# Patient Record
Sex: MALE | Race: WHITE | NOT HISPANIC OR LATINO | Employment: OTHER | URBAN - NONMETROPOLITAN AREA
[De-identification: names, ages, dates, MRNs, and addresses within clinical notes are randomized per-mention and may not be internally consistent; named-entity substitution may affect disease eponyms.]

---

## 2017-03-24 RX ORDER — ESOMEPRAZOLE MAGNESIUM 40 MG/1
CAPSULE, DELAYED RELEASE ORAL
Qty: 90 CAPSULE | Refills: 1 | Status: SHIPPED | OUTPATIENT
Start: 2017-03-24 | End: 2023-03-02 | Stop reason: SDUPTHER

## 2017-03-24 RX ORDER — FLUTICASONE PROPIONATE 50 MCG
SPRAY, SUSPENSION (ML) NASAL
Qty: 48 G | Refills: 2 | Status: SHIPPED | OUTPATIENT
Start: 2017-03-24 | End: 2021-06-21

## 2017-03-24 RX ORDER — BUPROPION HYDROCHLORIDE 300 MG/1
300 TABLET ORAL DAILY
Qty: 30 TABLET | Refills: 2 | Status: SHIPPED | OUTPATIENT
Start: 2017-03-24

## 2017-03-24 RX ORDER — BUPROPION HYDROCHLORIDE 300 MG/1
TABLET ORAL
Qty: 90 TABLET | Refills: 2 | OUTPATIENT
Start: 2017-03-24

## 2017-03-24 RX ORDER — PROPRANOLOL HCL 60 MG
CAPSULE, EXTENDED RELEASE 24HR ORAL
Qty: 90 CAPSULE | Refills: 1 | Status: SHIPPED | OUTPATIENT
Start: 2017-03-24 | End: 2017-04-20 | Stop reason: SDUPTHER

## 2017-04-20 RX ORDER — PROPRANOLOL HCL 60 MG
60 CAPSULE, EXTENDED RELEASE 24HR ORAL DAILY
Qty: 90 CAPSULE | Refills: 1 | Status: SHIPPED | OUTPATIENT
Start: 2017-04-20 | End: 2021-03-03 | Stop reason: SDUPTHER

## 2017-06-13 ENCOUNTER — HOSPITAL ENCOUNTER (OUTPATIENT)
Dept: PHYSICAL THERAPY | Facility: HOSPITAL | Age: 62
Setting detail: THERAPIES SERIES
Discharge: HOME | End: 2017-06-13

## 2017-06-13 ENCOUNTER — TRANSCRIBE ORDERS (OUTPATIENT)
Dept: PHYSICAL THERAPY | Facility: HOSPITAL | Age: 62
End: 2017-06-13

## 2017-06-13 DIAGNOSIS — M17.10 PRIMARY OSTEOARTHRITIS OF ONE KNEE, UNSPECIFIED LATERALITY: Primary | ICD-10-CM

## 2017-06-13 DIAGNOSIS — G89.29 CHRONIC PAIN OF RIGHT KNEE: ICD-10-CM

## 2017-06-13 DIAGNOSIS — Z96.651 STATUS POST TOTAL RIGHT KNEE REPLACEMENT: Primary | ICD-10-CM

## 2017-06-13 DIAGNOSIS — R79.1 ABNORMAL COAGULATION PROFILE: ICD-10-CM

## 2017-06-13 DIAGNOSIS — M25.561 CHRONIC PAIN OF RIGHT KNEE: ICD-10-CM

## 2017-06-13 PROCEDURE — 97162 PT EVAL MOD COMPLEX 30 MIN: CPT | Performed by: PHYSICAL THERAPIST

## 2017-06-13 PROCEDURE — 97110 THERAPEUTIC EXERCISES: CPT | Performed by: PHYSICAL THERAPIST

## 2017-06-13 NOTE — THERAPY EVALUATION
Outpatient Physical Therapy Ortho Initial Evaluation  [facility]     Patient Name: Otf Murray  : 1955  MRN: [id number]  Today's Date: 2017      Visit Date: 2017    There is no problem list on file for this patient.  Visit   Recert date 17  MD visit 17      S/P R TKR 17     Past Medical History:   Diagnosis Date   • Acute gastritis without bleeding    • Alcohol abuse counseling and surveillance of alcoholic    • Allergic rhinitis    • Anxiety state    • Attention deficit disorder of childhood with hyperactivity    • Attention deficit hyperactivity disorder    • Attention deficit hyperactivity disorder, predominantly hyperactive impulsive type    • Attention deficit hyperactivity disorder, predominantly inattentive type    • Backache    • Body mass index (bmi) 31.0-31.9, adult     Body mass index (BMI) 31.0-31.9, adult - BMI 33.5      • Burn of lower leg    • Candidiasis of skin    • Cardiac murmur, unspecified    • Decreased testosterone level    • Depressive disorder     Depressive disorder - acute on chronic      • Dysgraphia    • Edema    • Elbow joint pain    • Encounter for general adult medical examination with abnormal findings    • Encounter for screening for malignant neoplasm of colon    • Essential hypertension    • Essential tremor    • Ex-smoker    • Fatigue    • Gastro-esophageal reflux disease with esophagitis    • Gastroesophageal reflux disease    • Hyperlipidemia    • Hyperlipoproteinemia    • Impotence of organic origin    • Insomnia    • Intermittent palpitations    • Knee pain     Knee pain - patellofemoral      • Lateral epicondylitis    • Low back pain    • Male erectile disorder    • Male erectile dysfunction, unspecified    • Male hypogonadism    • Neck pain     Neck pain aggravated by recumbency      • Obese     Obese - BMI 33.0      • On long term drug therapy    • Osteoarthrosis     Osteoarthrosis, unspecified whether generalized or  localized, involving lower leg      • Other obesity    • Other specified diseases of anus and rectum     Other specified diseases of anus and rectum - rectal pain after c-scope prep      • Overweight    • Pain in left knee    • Pain in right knee    • Shoulder pain, right    • Spasm of back muscles    • Tachycardia, unspecified    • Tremor, unspecified    • Tubular adenoma of colon    • Unspecified essential hypertension         Past Surgical History:   Procedure Laterality Date   • COLONOSCOPY  03/16/2016    One polyp in the sigmoid colon.Resected and retrieved.        • CYST REMOVAL  10/27/2009     Excision of sebaceous cyst of the forehead, glabellar region.   • ENDOSCOPY  03/16/2016    Mildly severe esophagitis.Gastritis.Normal examined duodenum.Several biopsies obtained in the lower third of the esophagus.    • ENDOSCOPY AND COLONOSCOPY  04/12/2006     Normal colonoscopic examination    • INCISION AND DRAINAGE ABSCESS  10/24/2012   • INJECTION OF MEDICATION  04/21/2011    Depo Medrol (Methylprednisone) 80mg (1)        • INJECTION OF MEDICATION  02/01/2016    Kenalog (3)        • INJECTION OF MEDICATION  02/19/2016    Toradol (3)    • KNEE ARTHROSCOPY  04/02/2009     Medial meniscus tear of left knee   • SCROTUM EXPLORATION  07/02/2002     Excision of epidermal inclusion cyst. base of left scrotum   • SKIN BIOPSY  04/23/2012   • SKIN TAG REMOVAL  04/25/2002    Skin tag, left scrotum        Visit Dx:     ICD-10-CM ICD-9-CM   1. Status post total right knee replacement Z96.651 V43.65   2. Chronic pain of right knee M25.561 719.46    G89.29 338.29             Patient History       06/13/17 1300          History    Chief Complaint Pain  -KW      Type of Pain Knee pain  -KW      Date Current Problem(s) Began --   3 years ago  -KW      Brief Description of Current Complaint Difficulty walking, stairs, discomfort  -KW      Previous treatment for THIS PROBLEM Injections;Rehabilitation;Medication;Surgery  -KW       Surgery Date: 06/08/17  -KW      Patient/Caregiver Goals Relieve pain;Improve mobility;Improve strength  -KW      Current Tobacco Use none  -KW      Smoking Status none  -KW      Patient's Rating of General Health Good  -KW      Hand Dominance right-handed  -KW      Occupation/sports/leisure activities [place]  -KW      What clinical tests have you had for this problem? MRI  -KW      Results of Clinical Tests torn ACL,  -KW      History of Previous Related Injuries L knee scope 6 years ago  -KW      Pain     Pain Location Knee  -KW      Pain at Present 3  -KW      Pain at Best 2  -KW      Pain at Worst 10  -KW      Pain Description Burning;Discomfort;Dull  -KW      What Performance Factors Make the Current Problem(s) WORSE? moving, walking  -KW      What Performance Factors Make the Current Problem(s) BETTER? rest, meds  -KW      Is your sleep disturbed? No  -KW      What position do you sleep in? Right sidelying;Left sidelying;Supine  -KW      Fall Risk Assessment    Any falls in the past year: No  -KW      Services    Prior Rehab/Home Health Experiences No  -KW      Daily Activities    Primary Language English  -KW      Safety    Are you being hurt, hit, or frightened by anyone at home or in your life? No  -KW        User Key  (r) = Recorded By, (t) = Taken By, (c) = Cosigned By    Initials Name Provider Type    KW Pearl Mckeon, PT Physical Therapist                PT Ortho       06/13/17 1300    Posture/Observations    Observations Edema;Ecchymosis/bruising;Incision healing;Muscle atrophy  -KW    Posture/Observations Comments --   non antalgic gait with RW and compression sock on  -KW    Sensation    Sensation WNL? WNL  -KW    Knee Palpation    Patella Tendon Right:;Tender  -KW    Quads Right:;Elicits spasm;Guarded/taut;Trigger point  -KW    Knee Palpation? Yes  -KW    ROM (Range of Motion)    General ROM Detail R AROM knee 5-110, PROM 4-120  -KW    MMT (Manual Muscle Testing)    General MMT  Assessment Detail Held sec to recent sx  -KW    Flexibility    Flexibility Tested? Lower Extremity  -KW    Lower Extremity Flexibility    Hamstrings Right:;Moderately limited  -KW    Girth    Girth Measured? Right Lower Extremity  -KW    RLE Quick Girth (cm)    Mid patella 49 cm  -KW    Distal thigh 51 cm  -KW    Gait Assessment/Treatment    Gait, Caswell Level independent  -KW    Gait, Assistive Device rolling walker  -KW    Gait, Distance (Feet) 50  -KW    Gait, Gait Deviations decreased heel strike;step length decreased;stride length decreased;saman decreased  -KW    Gait, Impairments ROM decreased;strength decreased;pain  -KW      User Key  (r) = Recorded By, (t) = Taken By, (c) = Cosigned By    Initials Name Provider Type    CIPRIANO Mckeon, PT Physical Therapist                            Therapy Education       06/13/17 1348          Therapy Education    Given HEP  -KW      Program New  -KW      How Provided Verbal;Demonstration  -KW      Provided to Patient  -KW      Level of Understanding Verbalized;Demonstrated  -KW        User Key  (r) = Recorded By, (t) = Taken By, (c) = Cosigned By    Initials Name Provider Type    CIPRIANO Mckeon, PT Physical Therapist                PT OP Goals       06/13/17 1400       PT Short Term Goals    STG Date to Achieve 06/27/17  -KW     STG 1 Decrease R knee pain to 2/10  -KW     STG 2 Inc R knee AROM to 0-120 degrees  -KW     STG 3 Decrease edema R knee at jointline to 48 cm  -KW     STG 4 Improve Hamstring flexibility from mod to min  -KW     Long Term Goals    LTG Date to Achieve 07/04/17  -KW     LTG 1 Ind w/ final HEP  -KW     LTG 2 Inc R knee AROM to 0-125 degrees  -KW     LTG 3 Decrease R knee edema at joint line to 47cm or less  -KW     LTG 4 Increase LEFS score to 40/80 or better  -KW     LTG 5 Improve Hamsting flexibility from min to WNL  -KW     Time Calculation    PT Goal Re-Cert Due Date 07/04/17  -KW       User Key  (r) = Recorded By, (t) = Taken  By, (c) = Cosigned By    Initials Name Provider Type    CIPRIANO Mckeon, PT Physical Therapist                PT Assessment/Plan       06/13/17 1411       PT Assessment    Functional Limitations Impaired gait;Impaired locomotion;Limitation in home management;Limitations in community activities;Performance in leisure activities;Performance in self-care ADL  -KW     Impairments Joint integrity;Joint mobility;Range of motion;Impaired flexibility;Edema;Endurance;Locomotion;Muscle strength;Pain;Gait  -KW     Assessment Comments moderate edema R knee, decreased quad strength on R  -KW     Please refer to paper survey for additional self-reported information No  -KW     Rehab Potential Good  -KW     Patient/caregiver participated in establishment of treatment plan and goals Yes  -KW     Patient would benefit from skilled therapy intervention Yes  -KW     PT Plan    PT Frequency 4x/week  -KW     Predicted Duration of Therapy Intervention (days/wks) 4 weeks  -KW     Planned CPT's? PT EVAL MOD COMPLELITY: 32593;PT RE-EVAL: 86678;PT THER ACT EA 15 MIN: 75608;PT AQUATIC THERAPY EA 15 MIN: 45144;PT THER SUPP EA 15 MIN;PT MANUAL THERAPY EA 15 MIN: 31329;PT ELECTRICAL STIM UNATTEND: ;PT GAIT TRAINING EA 15 MIN: 53766;PT THER PROC EA 15 MIN: 19816  -KW     Physical Therapy Interventions (Optional Details) aquatics exercise;ROM (Range of Motion);stair training;strengthening;stretching;modalities;manual therapy techniques;balance training;swiss ball techniques;gait training;home exercise program;joint mobilization;patient/family education;gross motor skills  -KW     PT Plan Comments S/p R TKR on 6/8/17. 3-4x per week for the first 2 weeks Inc ROm and strength R knee  -KW       User Key  (r) = Recorded By, (t) = Taken By, (c) = Cosigned By    Initials Name Provider Type    CIPRIANO Mckeon, PT Physical Therapist                Modalities       06/13/17 1300          Ice    Ice Applied Yes  -KW      Location R knee  -KW       Rx Minutes 12 mins  -KW        User Key  (r) = Recorded By, (t) = Taken By, (c) = Cosigned By    Initials Name Provider Type    CIPRIANO Mckeon PT Physical Therapist              Exercises       06/13/17 1400          Subjective Comments    Subjective Comments Reports s/p R TKR on 6/8/17. states he had a no problems to get up and walking after sx. Has minimal pain today in R knee. States prior to sx he had years of R knee pain due to OA  -KW      Subjective Pain    Able to rate subjective pain? yes  -KW      Pre-Treatment Pain Level 3  -KW      Post-Treatment Pain Level 1  -KW      Exercise 1    Exercise Name 1 heel slides  -KW      Sets 1 2  -KW      Reps 1 10  -KW      Exercise 2    Exercise Name 2 PROM R knee  -KW      Sets 2 1  -KW      Reps 2 10  -KW      Exercise 3    Exercise Name 3 quad sets  -KW      Sets 3 3  -KW      Reps 3 10  -KW      Exercise 4    Exercise Name 4 stertch Hamstrings  -KW      Sets 4 3  -KW      Time (Seconds) 4 30  -KW        User Key  (r) = Recorded By, (t) = Taken By, (c) = Cosigned By    Initials Name Provider Type    CIPRIANO Mckeon, ODETTE Physical Therapist                              Outcome Measures       06/13/17 1400          Lower Extremity Functional Index    Any of your usual work, housework or school activities 0  -KW      Your usual hobbies, recreational or sporting activities 0  -KW      Getting into or out of the bath 2  -KW      Walking between rooms 2  -KW      Putting on your shoes or socks 0  -KW      Squatting 0  -KW      Lifting an object, like a bag of groceries from the floor 3  -KW      Performing light activities around your home 0  -KW      Performing heavy activities around your home 0  -KW      Getting into or out of a car 2  -KW      Walking 2 blocks 0  -KW      Walking a mile 0  -KW      Going up or down 10 stairs (about 1 flight of stairs) 0  -KW      Standing for 1 hour 0  -KW      Sitting for 1 hour 4  -KW      Running on even ground 0  -KW       Running on uneven ground 0  -KW      Making sharp turns while running fast 0  -KW      Hopping 0  -KW      Rolling over in bed 2  -KW      Total 15  -KW      Functional Assessment    Outcome Measure Options Lower Extremity Functional Scale (LEFS)  -KW        User Key  (r) = Recorded By, (t) = Taken By, (c) = Cosigned By    Initials Name Provider Type    KW Pearl Mckeon, PT Physical Therapist            Time Calculation:   Start Time: 1300  Stop Time: 1358  Time Calculation (min): 58 min  Total Timed Code Minutes- PT: 15 minute(s)     Therapy Charges for Today     Code Description Service Date Service Provider Modifiers Qty    32927818442 HC PT EVAL MOD COMPLEXITY 3 6/13/2017 Pearl Mckeon, PT GP 1    30576643043 HC PT THER PROC EA 15 MIN 6/13/2017 Pearl Mckeon, PT GP 1          PT G-Codes  Outcome Measure Options: Lower Extremity Functional Scale (LEFS)         Pearl Mckeon, PT  6/13/2017

## 2017-06-14 ENCOUNTER — HOSPITAL ENCOUNTER (OUTPATIENT)
Dept: PHYSICAL THERAPY | Facility: HOSPITAL | Age: 62
Setting detail: THERAPIES SERIES
Discharge: HOME | End: 2017-06-14

## 2017-06-14 DIAGNOSIS — Z96.651 STATUS POST TOTAL RIGHT KNEE REPLACEMENT: Primary | ICD-10-CM

## 2017-06-14 DIAGNOSIS — G89.29 CHRONIC PAIN OF RIGHT KNEE: ICD-10-CM

## 2017-06-14 DIAGNOSIS — M25.561 CHRONIC PAIN OF RIGHT KNEE: ICD-10-CM

## 2017-06-14 PROCEDURE — G0283 ELEC STIM OTHER THAN WOUND: HCPCS

## 2017-06-14 PROCEDURE — 97110 THERAPEUTIC EXERCISES: CPT

## 2017-06-14 NOTE — THERAPY TREATMENT NOTE
Outpatient Physical Therapy Ortho Treatment Note  [facility]     Patient Name: Otf Murray  : 1955  MRN: [id number]  Today's Date: 2017      Visit Date: 2017     sUBJECTIVE iMPROVEMENT 0  vISITS 2/2  VISITS APPROVED 30 per year  RTMD 2017  Recert Prfp2-    S/P right TKA on 2017      Visit Dx:    ICD-10-CM ICD-9-CM   1. Status post total right knee replacement Z96.651 V43.65   2. Chronic pain of right knee M25.561 719.46    G89.29 338.29       There is no problem list on file for this patient.       Past Medical History:   Diagnosis Date   • Acute gastritis without bleeding    • Alcohol abuse counseling and surveillance of alcoholic    • Allergic rhinitis    • Anxiety state    • Attention deficit disorder of childhood with hyperactivity    • Attention deficit hyperactivity disorder    • Attention deficit hyperactivity disorder, predominantly hyperactive impulsive type    • Attention deficit hyperactivity disorder, predominantly inattentive type    • Backache    • Body mass index (bmi) 31.0-31.9, adult     Body mass index (BMI) 31.0-31.9, adult - BMI 33.5      • Burn of lower leg    • Candidiasis of skin    • Cardiac murmur, unspecified    • Decreased testosterone level    • Depressive disorder     Depressive disorder - acute on chronic      • Dysgraphia    • Edema    • Elbow joint pain    • Encounter for general adult medical examination with abnormal findings    • Encounter for screening for malignant neoplasm of colon    • Essential hypertension    • Essential tremor    • Ex-smoker    • Fatigue    • Gastro-esophageal reflux disease with esophagitis    • Gastroesophageal reflux disease    • Hyperlipidemia    • Hyperlipoproteinemia    • Impotence of organic origin    • Insomnia    • Intermittent palpitations    • Knee pain     Knee pain - patellofemoral      • Lateral epicondylitis    • Low back pain    • Male erectile disorder    • Male erectile dysfunction,  unspecified    • Male hypogonadism    • Neck pain     Neck pain aggravated by recumbency      • Obese     Obese - BMI 33.0      • On long term drug therapy    • Osteoarthrosis     Osteoarthrosis, unspecified whether generalized or localized, involving lower leg      • Other obesity    • Other specified diseases of anus and rectum     Other specified diseases of anus and rectum - rectal pain after c-scope prep      • Overweight    • Pain in left knee    • Pain in right knee    • Shoulder pain, right    • Spasm of back muscles    • Tachycardia, unspecified    • Tremor, unspecified    • Tubular adenoma of colon    • Unspecified essential hypertension         Past Surgical History:   Procedure Laterality Date   • COLONOSCOPY  03/16/2016    One polyp in the sigmoid colon.Resected and retrieved.        • CYST REMOVAL  10/27/2009     Excision of sebaceous cyst of the forehead, glabellar region.   • ENDOSCOPY  03/16/2016    Mildly severe esophagitis.Gastritis.Normal examined duodenum.Several biopsies obtained in the lower third of the esophagus.    • ENDOSCOPY AND COLONOSCOPY  04/12/2006     Normal colonoscopic examination    • INCISION AND DRAINAGE ABSCESS  10/24/2012   • INJECTION OF MEDICATION  04/21/2011    Depo Medrol (Methylprednisone) 80mg (1)        • INJECTION OF MEDICATION  02/01/2016    Kenalog (3)        • INJECTION OF MEDICATION  02/19/2016    Toradol (3)    • KNEE ARTHROSCOPY  04/02/2009     Medial meniscus tear of left knee   • SCROTUM EXPLORATION  07/02/2002     Excision of epidermal inclusion cyst. base of left scrotum   • SKIN BIOPSY  04/23/2012   • SKIN TAG REMOVAL  04/25/2002    Skin tag, left scrotum              PT Ortho       06/14/17 1500    ROM (Range of Motion)    General ROM Detail Right AROM fl 115  -CP    MMT (Manual Muscle Testing)    General MMT Assessment Detail No Independent SLR  -CP      06/14/17 1400    Subjective Comments    Subjective Comments Patient report doing well so far.  thinks  he may have walked a little too much the other day  -CP    Subjective Pain    Able to rate subjective pain? yes  -CP    Pre-Treatment Pain Level 4  -CP    Post-Treatment Pain Level 3  -CP    Posture/Observations    Posture/Observations Comments osiel hose bilateral LE  -CP    Gait Assessment/Treatment    Gait, [place] Level conditional independence  -CP    Gait, Assistive Device rolling walker  -CP    Gait, Gait Deviations antalgic  -CP      06/13/17 1300    Posture/Observations    Observations Edema;Ecchymosis/bruising;Incision healing;Muscle atrophy  -KW    Posture/Observations Comments --   non antalgic gait with RW and compression sock on  -KW    Sensation    Sensation WNL? WNL  -KW    Knee Palpation    Patella Tendon Right:;Tender  -KW    Quads Right:;Elicits spasm;Guarded/taut;Trigger point  -KW    Knee Palpation? Yes  -KW    ROM (Range of Motion)    General ROM Detail R AROM knee 5-110, PROM 4-120  -KW    MMT (Manual Muscle Testing)    General MMT Assessment Detail Held sec to recent sx  -KW    Flexibility    Flexibility Tested? Lower Extremity  -KW    Lower Extremity Flexibility    Hamstrings Right:;Moderately limited  -KW    Girth    Girth Measured? Right Lower Extremity  -KW    RLE Quick Girth (cm)    Mid patella 49 cm  -KW    Distal thigh 51 cm  -KW    Gait Assessment/Treatment    Gait, [place] Level independent  -KW    Gait, Assistive Device rolling walker  -KW    Gait, Distance (Feet) 50  -KW    Gait, Gait Deviations decreased heel strike;step length decreased;stride length decreased;saman decreased  -KW    Gait, Impairments ROM decreased;strength decreased;pain  -KW      User Key  (r) = Recorded By, (t) = Taken By, (c) = Cosigned By    Initials Name Provider Type    CP Amy Santoyo, PTA Physical Therapy Assistant    KW Pearl Mckeon, PT Physical Therapist                            PT Assessment/Plan       06/14/17 6755       PT Assessment    Assessment Comments Patient has progressed  nicely so far.    -CP     PT Plan    PT Frequency 4x/week  -CP     Predicted Duration of Therapy Intervention (days/wks) 2 weeks  -CP     PT Plan Comments cont with POC. Standing SLR and hip AB  -CP       User Key  (r) = Recorded By, (t) = Taken By, (c) = Cosigned By    Initials Name Provider Type    CP Amy Santoyo PTA Physical Therapy Assistant                Modalities       06/14/17 1400          Ice    Ice Applied Yes  -CP      Location Right Knee  -CP      Rx Minutes --   20 minutes  -CP      Ice S/P Rx Yes  -CP      ELECTRICAL STIMULATION    Attended/Unattended Unattended  -CP      Stimulation Type IFC  -CP      Location/Electrode Placement/Other right knee  -CP      Rx Minutes 20 mins  -CP        User Key  (r) = Recorded By, (t) = Taken By, (c) = Cosigned By    Initials Name Provider Type    CP Amy Santoyo PTA Physical Therapy Assistant                Exercises       06/14/17 1400          Subjective Comments    Subjective Comments Patient report doing well so far.  thinks he may have walked a little too much the other day  -CP      Subjective Pain    Able to rate subjective pain? yes  -CP      Pre-Treatment Pain Level 4  -CP      Post-Treatment Pain Level 3  -CP      Exercise 1    Exercise Name 1 Pro II level 1 for ROM  -CP      Time (Minutes) 1 10  -CP      Exercise 2    Exercise Name 2 Incline Stretch  -CP      Sets 2 3  -CP      Time (Seconds) 2 30  -CP      Exercise 3    Exercise Name 3 HS stretch  -CP      Sets 3 3  -CP      Time (Seconds) 3 30  -CP      Exercise 4    Exercise Name 4 Lunge Stretch  -CP      Sets 4 3  -CP      Time (Seconds) 4 30  -CP      Exercise 5    Exercise Name 5 Heel raises  -CP      Reps 5 20  -CP      Exercise 6    Exercise Name 6 seated heelslides  -CP      Sets 6 2  -CP      Reps 6 10  -CP      Exercise 7    Exercise Name 7 seated leg swings/kicks on high table  -CP      Reps 7 30  -CP      Exercise 8    Exercise Name 8 LAQ  -CP      Sets 8 2  -CP      Reps 8 10   -CP      Exercise 9    Exercise Name 9 Heelslides with strap  -CP      Reps 9 30  -CP      Exercise 10    Exercise Name 10 QS  -CP      Sets 10 3  -CP      Reps 10 10  -CP      Time (Seconds) 10 5  -CP        User Key  (r) = Recorded By, (t) = Taken By, (c) = Cosigned By    Initials Name Provider Type    CP Amy Santoyo PTA Physical Therapy Assistant                               PT OP Goals       06/14/17 1500       PT Short Term Goals    STG Date to Achieve 06/27/17  -CP     STG 1 Decrease R knee pain to 2/10  -CP     STG 1 Progress Not Met  -CP     STG 2 Inc R knee AROM to 0-120 degrees  -CP     STG 2 Progress Progressing  -CP     STG 3 Decrease edema R knee at jointline to 48 cm  -CP     STG 3 Progress Not Met  -CP     STG 4 Improve Hamstring flexibility from mod to min  -CP     STG 4 Progress Progressing  -CP     Long Term Goals    LTG Date to Achieve 07/04/17  -CP     LTG 1 Ind w/ final HEP  -CP     LTG 1 Progress Progressing;Ongoing  -CP     LTG 2 Inc R knee AROM to 0-125 degrees  -CP     LTG 2 Progress Not Met  -CP     LTG 3 Decrease R knee edema at joint line to 47cm or less  -CP     LTG 3 Progress Not Met  -CP     LTG 4 Increase LEFS score to 40/80 or better  -CP     LTG 4 Progress Not Met  -CP     LTG 5 Improve Hamsting flexibility from min to WNL  -CP     LTG 5 Progress Not Met  -CP     Time Calculation    PT Goal Re-Cert Due Date 07/04/17  -CP       User Key  (r) = Recorded By, (t) = Taken By, (c) = Cosigned By    Initials Name Provider Type    CP Amy Santoyo PTA Physical Therapy Assistant                Therapy Education       06/14/17 1537          Therapy Education    Given HEP   seated heelslides  -CP      Program New  -CP      How Provided Verbal;Demonstration  -CP      Provided to Patient  -CP      Level of Understanding Verbalized;Demonstrated  -CP        User Key  (r) = Recorded By, (t) = Taken By, (c) = Cosigned By    Initials Name Provider Type    CP Amy Santoyo PTA Physical  Therapy Assistant                Outcome Measures       06/13/17 1400          Lower Extremity Functional Index    Any of your usual work, housework or school activities 0  -KW      Your usual hobbies, recreational or sporting activities 0  -KW      Getting into or out of the bath 2  -KW      Walking between rooms 2  -KW      Putting on your shoes or socks 0  -KW      Squatting 0  -KW      Lifting an object, like a bag of groceries from the floor 3  -KW      Performing light activities around your home 0  -KW      Performing heavy activities around your home 0  -KW      Getting into or out of a car 2  -KW      Walking 2 blocks 0  -KW      Walking a mile 0  -KW      Going up or down 10 stairs (about 1 flight of stairs) 0  -KW      Standing for 1 hour 0  -KW      Sitting for 1 hour 4  -KW      Running on even ground 0  -KW      Running on uneven ground 0  -KW      Making sharp turns while running fast 0  -KW      Hopping 0  -KW      Rolling over in bed 2  -KW      Total 15  -KW      Functional Assessment    Outcome Measure Options Lower Extremity Functional Scale (LEFS)  -KW        User Key  (r) = Recorded By, (t) = Taken By, (c) = Cosigned By    Initials Name Provider Type    CIPRIANO Mckeon, PT Physical Therapist            Time Calculation:   Start Time: 1433  Stop Time: 1542  Time Calculation (min): 69 min  Total Timed Code Minutes- PT: 65 minute(s)    Therapy Charges for Today     Code Description Service Date Service Provider Modifiers Qty    86134427695 HC PT THER PROC EA 15 MIN 6/14/2017 Amy Santoyo PTA GP 3    93543884113 HC PT ELECTRICAL STIM UNATTENDED 6/14/2017 Amy Santoyo PTA  1    48685496863 HC PT THER SUPP EA 15 MIN 6/14/2017 Amy Santoyo PTA GP 1     [facility] TENS SUPPL 2 LEAD PER MONTH 6/14/2017 Amy Santoyo PTA  1                    Amy Santoyo PTA  6/14/2017

## 2017-06-15 ENCOUNTER — HOSPITAL ENCOUNTER (OUTPATIENT)
Dept: PHYSICAL THERAPY | Facility: HOSPITAL | Age: 62
Setting detail: THERAPIES SERIES
Discharge: HOME | End: 2017-06-15

## 2017-06-15 DIAGNOSIS — Z96.651 STATUS POST TOTAL RIGHT KNEE REPLACEMENT: Primary | ICD-10-CM

## 2017-06-15 PROCEDURE — G0283 ELEC STIM OTHER THAN WOUND: HCPCS

## 2017-06-15 PROCEDURE — 97110 THERAPEUTIC EXERCISES: CPT

## 2017-06-15 NOTE — THERAPY TREATMENT NOTE
Outpatient Physical Therapy Ortho Treatment Note  [facility]     Patient Name: Otf Murray  : 1955  MRN: [id number]  Today's Date: 6/15/2017      Visit Date: 06/15/2017    Subjective Improvement: 0%   Visits Attended: 3/3   Visits Approved    MD visit: 17   Recert Date: 17       Visit Dx:    ICD-10-CM ICD-9-CM   1. Status post total right knee replacement Z96.651 V43.65       There is no problem list on file for this patient.       Past Medical History:   Diagnosis Date   • Acute gastritis without bleeding    • Alcohol abuse counseling and surveillance of alcoholic    • Allergic rhinitis    • Anxiety state    • Attention deficit disorder of childhood with hyperactivity    • Attention deficit hyperactivity disorder    • Attention deficit hyperactivity disorder, predominantly hyperactive impulsive type    • Attention deficit hyperactivity disorder, predominantly inattentive type    • Backache    • Body mass index (bmi) 31.0-31.9, adult     Body mass index (BMI) 31.0-31.9, adult - BMI 33.5      • Burn of lower leg    • Candidiasis of skin    • Cardiac murmur, unspecified    • Decreased testosterone level    • Depressive disorder     Depressive disorder - acute on chronic      • Dysgraphia    • Edema    • Elbow joint pain    • Encounter for general adult medical examination with abnormal findings    • Encounter for screening for malignant neoplasm of colon    • Essential hypertension    • Essential tremor    • Ex-smoker    • Fatigue    • Gastro-esophageal reflux disease with esophagitis    • Gastroesophageal reflux disease    • Hyperlipidemia    • Hyperlipoproteinemia    • Impotence of organic origin    • Insomnia    • Intermittent palpitations    • Knee pain     Knee pain - patellofemoral      • Lateral epicondylitis    • Low back pain    • Male erectile disorder    • Male erectile dysfunction, unspecified    • Male hypogonadism    • Neck pain     Neck pain aggravated by recumbency       • Obese     Obese - BMI 33.0      • On long term drug therapy    • Osteoarthrosis     Osteoarthrosis, unspecified whether generalized or localized, involving lower leg      • Other obesity    • Other specified diseases of anus and rectum     Other specified diseases of anus and rectum - rectal pain after c-scope prep      • Overweight    • Pain in left knee    • Pain in right knee    • Shoulder pain, right    • Spasm of back muscles    • Tachycardia, unspecified    • Tremor, unspecified    • Tubular adenoma of colon    • Unspecified essential hypertension         Past Surgical History:   Procedure Laterality Date   • COLONOSCOPY  03/16/2016    One polyp in the sigmoid colon.Resected and retrieved.        • CYST REMOVAL  10/27/2009     Excision of sebaceous cyst of the forehead, glabellar region.   • ENDOSCOPY  03/16/2016    Mildly severe esophagitis.Gastritis.Normal examined duodenum.Several biopsies obtained in the lower third of the esophagus.    • ENDOSCOPY AND COLONOSCOPY  04/12/2006     Normal colonoscopic examination    • INCISION AND DRAINAGE ABSCESS  10/24/2012   • INJECTION OF MEDICATION  04/21/2011    Depo Medrol (Methylprednisone) 80mg (1)        • INJECTION OF MEDICATION  02/01/2016    Kenalog (3)        • INJECTION OF MEDICATION  02/19/2016    Toradol (3)    • KNEE ARTHROSCOPY  04/02/2009     Medial meniscus tear of left knee   • SCROTUM EXPLORATION  07/02/2002     Excision of epidermal inclusion cyst. base of left scrotum   • SKIN BIOPSY  04/23/2012   • SKIN TAG REMOVAL  04/25/2002    Skin tag, left scrotum              PT Ortho       06/15/17 1500    ROM (Range of Motion)    General ROM Detail AROM of R knee = 118*  -TW      06/14/17 1500    ROM (Range of Motion)    General ROM Detail Right AROM fl 115  -CP    MMT (Manual Muscle Testing)    General MMT Assessment Detail No Independent SLR  -CP      06/14/17 1400    Subjective Comments    Subjective Comments Patient report doing well so far.   thinks he may have walked a little too much the other day  -CP    Subjective Pain    Able to rate subjective pain? yes  -CP    Pre-Treatment Pain Level 4  -CP    Post-Treatment Pain Level 3  -CP    Posture/Observations    Posture/Observations Comments osiel hose bilateral LE  -CP    Gait Assessment/Treatment    Gait, [place] Level conditional independence  -CP    Gait, Assistive Device rolling walker  -CP    Gait, Gait Deviations antalgic  -CP      06/13/17 1300    Posture/Observations    Observations Edema;Ecchymosis/bruising;Incision healing;Muscle atrophy  -KW    Posture/Observations Comments --   non antalgic gait with RW and compression sock on  -KW    Sensation    Sensation WNL? WNL  -KW    Knee Palpation    Patella Tendon Right:;Tender  -KW    Quads Right:;Elicits spasm;Guarded/taut;Trigger point  -KW    Knee Palpation? Yes  -KW    ROM (Range of Motion)    General ROM Detail R AROM knee 5-110, PROM 4-120  -KW    MMT (Manual Muscle Testing)    General MMT Assessment Detail Held sec to recent sx  -KW    Flexibility    Flexibility Tested? Lower Extremity  -KW    Lower Extremity Flexibility    Hamstrings Right:;Moderately limited  -KW    Girth    Girth Measured? Right Lower Extremity  -KW    RLE Quick Girth (cm)    Mid patella 49 cm  -KW    Distal thigh 51 cm  -KW    Gait Assessment/Treatment    Gait, [place] Level independent  -KW    Gait, Assistive Device rolling walker  -KW    Gait, Distance (Feet) 50  -KW    Gait, Gait Deviations decreased heel strike;step length decreased;stride length decreased;saman decreased  -KW    Gait, Impairments ROM decreased;strength decreased;pain  -KW      User Key  (r) = Recorded By, (t) = Taken By, (c) = Cosigned By    Initials Name Provider Type     Dennis Hare PTA Physical Therapy Assistant    MACIE Santoyo PTA Physical Therapy Assistant    KW Pearl Mckeon, PT Physical Therapist                            PT Assessment/Plan       06/15/17 1600     "   PT Assessment    Assessment Comments Pt cont to improve with ROM and transfer ability.  -TW     PT Plan    PT Frequency 4x/week  -TW     Predicted Duration of Therapy Intervention (days/wks) 2 weeks  -TW     PT Plan Comments Cont with POC add standing SLR and Hip abd.  -TW       User Key  (r) = Recorded By, (t) = Taken By, (c) = Cosigned By    Initials Name Provider Type    TW Dennis Hare PTA Physical Therapy Assistant                Modalities       06/15/17 1500          Ice    Ice Applied Yes  -TW      Location Right Knee  -TW      Rx Minutes --   20 min with IFC  -TW      Ice S/P Rx Yes  -TW      ELECTRICAL STIMULATION    Attended/Unattended Attended  -TW      Stimulation Type IFC  -TW      Location/Electrode Placement/Other right knee  -TW      Rx Minutes 20 mins  -TW        User Key  (r) = Recorded By, (t) = Taken By, (c) = Cosigned By    Initials Name Provider Type     Dennis Hare PTA Physical Therapy Assistant                Exercises       06/15/17 1500          Subjective Comments    Subjective Comments Pt reports that his knee feels \"Stiff\" and pain goes up and down.  -TW      Subjective Pain    Able to rate subjective pain? yes  -TW      Pre-Treatment Pain Level 4  -TW      Exercise 1    Exercise Name 1 Pro II, level 2, seat at 14  -TW      Time (Minutes) 1 10  -TW      Exercise 2    Exercise Name 2 Incline Stretch  -TW      Sets 2 3  -TW      Time (Seconds) 2 30  -TW      Exercise 3    Exercise Name 3 HS stretch  -TW      Sets 3 3  -TW      Time (Seconds) 3 30  -TW      Exercise 4    Exercise Name 4 Lunge stretch  -TW      Sets 4 3  -TW      Time (Seconds) 4 30  -TW      Exercise 5    Exercise Name 5 Heelraises  -TW      Reps 5 30  -TW      Exercise 6    Exercise Name 6 seated heelslides  -TW      Sets 6 2  -TW      Reps 6 15  -TW      Exercise 7    Exercise Name 7 seated leg swings/kicks on high table  -TW      Reps 7 30  -TW      Exercise 8    Exercise Name 8 LAQ  -TW      Sets 8 " 2  -TW      Reps 8 15  -TW      Exercise 9    Exercise Name 9 Heelslides with strap  -TW      Sets 9 2  -TW      Reps 9 30  -TW      Exercise 10    Exercise Name 10 QS  -TW      Sets 10 3  -TW      Reps 10 15  -TW      Time (Seconds) 10 5  -TW        User Key  (r) = Recorded By, (t) = Taken By, (c) = Cosigned By    Initials Name Provider Type    TW Dennis Hare PTA Physical Therapy Assistant                               PT OP Goals       06/15/17 1614 06/15/17 1500    PT Short Term Goals    STG Date to Achieve  06/27/17  -TW    STG 1  Decrease R knee pain to 2/10  -TW    STG 1 Progress  Not Met  -TW    STG 2  Inc R knee AROM to 0-120 degrees  -TW    STG 2 Progress  Progressing  -TW    STG 3  Decrease edema R knee at jointline to 48 cm  -TW    STG 3 Progress  Not Met  -TW    STG 4  Improve Hamstring flexibility from mod to min  -TW    STG 4 Progress  Progressing  -TW    Long Term Goals    LTG Date to Achieve  07/04/17  -TW    LTG 1  Ind w/ final HEP  -TW    LTG 1 Progress  Progressing;Ongoing  -TW    LTG 2  Inc R knee AROM to 0-125 degrees  -TW    LTG 2 Progress  Not Met  -TW    LTG 3  Decrease R knee edema at joint line to 47cm or less  -TW    LTG 3 Progress  Not Met  -TW    LTG 4  Increase LEFS score to 40/80 or better  -TW    LTG 4 Progress  Not Met  -TW    LTG 5  Improve Hamsting flexibility from min to WNL  -TW    LTG 5 Progress  Not Met  -TW    Time Calculation    PT Goal Re-Cert Due Date 07/04/17  -TW       User Key  (r) = Recorded By, (t) = Taken By, (c) = Cosigned By    Initials Name Provider Type    TW Dennis Hare PTA Physical Therapy Assistant                Therapy Education       06/15/17 1500          Therapy Education    Given HEP;Pain management  -TW      Program Reinforced  -TW      How Provided Verbal  -TW      Provided to Patient  -TW      Level of Understanding Verbalized;Demonstrated  -TW        User Key  (r) = Recorded By, (t) = Taken By, (c) = Cosigned By    Initials Name  Provider Type    TW Dennis Hare PTA Physical Therapy Assistant                Outcome Measures       06/13/17 1400          Lower Extremity Functional Index    Any of your usual work, housework or school activities 0  -KW      Your usual hobbies, recreational or sporting activities 0  -KW      Getting into or out of the bath 2  -KW      Walking between rooms 2  -KW      Putting on your shoes or socks 0  -KW      Squatting 0  -KW      Lifting an object, like a bag of groceries from the floor 3  -KW      Performing light activities around your home 0  -KW      Performing heavy activities around your home 0  -KW      Getting into or out of a car 2  -KW      Walking 2 blocks 0  -KW      Walking a mile 0  -KW      Going up or down 10 stairs (about 1 flight of stairs) 0  -KW      Standing for 1 hour 0  -KW      Sitting for 1 hour 4  -KW      Running on even ground 0  -KW      Running on uneven ground 0  -KW      Making sharp turns while running fast 0  -KW      Hopping 0  -KW      Rolling over in bed 2  -KW      Total 15  -KW      Functional Assessment    Outcome Measure Options Lower Extremity Functional Scale (LEFS)  -KW        User Key  (r) = Recorded By, (t) = Taken By, (c) = Cosigned By    Initials Name Provider Type    KW Pearl Mckeon, PT Physical Therapist            Time Calculation:   Start Time: 1507  Stop Time: 1625  Time Calculation (min): 78 min  PT Non-Billable Time (min): 20 min  Total Timed Code Minutes- PT: 58 minute(s)    Therapy Charges for Today     Code Description Service Date Service Provider Modifiers Qty    18367970444 HC PT THER SUPP EA 15 MIN 6/15/2017 Dennis Hare PTA GP 1    15974917729 HC PT ELECTRICAL STIM UNATTENDED 6/15/2017 Dennis Hare PTA  1    14838902563 HC PT THER PROC EA 15 MIN 6/15/2017 Dennis Hare PTA GP 4                    Dennis Hare PTA  6/15/2017

## 2017-06-16 ENCOUNTER — HOSPITAL ENCOUNTER (OUTPATIENT)
Dept: PHYSICAL THERAPY | Facility: HOSPITAL | Age: 62
Setting detail: THERAPIES SERIES
Discharge: HOME | End: 2017-06-16

## 2017-06-16 DIAGNOSIS — G89.29 CHRONIC PAIN OF RIGHT KNEE: ICD-10-CM

## 2017-06-16 DIAGNOSIS — M25.561 CHRONIC PAIN OF RIGHT KNEE: ICD-10-CM

## 2017-06-16 DIAGNOSIS — Z96.651 STATUS POST TOTAL RIGHT KNEE REPLACEMENT: Primary | ICD-10-CM

## 2017-06-16 PROCEDURE — G0283 ELEC STIM OTHER THAN WOUND: HCPCS

## 2017-06-16 PROCEDURE — 97110 THERAPEUTIC EXERCISES: CPT

## 2017-06-16 NOTE — THERAPY TREATMENT NOTE
Outpatient Physical Therapy Ortho Treatment Note  [facility]     Patient Name: Otf Murray  : 1955  MRN: [id number]  Today's Date: 2017      Visit Date: 2017     Subjective Improvement: 30%  Attendance:    Next MD Visit : 17  Recert Date:  17      Therapy Diagnosis:  S/P R TKR 17        Visit Dx:    ICD-10-CM ICD-9-CM   1. Status post total right knee replacement Z96.651 V43.65   2. Chronic pain of right knee M25.561 719.46    G89.29 338.29       There is no problem list on file for this patient.       Past Medical History:   Diagnosis Date   • Acute gastritis without bleeding    • Alcohol abuse counseling and surveillance of alcoholic    • Allergic rhinitis    • Anxiety state    • Attention deficit disorder of childhood with hyperactivity    • Attention deficit hyperactivity disorder    • Attention deficit hyperactivity disorder, predominantly hyperactive impulsive type    • Attention deficit hyperactivity disorder, predominantly inattentive type    • Backache    • Body mass index (bmi) 31.0-31.9, adult     Body mass index (BMI) 31.0-31.9, adult - BMI 33.5      • Burn of lower leg    • Candidiasis of skin    • Cardiac murmur, unspecified    • Decreased testosterone level    • Depressive disorder     Depressive disorder - acute on chronic      • Dysgraphia    • Edema    • Elbow joint pain    • Encounter for general adult medical examination with abnormal findings    • Encounter for screening for malignant neoplasm of colon    • Essential hypertension    • Essential tremor    • Ex-smoker    • Fatigue    • Gastro-esophageal reflux disease with esophagitis    • Gastroesophageal reflux disease    • Hyperlipidemia    • Hyperlipoproteinemia    • Impotence of organic origin    • Insomnia    • Intermittent palpitations    • Knee pain     Knee pain - patellofemoral      • Lateral epicondylitis    • Low back pain    • Male erectile disorder    • Male erectile dysfunction,  unspecified    • Male hypogonadism    • Neck pain     Neck pain aggravated by recumbency      • Obese     Obese - BMI 33.0      • On long term drug therapy    • Osteoarthrosis     Osteoarthrosis, unspecified whether generalized or localized, involving lower leg      • Other obesity    • Other specified diseases of anus and rectum     Other specified diseases of anus and rectum - rectal pain after c-scope prep      • Overweight    • Pain in left knee    • Pain in right knee    • Shoulder pain, right    • Spasm of back muscles    • Tachycardia, unspecified    • Tremor, unspecified    • Tubular adenoma of colon    • Unspecified essential hypertension         Past Surgical History:   Procedure Laterality Date   • COLONOSCOPY  03/16/2016    One polyp in the sigmoid colon.Resected and retrieved.        • CYST REMOVAL  10/27/2009     Excision of sebaceous cyst of the forehead, glabellar region.   • ENDOSCOPY  03/16/2016    Mildly severe esophagitis.Gastritis.Normal examined duodenum.Several biopsies obtained in the lower third of the esophagus.    • ENDOSCOPY AND COLONOSCOPY  04/12/2006     Normal colonoscopic examination    • INCISION AND DRAINAGE ABSCESS  10/24/2012   • INJECTION OF MEDICATION  04/21/2011    Depo Medrol (Methylprednisone) 80mg (1)        • INJECTION OF MEDICATION  02/01/2016    Kenalog (3)        • INJECTION OF MEDICATION  02/19/2016    Toradol (3)    • KNEE ARTHROSCOPY  04/02/2009     Medial meniscus tear of left knee   • SCROTUM EXPLORATION  07/02/2002     Excision of epidermal inclusion cyst. base of left scrotum   • SKIN BIOPSY  04/23/2012   • SKIN TAG REMOVAL  04/25/2002    Skin tag, left scrotum              PT Ortho       06/16/17 0936    Subjective Pain    Post-Treatment Pain Level 3  -KH    Posture/Observations    Posture/Observations Comments gait with rolling walker; no bandage no drainage; Incsion healing. Minimal swelling  -KH    ROM (Range of Motion)    General ROM Detail AROM 0-120; AAROM  0-122  -KH      06/15/17 1500    ROM (Range of Motion)    General ROM Detail AROM of R knee = 118*  -TW      06/14/17 1500    ROM (Range of Motion)    General ROM Detail Right AROM fl 115  -CP    MMT (Manual Muscle Testing)    General MMT Assessment Detail No Independent SLR  -CP      06/14/17 1400    Subjective Comments    Subjective Comments Patient report doing well so far.  thinks he may have walked a little too much the other day  -CP    Subjective Pain    Able to rate subjective pain? yes  -CP    Pre-Treatment Pain Level 4  -CP    Post-Treatment Pain Level 3  -CP    Posture/Observations    Posture/Observations Comments osiel hose bilateral LE  -CP    Gait Assessment/Treatment    Gait, Calloway Level conditional independence  -CP    Gait, Assistive Device rolling walker  -CP    Gait, Gait Deviations antalgic  -CP      06/13/17 1300    Posture/Observations    Observations Edema;Ecchymosis/bruising;Incision healing;Muscle atrophy  -KW    Posture/Observations Comments --   non antalgic gait with RW and compression sock on  -KW    Sensation    Sensation WNL? WNL  -KW    Knee Palpation    Patella Tendon Right:;Tender  -KW    Quads Right:;Elicits spasm;Guarded/taut;Trigger point  -KW    Knee Palpation? Yes  -KW    ROM (Range of Motion)    General ROM Detail R AROM knee 5-110, PROM 4-120  -KW    MMT (Manual Muscle Testing)    General MMT Assessment Detail Held sec to recent sx  -KW    Flexibility    Flexibility Tested? Lower Extremity  -KW    Lower Extremity Flexibility    Hamstrings Right:;Moderately limited  -KW    Girth    Girth Measured? Right Lower Extremity  -KW    RLE Quick Girth (cm)    Mid patella 49 cm  -KW    Distal thigh 51 cm  -KW    Gait Assessment/Treatment    Gait, Calloway Level independent  -KW    Gait, Assistive Device rolling walker  -KW    Gait, Distance (Feet) 50  -KW    Gait, Gait Deviations decreased heel strike;step length decreased;stride length decreased;saman decreased  -KW    Gait,  Impairments ROM decreased;strength decreased;pain  -KW      User Key  (r) = Recorded By, (t) = Taken By, (c) = Cosigned By    Initials Name Provider Type    TW Dennis Hare PTA Physical Therapy Assistant    CHELSEA Lamas, [facility] Physical Therapy Assistant    MACIE Santoyo, PTA Physical Therapy Assistant    CIPRIANO Mckeon, PT Physical Therapist                            PT Assessment/Plan       06/16/17 0936 06/15/17 1600    PT Assessment    Assessment Comments continues to lack with SLR and quad lag secondary to quad weakness. Encouraged patient to increase quad sets and SLR at home along with LAQ to focus on quad strength as able.   - Pt cont to improve with ROM and transfer ability.  -TW    PT Plan    PT Frequency 4x/week  -KH 4x/week  -TW    Predicted Duration of Therapy Intervention (days/wks) 2 weeks  - 2 weeks  -TW    PT Plan Comments Continue to increase as tolerates. Pt to bring cane at next visit. Progress pt off walker.   - Cont with POC add standing SLR and Hip abd.  -TW      User Key  (r) = Recorded By, (t) = Taken By, (c) = Cosigned By    Initials Name Provider Type     Dennis Hare PTA Physical Therapy Assistant    CHELSEA Lamas PTA Physical Therapy Assistant                Modalities       06/16/17 0936 06/15/17 1500       Ice    Ice Applied Yes  -KH Yes  -TW     Location R knee 20 min w/ IFC  -KH Right Knee  -TW     Rx Minutes Other:  -KH --   20 min with IFC  -TW     Ice S/P Rx Yes  - Yes  -TW     ELECTRICAL STIMULATION    Attended/Unattended Unattended  - Attended  -TW     Stimulation Type IFC  -KH IFC  -TW     Location/Electrode Placement/Other Right knee w/ ICE  -KH right knee  -TW     Rx Minutes 20 mins  -KH 20 mins  -TW       User Key  (r) = Recorded By, (t) = Taken By, (c) = Cosigned By    Initials Name Provider Type    ELIZABETH Hare PTA Physical Therapy Assistant    CHELSEA Lamas PTA Physical Therapy Assistant                Exercises        "06/16/17 0936 06/15/17 1500       Subjective Comments    Subjective Comments Reports that knee is doing well.  Moving and bending more than it was.   -KH Pt reports that his knee feels \"Stiff\" and pain goes up and down.  -TW     Subjective Pain    Able to rate subjective pain? yes  -KH yes  -TW     Pre-Treatment Pain Level 4  -KH 4  -TW     Post-Treatment Pain Level 3  -KH      Exercise 1    Exercise Name 1 pro ll level 3  -KH Pro II, level 2, seat at 14  -TW     Time (Minutes) 1 10'  -KH 10  -TW     Exercise 2    Exercise Name 2 incline stretch  -KH Incline Stretch  -TW     Sets 2 3  -KH 3  -TW     Time (Seconds) 2 30\"  -KH 30  -TW     Exercise 3    Exercise Name 3 standing hamstring stretch  -KH HS stretch  -TW     Sets 3 3  -KH 3  -TW     Time (Seconds) 3 30\"  -KH 30  -TW     Exercise 4    Exercise Name 4 lunge flexion stretch  -KH Lunge stretch  -TW     Sets 4 1  -KH 3  -TW     Reps 4 10  -KH      Time (Seconds) 4 10\"  -KH 30  -TW     Exercise 5    Exercise Name 5 CR/TR  -KH Heelraises  -TW     Sets 5 2  -KH      Reps 5 10  -KH 30  -TW     Exercise 6    Exercise Name 6 Mini squats  -KH seated heelslides  -TW     Sets 6 2  -KH 2  -TW     Reps 6 10  -KH 15  -TW     Exercise 7    Exercise Name 7 step ups fwd  -KH seated leg swings/kicks on high table  -TW     Sets 7 2  -KH      Reps 7 10  -KH 30  -TW     Time (Minutes) 7 4 inch step  -KH      Exercise 8    Exercise Name 8 Heel slides  -KH LAQ  -TW     Sets 8 2  -KH 2  -TW     Reps 8 10  -KH 15  -TW     Exercise 9    Exercise Name 9 QS  -KH Heelslides with strap  -TW     Sets 9 2  -KH 2  -TW     Reps 9 10  -KH 30  -TW     Exercise 10    Exercise Name 10 SLR  -KH QS  -TW     Sets 10 2  -KH 3  -TW     Reps 10 10  -KH 15  -TW     Time (Seconds) 10  5  -TW     Exercise 11    Exercise Name 11 SAQ lg bolster  -KH      Sets 11 2  -KH      Reps 11 10  -KH      Exercise 12    Exercise Name 12 LAQ  -KH      Sets 12 2  -KH      Reps 12 10  -KH        User Key  (r) = Recorded " By, (t) = Taken By, (c) = Cosigned By    Initials Name Provider Type    TW Dennis Hare, PTA Physical Therapy Assistant     Kristin Lamas, PTA Physical Therapy Assistant                               PT OP Goals       06/16/17 0936 06/15/17 1614    PT Short Term Goals    STG Date to Achieve 06/27/17  -KH     STG 1 Decrease R knee pain to 2/10  -KH     STG 1 Progress Not Met  -KH     STG 2 Inc R knee AROM to 0-120 degrees  -KH     STG 2 Progress Met  -KH     STG 3 Decrease edema R knee at jointline to 48 cm  -KH     STG 3 Progress Not Met  -KH     STG 4 Improve Hamstring flexibility from mod to min  -KH     STG 4 Progress Progressing  -KH     Long Term Goals    LTG Date to Achieve 07/04/17  -KH     LTG 1 Ind w/ final HEP  -KH     LTG 1 Progress Progressing;Ongoing  -KH     LTG 2 Inc R knee AROM to 0-125 degrees  -KH     LTG 2 Progress Not Met  -KH     LTG 3 Decrease R knee edema at joint line to 47cm or less  -KH     LTG 3 Progress Not Met  -KH     LTG 4 Increase LEFS score to 40/80 or better  -KH     LTG 4 Progress Not Met  -KH     LTG 5 Improve Hamsting flexibility from min to WNL  -KH     LTG 5 Progress Not Met  -KH     Time Calculation    PT Goal Re-Cert Due Date 07/04/17  -KH 07/04/17  -TW      06/15/17 1500       PT Short Term Goals    STG Date to Achieve 06/27/17  -TW     STG 1 Decrease R knee pain to 2/10  -TW     STG 1 Progress Not Met  -TW     STG 2 Inc R knee AROM to 0-120 degrees  -TW     STG 2 Progress Progressing  -TW     STG 3 Decrease edema R knee at jointline to 48 cm  -TW     STG 3 Progress Not Met  -TW     STG 4 Improve Hamstring flexibility from mod to min  -TW     STG 4 Progress Progressing  -TW     Long Term Goals    LTG Date to Achieve 07/04/17  -TW     LTG 1 Ind w/ final HEP  -TW     LTG 1 Progress Progressing;Ongoing  -TW     LTG 2 Inc R knee AROM to 0-125 degrees  -TW     LTG 2 Progress Not Met  -TW     LTG 3 Decrease R knee edema at joint line to 47cm or less  -TW     LTG 3 Progress  Not Met  -TW     LTG 4 Increase LEFS score to 40/80 or better  -TW     LTG 4 Progress Not Met  -TW     LTG 5 Improve Hamsting flexibility from min to WNL  -TW     LTG 5 Progress Not Met  -TW       User Key  (r) = Recorded By, (t) = Taken By, (c) = Cosigned By    Initials Name Provider Type    ELIZABETH Hare PTA Physical Therapy Assistant    CHELSEA Lamas PTA Physical Therapy Assistant                Therapy Education       06/15/17 1500          Therapy Education    Given HEP;Pain management  -TW      Program Reinforced  -TW      How Provided Verbal  -TW      Provided to Patient  -TW      Level of Understanding Verbalized;Demonstrated  -TW        User Key  (r) = Recorded By, (t) = Taken By, (c) = Cosigned By    Initials Name Provider Type    ELIZABETH Hare PTA Physical Therapy Assistant                Outcome Measures       06/13/17 1400          Lower Extremity Functional Index    Any of your usual work, housework or school activities 0  -KW      Your usual hobbies, recreational or sporting activities 0  -KW      Getting into or out of the bath 2  -KW      Walking between rooms 2  -KW      Putting on your shoes or socks 0  -KW      Squatting 0  -KW      Lifting an object, like a bag of groceries from the floor 3  -KW      Performing light activities around your home 0  -KW      Performing heavy activities around your home 0  -KW      Getting into or out of a car 2  -KW      Walking 2 blocks 0  -KW      Walking a mile 0  -KW      Going up or down 10 stairs (about 1 flight of stairs) 0  -KW      Standing for 1 hour 0  -KW      Sitting for 1 hour 4  -KW      Running on even ground 0  -KW      Running on uneven ground 0  -KW      Making sharp turns while running fast 0  -KW      Hopping 0  -KW      Rolling over in bed 2  -KW      Total 15  -KW      Functional Assessment    Outcome Measure Options Lower Extremity Functional Scale (LEFS)  -KW        User Key  (r) = Recorded By, (t) = Taken By, (c) =  Cosigned By    Initials Name Provider Type    KW Pearl Mckeon, PT Physical Therapist            Time Calculation:   Start Time: 0936  Stop Time: 1036  Time Calculation (min): 60 min  Total Timed Code Minutes- PT: 40 minute(s)    Therapy Charges for Today     Code Description Service Date Service Provider Modifiers Qty    16024508868 HC PT THER SUPP EA 15 MIN 6/16/2017 Kristin Lamas, PTA GP 1    11752645609 HC PT ELECTRICAL STIM UNATTENDED 6/16/2017 Kristin Lamas PTA  1    07378010556 HC PT THER PROC EA 15 MIN 6/16/2017 Kristin Lamas PTA GP 3                    Kristin Lamas PTA  6/16/2017

## 2017-06-19 ENCOUNTER — HOSPITAL ENCOUNTER (OUTPATIENT)
Dept: PHYSICAL THERAPY | Facility: HOSPITAL | Age: 62
Setting detail: THERAPIES SERIES
Discharge: HOME | End: 2017-06-19

## 2017-06-19 DIAGNOSIS — Z96.651 STATUS POST TOTAL RIGHT KNEE REPLACEMENT: Primary | ICD-10-CM

## 2017-06-19 PROCEDURE — G0283 ELEC STIM OTHER THAN WOUND: HCPCS

## 2017-06-19 PROCEDURE — 97110 THERAPEUTIC EXERCISES: CPT

## 2017-06-19 NOTE — THERAPY TREATMENT NOTE
Outpatient Physical Therapy Ortho Treatment Note  [facility]   Rayna Link ATC       Patient Name: Otf Murray  : 1955  MRN: [id number]  Today's Date: 2017      Visit Date: 2017   Pt reports 2/10 pain.  Reports 40% of improvement.  Attended 5/5 visits.  Insurance available:30 per year   Next MD appt: 2017.  Recertification: 2017.    Visit Dx:    ICD-10-CM ICD-9-CM   1. Status post total right knee replacement Z96.651 V43.65       There is no problem list on file for this patient.       Past Medical History:   Diagnosis Date   • Acute gastritis without bleeding    • Alcohol abuse counseling and surveillance of alcoholic    • Allergic rhinitis    • Anxiety state    • Attention deficit disorder of childhood with hyperactivity    • Attention deficit hyperactivity disorder    • Attention deficit hyperactivity disorder, predominantly hyperactive impulsive type    • Attention deficit hyperactivity disorder, predominantly inattentive type    • Backache    • Body mass index (bmi) 31.0-31.9, adult     Body mass index (BMI) 31.0-31.9, adult - BMI 33.5      • Burn of lower leg    • Candidiasis of skin    • Cardiac murmur, unspecified    • Decreased testosterone level    • Depressive disorder     Depressive disorder - acute on chronic      • Dysgraphia    • Edema    • Elbow joint pain    • Encounter for general adult medical examination with abnormal findings    • Encounter for screening for malignant neoplasm of colon    • Essential hypertension    • Essential tremor    • Ex-smoker    • Fatigue    • Gastro-esophageal reflux disease with esophagitis    • Gastroesophageal reflux disease    • Hyperlipidemia    • Hyperlipoproteinemia    • Impotence of organic origin    • Insomnia    • Intermittent palpitations    • Knee pain     Knee pain - patellofemoral      • Lateral epicondylitis    • Low back pain    • Male erectile disorder    • Male erectile dysfunction, unspecified    • Male  hypogonadism    • Neck pain     Neck pain aggravated by recumbency      • Obese     Obese - BMI 33.0      • On long term drug therapy    • Osteoarthrosis     Osteoarthrosis, unspecified whether generalized or localized, involving lower leg      • Other obesity    • Other specified diseases of anus and rectum     Other specified diseases of anus and rectum - rectal pain after c-scope prep      • Overweight    • Pain in left knee    • Pain in right knee    • Shoulder pain, right    • Spasm of back muscles    • Tachycardia, unspecified    • Tremor, unspecified    • Tubular adenoma of colon    • Unspecified essential hypertension         Past Surgical History:   Procedure Laterality Date   • COLONOSCOPY  03/16/2016    One polyp in the sigmoid colon.Resected and retrieved.        • CYST REMOVAL  10/27/2009     Excision of sebaceous cyst of the forehead, glabellar region.   • ENDOSCOPY  03/16/2016    Mildly severe esophagitis.Gastritis.Normal examined duodenum.Several biopsies obtained in the lower third of the esophagus.    • ENDOSCOPY AND COLONOSCOPY  04/12/2006     Normal colonoscopic examination    • INCISION AND DRAINAGE ABSCESS  10/24/2012   • INJECTION OF MEDICATION  04/21/2011    Depo Medrol (Methylprednisone) 80mg (1)        • INJECTION OF MEDICATION  02/01/2016    Kenalog (3)        • INJECTION OF MEDICATION  02/19/2016    Toradol (3)    • KNEE ARTHROSCOPY  04/02/2009     Medial meniscus tear of left knee   • SCROTUM EXPLORATION  07/02/2002     Excision of epidermal inclusion cyst. base of left scrotum   • SKIN BIOPSY  04/23/2012   • SKIN TAG REMOVAL  04/25/2002    Skin tag, left scrotum              PT Ortho       06/19/17 1300    Subjective Pain    Post-Treatment Pain Level (P)  0  -HB    Posture/Observations    Posture/Observations Comments (P)  amb with single point cane but rarely uses throughout therex  -HB      User Key  (r) = Recorded By, (t) = Taken By, (c) = Cosigned By    Initials Name Provider Type      Rayna Link, [facility]                             PT Assessment/Plan       06/19/17 1300       PT Assessment    Assessment Comments (P)  performed SLR great effort and little to no lag. able to perform all exercises well and even begged for hard activities to help stregthen  -HB     PT Plan    PT Frequency (P)  4x/week  -HB     PT Plan Comments (P)  cont to progress strength and ROM. may need to add heel prop for residual ext lag.   -HB       User Key  (r) = Recorded By, (t) = Taken By, (c) = Cosigned By    Initials Name Provider Type     Rayna Link [facility]                 Modalities       06/19/17 1300          Ice    Ice Applied (P)  Yes  -HB      Location (P)  r knee  -HB      Ice S/P Rx (P)  Yes  -HB      ELECTRICAL STIMULATION    Attended/Unattended (P)  Unattended  -HB      Stimulation Type (P)  IFC  -HB      Location/Electrode Placement/Other (P)  Right knee w/ ice  -HB      Rx Minutes (P)  20 mins  -HB        User Key  (r) = Recorded By, (t) = Taken By, (c) = Cosigned By    Initials Name Provider Type     Rayna Link, [facility]                 Exercises       06/19/17 1300          Subjective Comments    Subjective Comments (P)  reports walking aorund the house with just his cane or no AD and reporting being able to wlk fine without his cane but keeps it with him in case. states his pain is reduced and is able to doing HEP without issues.   -HB      Subjective Pain    Able to rate subjective pain? (P)  yes  -HB      Pre-Treatment Pain Level (P)  2  -HB      Post-Treatment Pain Level (P)  0  -HB      Exercise 1    Exercise Name 1 (P)  pro 2 L3.5  -HB      Time (Minutes) 1 (P)  10 min  -HB      Exercise 2    Exercise Name 2 (P)  HS stetch   -HB      Sets 2 (P)  3  -HB      Time (Seconds) 2 (P)  30 sec  -HB      Exercise 3    Exercise Name 3 (P)  incline S (mama)  -HB      Sets 3 (P)  3  -HB      Time (Seconds) 3 (P)  30 sec  -HB      Exercise 4    Exercise Name 4  (P)  lunge stretch  -HB      Reps 4 (P)  10  -HB      Time (Seconds) 4 (P)  10 sec  -HB      Exercise 5    Exercise Name 5 (P)  FWD step ups   -HB      Sets 5 (P)  2  -HB      Reps 5 (P)  10  -HB      Exercise 6    Exercise Name 6 (P)  Lat step ups B  -HB      Reps 6 (P)  15  -HB      Exercise 7    Exercise Name 7 (P)  fwd step downs  -HB      Reps 7 (P)  10  -HB      Exercise 8    Exercise Name 8 (P)  Heel slides   -HB      Sets 8 (P)  2  -HB      Reps 8 (P)  10  -HB      Exercise 9    Exercise Name 9 (P)  SLR  -HB      Reps 9 (P)  20  -HB      Exercise 10    Exercise Name 10 (P)  SAQ   -HB      Sets 10 (P)  2  -HB      Reps 10 (P)  15  -HB      Exercise 11    Exercise Name 11 (P)  Add squeezes   -HB      Reps 11 (P)  20  -HB      Exercise 12    Exercise Name 12 (P)  LAQ  -HB      Reps 12 (P)  20  -HB      Exercise 13    Exercise Name 13 (P)  seated Marches  -HB      Reps 13 (P)  20  -HB        User Key  (r) = Recorded By, (t) = Taken By, (c) = Cosigned By    Initials Name Provider Type    JUNE Link, ATC                                PT OP Goals       06/19/17 1300       PT Short Term Goals    STG Date to Achieve (P)  06/27/17  -HB     STG 1 (P)  Decrease R knee pain to 2/10  -HB     STG 1 Progress (P)  Partially Met  -HB     STG 2 (P)  Inc R knee AROM to 0-120 degrees  -HB     STG 2 Progress (P)  Met  -HB     STG 3 (P)  Decrease edema R knee at jointline to 48 cm  -HB     STG 3 Progress (P)  Ongoing  -HB     STG 4 (P)  Improve Hamstring flexibility from mod to min  -HB     STG 4 Progress (P)  Progressing  -HB     Long Term Goals    LTG Date to Achieve (P)  07/04/17  -HB     LTG 1 (P)  Ind w/ final HEP  -HB     LTG 1 Progress (P)  Progressing;Ongoing  -HB     LTG 2 (P)  Inc R knee AROM to 0-125 degrees  -HB     LTG 2 Progress (P)  Progressing  -HB     LTG 3 (P)  Decrease R knee edema at joint line to 47cm or less  -HB     LTG 3 Progress (P)  Progressing  -HB     LTG 4 (P)  Increase LEFS score  to 40/80 or better  -HB     LTG 4 Progress (P)  Progressing  -HB     LTG 5 (P)  Improve Hamsting flexibility from min to WNL  -HB     LTG 5 Progress (P)  Progressing  -HB     Time Calculation    PT Goal Re-Cert Due Date (P)  07/04/17  -HB       User Key  (r) = Recorded By, (t) = Taken By, (c) = Cosigned By    Initials Name Provider Type    HB Rayna Link, ATC                     Time Calculation:   Start Time: (P) 1301  Stop Time: (P) 1415  Time Calculation (min): (P) 74 min  PT Non-Billable Time (min): (P) 20 min  Total Timed Code Minutes- PT: (P) 54 minute(s)    Therapy Charges for Today     Code Description Service Date Service Provider Modifiers Qty    82787303190 HC PT THER PROC EA 15 MIN 6/19/2017 Rayna Link, ATC  3    30156608425 HC PT ELECTRICAL STIM UNATTENDED 6/19/2017 Rayna Link, ATC  1    62232390299 HC PT THER SUPP EA 15 MIN 6/19/2017 Rayna Link, ATC  1                    Rayna Link, ATC  6/19/2017

## 2017-06-20 ENCOUNTER — APPOINTMENT (OUTPATIENT)
Dept: PHYSICAL THERAPY | Facility: HOSPITAL | Age: 62
End: 2017-06-20

## 2017-06-21 ENCOUNTER — HOSPITAL ENCOUNTER (OUTPATIENT)
Dept: PHYSICAL THERAPY | Facility: HOSPITAL | Age: 62
Setting detail: THERAPIES SERIES
Discharge: HOME | End: 2017-06-21

## 2017-06-21 DIAGNOSIS — Z96.651 STATUS POST TOTAL RIGHT KNEE REPLACEMENT: Primary | ICD-10-CM

## 2017-06-21 PROCEDURE — G0283 ELEC STIM OTHER THAN WOUND: HCPCS

## 2017-06-21 PROCEDURE — 97110 THERAPEUTIC EXERCISES: CPT

## 2017-06-21 NOTE — THERAPY TREATMENT NOTE
Outpatient Physical Therapy Ortho Treatment Note  [facility]   Rayna Link ATC       Patient Name: Otf Murray  : 1955  MRN: [id number]  Today's Date: 2017      Visit Date: 2017   Pt reports 3/10 pain.  Reports 45% of improvement.  Attended 6/6 visits.  Insurance available: 30 per year  Next MD appt: 2017.  Recertification: 2017.    Visit Dx:    ICD-10-CM ICD-9-CM   1. Status post total right knee replacement Z96.651 V43.65       There is no problem list on file for this patient.       Past Medical History:   Diagnosis Date   • Acute gastritis without bleeding    • Alcohol abuse counseling and surveillance of alcoholic    • Allergic rhinitis    • Anxiety state    • Attention deficit disorder of childhood with hyperactivity    • Attention deficit hyperactivity disorder    • Attention deficit hyperactivity disorder, predominantly hyperactive impulsive type    • Attention deficit hyperactivity disorder, predominantly inattentive type    • Backache    • Body mass index (bmi) 31.0-31.9, adult     Body mass index (BMI) 31.0-31.9, adult - BMI 33.5      • Burn of lower leg    • Candidiasis of skin    • Cardiac murmur, unspecified    • Decreased testosterone level    • Depressive disorder     Depressive disorder - acute on chronic      • Dysgraphia    • Edema    • Elbow joint pain    • Encounter for general adult medical examination with abnormal findings    • Encounter for screening for malignant neoplasm of colon    • Essential hypertension    • Essential tremor    • Ex-smoker    • Fatigue    • Gastro-esophageal reflux disease with esophagitis    • Gastroesophageal reflux disease    • Hyperlipidemia    • Hyperlipoproteinemia    • Impotence of organic origin    • Insomnia    • Intermittent palpitations    • Knee pain     Knee pain - patellofemoral      • Lateral epicondylitis    • Low back pain    • Male erectile disorder    • Male erectile dysfunction, unspecified    • Male  hypogonadism    • Neck pain     Neck pain aggravated by recumbency      • Obese     Obese - BMI 33.0      • On long term drug therapy    • Osteoarthrosis     Osteoarthrosis, unspecified whether generalized or localized, involving lower leg      • Other obesity    • Other specified diseases of anus and rectum     Other specified diseases of anus and rectum - rectal pain after c-scope prep      • Overweight    • Pain in left knee    • Pain in right knee    • Shoulder pain, right    • Spasm of back muscles    • Tachycardia, unspecified    • Tremor, unspecified    • Tubular adenoma of colon    • Unspecified essential hypertension         Past Surgical History:   Procedure Laterality Date   • COLONOSCOPY  03/16/2016    One polyp in the sigmoid colon.Resected and retrieved.        • CYST REMOVAL  10/27/2009     Excision of sebaceous cyst of the forehead, glabellar region.   • ENDOSCOPY  03/16/2016    Mildly severe esophagitis.Gastritis.Normal examined duodenum.Several biopsies obtained in the lower third of the esophagus.    • ENDOSCOPY AND COLONOSCOPY  04/12/2006     Normal colonoscopic examination    • INCISION AND DRAINAGE ABSCESS  10/24/2012   • INJECTION OF MEDICATION  04/21/2011    Depo Medrol (Methylprednisone) 80mg (1)        • INJECTION OF MEDICATION  02/01/2016    Kenalog (3)        • INJECTION OF MEDICATION  02/19/2016    Toradol (3)    • KNEE ARTHROSCOPY  04/02/2009     Medial meniscus tear of left knee   • SCROTUM EXPLORATION  07/02/2002     Excision of epidermal inclusion cyst. base of left scrotum   • SKIN BIOPSY  04/23/2012   • SKIN TAG REMOVAL  04/25/2002    Skin tag, left scrotum              PT Ortho       06/21/17 1300    Subjective Comments    Subjective Comments (P)  reports no having a ride to yesterdays appointment so he did his HEP throughout the day yesterday. He believes he overdid it and that caused some swelling in his knee last night/this morning. reports icing  last night and this morning to  help reduce the swelling.  -HB    Subjective Pain    Able to rate subjective pain? (P)  yes  -HB    Pre-Treatment Pain Level (P)  3  -HB    Posture/Observations    Posture/Observations Comments (P)  amb w/ SC but doesnt us throughout therex  -HB      06/19/17 1300    Subjective Pain    Post-Treatment Pain Level (P)  0  -HB    Posture/Observations    Posture/Observations Comments (P)  amb with single point cane but rarely uses throughout therex  -HB      User Key  (r) = Recorded By, (t) = Taken By, (c) = Cosigned By    Initials Name Provider Type     Rayna Link [email or web address]                             PT Assessment/Plan       06/21/17 1300       PT Assessment    Assessment Comments (P)  added heel prop for ext lag. tolerated therex well.   -HB     PT Plan    PT Frequency (P)  4x/week  -HB     PT Plan Comments (P)  MD note next visit. progress ROM and strength as needed  -HB       User Key  (r) = Recorded By, (t) = Taken By, (c) = Cosigned By    Initials Name Provider Type     Rayna Link [facility]                 Modalities       06/21/17 1300          Subjective Pain    Post-Treatment Pain Level (P)  0  -HB      Ice    Ice Applied (P)  Yes  -HB      Location (P)  R knee  -HB      Ice S/P Rx (P)  Yes  -HB      ELECTRICAL STIMULATION    Attended/Unattended (P)  Unattended  -HB      Stimulation Type (P)  IFC  -HB      Location/Electrode Placement/Other (P)  Right knee w/ ice  -HB      Rx Minutes (P)  20 mins  -HB        User Key  (r) = Recorded By, (t) = Taken By, (c) = Cosigned By    Initials Name Provider Type     Rayna Link [facility]                 Exercises       06/21/17 1300          Subjective Comments    Subjective Comments (P)  reports no having a ride to yesterdays appointment so he did his HEP throughout the day yesterday. He believes he overdid it and that caused some swelling in his knee last night/this morning. reports icing  last night and this morning to help  reduce the swelling.  -HB      Subjective Pain    Able to rate subjective pain? (P)  yes  -HB      Pre-Treatment Pain Level (P)  3  -HB      Post-Treatment Pain Level (P)  0  -HB      Exercise 1    Exercise Name 1 (P)  Pro 2 L 5.0  -HB      Time (Minutes) 1 (P)  10 min  -HB      Exercise 2    Exercise Name 2 (P)  Hs Stretch   -HB      Sets 2 (P)  3  -HB      Time (Seconds) 2 (P)  30 sec  -HB      Exercise 3    Exercise Name 3 (P)  incline S  -HB      Sets 3 (P)  3  -HB      Time (Seconds) 3 (P)  30 sec  -HB      Exercise 4    Exercise Name 4 (P)  Lunge S  -HB      Reps 4 (P)  10  -HB      Time (Seconds) 4 (P)  10 sec  -HB      Exercise 5    Exercise Name 5 (P)  LAQ  -HB      Sets 5 (P)  3  -HB      Reps 5 (P)  20  -HB      Exercise 6    Exercise Name 6 (P)  SLR flex/ABD  -HB      Sets 6 (P)  2  -HB      Reps 6 (P)  20  -HB      Exercise 7    Exercise Name 7 (P)  heel prop  -HB      Time (Minutes) 7 (P)  3 min  -HB      Exercise 8    Exercise Name 8 (P)  Add squeezes  -HB      Sets 8 (P)  2  -HB      Reps 8 (P)  20  -HB      Exercise 9    Exercise Name 9 (P)  FWD Step ups  -HB      Reps 9 (P)  20  -HB      Exercise 10    Exercise Name 10 (P)  LAT step ups  -HB      Reps 10 (P)  20  -HB      Exercise 11    Exercise Name 11 (P)  FWD step down  -HB      Reps 11 (P)  10  -HB      Exercise 12    Exercise Name 12 (P)  airex CR/TR  -HB      Sets 12 (P)  2  -HB      Reps 12 (P)  20  -HB      Exercise 13    Exercise Name 13 (P)  Airex marches  -HB      Sets 13 (P)  2  -HB      Reps 13 (P)  20  -HB        User Key  (r) = Recorded By, (t) = Taken By, (c) = Cosigned By    Initials Name Provider Type    HB Rayna Link, ATC                                PT OP Goals       06/21/17 1300       PT Short Term Goals    STG Date to Achieve (P)  06/27/17  -HB     STG 1 (P)  Decrease R knee pain to 2/10  -HB     STG 1 Progress (P)  Partially Met  -HB     STG 2 (P)  Inc R knee AROM to 0-120 degrees  -HB     STG 2 Progress  (P)  Met  -HB     STG 3 (P)  Decrease edema R knee at jointline to 48 cm  -HB     STG 3 Progress (P)  Progressing  -HB     STG 4 (P)  Improve Hamstring flexibility from mod to min  -HB     STG 4 Progress (P)  Ongoing  -HB     Long Term Goals    LTG Date to Achieve (P)  07/04/17  -HB     LTG 1 (P)  Ind w/ final HEP  -HB     LTG 1 Progress (P)  Progressing;Ongoing  -HB     LTG 2 (P)  Inc R knee AROM to 0-125 degrees  -HB     LTG 2 Progress (P)  Progressing  -HB     LTG 3 (P)  Decrease R knee edema at joint line to 47cm or less  -HB     LTG 3 Progress (P)  Progressing  -HB     LTG 4 (P)  Increase LEFS score to 40/80 or better  -HB     LTG 4 Progress (P)  Progressing  -HB     LTG 5 (P)  Improve Hamsting flexibility from min to WNL  -HB     LTG 5 Progress (P)  Progressing  -HB     Time Calculation    PT Goal Re-Cert Due Date (P)  07/04/17  -HB       User Key  (r) = Recorded By, (t) = Taken By, (c) = Cosigned By    Initials Name Provider Type    HB Rayna Link, ATC                 Therapy Education       06/21/17 1400          Therapy Education    Given (P)  HEP  -HB      Program (P)  Reinforced  -HB      How Provided (P)  Demonstration;Verbal  -HB      Provided to (P)  Patient  -HB      Level of Understanding (P)  Verbalized;Demonstrated  -HB        User Key  (r) = Recorded By, (t) = Taken By, (c) = Cosigned By    Initials Name Provider Type    HB Rayna Link, ATC                 Time Calculation:   Start Time: (P) 1258  Stop Time: (P) 1412  Time Calculation (min): (P) 74 min  PT Non-Billable Time (min): (P) 20 min  Total Timed Code Minutes- PT: (P) 54 minute(s)    Therapy Charges for Today     Code Description Service Date Service Provider Modifiers Qty    63599814223 HC PT THER PROC EA 15 MIN 6/21/2017 Rayna Link, ATC  4    14495034801 HC PT ELECTRICAL STIM UNATTENDED 6/21/2017 Rayna Link, ATC  1    54837632025 HC PT THER SUPP EA 15 MIN 6/21/2017 Rayna Link, ATC  1                     Rayna Link, ATC  6/21/2017

## 2017-06-22 ENCOUNTER — HOSPITAL ENCOUNTER (OUTPATIENT)
Dept: PHYSICAL THERAPY | Facility: HOSPITAL | Age: 62
Setting detail: THERAPIES SERIES
Discharge: HOME | End: 2017-06-22

## 2017-06-22 DIAGNOSIS — M25.561 CHRONIC PAIN OF RIGHT KNEE: ICD-10-CM

## 2017-06-22 DIAGNOSIS — G89.29 CHRONIC PAIN OF RIGHT KNEE: ICD-10-CM

## 2017-06-22 DIAGNOSIS — Z96.651 STATUS POST TOTAL RIGHT KNEE REPLACEMENT: Primary | ICD-10-CM

## 2017-06-22 PROCEDURE — 97110 THERAPEUTIC EXERCISES: CPT | Performed by: PHYSICAL THERAPIST

## 2017-06-22 NOTE — THERAPY TREATMENT NOTE
Outpatient Physical Therapy Ortho Treatment Note  [facility]     Patient Name: Otf Murray  : 1955  MRN: [id number]  Today's Date: 2017      Visit Date: 2017    Visit  (30)  MD visit 17  Recert date 17  45% improvement          Visit Dx:    ICD-10-CM ICD-9-CM   1. Status post total right knee replacement Z96.651 V43.65   2. Chronic pain of right knee M25.561 719.46    G89.29 338.29       There is no problem list on file for this patient.       Past Medical History:   Diagnosis Date   • Acute gastritis without bleeding    • Alcohol abuse counseling and surveillance of alcoholic    • Allergic rhinitis    • Anxiety state    • Attention deficit disorder of childhood with hyperactivity    • Attention deficit hyperactivity disorder    • Attention deficit hyperactivity disorder, predominantly hyperactive impulsive type    • Attention deficit hyperactivity disorder, predominantly inattentive type    • Backache    • Body mass index (bmi) 31.0-31.9, adult     Body mass index (BMI) 31.0-31.9, adult - BMI 33.5      • Burn of lower leg    • Candidiasis of skin    • Cardiac murmur, unspecified    • Decreased testosterone level    • Depressive disorder     Depressive disorder - acute on chronic      • Dysgraphia    • Edema    • Elbow joint pain    • Encounter for general adult medical examination with abnormal findings    • Encounter for screening for malignant neoplasm of colon    • Essential hypertension    • Essential tremor    • Ex-smoker    • Fatigue    • Gastro-esophageal reflux disease with esophagitis    • Gastroesophageal reflux disease    • Hyperlipidemia    • Hyperlipoproteinemia    • Impotence of organic origin    • Insomnia    • Intermittent palpitations    • Knee pain     Knee pain - patellofemoral      • Lateral epicondylitis    • Low back pain    • Male erectile disorder    • Male erectile dysfunction, unspecified    • Male hypogonadism    • Neck pain     Neck pain  aggravated by recumbency      • Obese     Obese - BMI 33.0      • On long term drug therapy    • Osteoarthrosis     Osteoarthrosis, unspecified whether generalized or localized, involving lower leg      • Other obesity    • Other specified diseases of anus and rectum     Other specified diseases of anus and rectum - rectal pain after c-scope prep      • Overweight    • Pain in left knee    • Pain in right knee    • Shoulder pain, right    • Spasm of back muscles    • Tachycardia, unspecified    • Tremor, unspecified    • Tubular adenoma of colon    • Unspecified essential hypertension         Past Surgical History:   Procedure Laterality Date   • COLONOSCOPY  03/16/2016    One polyp in the sigmoid colon.Resected and retrieved.        • CYST REMOVAL  10/27/2009     Excision of sebaceous cyst of the forehead, glabellar region.   • ENDOSCOPY  03/16/2016    Mildly severe esophagitis.Gastritis.Normal examined duodenum.Several biopsies obtained in the lower third of the esophagus.    • ENDOSCOPY AND COLONOSCOPY  04/12/2006     Normal colonoscopic examination    • INCISION AND DRAINAGE ABSCESS  10/24/2012   • INJECTION OF MEDICATION  04/21/2011    Depo Medrol (Methylprednisone) 80mg (1)        • INJECTION OF MEDICATION  02/01/2016    Kenalog (3)        • INJECTION OF MEDICATION  02/19/2016    Toradol (3)    • KNEE ARTHROSCOPY  04/02/2009     Medial meniscus tear of left knee   • SCROTUM EXPLORATION  07/02/2002     Excision of epidermal inclusion cyst. base of left scrotum   • SKIN BIOPSY  04/23/2012   • SKIN TAG REMOVAL  04/25/2002    Skin tag, left scrotum              PT Ortho       06/22/17 0800    Posture/Observations    Posture/Observations Comments amb w/ SC but doesnt us throughout therex  -KW    ROM (Range of Motion)    General ROM Detail AROM 0-117  -KW      06/21/17 1300    Subjective Comments    Subjective Comments (P)  reports no having a ride to yesterdays appointment so he did his HEP throughout the day  yesterday. He believes he overdid it and that caused some swelling in his knee last night/this morning. reports icing  last night and this morning to help reduce the swelling.  -HB    Subjective Pain    Able to rate subjective pain? (P)  yes  -HB    Pre-Treatment Pain Level (P)  3  -HB    Posture/Observations    Posture/Observations Comments (P)  amb w/ SC but doesnt us throughout therex  -HB      06/19/17 1300    Subjective Pain    Post-Treatment Pain Level (P)  0  -HB    Posture/Observations    Posture/Observations Comments (P)  amb with single point cane but rarely uses throughout therex  -HB      User Key  (r) = Recorded By, (t) = Taken By, (c) = Cosigned By    Initials Name Provider Type    JUNE Link ATC     CIPRIANO Mckeon, PT Physical Therapist                            PT Assessment/Plan       06/22/17 0842 06/21/17 1300    PT Assessment    Assessment Comments mild heat and edema medial R knee  -KW (P)  added heel prop for ext lag. tolerated therex well.   -HB    PT Plan    PT Frequency  (P)  4x/week  -HB    PT Plan Comments Cont POC. MD visit today  -KW (P)  MD note next visit. progress ROM and strength as needed  -HB      User Key  (r) = Recorded By, (t) = Taken By, (c) = Cosigned By    Initials Name Provider Type    JUNE Link ATC     CIPRIANO Mckeon, PT Physical Therapist                Modalities       06/21/17 1300          Subjective Pain    Post-Treatment Pain Level (P)  0  -HB      Ice    Ice Applied (P)  Yes  -HB      Location (P)  R knee  -HB      Ice S/P Rx (P)  Yes  -HB      ELECTRICAL STIMULATION    Attended/Unattended (P)  Unattended  -HB      Stimulation Type (P)  IFC  -HB      Location/Electrode Placement/Other (P)  Right knee w/ ice  -HB      Rx Minutes (P)  20 mins  -HB        User Key  (r) = Recorded By, (t) = Taken By, (c) = Cosigned By    Initials Name Provider Type    JUNE Link ATC                 Elyse   "     06/22/17 0800 06/21/17 1300       Subjective Comments    Subjective Comments Reports having to ice alot yesterday. Has MD appt today after treatment  -KW (P)  reports no having a ride to yesterdays appointment so he did his HEP throughout the day yesterday. He believes he overdid it and that caused some swelling in his knee last night/this morning. reports icing  last night and this morning to help reduce the swelling.  -HB     Subjective Pain    Able to rate subjective pain? yes  -KW (P)  yes  -HB     Pre-Treatment Pain Level 1  -KW (P)  3  -HB     Post-Treatment Pain Level 0  -KW (P)  0  -HB     Exercise 1    Exercise Name 1 pro ll level 3  -KW (P)  Pro 2 L 5.0  -HB     Sets 1 2  -KW      Reps 1 10  -KW      Time (Minutes) 1 10'  -KW (P)  10 min  -HB     Exercise 2    Exercise Name 2 incline stretch  -KW (P)  Hs Stretch   -HB     Sets 2 3  -KW (P)  3  -HB     Reps 2 10  -KW      Time (Seconds) 2 30\"  -KW (P)  30 sec  -HB     Exercise 3    Exercise Name 3 standing hamstring stretch  -KW (P)  incline S  -HB     Sets 3 3  -KW (P)  3  -HB     Reps 3 10  -KW      Time (Seconds) 3 30\"  -KW (P)  30 sec  -HB     Exercise 4    Exercise Name 4 lunge flexion stretch  -KW (P)  Lunge S  -HB     Sets 4 1  -KW      Reps 4 10  -KW (P)  10  -HB     Time (Seconds) 4 10\"  -KW (P)  10 sec  -HB     Exercise 5    Exercise Name 5 gait training  -KW (P)  LAQ  -HB     Sets 5 --  -KW (P)  3  -HB     Reps 5 --  -KW (P)  20  -HB     Time (Minutes) 5 5  -KW      Exercise 6    Exercise Name 6 --  -KW (P)  SLR flex/ABD  -HB     Sets 6 --  -KW (P)  2  -HB     Reps 6 --  -KW (P)  20  -HB     Exercise 7    Exercise Name 7 step ups fwd  -KW (P)  heel prop  -HB     Sets 7 2  -KW      Reps 7 10  -KW      Time (Minutes) 7 4 inch step  -KW (P)  3 min  -HB     Exercise 8    Exercise Name 8 Heel slides  -KW (P)  Add squeezes  -HB     Sets 8 2  -KW (P)  2  -HB     Reps 8 10  -KW (P)  20  -HB     Exercise 9    Exercise Name 9 QS  -KW (P)  FWD Step ups "  -HB     Sets 9 2  -KW      Reps 9 10  -KW (P)  20  -HB     Exercise 10    Exercise Name 10 SLR  -KW (P)  LAT step ups  -HB     Sets 10 2  -KW      Reps 10 10  -KW (P)  20  -HB     Time (Seconds) 10 5  -KW      Exercise 11    Exercise Name 11 --  -KW (P)  FWD step down  -HB     Sets 11 --  -KW      Reps 11 --  -KW (P)  10  -HB     Exercise 12    Exercise Name 12 --  -KW (P)  airex CR/TR  -HB     Sets 12 --  -KW (P)  2  -HB     Reps 12 --  -KW (P)  20  -HB     Exercise 13    Exercise Name 13  (P)  Airex marches  -HB     Sets 13  (P)  2  -HB     Reps 13  (P)  20  -HB       User Key  (r) = Recorded By, (t) = Taken By, (c) = Cosigned By    Initials Name Provider Type    HB Rayna Link, ATC     KW Pearl Mckeon, PT Physical Therapist                               PT OP Goals       06/22/17 0800 06/21/17 1300    PT Short Term Goals    STG Date to Achieve 06/27/17  -KW (P)  06/27/17  -HB    STG 1 Decrease R knee pain to 2/10  -KW (P)  Decrease R knee pain to 2/10  -HB    STG 1 Progress Met  -KW (P)  Partially Met  -HB    STG 2 Inc R knee AROM to 0-120 degrees  -KW (P)  Inc R knee AROM to 0-120 degrees  -HB    STG 2 Progress Met  -KW (P)  Met  -HB    STG 3 Decrease edema R knee at jointline to 48 cm  -KW (P)  Decrease edema R knee at jointline to 48 cm  -HB    STG 3 Progress Not Met  -KW (P)  Progressing  -HB    STG 4 Improve Hamstring flexibility from mod to min  -KW (P)  Improve Hamstring flexibility from mod to min  -HB    STG 4 Progress Progressing  -KW (P)  Ongoing  -HB    Long Term Goals    LTG Date to Achieve 07/04/17  -KW (P)  07/04/17  -HB    LTG 1 Ind w/ final HEP  -KW (P)  Ind w/ final HEP  -HB    LTG 1 Progress Progressing;Ongoing  -KW (P)  Progressing;Ongoing  -HB    LTG 2 Inc R knee AROM to 0-125 degrees  -KW (P)  Inc R knee AROM to 0-125 degrees  -HB    LTG 2 Progress Not Met  -KW (P)  Progressing  -HB    LTG 3 Decrease R knee edema at joint line to 47cm or less  -KW (P)  Decrease R knee  edema at joint line to 47cm or less  -HB    LTG 3 Progress Not Met  -KW (P)  Progressing  -HB    LTG 4 Increase LEFS score to 40/80 or better  -KW (P)  Increase LEFS score to 40/80 or better  -HB    LTG 4 Progress Not Met  -KW (P)  Progressing  -HB    LTG 5 Improve Hamsting flexibility from min to WNL  -KW (P)  Improve Hamsting flexibility from min to WNL  -HB    LTG 5 Progress Not Met  -KW (P)  Progressing  -HB    Time Calculation    PT Goal Re-Cert Due Date 07/04/17  -KW (P)  07/04/17  -HB      User Key  (r) = Recorded By, (t) = Taken By, (c) = Cosigned By    Initials Name Provider Type    JUNE Link ATC     KW Pearl Mckeon, PT Physical Therapist                Therapy Education       06/22/17 0842 06/21/17 1400       Therapy Education    Given HEP  -KW (P)  HEP  -HB     Program Reinforced  -KW (P)  Reinforced  -HB     How Provided Verbal;Demonstration  -KW (P)  Demonstration;Verbal  -HB     Provided to Patient  -KW (P)  Patient  -HB     Level of Understanding Verbalized;Demonstrated  -KW (P)  Verbalized;Demonstrated  -HB       User Key  (r) = Recorded By, (t) = Taken By, (c) = Cosigned By    Initials Name Provider Type    JUNE Link ATC     KW Pearl Mckeon, PT Physical Therapist                Time Calculation:   Start Time: 0805  Stop Time: 0844  Time Calculation (min): 39 min  Total Timed Code Minutes- PT: 39 minute(s)    Therapy Charges for Today     Code Description Service Date Service Provider Modifiers Qty    06123936173 HC PT THER PROC EA 15 MIN 6/22/2017 Pearl Mckeon, PT GP 3                    Pearl Mckeon, PT  6/22/2017

## 2017-06-23 ENCOUNTER — HOSPITAL ENCOUNTER (OUTPATIENT)
Dept: PHYSICAL THERAPY | Facility: HOSPITAL | Age: 62
Setting detail: THERAPIES SERIES
Discharge: HOME | End: 2017-06-23

## 2017-06-23 DIAGNOSIS — Z96.651 STATUS POST TOTAL RIGHT KNEE REPLACEMENT: Primary | ICD-10-CM

## 2017-06-23 PROCEDURE — 97110 THERAPEUTIC EXERCISES: CPT

## 2017-06-23 PROCEDURE — G0283 ELEC STIM OTHER THAN WOUND: HCPCS

## 2017-06-23 NOTE — THERAPY TREATMENT NOTE
Outpatient Physical Therapy Ortho Treatment Note  [facility]     Patient Name: Otf Murray  : 1955  MRN: [id number]  Today's Date: 2017      Visit Date: 2017     Sub imp 50%  Visit  (30)  Recert 17  MD 17    Visit Dx:    ICD-10-CM ICD-9-CM   1. Status post total right knee replacement Z96.651 V43.65       There is no problem list on file for this patient.       Past Medical History:   Diagnosis Date   • Acute gastritis without bleeding    • Alcohol abuse counseling and surveillance of alcoholic    • Allergic rhinitis    • Anxiety state    • Attention deficit disorder of childhood with hyperactivity    • Attention deficit hyperactivity disorder    • Attention deficit hyperactivity disorder, predominantly hyperactive impulsive type    • Attention deficit hyperactivity disorder, predominantly inattentive type    • Backache    • Body mass index (bmi) 31.0-31.9, adult     Body mass index (BMI) 31.0-31.9, adult - BMI 33.5      • Burn of lower leg    • Candidiasis of skin    • Cardiac murmur, unspecified    • Decreased testosterone level    • Depressive disorder     Depressive disorder - acute on chronic      • Dysgraphia    • Edema    • Elbow joint pain    • Encounter for general adult medical examination with abnormal findings    • Encounter for screening for malignant neoplasm of colon    • Essential hypertension    • Essential tremor    • Ex-smoker    • Fatigue    • Gastro-esophageal reflux disease with esophagitis    • Gastroesophageal reflux disease    • Hyperlipidemia    • Hyperlipoproteinemia    • Impotence of organic origin    • Insomnia    • Intermittent palpitations    • Knee pain     Knee pain - patellofemoral      • Lateral epicondylitis    • Low back pain    • Male erectile disorder    • Male erectile dysfunction, unspecified    • Male hypogonadism    • Neck pain     Neck pain aggravated by recumbency      • Obese     Obese - BMI 33.0      • On long term drug  therapy    • Osteoarthrosis     Osteoarthrosis, unspecified whether generalized or localized, involving lower leg      • Other obesity    • Other specified diseases of anus and rectum     Other specified diseases of anus and rectum - rectal pain after c-scope prep      • Overweight    • Pain in left knee    • Pain in right knee    • Shoulder pain, right    • Spasm of back muscles    • Tachycardia, unspecified    • Tremor, unspecified    • Tubular adenoma of colon    • Unspecified essential hypertension         Past Surgical History:   Procedure Laterality Date   • COLONOSCOPY  03/16/2016    One polyp in the sigmoid colon.Resected and retrieved.        • CYST REMOVAL  10/27/2009     Excision of sebaceous cyst of the forehead, glabellar region.   • ENDOSCOPY  03/16/2016    Mildly severe esophagitis.Gastritis.Normal examined duodenum.Several biopsies obtained in the lower third of the esophagus.    • ENDOSCOPY AND COLONOSCOPY  04/12/2006     Normal colonoscopic examination    • INCISION AND DRAINAGE ABSCESS  10/24/2012   • INJECTION OF MEDICATION  04/21/2011    Depo Medrol (Methylprednisone) 80mg (1)        • INJECTION OF MEDICATION  02/01/2016    Kenalog (3)        • INJECTION OF MEDICATION  02/19/2016    Toradol (3)    • KNEE ARTHROSCOPY  04/02/2009     Medial meniscus tear of left knee   • SCROTUM EXPLORATION  07/02/2002     Excision of epidermal inclusion cyst. base of left scrotum   • SKIN BIOPSY  04/23/2012   • SKIN TAG REMOVAL  04/25/2002    Skin tag, left scrotum              PT Ortho       06/23/17 0900    ROM (Range of Motion)    General ROM Detail (P)  AROM 0-120  -NEVIN    MMT (Manual Muscle Testing)    General MMT Assessment Detail (P)  Independent slr with 10+degree lag  -NEVIN    Gait Assessment/Treatment    Gait, Comment (P)  FWB, antalgic  -NEVIN      06/22/17 0800    Posture/Observations    Posture/Observations Comments amb w/ SC but doesnt us throughout therex  -KW    ROM (Range of Motion)    General ROM  Detail AROM 0-117  -KW      06/21/17 1300    Subjective Comments    Subjective Comments (P)  reports no having a ride to yesterdays appointment so he did his HEP throughout the day yesterday. He believes he overdid it and that caused some swelling in his knee last night/this morning. reports icing  last night and this morning to help reduce the swelling.  -HB    Subjective Pain    Able to rate subjective pain? (P)  yes  -HB    Pre-Treatment Pain Level (P)  3  -HB    Posture/Observations    Posture/Observations Comments (P)  amb w/ SC but doesnt us throughout therex  -HB      User Key  (r) = Recorded By, (t) = Taken By, (c) = Cosigned By    Initials Name Provider Type    HB Rayna Link, ATC     NEVIN Morse, ATC     KW Pearl Mckeon, PT Physical Therapist                            PT Assessment/Plan       06/23/17 1023       PT Assessment    Assessment Comments (P)  completes rx. slight decreased secondary to increased pain a fatigue from long day yesterday and 4th PT visit this week  -NEVIN     PT Plan    PT Plan Comments (P)  Cont to progress per POC as yudelka  -NEVIN       User Key  (r) = Recorded By, (t) = Taken By, (c) = Cosigned By    Initials Name Provider Type    NEVIN Morse, ATC                 Modalities       06/23/17 1000 06/23/17 0900       Subjective Comments    Subjective Comments  (P)  Saw MD yesterday. Doing extremely well. Reports he is pretty sore above  knee on muscle where therapist rubbed last rx. Getting L knee done on July 28th. Also worn out from 4 days of PT this week and long day in [place] yesterday   -NEVIN     Subjective Pain    Post-Treatment Pain Level  (P)  1  -NEVIN     Ice    Location (P)  R knee  -NEVIN      Ice S/P Rx (P)  Yes  -NEVIN      ELECTRICAL STIMULATION    Attended/Unattended (P)  Unattended  -NEVIN      Stimulation Type (P)  IFC  -NEVIN      Location/Electrode Placement/Other (P)  Right knee w/ ice  -NEVIN      Rx Minutes (P)  20 mins  -NEVIN  "       User Key  (r) = Recorded By, (t) = Taken By, (c) = Cosigned By    Initials Name Provider Type    NEVIN Morse ATC                 Exercises       06/23/17 0900          Subjective Comments    Subjective Comments (P)  Saw MD yesterday. Doing extremely well. Reports he is pretty sore above  knee on muscle where therapist rubbed last rx. Getting L knee done on July 28th. Also worn out from 4 days of PT this week and long day in [place] yesterday   -NEVIN      Subjective Pain    Able to rate subjective pain? (P)  yes  -NEVIN      Pre-Treatment Pain Level (P)  3  -NEVIN      Post-Treatment Pain Level (P)  1  -NEVIN      Exercise 1    Exercise Name 1 (P)  Pro II seat 15 L3  -NEVIN      Time (Minutes) 1 (P)  12  -NEVIN      Exercise 2    Exercise Name 2 (P)  standing ham stretch  -NEVIN      Sets 2 (P)  3  -NEVIN      Time (Seconds) 2 (P)  30  -NEVIN      Exercise 3    Exercise Name 3 (P)  standing lunge stretch  -NEVIN      Sets 3 (P)  3  -NEVIN      Time (Seconds) 3 (P)  30  -NEVIN      Exercise 4    Exercise Name 4 (P)  incline stretch  -NEVIN      Sets 4 (P)  3  -NEVIN      Time (Seconds) 4 (P)  30  -NEVIN      Exercise 5    Exercise Name 5 (P)  Fwd step up 4\"  -NEVIN      Sets 5 (P)  2  -NEVIN      Reps 5 (P)  10  -NEVIN      Exercise 6    Exercise Name 6 (P)  QS  -NEVIN      Time (Minutes) 6 (P)  20  -NEVIN      Exercise 7    Exercise Name 7 (P)  SLR  -NEVIN      Sets 7 (P)  2  -NEVIN      Reps 7 (P)  10  -NEVIN      Exercise 8    Exercise Name 8 (P)  SAQ  -NEVIN      Sets 8 (P)  2  -NEVIN      Reps 8 (P)  10  -NEVIN        User Key  (r) = Recorded By, (t) = Taken By, (c) = Cosigned By    Initials Name Provider Type    NEVIN Morse ATC                                PT OP Goals       06/23/17 0900       PT Short Term Goals    STG Date to Achieve (P)  06/27/17  -NEVIN     STG 1 (P)  Decrease R knee pain to 2/10  -NEVIN     STG 1 Progress (P)  Met  -NEVIN     STG 2 (P)  Inc R knee AROM to 0-120 degrees  -NEVIN     STG 2 Progress (P)  Met  -NEVIN     STG 3 (P)  " Decrease edema R knee at jointline to 48 cm  -NEVIN     STG 3 Progress (P)  Not Met  -NEVIN     STG 4 (P)  Improve Hamstring flexibility from mod to min  -NEVIN     STG 4 Progress (P)  Progressing  -NEVIN     Long Term Goals    LTG Date to Achieve (P)  07/04/17  -     LTG 1 (P)  Ind w/ final HEP  -NEVIN     LTG 1 Progress (P)  Progressing;Ongoing  -NEVIN     LTG 2 (P)  Inc R knee AROM to 0-125 degrees  -     LTG 2 Progress (P)  Not Met  -NEVIN     LTG 3 (P)  Decrease R knee edema at joint line to 47cm or less  -     LTG 3 Progress (P)  Not Met  -NEVIN     LTG 4 (P)  Increase LEFS score to 40/80 or better  -     LTG 4 Progress (P)  Not Met  -NEVIN     LTG 5 (P)  Improve Hamsting flexibility from min to WNL  -     LTG 5 Progress (P)  Not Met  -NEVIN       User Key  (r) = Recorded By, (t) = Taken By, (c) = Cosigned By    Initials Name Provider Type    NEVIN Morse ATC                 Therapy Education       06/23/17 0926          Therapy Education    Given (P)  HEP  -NEVIN      Program (P)  Reinforced  -NEVIN      How Provided (P)  Verbal  -NEVIN      Provided to (P)  Patient  -NEVIN      Level of Understanding (P)  Verbalized  -NEVIN        User Key  (r) = Recorded By, (t) = Taken By, (c) = Cosigned By    Initials Name Provider Type    NEVIN Morse ATC                 Time Calculation:   Start Time: (P) 0926  Stop Time: (P) 1035  Time Calculation (min): (P) 69 min  Total Timed Code Minutes- PT: (P) 49 minute(s)    Therapy Charges for Today     Code Description Service Date Service Provider Modifiers Qty    43837880426 HC PT THER SUPP EA 15 MIN 6/23/2017 Tony Morse ATC  1    83743636025 HC PT ELECTRICAL STIM UNATTENDED 6/23/2017 Tony Morse ATC  1    93469446548 HC PT THER PROC EA 15 MIN 6/23/2017 Tony Morse ATC  3                    Tony Morse ATC  6/23/2017

## 2017-06-26 ENCOUNTER — HOSPITAL ENCOUNTER (OUTPATIENT)
Dept: PHYSICAL THERAPY | Facility: HOSPITAL | Age: 62
Setting detail: THERAPIES SERIES
Discharge: HOME | End: 2017-06-26

## 2017-06-26 DIAGNOSIS — Z96.651 STATUS POST TOTAL RIGHT KNEE REPLACEMENT: Primary | ICD-10-CM

## 2017-06-26 PROCEDURE — 97110 THERAPEUTIC EXERCISES: CPT

## 2017-06-26 PROCEDURE — G0283 ELEC STIM OTHER THAN WOUND: HCPCS

## 2017-06-26 NOTE — THERAPY TREATMENT NOTE
Outpatient Physical Therapy Ortho Treatment Note  [facility]   Rayna Link ATC       Patient Name: Otf uMrray  : 1955  MRN: [id number]  Today's Date: 2017      Visit Date: 2017   Pt reports 3/10 pain.  Reports 50% of improvement.  Attended / visits.  Insurance available: 30  Next MD appt: 2017.  Recertification: 2017.    Visit Dx:    ICD-10-CM ICD-9-CM   1. Status post total right knee replacement Z96.651 V43.65       There is no problem list on file for this patient.       Past Medical History:   Diagnosis Date   • Acute gastritis without bleeding    • Alcohol abuse counseling and surveillance of alcoholic    • Allergic rhinitis    • Anxiety state    • Attention deficit disorder of childhood with hyperactivity    • Attention deficit hyperactivity disorder    • Attention deficit hyperactivity disorder, predominantly hyperactive impulsive type    • Attention deficit hyperactivity disorder, predominantly inattentive type    • Backache    • Body mass index (bmi) 31.0-31.9, adult     Body mass index (BMI) 31.0-31.9, adult - BMI 33.5      • Burn of lower leg    • Candidiasis of skin    • Cardiac murmur, unspecified    • Decreased testosterone level    • Depressive disorder     Depressive disorder - acute on chronic      • Dysgraphia    • Edema    • Elbow joint pain    • Encounter for general adult medical examination with abnormal findings    • Encounter for screening for malignant neoplasm of colon    • Essential hypertension    • Essential tremor    • Ex-smoker    • Fatigue    • Gastro-esophageal reflux disease with esophagitis    • Gastroesophageal reflux disease    • Hyperlipidemia    • Hyperlipoproteinemia    • Impotence of organic origin    • Insomnia    • Intermittent palpitations    • Knee pain     Knee pain - patellofemoral      • Lateral epicondylitis    • Low back pain    • Male erectile disorder    • Male erectile dysfunction, unspecified    • Male hypogonadism     • Neck pain     Neck pain aggravated by recumbency      • Obese     Obese - BMI 33.0      • On long term drug therapy    • Osteoarthrosis     Osteoarthrosis, unspecified whether generalized or localized, involving lower leg      • Other obesity    • Other specified diseases of anus and rectum     Other specified diseases of anus and rectum - rectal pain after c-scope prep      • Overweight    • Pain in left knee    • Pain in right knee    • Shoulder pain, right    • Spasm of back muscles    • Tachycardia, unspecified    • Tremor, unspecified    • Tubular adenoma of colon    • Unspecified essential hypertension         Past Surgical History:   Procedure Laterality Date   • COLONOSCOPY  03/16/2016    One polyp in the sigmoid colon.Resected and retrieved.        • CYST REMOVAL  10/27/2009     Excision of sebaceous cyst of the forehead, glabellar region.   • ENDOSCOPY  03/16/2016    Mildly severe esophagitis.Gastritis.Normal examined duodenum.Several biopsies obtained in the lower third of the esophagus.    • ENDOSCOPY AND COLONOSCOPY  04/12/2006     Normal colonoscopic examination    • INCISION AND DRAINAGE ABSCESS  10/24/2012   • INJECTION OF MEDICATION  04/21/2011    Depo Medrol (Methylprednisone) 80mg (1)        • INJECTION OF MEDICATION  02/01/2016    Kenalog (3)        • INJECTION OF MEDICATION  02/19/2016    Toradol (3)    • KNEE ARTHROSCOPY  04/02/2009     Medial meniscus tear of left knee   • SCROTUM EXPLORATION  07/02/2002     Excision of epidermal inclusion cyst. base of left scrotum   • SKIN BIOPSY  04/23/2012   • SKIN TAG REMOVAL  04/25/2002    Skin tag, left scrotum              PT Ortho       06/26/17 1300    Subjective Comments    Subjective Comments (P)  reports having tightness/ soreness. he states its just feels like its enough to kow its there. states he will going back 7/26/2017 to get the left knee replaced  -HB    Subjective Pain    Able to rate subjective pain? (P)  yes  -HB    Pre-Treatment  Pain Level (P)  3  -HB    Posture/Observations    Posture/Observations Comments (P)  amb w/ no assisted device  -HB    ROM (Range of Motion)    General ROM Detail (P)  AROM 0-123  -HB    Girth    Girth Measured? (P)  --   jt line 45cm, 2 inches above 47cm, tib tub 44cm  -HB    RLE Quick Girth (cm)    Tib tuberosity (P)  42 cm   L 41  -HB    Mid patella (P)  44 cm   L 43  -HB    Distal thigh (P)  47 cm   L 45  -HB      User Key  (r) = Recorded By, (t) = Taken By, (c) = Cosigned By    Initials Name Provider Type     Rayna Link, [place]                             PT Assessment/Plan       06/26/17 1300       PT Assessment    Assessment Comments (P)  toelrated therex well. completed exercises well. accomplished girth measurments goal today.   -HB     PT Plan    PT Frequency (P)  4x/week  -HB     PT Plan Comments (P)  cont as tolerated  -HB       User Key  (r) = Recorded By, (t) = Taken By, (c) = Cosigned By    Initials Name Provider Type     Rayna Link, [place]                 Modalities       06/26/17 1300          Ice    Ice Applied (P)  Yes  -HB      Location (P)  R knee  -HB      Ice S/P Rx (P)  Yes  -HB      ELECTRICAL STIMULATION    Attended/Unattended (P)  Unattended  -HB      Stimulation Type (P)  IFC  -HB      Location/Electrode Placement/Other (P)  Right knee w/ ice  -HB      Rx Minutes (P)  20 mins  -HB        User Key  (r) = Recorded By, (t) = Taken By, (c) = Cosigned By    Initials Name Provider Type     Rayna Link, [place]                 Exercises       06/26/17 1300          Subjective Comments    Subjective Comments (P)  reports having tightness/ soreness. he states its just feels like its enough to kow its there. states he will going back 7/26/2017 to get the left knee replaced  -HB      Subjective Pain    Able to rate subjective pain? (P)  yes  -HB      Pre-Treatment Pain Level (P)  3  -HB      Exercise 1    Exercise Name 1 (P)  Pro 2 seat 15 L 5.0  -HB       Time (Minutes) 1 (P)  10 min  -HB      Exercise 2    Exercise Name 2 (P)  St ham S  -HB      Sets 2 (P)  3  -HB      Time (Seconds) 2 (P)  30 sec  -HB      Exercise 3    Exercise Name 3 (P)  St Lunge S  -HB      Sets 3 (P)  3  -HB      Time (Seconds) 3 (P)  30 sec  -HB      Exercise 4    Exercise Name 4 (P)  Incline S  -HB      Sets 4 (P)  3  -HB      Time (Seconds) 4 (P)  30 sec  -HB      Exercise 5    Exercise Name 5 (P)  Airex CR/TR  -HB      Reps 5 (P)  30  -HB      Exercise 6    Exercise Name 6 (P)  Airex marches   -HB      Time (Minutes) 6 (P)  30  -HB      Exercise 7    Exercise Name 7 (P)  Airex MS  -HB      Reps 7 (P)  20  -HB      Exercise 8    Exercise Name 8 (P)  FWD step ups   -HB      Sets 8 (P)  2  -HB      Reps 8 (P)  10  -HB      Exercise 9    Exercise Name 9 (P)  LAT Step ups  -HB      Sets 9 (P)  2  -HB      Reps 9 (P)  10  -HB      Exercise 10    Exercise Name 10 (P)  SLR w/ ER  -HB      Sets 10 (P)  2  -HB      Reps 10 (P)  10  -HB      Exercise 11    Exercise Name 11 (P)  heel slides  -HB      Time (Minutes) 11 (P)  3 min  -HB      Exercise 12    Exercise Name 12 (P)  LAQ  -HB      Reps 12 (P)  20  -HB        User Key  (r) = Recorded By, (t) = Taken By, (c) = Cosigned By    Initials Name Provider Type    JUNE Link, ATC                                PT OP Goals       06/26/17 1400 06/26/17 1300    PT Short Term Goals    STG Date to Achieve  (P)  06/27/17  -HB    STG 1  (P)  Decrease R knee pain to 2/10  -HB    STG 1 Progress  (P)  Met  -HB    STG 2  (P)  Inc R knee AROM to 0-120 degrees  -HB    STG 2 Progress  (P)  Met  -HB    STG 3  (P)  Decrease edema R knee at jointline to 48 cm  -HB    STG 3 Progress  (P)  Met  -HB    STG 4  (P)  Improve Hamstring flexibility from mod to min  -HB    STG 4 Progress  (P)  Progressing  -HB    Long Term Goals    LTG Date to Achieve  (P)  07/04/17  -HB    LTG 1  (P)  Ind w/ final HEP  -HB    LTG 1 Progress  (P)  Progressing;Ongoing  -HB     LTG 2  (P)  Inc R knee AROM to 0-125 degrees  -HB    LTG 2 Progress  (P)  Not Met  -HB    LTG 3  (P)  Decrease R knee edema at joint line to 47cm or less  -HB    LTG 3 Progress  (P)  Met  -HB    LTG 4  (P)  Increase LEFS score to 40/80 or better  -HB    LTG 4 Progress  (P)  Not Met  -HB    LTG 5  (P)  Improve Hamsting flexibility from min to WNL  -HB    LTG 5 Progress  (P)  Not Met  -HB    Time Calculation    PT Goal Re-Cert Due Date (P)  07/04/17  -HB       User Key  (r) = Recorded By, (t) = Taken By, (c) = Cosigned By    Initials Name Provider Type    HB Rayna Link, ATC                     Time Calculation:   Start Time: (P) 1300  Stop Time: (P) 1415  Time Calculation (min): (P) 75 min  PT Non-Billable Time (min): (P) 20 min  Total Timed Code Minutes- PT: (P) 55 minute(s)    Therapy Charges for Today     Code Description Service Date Service Provider Modifiers Qty    80350130699  PT THER PROC EA 15 MIN 6/26/2017 Rayna Link, ATC  4    00000949853 HC PT THER SUPP EA 15 MIN 6/26/2017 Rayna Link, ATC  1    [id number]  PT ELECTRICAL STIM UNATTENDED 6/26/2017 Rayna Link, ATC  1                    Rayna Link, ATC  6/26/2017

## 2017-06-28 ENCOUNTER — HOSPITAL ENCOUNTER (OUTPATIENT)
Dept: PHYSICAL THERAPY | Facility: HOSPITAL | Age: 62
Setting detail: THERAPIES SERIES
Discharge: HOME | End: 2017-06-28

## 2017-06-28 DIAGNOSIS — Z96.651 STATUS POST TOTAL RIGHT KNEE REPLACEMENT: Primary | ICD-10-CM

## 2017-06-28 PROCEDURE — G0283 ELEC STIM OTHER THAN WOUND: HCPCS

## 2017-06-28 PROCEDURE — 97110 THERAPEUTIC EXERCISES: CPT

## 2017-06-28 NOTE — THERAPY TREATMENT NOTE
Outpatient Physical Therapy Ortho Treatment Note  [facility]   Rayna Link ATC       Patient Name: Otf Murray  : 1955  MRN: [id number]  Today's Date: 2017      Visit Date: 2017   Pt reports /10 pain.  Reports 60% of improvement.  Attended 10/10 visits.  Insurance available: 30 per year  Next MD appt: 2017.  Recertification: 2017.    Visit Dx:    ICD-10-CM ICD-9-CM   1. Status post total right knee replacement Z96.651 V43.65       There is no problem list on file for this patient.       Past Medical History:   Diagnosis Date   • Acute gastritis without bleeding    • Alcohol abuse counseling and surveillance of alcoholic    • Allergic rhinitis    • Anxiety state    • Attention deficit disorder of childhood with hyperactivity    • Attention deficit hyperactivity disorder    • Attention deficit hyperactivity disorder, predominantly hyperactive impulsive type    • Attention deficit hyperactivity disorder, predominantly inattentive type    • Backache    • Body mass index (bmi) 31.0-31.9, adult     Body mass index (BMI) 31.0-31.9, adult - BMI 33.5      • Burn of lower leg    • Candidiasis of skin    • Cardiac murmur, unspecified    • Decreased testosterone level    • Depressive disorder     Depressive disorder - acute on chronic      • Dysgraphia    • Edema    • Elbow joint pain    • Encounter for general adult medical examination with abnormal findings    • Encounter for screening for malignant neoplasm of colon    • Essential hypertension    • Essential tremor    • Ex-smoker    • Fatigue    • Gastro-esophageal reflux disease with esophagitis    • Gastroesophageal reflux disease    • Hyperlipidemia    • Hyperlipoproteinemia    • Impotence of organic origin    • Insomnia    • Intermittent palpitations    • Knee pain     Knee pain - patellofemoral      • Lateral epicondylitis    • Low back pain    • Male erectile disorder    • Male erectile dysfunction, unspecified    • Male  hypogonadism    • Neck pain     Neck pain aggravated by recumbency      • Obese     Obese - BMI 33.0      • On long term drug therapy    • Osteoarthrosis     Osteoarthrosis, unspecified whether generalized or localized, involving lower leg      • Other obesity    • Other specified diseases of anus and rectum     Other specified diseases of anus and rectum - rectal pain after c-scope prep      • Overweight    • Pain in left knee    • Pain in right knee    • Shoulder pain, right    • Spasm of back muscles    • Tachycardia, unspecified    • Tremor, unspecified    • Tubular adenoma of colon    • Unspecified essential hypertension         Past Surgical History:   Procedure Laterality Date   • COLONOSCOPY  03/16/2016    One polyp in the sigmoid colon.Resected and retrieved.        • CYST REMOVAL  10/27/2009     Excision of sebaceous cyst of the forehead, glabellar region.   • ENDOSCOPY  03/16/2016    Mildly severe esophagitis.Gastritis.Normal examined duodenum.Several biopsies obtained in the lower third of the esophagus.    • ENDOSCOPY AND COLONOSCOPY  04/12/2006     Normal colonoscopic examination    • INCISION AND DRAINAGE ABSCESS  10/24/2012   • INJECTION OF MEDICATION  04/21/2011    Depo Medrol (Methylprednisone) 80mg (1)        • INJECTION OF MEDICATION  02/01/2016    Kenalog (3)        • INJECTION OF MEDICATION  02/19/2016    Toradol (3)    • KNEE ARTHROSCOPY  04/02/2009     Medial meniscus tear of left knee   • SCROTUM EXPLORATION  07/02/2002     Excision of epidermal inclusion cyst. base of left scrotum   • SKIN BIOPSY  04/23/2012   • SKIN TAG REMOVAL  04/25/2002    Skin tag, left scrotum              PT Ortho       06/28/17 1300    Subjective Comments    Subjective Comments (P)  reports he had a bad day yesterday. states his knee began to flare with with some swelling and pain afer he took some trash out yesterday. he was able to get some rest after taking a pain pill last night. today he is feeling better but  still has some sweling ans tightness. patient cont to report doing HEP at home  -HB    Subjective Pain    Able to rate subjective pain? (P)  yes  -HB    Pre-Treatment Pain Level (P)  4  -HB    Post-Treatment Pain Level (P)  1  -HB      06/26/17 1300    Subjective Comments    Subjective Comments (P)  reports having tightness/ soreness. he states its just feels like its enough to kow its there. states he will going back 7/26/2017 to get the left knee replaced  -HB    Subjective Pain    Able to rate subjective pain? (P)  yes  -HB    Pre-Treatment Pain Level (P)  3  -HB    Posture/Observations    Posture/Observations Comments (P)  amb w/ no assisted device  -HB    ROM (Range of Motion)    General ROM Detail (P)  AROM 0-123  -HB    Girth    Girth Measured? (P)  --   jt line 45cm, 2 inches above 47cm, tib tub 44cm  -HB    RLE Quick Girth (cm)    Tib tuberosity (P)  42 cm   L 41  -HB    Mid patella (P)  44 cm   L 43  -HB    Distal thigh (P)  47 cm   L 45  -HB      User Key  (r) = Recorded By, (t) = Taken By, (c) = Cosigned By    Initials Name Provider Type     Raynakaren Link, [employer]                             PT Assessment/Plan       06/28/17 1300       PT Assessment    Assessment Comments (P)  patient began to have a medical knee pain while walking. but does not occur when not ambulating   -HB     PT Plan    PT Frequency (P)  4x/week  -HB     PT Plan Comments (P)  resume step ups if able. progress ROM and strength  -       User Key  (r) = Recorded By, (t) = Taken By, (c) = Cosigned By    Initials Name Provider Type     Rayna Link [employer]                 Modalities       06/28/17 1300          Ice    Ice Applied (P)  Yes  -HB      Location (P)  R knee  -HB      Ice S/P Rx (P)  Yes  -HB      ELECTRICAL STIMULATION    Attended/Unattended (P)  Unattended  -HB      Stimulation Type (P)  IFC  -HB      Location/Electrode Placement/Other (P)  Right knee w/ ice  -HB      Rx Minutes (P)  20 mins   -HB        User Key  (r) = Recorded By, (t) = Taken By, (c) = Cosigned By    Initials Name Provider Type    HB Rayna Link, ATC                 Exercises       06/28/17 1300          Subjective Comments    Subjective Comments (P)  reports he had a bad day yesterday. states his knee began to flare with with some swelling and pain afer he took some trash out yesterday. he was able to get some rest after taking a pain pill last night. today he is feeling better but still has some sweling ans tightness. patient cont to report doing HEP at home  -HB      Subjective Pain    Able to rate subjective pain? (P)  yes  -HB      Pre-Treatment Pain Level (P)  4  -HB      Post-Treatment Pain Level (P)  1  -HB      Exercise 1    Exercise Name 1 (P)  Pro 2 s=15 L3.0  -HB      Time (Minutes) 1 (P)  10 min  -HB      Exercise 2    Exercise Name 2 (P)  St Ham S  -HB      Sets 2 (P)  3  -HB      Time (Seconds) 2 (P)  30 sec  -HB      Exercise 3    Exercise Name 3 (P)  St lunge  -HB      Sets 3 (P)  3  -HB      Time (Seconds) 3 (P)  30 sec  -HB      Exercise 4    Exercise Name 4 (P)  Incline S  -HB      Sets 4 (P)  3  -HB      Time (Seconds) 4 (P)  30 sec  -HB      Exercise 5    Exercise Name 5 (P)  A[employer] CR/TR  -HB      Sets 5 (P)  2  -HB      Reps 5 (P)  20  -HB      Exercise 6    Exercise Name 6 (P)  A[employer]x MS  -HB      Time (Minutes) 6 (P)  20  -HB      Exercise 7    Exercise Name 7 (P)  Airex Marches  -HB      Reps 7 (P)  30  -HB      Exercise 8    Exercise Name 8 (P)  SLR w/ ER  -HB      Sets 8 (P)  2  -HB      Reps 8 (P)  10  -HB      Exercise 9    Exercise Name 9 (P)  SLR 4 way   -HB      Reps 9 (P)  10  -HB      Exercise 10    Exercise Name 10 (P)  heelslides   -HB      Time (Minutes) 10 (P)  3 min  -HB      Exercise 11    Exercise Name 11 (P)  CC TKE   -HB      Weights/Plates 11 (P)  3 Plates  -HB      Reps 11 (P)  20  -HB        User Key  (r) = Recorded By, (t) = Taken By, (c) = Cosigned By    Initials Name  Provider Type    JUNE Link ATC                                PT OP Goals       06/28/17 1300       PT Short Term Goals    STG Date to Achieve (P)  06/27/17  -HB     STG 1 (P)  Decrease R knee pain to 2/10  -HB     STG 1 Progress (P)  Met  -HB     STG 2 (P)  Inc R knee AROM to 0-120 degrees  -HB     STG 2 Progress (P)  Met  -HB     STG 3 (P)  Decrease edema R knee at jointline to 48 cm  -HB     STG 3 Progress (P)  Met  -HB     STG 4 (P)  Improve Hamstring flexibility from mod to min  -HB     STG 4 Progress (P)  Progressing  -HB     Long Term Goals    LTG Date to Achieve (P)  07/04/17  -HB     LTG 1 (P)  Ind w/ final HEP  -HB     LTG 1 Progress (P)  Progressing;Ongoing  -HB     LTG 2 (P)  Inc R knee AROM to 0-125 degrees  -HB     LTG 2 Progress (P)  Progressing  -HB     LTG 3 (P)  Decrease R knee edema at joint line to 47cm or less  -HB     LTG 3 Progress (P)  Met  -HB     LTG 4 (P)  Increase LEFS score to 40/80 or better  -HB     LTG 4 Progress (P)  Not Met  -HB     LTG 5 (P)  Improve Hamsting flexibility from min to WNL  -HB     LTG 5 Progress (P)  Progressing  -HB     Time Calculation    PT Goal Re-Cert Due Date (P)  07/04/17  -HB       User Key  (r) = Recorded By, (t) = Taken By, (c) = Cosigned By    Initials Name Provider Type    JUNE Way RADHA Nikolay ATC                 Therapy Education       06/28/17 1300          Therapy Education    Given (P)  HEP;Pain management  -HB      Program (P)  Reinforced  -HB      How Provided (P)  Verbal  -HB      Provided to (P)  Patient  -HB      Level of Understanding (P)  Verbalized  -HB        User Key  (r) = Recorded By, (t) = Taken By, (c) = Cosigned By    Initials Name Provider Type    JUNE Way RADHA Nikolay ATC                 Time Calculation:   Start Time: (P) 1300  Stop Time: (P) 1410  Time Calculation (min): (P) 70 min  PT Non-Billable Time (min): (P) 20 min  Total Timed Code Minutes- PT: (P) 50 minute(s)    Therapy Charges  for Today     Code Description Service Date Service Provider Modifiers Qty    16326511654 HC PT THER PROC EA 15 MIN 6/28/2017 Rayna Link, ATC  3    36321844885 HC PT THER SUPP EA 15 MIN 6/28/2017 Rayna Link, ATC  1    14543791775 HC PT ELECTRICAL STIM UNATTENDED 6/28/2017 Rayna Link, ATC  1                    Rayna Link, ATC  6/28/2017

## 2017-06-29 ENCOUNTER — APPOINTMENT (OUTPATIENT)
Dept: PHYSICAL THERAPY | Facility: HOSPITAL | Age: 62
End: 2017-06-29

## 2017-06-30 ENCOUNTER — HOSPITAL ENCOUNTER (OUTPATIENT)
Dept: PHYSICAL THERAPY | Facility: HOSPITAL | Age: 62
Setting detail: THERAPIES SERIES
Discharge: HOME | End: 2017-06-30

## 2017-06-30 DIAGNOSIS — Z96.651 STATUS POST TOTAL RIGHT KNEE REPLACEMENT: Primary | ICD-10-CM

## 2017-06-30 PROCEDURE — G0283 ELEC STIM OTHER THAN WOUND: HCPCS

## 2017-06-30 PROCEDURE — 97110 THERAPEUTIC EXERCISES: CPT

## 2017-07-05 ENCOUNTER — HOSPITAL ENCOUNTER (OUTPATIENT)
Dept: PHYSICAL THERAPY | Facility: HOSPITAL | Age: 62
Setting detail: THERAPIES SERIES
Discharge: HOME | End: 2017-07-05

## 2017-07-05 DIAGNOSIS — M25.561 CHRONIC PAIN OF RIGHT KNEE: ICD-10-CM

## 2017-07-05 DIAGNOSIS — G89.29 CHRONIC PAIN OF RIGHT KNEE: ICD-10-CM

## 2017-07-05 DIAGNOSIS — Z96.651 STATUS POST TOTAL RIGHT KNEE REPLACEMENT: Primary | ICD-10-CM

## 2017-07-05 PROCEDURE — G0283 ELEC STIM OTHER THAN WOUND: HCPCS

## 2017-07-05 PROCEDURE — 97140 MANUAL THERAPY 1/> REGIONS: CPT

## 2017-07-05 PROCEDURE — 97110 THERAPEUTIC EXERCISES: CPT

## 2017-07-05 NOTE — THERAPY TREATMENT NOTE
Outpatient Physical Therapy Ortho Treatment Note  [facility]     Patient Name: Otf Murray  : 1955  MRN: [id number]  Today's Date: 2017      Visit Date: 2017     Subjective Improvement: 60%  Attendance:    Next MD Visit : 2017  Recert Date:  2017      Therapy Diagnosis:  Status post total R knee replacement         Visit Dx:    ICD-10-CM ICD-9-CM   1. Status post total right knee replacement Z96.651 V43.65   2. Chronic pain of right knee M25.561 719.46    G89.29 338.29       There is no problem list on file for this patient.       Past Medical History:   Diagnosis Date   • Acute gastritis without bleeding    • Alcohol abuse counseling and surveillance of alcoholic    • Allergic rhinitis    • Anxiety state    • Attention deficit disorder of childhood with hyperactivity    • Attention deficit hyperactivity disorder    • Attention deficit hyperactivity disorder, predominantly hyperactive impulsive type    • Attention deficit hyperactivity disorder, predominantly inattentive type    • Backache    • Body mass index (bmi) 31.0-31.9, adult     Body mass index (BMI) 31.0-31.9, adult - BMI 33.5      • Burn of lower leg    • Candidiasis of skin    • Cardiac murmur, unspecified    • Decreased testosterone level    • Depressive disorder     Depressive disorder - acute on chronic      • Dysgraphia    • Edema    • Elbow joint pain    • Encounter for general adult medical examination with abnormal findings    • Encounter for screening for malignant neoplasm of colon    • Essential hypertension    • Essential tremor    • Ex-smoker    • Fatigue    • Gastro-esophageal reflux disease with esophagitis    • Gastroesophageal reflux disease    • Hyperlipidemia    • Hyperlipoproteinemia    • Impotence of organic origin    • Insomnia    • Intermittent palpitations    • Knee pain     Knee pain - patellofemoral      • Lateral epicondylitis    • Low back pain    • Male erectile disorder    • Male  erectile dysfunction, unspecified    • Male hypogonadism    • Neck pain     Neck pain aggravated by recumbency      • Obese     Obese - BMI 33.0      • On long term drug therapy    • Osteoarthrosis     Osteoarthrosis, unspecified whether generalized or localized, involving lower leg      • Other obesity    • Other specified diseases of anus and rectum     Other specified diseases of anus and rectum - rectal pain after c-scope prep      • Overweight    • Pain in left knee    • Pain in right knee    • Shoulder pain, right    • Spasm of back muscles    • Tachycardia, unspecified    • Tremor, unspecified    • Tubular adenoma of colon    • Unspecified essential hypertension         Past Surgical History:   Procedure Laterality Date   • COLONOSCOPY  03/16/2016    One polyp in the sigmoid colon.Resected and retrieved.        • CYST REMOVAL  10/27/2009     Excision of sebaceous cyst of the forehead, glabellar region.   • ENDOSCOPY  03/16/2016    Mildly severe esophagitis.Gastritis.Normal examined duodenum.Several biopsies obtained in the lower third of the esophagus.    • ENDOSCOPY AND COLONOSCOPY  04/12/2006     Normal colonoscopic examination    • INCISION AND DRAINAGE ABSCESS  10/24/2012   • INJECTION OF MEDICATION  04/21/2011    Depo Medrol (Methylprednisone) 80mg (1)        • INJECTION OF MEDICATION  02/01/2016    Kenalog (3)        • INJECTION OF MEDICATION  02/19/2016    Toradol (3)    • KNEE ARTHROSCOPY  04/02/2009     Medial meniscus tear of left knee   • SCROTUM EXPLORATION  07/02/2002     Excision of epidermal inclusion cyst. base of left scrotum   • SKIN BIOPSY  04/23/2012   • SKIN TAG REMOVAL  04/25/2002    Skin tag, left scrotum              PT Ortho       07/05/17 1100    Subjective Pain    Post-Treatment Pain Level (P)  1  -MO      User Key  (r) = Recorded By, (t) = Taken By, (c) = Cosigned By    Initials Name Provider Type    YOVANY Toussaint, PTA Student PTA Student                            PT  Assessment/Plan       07/05/17 1100       PT Assessment    Assessment Comments (P)  Pt tolerated treatment well.  Pt stated his knee is almost back to normal and he broke a sweat today.  -MO     PT Plan    PT Frequency (P)  4x/week  -MO     Predicted Duration of Therapy Intervention (days/wks) (P)  2 weeks  -MO     PT Plan Comments (P)  Continue POC per protocol.  Next visit work w/ CC to switch things up.  -MO       User Key  (r) = Recorded By, (t) = Taken By, (c) = Cosigned By    Initials Name Provider Type    YOVANY Toussaint, PTA Student PTA Student                Modalities       07/05/17 1100          Ice    Ice Applied (P)  Yes  -MO      Location (P)  R knee  -MO      Rx Minutes (P)  --   10 min w/ IFC  -MO      Ice S/P Rx (P)  Yes  -MO      ELECTRICAL STIMULATION    Attended/Unattended (P)  Unattended  -MO      Stimulation Type (P)  IFC  -MO      Location/Electrode Placement/Other (P)  R knee w/ ice  -MO      Rx Minutes (P)  10 mins  -MO        User Key  (r) = Recorded By, (t) = Taken By, (c) = Cosigned By    Initials Name Provider Type    YOVANY Toussaint, PTA Student PTA Student                Exercises       07/05/17 1100          Subjective Comments    Subjective Comments (P)  Pt states he is feeling pretty good today.  He is not having any pain today and is following his HEP.  -MO      Subjective Pain    Able to rate subjective pain? (P)  yes  -MO      Pre-Treatment Pain Level (P)  1  -MO      Post-Treatment Pain Level (P)  1  -MO      Exercise 1    Exercise Name 1 (P)  Pro ll level 4.5  -MO      Time (Minutes) 1 (P)  10  -MO      Exercise 2    Exercise Name 2 (P)  incline stretch  -MO      Sets 2 (P)  3  -MO      Time (Seconds) 2 (P)  30  -MO      Exercise 3    Exercise Name 3 (P)  Hamstring stretch  -MO      Sets 3 (P)  3  -MO      Time (Seconds) 3 (P)  30  -MO      Exercise 4    Exercise Name 4 (P)  lunge stretch  -MO      Sets 4 (P)  3  -MO      Time (Seconds) 4 (P)  30  -MO      Exercise 5     Exercise Name 5 (P)  airex heel/toe lifts  -MO      Sets 5 (P)  3  -MO      Reps 5 (P)  10  -MO      Exercise 6    Exercise Name 6 (P)  airex mini squats  -MO      Sets 6 (P)  3  -MO      Reps 6 (P)  10  -MO      Exercise 7    Exercise Name 7 (P)  airex marches  -MO      Sets 7 (P)  3  -MO      Reps 7 (P)  10  -MO      Exercise 8    Exercise Name 8 (P)  airex hamstring curls  -MO      Equipment 8 (P)  Cuff Weight   3#  -MO      Sets 8 (P)  3  -MO      Reps 8 (P)  10  -MO      Exercise 9    Exercise Name 9 (P)  Bosu fwd/lat steps ups  -MO      Sets 9 (P)  3  -MO      Reps 9 (P)  10  -MO      Exercise 10    Exercise Name 10 (P)  SLR 4-way  -MO      Equipment 10 (P)  Theraband  -MO      Resistance 10 (P)  Red  -MO      Sets 10 (P)  2  -MO      Reps 10 (P)  10  -MO      Exercise 11    Exercise Name 11 (P)  --  -MO      Sets 11 (P)  --  -MO      Reps 11 (P)  --  -MO      Time (Seconds) 11 (P)  --  -MO      Exercise 12    Exercise Name 12 (P)  --  -MO      Sets 12 (P)  --  -MO      Reps 12 (P)  --  -MO      Time (Seconds) 12 (P)  --  -MO        User Key  (r) = Recorded By, (t) = Taken By, (c) = Cosigned By    Initials Name Provider Type    YOVANY Toussaint PTA Student PTA Student                        Manual Rx (last 36 hours)      Manual Treatments       07/05/17 1100          Manual Rx 1    Manual Rx 1 Location (P)  R knee  -MO      Manual Rx 1 Type (P)  scar massage  -MO      Manual Rx 1 Duration (P)  10'  -MO      Manual Rx 2    Manual Rx 2 Location (P)  R knee  -MO      Manual Rx 2 Type (P)  Patellar mobs  -MO      Manual Rx 2 Duration (P)  5'  -MO        User Key  (r) = Recorded By, (t) = Taken By, (c) = Cosigned By    Initials Name Provider Type    YOVANY Toussaint PTA Student PTA Student                PT OP Goals       07/05/17 1112       PT Short Term Goals    STG Date to Achieve (P)  06/27/17  -MO     STG 1 (P)  Decrease R knee pain to 2/10  -MO     STG 1 Progress (P)  Met  -MO     STG 2 (P)  Inc DIANA  knee AROM to 0-120 degrees  -MO     STG 2 Progress (P)  Met  -MO     STG 3 (P)  Decrease edema R knee at jointline to 48 cm  -MO     STG 3 Progress (P)  Not Met  -MO     STG 4 (P)  Improve Hamstring flexibility from mod to min  -MO     STG 4 Progress (P)  Progressing  -MO     Long Term Goals    LTG Date to Achieve (P)  07/04/17  -MO     LTG 1 (P)  Ind w/ final HEP  -MO     LTG 1 Progress (P)  Progressing;Ongoing  -MO     LTG 2 (P)  Inc R knee AROM to 0-125 degrees  -MO     LTG 2 Progress (P)  Not Met  -MO     LTG 3 (P)  Decrease R knee edema at joint line to 47cm or less  -MO     LTG 3 Progress (P)  Not Met  -MO     LTG 4 (P)  Increase LEFS score to 40/80 or better  -MO     LTG 4 Progress (P)  Not Met  -MO     LTG 5 (P)  Improve Hamsting flexibility from min to WNL  -MO     LTG 5 Progress (P)  Not Met  -MO     Time Calculation    PT Goal Re-Cert Due Date (P)  07/04/17  -MO       User Key  (r) = Recorded By, (t) = Taken By, (c) = Cosigned By    Initials Name Provider Type    YOVANY Toussaint PTA Student PTA Student                    Time Calculation:   Start Time: (P) 1100  Stop Time: (P) 1202  Time Calculation (min): (P) 62 min  Total Timed Code Minutes- PT: (P) 62 minute(s)    Therapy Charges for Today     Code Description Service Date Service Provider Modifiers Qty    67836068808 HC PT THER SUPP EA 15 MIN 7/5/2017 Carol Toussaint PTA Student GP 1    42846478309 HC PT THER PROC EA 15 MIN 7/5/2017 Carol Toussaint PTA Student GP 2    76971239784 HC PT MANUAL THERAPY EA 15 MIN 7/5/2017 Carol Toussaint PTA Student GP 1    77722922442 HC PT ELECTRICAL STIM UNATTENDED 7/5/2017 Carol Toussaint PTA Student  1                    Carol Toussaint PTA Student  7/5/2017

## 2017-07-06 ENCOUNTER — HOSPITAL ENCOUNTER (OUTPATIENT)
Dept: PHYSICAL THERAPY | Facility: HOSPITAL | Age: 62
Setting detail: THERAPIES SERIES
Discharge: HOME | End: 2017-07-06

## 2017-07-06 ENCOUNTER — OFFICE VISIT (OUTPATIENT)
Dept: SLEEP MEDICINE | Facility: HOSPITAL | Age: 62
End: 2017-07-06

## 2017-07-06 VITALS
DIASTOLIC BLOOD PRESSURE: 72 MMHG | HEART RATE: 67 BPM | OXYGEN SATURATION: 95 % | BODY MASS INDEX: 35.89 KG/M2 | SYSTOLIC BLOOD PRESSURE: 130 MMHG | HEIGHT: 77 IN | WEIGHT: 304 LBS

## 2017-07-06 DIAGNOSIS — F51.04 PSYCHOPHYSIOLOGICAL INSOMNIA: ICD-10-CM

## 2017-07-06 DIAGNOSIS — Z96.651 STATUS POST TOTAL RIGHT KNEE REPLACEMENT: Primary | ICD-10-CM

## 2017-07-06 DIAGNOSIS — G89.29 CHRONIC PAIN OF RIGHT KNEE: ICD-10-CM

## 2017-07-06 DIAGNOSIS — M25.561 CHRONIC PAIN OF RIGHT KNEE: ICD-10-CM

## 2017-07-06 DIAGNOSIS — G47.30 HYPERSOMNIA WITH SLEEP APNEA: Primary | ICD-10-CM

## 2017-07-06 DIAGNOSIS — G47.10 HYPERSOMNIA WITH SLEEP APNEA: Primary | ICD-10-CM

## 2017-07-06 PROCEDURE — 99243 OFF/OP CNSLTJ NEW/EST LOW 30: CPT | Performed by: INTERNAL MEDICINE

## 2017-07-06 PROCEDURE — G0283 ELEC STIM OTHER THAN WOUND: HCPCS

## 2017-07-06 PROCEDURE — 97110 THERAPEUTIC EXERCISES: CPT

## 2017-07-06 NOTE — THERAPY TREATMENT NOTE
Outpatient Physical Therapy Ortho Treatment Note  [facility]     Patient Name: Otf Murray  : 1955  MRN: [id number]  Today's Date: 2017      Visit Date: 2017     Subjective Improvement: 60%  Attendance:    Next MD Visit : 2017  Recert Date:  2017      Therapy Diagnosis:  S/P R TKA replacement         Visit Dx:    ICD-10-CM ICD-9-CM   1. Status post total right knee replacement Z96.651 V43.65   2. Chronic pain of right knee M25.561 719.46    G89.29 338.29       There is no problem list on file for this patient.       Past Medical History:   Diagnosis Date   • Acute gastritis without bleeding    • Alcohol abuse counseling and surveillance of alcoholic    • Allergic rhinitis    • Anxiety state    • Attention deficit disorder of childhood with hyperactivity    • Attention deficit hyperactivity disorder    • Attention deficit hyperactivity disorder, predominantly hyperactive impulsive type    • Attention deficit hyperactivity disorder, predominantly inattentive type    • Backache    • Body mass index (bmi) 31.0-31.9, adult     Body mass index (BMI) 31.0-31.9, adult - BMI 33.5      • Burn of lower leg    • Candidiasis of skin    • Cardiac murmur, unspecified    • Decreased testosterone level    • Depressive disorder     Depressive disorder - acute on chronic      • Dysgraphia    • Edema    • Elbow joint pain    • Encounter for general adult medical examination with abnormal findings    • Encounter for screening for malignant neoplasm of colon    • Essential hypertension    • Essential tremor    • Ex-smoker    • Fatigue    • Gastro-esophageal reflux disease with esophagitis    • Gastroesophageal reflux disease    • Hyperlipidemia    • Hyperlipoproteinemia    • Impotence of organic origin    • Insomnia    • Intermittent palpitations    • Knee pain     Knee pain - patellofemoral      • Lateral epicondylitis    • Low back pain    • Male erectile disorder    • Male erectile  dysfunction, unspecified    • Male hypogonadism    • Neck pain     Neck pain aggravated by recumbency      • Obese     Obese - BMI 33.0      • On long term drug therapy    • Osteoarthrosis     Osteoarthrosis, unspecified whether generalized or localized, involving lower leg      • Other obesity    • Other specified diseases of anus and rectum     Other specified diseases of anus and rectum - rectal pain after c-scope prep      • Overweight    • Pain in left knee    • Pain in right knee    • Shoulder pain, right    • Spasm of back muscles    • Tachycardia, unspecified    • Tremor, unspecified    • Tubular adenoma of colon    • Unspecified essential hypertension         Past Surgical History:   Procedure Laterality Date   • COLONOSCOPY  03/16/2016    One polyp in the sigmoid colon.Resected and retrieved.        • CYST REMOVAL  10/27/2009     Excision of sebaceous cyst of the forehead, glabellar region.   • ENDOSCOPY  03/16/2016    Mildly severe esophagitis.Gastritis.Normal examined duodenum.Several biopsies obtained in the lower third of the esophagus.    • ENDOSCOPY AND COLONOSCOPY  04/12/2006     Normal colonoscopic examination    • INCISION AND DRAINAGE ABSCESS  10/24/2012   • INJECTION OF MEDICATION  04/21/2011    Depo Medrol (Methylprednisone) 80mg (1)        • INJECTION OF MEDICATION  02/01/2016    Kenalog (3)        • INJECTION OF MEDICATION  02/19/2016    Toradol (3)    • KNEE ARTHROSCOPY  04/02/2009     Medial meniscus tear of left knee   • SCROTUM EXPLORATION  07/02/2002     Excision of epidermal inclusion cyst. base of left scrotum   • SKIN BIOPSY  04/23/2012   • SKIN TAG REMOVAL  04/25/2002    Skin tag, left scrotum              PT Ortho       07/06/17 1101    Posture/Observations    Posture- WNL (P)  Posture is WNL  -MO      07/05/17 1100    Subjective Pain    Post-Treatment Pain Level (P)  1  -MO      User Key  (r) = Recorded By, (t) = Taken By, (c) = Cosigned By    Initials Name Provider Type    MO  Carol Toussaint, PTA Student PTA Student                            PT Assessment/Plan       07/06/17 1101       PT Assessment    Assessment Comments (P)  Pt tolerated treatment well.  Pt could only tolerate 20 mini squats on the Bosu before L and R medial knees started aching.    -MO     PT Plan    PT Frequency (P)  4x/week  -MO     Predicted Duration of Therapy Intervention (days/wks) (P)  2 weeks  -MO     PT Plan Comments (P)  Continue POC per protocol.  Work on strength and extension of R knee.  -MO       User Key  (r) = Recorded By, (t) = Taken By, (c) = Cosigned By    Initials Name Provider Type    YOVANY Toussaint, PTA Student PTA Student                Modalities       07/06/17 1101          Ice    Ice Applied (P)  Yes  -MO      Location (P)  R knee w/ IFC  -MO      Rx Minutes (P)  --   15 mins  -MO      Ice S/P Rx (P)  Yes  -MO      ELECTRICAL STIMULATION    Attended/Unattended (P)  Unattended  -MO      Stimulation Type (P)  IFC  -MO      Location/Electrode Placement/Other (P)  R knee w/ ice  -MO      Rx Minutes (P)  15 mins  -MO        User Key  (r) = Recorded By, (t) = Taken By, (c) = Cosigned By    Initials Name Provider Type    MO Carol Toussaint, PTA Student PTA Student                Exercises       07/06/17 1101          Subjective Comments    Subjective Comments (P)  Pt states he is feeling very good today and not in any pain.  Pt states he took it easy yesterday after therapy so he would ot be sore today.  -MO      Subjective Pain    Able to rate subjective pain? (P)  yes  -MO      Pre-Treatment Pain Level (P)  1  -MO      Post-Treatment Pain Level (P)  1  -MO      Exercise 1    Exercise Name 1 (P)  Pro ll level 4.5  -MO      Time (Minutes) 1 (P)  10  -MO      Exercise 2    Exercise Name 2 (P)  incline stretch  -MO      Sets 2 (P)  3  -MO      Time (Seconds) 2 (P)  30  -MO      Exercise 3    Exercise Name 3 (P)  hamstring stretch  -MO      Sets 3 (P)  3  -MO      Time (Seconds) 3 (P)  30  -MO       Exercise 4    Exercise Name 4 (P)  lunge stretch  -MO      Sets 4 (P)  3  -MO      Time (Seconds) 4 (P)  30  -MO      Exercise 5    Exercise Name 5 (P)  Bosu mini squats  -MO      Sets 5 (P)  2  -MO      Reps 5 (P)  10  -MO      Exercise 6    Exercise Name 6 (P)  airex L hamstring curls  -MO      Sets 6 (P)  3  -MO      Reps 6 (P)  10  -MO      Exercise 7    Exercise Name 7 (P)  fwd/lat bosu step ups  -MO      Sets 7 (P)  2  -MO      Reps 7 (P)  10  -MO      Exercise 8    Exercise Name 8 (P)  Cybex CC 4-way  -MO      Weights/Plates 8 (P)  4 Plates  -MO      Sets 8 (P)  1  -MO      Reps 8 (P)  10  -MO      Exercise 9    Exercise Name 9 (P)  Standing TKE  -MO      Equipment 9 (P)  Theraband  -MO      Resistance 9 (P)  Red  -MO      Sets 9 (P)  3  -MO      Reps 9 (P)  10  -MO        User Key  (r) = Recorded By, (t) = Taken By, (c) = Cosigned By    Initials Name Provider Type    YOVANY Toussaint, PTA Student PTA Student                        Manual Rx (last 36 hours)      Manual Treatments       07/05/17 1100          Manual Rx 1    Manual Rx 1 Location (P)  R knee  -MO      Manual Rx 1 Type (P)  scar massage  -MO      Manual Rx 1 Duration (P)  10'  -MO      Manual Rx 2    Manual Rx 2 Location (P)  R knee  -MO      Manual Rx 2 Type (P)  Patellar mobs  -MO      Manual Rx 2 Duration (P)  5'  -MO        User Key  (r) = Recorded By, (t) = Taken By, (c) = Cosigned By    Initials Name Provider Type    YOVANY Toussaint PTA Student PTA Student                PT OP Goals       07/06/17 1101       PT Short Term Goals    STG Date to Achieve (P)  06/27/17  -MO     STG 1 (P)  Decrease R knee pain to 2/10  -MO     STG 1 Progress (P)  Met  -MO     STG 2 (P)  Inc R knee AROM to 0-120 degrees  -MO     STG 2 Progress (P)  Met  -MO     STG 3 (P)  Decrease edema R knee at jointline to 48 cm  -MO     STG 3 Progress (P)  Not Met  -MO     STG 4 (P)  Improve Hamstring flexibility from mod to min  -MO     STG 4 Progress (P)   Progressing  -MO     Long Term Goals    LTG Date to Achieve (P)  07/04/17  -MO     LTG 1 (P)  Ind w/ final HEP  -MO     LTG 1 Progress (P)  Progressing;Ongoing  -MO     LTG 2 (P)  Inc R knee AROM to 0-125 degrees  -MO     LTG 2 Progress (P)  Not Met  -MO     LTG 3 (P)  Decrease R knee edema at joint line to 47cm or less  -MO     LTG 3 Progress (P)  Not Met  -MO     LTG 4 (P)  Increase LEFS score to 40/80 or better  -MO     LTG 4 Progress (P)  Not Met  -MO     LTG 5 (P)  Improve Hamsting flexibility from min to WNL  -MO     LTG 5 Progress (P)  Not Met  -MO     Time Calculation    PT Goal Re-Cert Due Date (P)  07/04/17  -MO       User Key  (r) = Recorded By, (t) = Taken By, (c) = Cosigned By    Initials Name Provider Type    YOVANY Toussaint PTA Student PTA Student                    Time Calculation:   Start Time: (P) 1101  Stop Time: (P) 1158  Time Calculation (min): (P) 57 min  Total Timed Code Minutes- PT: (P) 57 minute(s)    Therapy Charges for Today     Code Description Service Date Service Provider Modifiers Qty    04998650207 HC PT THER SUPP EA 15 MIN 7/6/2017 Carol Toussaint PTA Student GP 1    1511955 HC PT ELECTRICAL STIM UNATTENDED 7/6/2017 Carol Toussaint PTA Student  1    86648262191 HC PT THER PROC EA 15 MIN 7/6/2017 Carol Toussaint PTA Student GP 3                    Carol Toussaint PTA Student  7/6/2017

## 2017-07-06 NOTE — PROGRESS NOTES
New patient Sleep Consultation    Encounter Date: 7/6/2017         Patient's PCP: Juan Augustine MD  Referring provider: Ray Ureña MD  Reason for consultation: excessive daytime sleepiness, unrefreshing sleep and insomnia    Otf Murray is a 62 y.o. male who complains of worsening sleep. He has very strong family history of JEAN PIERRE on CPAP and has noticed more snoring and excessive daytime sleepiness. He had right knee surgery ~ 4 wks ago and his sleep is worse. He had insomnia (sleep onset and maintenance) prior, but now it is worse. His wife suffers from JEAN PIERRE (is on CPAP) and he convinced her to get tested because he could not sleep prior because of her snoring and apnea. He has depression and ADD and is on Wellbutrin 300mg right now. He was on 450 mg of wellbutrin and Vyvanse a year and a half ago.    Bed time: 2145  Number of times awakens during the night: rare  Wake time: 0530  Estimated total sleep time at night: 8 hours  Caffeine intake: 2 cups in the am  Nap time: rare except since surgery - more now  Alcohol intake: 1 daily  Driving:no issues with drowsiness now, was getting sleepy in the afternoon, but better since started on vit B  He denies abnormal dreams, but has negative dreams, no sleep paralysis, hypnagogic hallucinations, or cataplexy symptoms   Tonsillectomy: No    Weight Issues:  Weight recorded over past 2 years:   weight gain of 40 lbs      Past Medical History:   Diagnosis Date   • Acute gastritis without bleeding    • Alcohol abuse counseling and surveillance of alcoholic    • Allergic rhinitis    • Anxiety state    • Attention deficit disorder of childhood with hyperactivity    • Attention deficit hyperactivity disorder    • Attention deficit hyperactivity disorder, predominantly hyperactive impulsive type    • Attention deficit hyperactivity disorder, predominantly inattentive type    • Backache    • Body mass index (bmi) 31.0-31.9, adult     Body mass index (BMI) 31.0-31.9,  adult - BMI 33.5      • Burn of lower leg    • Candidiasis of skin    • Cardiac murmur, unspecified    • Decreased testosterone level    • Depressive disorder     Depressive disorder - acute on chronic      • Dysgraphia    • Edema    • Elbow joint pain    • Encounter for general adult medical examination with abnormal findings    • Encounter for screening for malignant neoplasm of colon    • Essential hypertension    • Essential tremor    • Ex-smoker    • Fatigue    • Gastro-esophageal reflux disease with esophagitis    • Gastroesophageal reflux disease    • Hyperlipidemia    • Hyperlipoproteinemia    • Impotence of organic origin    • Insomnia    • Intermittent palpitations    • Knee pain     Knee pain - patellofemoral      • Lateral epicondylitis    • Low back pain    • Male erectile disorder    • Male erectile dysfunction, unspecified    • Male hypogonadism    • Neck pain     Neck pain aggravated by recumbency      • Obese     Obese - BMI 33.0      • On long term drug therapy    • Osteoarthrosis     Osteoarthrosis, unspecified whether generalized or localized, involving lower leg      • Other obesity    • Other specified diseases of anus and rectum     Other specified diseases of anus and rectum - rectal pain after c-scope prep      • Overweight    • Pain in left knee    • Pain in right knee    • Shoulder pain, right    • Spasm of back muscles    • Tachycardia, unspecified    • Tremor, unspecified    • Tubular adenoma of colon    • Unspecified essential hypertension      Social History     Social History   • Marital status:      Spouse name: N/A   • Number of children: N/A   • Years of education: N/A     Occupational History   • Not on file.     Social History Main Topics   • Smoking status: Former Smoker   • Smokeless tobacco: Not on file   • Alcohol use Not on file   • Drug use: Not on file   • Sexual activity: Not on file     Other Topics Concern   • Not on file     Social History Narrative     Family  History   Problem Relation Age of Onset   • Diabetes Mother    • Kidney disease Mother    • Thyroid disease Mother    • Atrial fibrillation Mother    • Coronary artery disease Father    • Hypertension Father    • Hearing loss Father    • Cancer Other    • Coronary artery disease Other    • Diabetes Other    • Hearing loss Other    • Heart disease Other    • Hypertension Other    • Osteoarthritis Other    • Stroke Other    • Thyroid disease Other    • Pancreatitis Other        Smoking history: smoked < 1 ppd ppds from age 16 until 59    Review of Systems:  Pertinent items are noted in HPI. Significant for depression    Patient advised to discuss any positive ROS with PCP.      Vitals:    07/06/17 1602   BP: 130/72   Pulse: 67   SpO2: 95%     Gen:  No distress, appears stated age, alert, oriented to person, place, and time  Heent:   NC/AT, PERRLA, EOMI, anicteric sclera    External ears normal, OP clear, Mallamati 4 with small torus    Nose: nares patent  NECK:  Supple, midline trachea, no palpable goiter  LUNGS: Clear breath sounds bilaterally, nonlabored breathing  CV:  Normal S1/S2, without murmur, no peripheral edema  ABD:  Non tender, no enlarged liver or masses, Bowel sounds not appreciated  EXT:  No cyanosis or clubbing, right knee healing well      ASSESSMENT:  1. Possible Obstructive sleep apnea - tosses and turns, excessive daytime sleepiness with  Snoring.  2. Insomnia - sleep maintenance       PLAN:  1. Reduce narcotics as possible except when at physical therapy  2.  In laboratory polysomnography testing    3. RTC in 1-2 weeks after psg      Matthew Ramirez MD        CC: MD Niranjan Edwards Farhan, MD

## 2017-07-07 ENCOUNTER — HOSPITAL ENCOUNTER (OUTPATIENT)
Dept: PHYSICAL THERAPY | Facility: HOSPITAL | Age: 62
Setting detail: THERAPIES SERIES
Discharge: HOME | End: 2017-07-07

## 2017-07-07 DIAGNOSIS — Z96.651 STATUS POST TOTAL RIGHT KNEE REPLACEMENT: Primary | ICD-10-CM

## 2017-07-07 DIAGNOSIS — G89.29 CHRONIC PAIN OF RIGHT KNEE: ICD-10-CM

## 2017-07-07 DIAGNOSIS — M25.561 CHRONIC PAIN OF RIGHT KNEE: ICD-10-CM

## 2017-07-07 PROCEDURE — 97110 THERAPEUTIC EXERCISES: CPT

## 2017-07-07 PROCEDURE — G0283 ELEC STIM OTHER THAN WOUND: HCPCS

## 2017-07-07 NOTE — THERAPY TREATMENT NOTE
Outpatient Physical Therapy Ortho Treatment Note  [facility]     Patient Name: Otf Murray  : 1955  MRN: [id number]  Today's Date: 2017      Visit Date: 2017     Subjective Improvement: 60%  Attendance:    Next MD Visit : 2017  Recert Date:  2017      Therapy Diagnosis:  1. S/p R TKA        Visit Dx:    ICD-10-CM ICD-9-CM   1. Status post total right knee replacement Z96.651 V43.65   2. Chronic pain of right knee M25.561 719.46    G89.29 338.29       There is no problem list on file for this patient.       Past Medical History:   Diagnosis Date   • Acute gastritis without bleeding    • Alcohol abuse counseling and surveillance of alcoholic    • Allergic rhinitis    • Anxiety state    • Attention deficit disorder of childhood with hyperactivity    • Attention deficit hyperactivity disorder    • Attention deficit hyperactivity disorder, predominantly hyperactive impulsive type    • Attention deficit hyperactivity disorder, predominantly inattentive type    • Backache    • Body mass index (bmi) 31.0-31.9, adult     Body mass index (BMI) 31.0-31.9, adult - BMI 33.5      • Burn of lower leg    • Candidiasis of skin    • Cardiac murmur, unspecified    • Decreased testosterone level    • Depressive disorder     Depressive disorder - acute on chronic      • Dysgraphia    • Edema    • Elbow joint pain    • Encounter for general adult medical examination with abnormal findings    • Encounter for screening for malignant neoplasm of colon    • Essential hypertension    • Essential tremor    • Ex-smoker    • Fatigue    • Gastro-esophageal reflux disease with esophagitis    • Gastroesophageal reflux disease    • Hyperlipidemia    • Hyperlipoproteinemia    • Impotence of organic origin    • Insomnia    • Intermittent palpitations    • Knee pain     Knee pain - patellofemoral      • Lateral epicondylitis    • Low back pain    • Male erectile disorder    • Male erectile dysfunction,  unspecified    • Male hypogonadism    • Neck pain     Neck pain aggravated by recumbency      • Obese     Obese - BMI 33.0      • On long term drug therapy    • Osteoarthrosis     Osteoarthrosis, unspecified whether generalized or localized, involving lower leg      • Other obesity    • Other specified diseases of anus and rectum     Other specified diseases of anus and rectum - rectal pain after c-scope prep      • Overweight    • Pain in left knee    • Pain in right knee    • Shoulder pain, right    • Spasm of back muscles    • Tachycardia, unspecified    • Tremor, unspecified    • Tubular adenoma of colon    • Unspecified essential hypertension         Past Surgical History:   Procedure Laterality Date   • COLONOSCOPY  03/16/2016    One polyp in the sigmoid colon.Resected and retrieved.        • CYST REMOVAL  10/27/2009     Excision of sebaceous cyst of the forehead, glabellar region.   • ENDOSCOPY  03/16/2016    Mildly severe esophagitis.Gastritis.Normal examined duodenum.Several biopsies obtained in the lower third of the esophagus.    • ENDOSCOPY AND COLONOSCOPY  04/12/2006     Normal colonoscopic examination    • INCISION AND DRAINAGE ABSCESS  10/24/2012   • INJECTION OF MEDICATION  04/21/2011    Depo Medrol (Methylprednisone) 80mg (1)        • INJECTION OF MEDICATION  02/01/2016    Kenalog (3)        • INJECTION OF MEDICATION  02/19/2016    Toradol (3)    • KNEE ARTHROSCOPY  04/02/2009     Medial meniscus tear of left knee   • SCROTUM EXPLORATION  07/02/2002     Excision of epidermal inclusion cyst. base of left scrotum   • SKIN BIOPSY  04/23/2012   • SKIN TAG REMOVAL  04/25/2002    Skin tag, left scrotum              PT Ortho       07/07/17 1055    Posture/Observations    Posture- WNL (P)  Posture is WNL  -MO    ROM (Range of Motion)    General ROM Detail (P)  AROM 0-122  -MO    Girth    Girth Measured? (P)  Right Lower Extremity  -MO    RLE Quick Girth (cm)    Mid patella (P)  --   44.5 cm.  -MO       07/06/17 1101    Posture/Observations    Posture- WNL (P)  Posture is WNL  -MO      07/05/17 1100    Subjective Pain    Post-Treatment Pain Level (P)  1  -MO      User Key  (r) = Recorded By, (t) = Taken By, (c) = Cosigned By    Initials Name Provider Type    MO Carol Toussaint PTA Student PTA Student                            PT Assessment/Plan       07/07/17 1055       PT Assessment    Assessment Comments (P)  Pt increased his LEFS score and his flexion ROM.  Pt tolerated treatment w/ minimal pain.  There was slight discomfort when riding the bike and using the leg press in the medial R knee. Educated pt on the exercises and machines to use once discharged.  All pt goals were met.  -MO     PT Plan    PT Frequency (P)  4x/week  -MO     Predicted Duration of Therapy Intervention (days/wks) (P)  2 weeks  -MO     PT Plan Comments (P)  Pt discharged this date.  -MO       User Key  (r) = Recorded By, (t) = Taken By, (c) = Cosigned By    Initials Name Provider Type    YOVANY Toussaint PTA Student PTA Student                Modalities       07/07/17 1055          Ice    Ice Applied (P)  Yes  -MO      Location (P)  R knee 20 min w/IFC  -MO      Rx Minutes (P)  15 mins  -MO      Ice S/P Rx (P)  Yes  -MO      ELECTRICAL STIMULATION    Attended/Unattended (P)  Unattended  -MO      Stimulation Type (P)  IFC  -MO      Location/Electrode Placement/Other (P)  R knee w/ ice  -MO      Rx Minutes (P)  15 mins  -MO        User Key  (r) = Recorded By, (t) = Taken By, (c) = Cosigned By    Initials Name Provider Type    YOVANY Carbonewero Toussaint PTA Student PTA Student                Exercises       07/07/17 1055          Subjective Comments    Subjective Comments (P)  Pt states there is a slight pain on the medial portion of his R knee.  It does not hurt bad just lets him know it is there.  -MO      Subjective Pain    Able to rate subjective pain? (P)  yes  -MO      Pre-Treatment Pain Level (P)  2  -MO      Post-Treatment Pain Level  (P)  1  -MO      Exercise 1    Exercise Name 1 (P)  Pro ll level 4.5  -MO      Time (Minutes) 1 (P)  10  -MO      Exercise 2    Exercise Name 2 (P)  incline stretch  -MO      Sets 2 (P)  3  -MO      Time (Seconds) 2 (P)  30  -MO      Exercise 3    Exercise Name 3 (P)  standing hamstring stretch  -MO      Sets 3 (P)  3  -MO      Time (Seconds) 3 (P)  30  -MO      Exercise 4    Exercise Name 4 (P)  lunge stretch  -MO      Sets 4 (P)  3  -MO      Time (Seconds) 4 (P)  30  -MO      Exercise 5    Exercise Name 5 (P)  cybex hip abd  -MO      Weights/Plates 5 (P)  --   50#  -MO      Sets 5 (P)  2  -MO      Reps 5 (P)  10  -MO      Exercise 6    Exercise Name 6 (P)  cybex hip abb  -MO      Weights/Plates 6 (P)  --   50#  -MO      Sets 6 (P)  2  -MO      Reps 6 (P)  10  -MO      Exercise 7    Exercise Name 7 (P)  cybex leg press  -MO      Weights/Plates 7 (P)  --   90#  -MO      Sets 7 (P)  3  -MO      Reps 7 (P)  10  -MO      Exercise 8    Exercise Name 8 (P)  cybex leg curl/extension  -MO      Weights/Plates 8 (P)  --   30#  -MO      Sets 8 (P)  3  -MO      Reps 8 (P)  10  -MO        User Key  (r) = Recorded By, (t) = Taken By, (c) = Cosigned By    Initials Name Provider Type    YOVANY Toussaint, PTA Student PTA Student                               PT OP Goals       07/07/17 1055       PT Short Term Goals    STG Date to Achieve (P)  06/27/17  -MO     STG 1 (P)  Decrease R knee pain to 2/10  -MO     STG 1 Progress (P)  Met  -MO     STG 2 (P)  Inc R knee AROM to 0-120 degrees  -MO     STG 2 Progress (P)  Met  -MO     STG 3 (P)  Decrease edema R knee at jointline to 48 cm  -MO     STG 3 Progress (P)  Met  -MO     STG 4 (P)  Improve Hamstring flexibility from mod to min  -MO     STG 4 Progress (P)  Progressing  -MO     Long Term Goals    LTG Date to Achieve (P)  07/04/17  -MO     LTG 1 (P)  Ind w/ final HEP  -MO     LTG 1 Progress (P)  Met  -MO     LTG 2 (P)  Inc R knee AROM to 0-125 degrees  -MO     LTG 2 Progress (P)   Met  -MO     LTG 3 (P)  Decrease R knee edema at joint line to 47cm or less  -MO     LTG 3 Progress (P)  Met  -MO     LTG 4 (P)  Increase LEFS score to 40/80 or better  -MO     LTG 4 Progress (P)  Met  -MO     LTG 5 (P)  Improve Hamsting flexibility from min to WNL  -MO     LTG 5 Progress (P)  Progressing  -MO     Time Calculation    PT Goal Re-Cert Due Date (P)  07/04/17  -MO       User Key  (r) = Recorded By, (t) = Taken By, (c) = Cosigned By    Initials Name Provider Type    YOVANY Toussaint, PTA Student PTA Student                    Outcome Measures       07/07/17 1055          Lower Extremity Functional Index    Any of your usual work, housework or school activities (P)  3  -MO      Your usual hobbies, recreational or sporting activities (P)  3  -MO      Getting into or out of the bath (P)  3  -MO      Walking between rooms (P)  4  -MO      Putting on your shoes or socks (P)  3  -MO      Squatting (P)  3  -MO      Lifting an object, like a bag of groceries from the floor (P)  4  -MO      Performing light activities around your home (P)  3  -MO      Performing heavy activities around your home (P)  2  -MO      Getting into or out of a car (P)  3  -MO      Walking 2 blocks (P)  4  -MO      Walking a mile (P)  3  -MO      Going up or down 10 stairs (about 1 flight of stairs) (P)  3  -MO      Standing for 1 hour (P)  3  -MO      Sitting for 1 hour (P)  3  -MO      Running on even ground (P)  2  -MO      Running on uneven ground (P)  2  -MO      Making sharp turns while running fast (P)  2  -MO      Hopping (P)  2  -MO      Rolling over in bed (P)  3  -MO      Total (P)  58  -MO      Functional Assessment    Outcome Measure Options (P)  Lower Extremity Functional Scale (LEFS)  -MO        User Key  (r) = Recorded By, (t) = Taken By, (c) = Cosigned By    Initials Name Provider Type    YOVANY Toussaint, PTA Student PTA Student            Time Calculation:   Start Time: (P) 1055  Stop Time: (P) 1201  Time  Calculation (min): (P) 66 min  Total Timed Code Minutes- PT: (P) 66 minute(s)        PT G-Codes  Outcome Measure Options: (P) Lower Extremity Functional Scale (LEFS)         Kristin Lamas, NANCY  7/7/2017

## 2017-07-13 ENCOUNTER — HOSPITAL ENCOUNTER (OUTPATIENT)
Dept: SLEEP MEDICINE | Facility: HOSPITAL | Age: 62
Discharge: HOME | End: 2017-07-13
Admitting: INTERNAL MEDICINE

## 2017-07-13 VITALS — HEIGHT: 77 IN | WEIGHT: 304 LBS | BODY MASS INDEX: 35.89 KG/M2

## 2017-07-13 DIAGNOSIS — G47.10 HYPERSOMNIA WITH SLEEP APNEA: ICD-10-CM

## 2017-07-13 DIAGNOSIS — G47.30 HYPERSOMNIA WITH SLEEP APNEA: ICD-10-CM

## 2017-07-13 PROCEDURE — 95810 POLYSOM 6/> YRS 4/> PARAM: CPT

## 2017-07-13 PROCEDURE — 95810 POLYSOM 6/> YRS 4/> PARAM: CPT | Performed by: INTERNAL MEDICINE

## 2017-07-19 ENCOUNTER — OFFICE VISIT (OUTPATIENT)
Dept: SLEEP MEDICINE | Facility: HOSPITAL | Age: 62
End: 2017-07-19

## 2017-07-19 VITALS
DIASTOLIC BLOOD PRESSURE: 64 MMHG | SYSTOLIC BLOOD PRESSURE: 124 MMHG | BODY MASS INDEX: 35.89 KG/M2 | HEIGHT: 77 IN | WEIGHT: 304 LBS

## 2017-07-19 DIAGNOSIS — G47.33 OSA (OBSTRUCTIVE SLEEP APNEA): Primary | ICD-10-CM

## 2017-07-19 PROCEDURE — 99212 OFFICE O/P EST SF 10 MIN: CPT | Performed by: INTERNAL MEDICINE

## 2017-07-19 NOTE — PROGRESS NOTES
CHIEF COMPLAINT: follow up sleep study results    HPI:The patient is a 62 y.o. male.  He returns to sleep clinic to discuss the results of the recent sleep study.  He had a diagnostic polysomnogram/ split night sleep study/PAP titration study on the night of 07/14/2017, AHi of 12, REM AHi of 38.    INTERVAL MEDICAL HISTORY: plans to have knee surgery next week    MEDICATIONS:   Current Outpatient Prescriptions:   •  amLODIPine (NORVASC) 5 MG tablet, Take 5 mg by mouth Daily., Disp: , Rfl:   •  buPROPion XL (WELLBUTRIN XL) 300 MG 24 hr tablet, Take 1 tablet by mouth Daily., Disp: 30 tablet, Rfl: 2  •  esomeprazole (nexIUM) 40 MG capsule, TAKE 1 CAPSULE DAILY, Disp: 90 capsule, Rfl: 1  •  fluticasone (FLONASE) 50 MCG/ACT nasal spray, USE 2 SPRAYS IN EACH NOSTRIL DAILY, Disp: 48 g, Rfl: 2  •  losartan-hydrochlorothiazide (HYZAAR) 100-12.5 MG per tablet, Take 1 tablet by mouth Daily., Disp: , Rfl:   •  propranolol LA (INDERAL LA) 60 MG 24 hr capsule, Take 1 capsule by mouth Daily., Disp: 90 capsule, Rfl: 1    PHYSICAL EXAM  Vital Signs (last 24 hours)       07/18 0700  -  07/19 0659 07/19 0700  -  07/19 1504   Most Recent    BP     124/64     124/64          Gen:  No distress, appears stated age, alert, oriented to person, place, and time  Heent:   NC/AT, PERRLA, EOMI, anicteric sclera    NECK:  Supple, midline trachea, LUNGS: Clear breath sounds bilaterally, nonlabored breathing  CV:  Normal S1/S2, without murmur, no peripheral edema  ABD:  Non tender, no enlarged liver or masses, Bowel sounds not appreciated  EXT:  No cyanosis or clubbing      IMPRESSION AND PLAN:  1.mild Obstructive Sleep Apnea Hypopnea Syndrome.  The sleep results were discussed in detail with the patient.  The risks of untreated sleep apnea were reviewed.  Treatment options for sleep apnea were discussed.  The patient decided to pursue CPAP therapy.  He will follow-up in 2-3 months with a download.  He agrees to return sooner on a prn basis if  needed      Matthew Ramirez MD

## 2017-07-26 ENCOUNTER — TRANSCRIBE ORDERS (OUTPATIENT)
Dept: PHYSICAL THERAPY | Facility: HOSPITAL | Age: 62
End: 2017-07-26

## 2017-07-26 ENCOUNTER — HOSPITAL ENCOUNTER (OUTPATIENT)
Dept: PHYSICAL THERAPY | Facility: HOSPITAL | Age: 62
Setting detail: THERAPIES SERIES
Discharge: HOME | End: 2017-07-26

## 2017-07-26 DIAGNOSIS — Z96.652 STATUS POST LEFT KNEE REPLACEMENT: Primary | ICD-10-CM

## 2017-07-26 DIAGNOSIS — M25.562 ACUTE PAIN OF LEFT KNEE: Primary | ICD-10-CM

## 2017-07-26 DIAGNOSIS — M17.12 PRIMARY LOCALIZED OSTEOARTHROSIS, LOWER LEG, LEFT: ICD-10-CM

## 2017-07-26 DIAGNOSIS — M25.562 CHRONIC PAIN OF LEFT KNEE: ICD-10-CM

## 2017-07-26 DIAGNOSIS — G89.29 CHRONIC PAIN OF LEFT KNEE: ICD-10-CM

## 2017-07-26 PROCEDURE — 97162 PT EVAL MOD COMPLEX 30 MIN: CPT | Performed by: PHYSICAL THERAPIST

## 2017-07-26 PROCEDURE — 97110 THERAPEUTIC EXERCISES: CPT | Performed by: PHYSICAL THERAPIST

## 2017-07-26 PROCEDURE — G0283 ELEC STIM OTHER THAN WOUND: HCPCS | Performed by: PHYSICAL THERAPIST

## 2017-07-27 ENCOUNTER — HOSPITAL ENCOUNTER (OUTPATIENT)
Dept: PHYSICAL THERAPY | Facility: HOSPITAL | Age: 62
Setting detail: THERAPIES SERIES
Discharge: HOME | End: 2017-07-27

## 2017-07-27 DIAGNOSIS — G89.29 CHRONIC PAIN OF LEFT KNEE: ICD-10-CM

## 2017-07-27 DIAGNOSIS — Z96.652 STATUS POST LEFT KNEE REPLACEMENT: Primary | ICD-10-CM

## 2017-07-27 DIAGNOSIS — M25.562 CHRONIC PAIN OF LEFT KNEE: ICD-10-CM

## 2017-07-27 DIAGNOSIS — M17.12 PRIMARY LOCALIZED OSTEOARTHROSIS, LOWER LEG, LEFT: ICD-10-CM

## 2017-07-27 PROCEDURE — G0283 ELEC STIM OTHER THAN WOUND: HCPCS

## 2017-07-27 PROCEDURE — 97110 THERAPEUTIC EXERCISES: CPT

## 2017-07-27 NOTE — THERAPY TREATMENT NOTE
Outpatient Physical Therapy Ortho Treatment Note  [facility]     Patient Name: Otf Murray  : 1955  MRN: [id number]  Today's Date: 2017      Visit Date: 2017     Subjective Improvement: 0%  Attendance:  2/2 (16 visits for the rest of )  Next MD Visit : 2017  Recert Date:  2017      Therapy Diagnosis:  L TKA 2017        Visit Dx:    ICD-10-CM ICD-9-CM   1. Status post left knee replacement Z96.652 V43.65   2. Chronic pain of left knee M25.562 719.46    G89.29 338.29   3. Primary localized osteoarthrosis, lower leg, left M17.12 715.16       There is no problem list on file for this patient.       Past Medical History:   Diagnosis Date   • Acute gastritis without bleeding    • Alcohol abuse counseling and surveillance of alcoholic    • Allergic rhinitis    • Anxiety state    • Attention deficit disorder of childhood with hyperactivity    • Attention deficit hyperactivity disorder    • Attention deficit hyperactivity disorder, predominantly hyperactive impulsive type    • Attention deficit hyperactivity disorder, predominantly inattentive type    • Backache    • Body mass index (bmi) 31.0-31.9, adult     Body mass index (BMI) 31.0-31.9, adult - BMI 33.5      • Burn of lower leg    • Candidiasis of skin    • Cardiac murmur, unspecified    • Decreased testosterone level    • Depressive disorder     Depressive disorder - acute on chronic      • Dysgraphia    • Edema    • Elbow joint pain    • Encounter for general adult medical examination with abnormal findings    • Encounter for screening for malignant neoplasm of colon    • Essential hypertension    • Essential tremor    • Ex-smoker    • Fatigue    • Gastro-esophageal reflux disease with esophagitis    • Gastroesophageal reflux disease    • Hyperlipidemia    • Hyperlipoproteinemia    • Impotence of organic origin    • Insomnia    • Intermittent palpitations    • Knee pain     Knee pain - patellofemoral      • Lateral  epicondylitis    • Low back pain    • Male erectile disorder    • Male erectile dysfunction, unspecified    • Male hypogonadism    • Neck pain     Neck pain aggravated by recumbency      • Obese     Obese - BMI 33.0      • On long term drug therapy    • Osteoarthrosis     Osteoarthrosis, unspecified whether generalized or localized, involving lower leg      • Other obesity    • Other specified diseases of anus and rectum     Other specified diseases of anus and rectum - rectal pain after c-scope prep      • Overweight    • Pain in left knee    • Pain in right knee    • Shoulder pain, right    • Spasm of back muscles    • Tachycardia, unspecified    • Tremor, unspecified    • Tubular adenoma of colon    • Unspecified essential hypertension         Past Surgical History:   Procedure Laterality Date   • COLONOSCOPY  03/16/2016    One polyp in the sigmoid colon.Resected and retrieved.        • CYST REMOVAL  10/27/2009     Excision of sebaceous cyst of the forehead, glabellar region.   • ENDOSCOPY  03/16/2016    Mildly severe esophagitis.Gastritis.Normal examined duodenum.Several biopsies obtained in the lower third of the esophagus.    • ENDOSCOPY AND COLONOSCOPY  04/12/2006     Normal colonoscopic examination    • INCISION AND DRAINAGE ABSCESS  10/24/2012   • INJECTION OF MEDICATION  04/21/2011    Depo Medrol (Methylprednisone) 80mg (1)        • INJECTION OF MEDICATION  02/01/2016    Kenalog (3)        • INJECTION OF MEDICATION  02/19/2016    Toradol (3)    • KNEE ARTHROSCOPY  04/02/2009     Medial meniscus tear of left knee   • SCROTUM EXPLORATION  07/02/2002     Excision of epidermal inclusion cyst. base of left scrotum   • SKIN BIOPSY  04/23/2012   • SKIN TAG REMOVAL  04/25/2002    Skin tag, left scrotum              PT Ortho       07/27/17 1600    Posture/Observations    Posture/Observations Comments (P)  bandage anterior L knee, pt presents w/ 2 wheeled walker  -MO    ROM (Range of Motion)    General ROM Detail  (P)  ROM ext: 5; AAROM flex: 95; seated SLR 11 deg lag  -MO      07/26/17 1600    Subjective Comments    Subjective Comments see Therapy Patient History  -SS    Precautions and Contraindications    Precautions/Limitations no known precautions/limitations  -SS    Subjective Pain    Able to rate subjective pain? yes  -SS    Pre-Treatment Pain Level 4  -SS    Post-Treatment Pain Level 6  -SS    Subjective Pain Comment increased pain from ice pressing knee into extension  -SS    Posture/Observations    Posture/Observations Comments bandage anterior left knee  -SS    ROM (Range of Motion)    General ROM Detail 0-  -SS    MMT (Manual Muscle Testing)    General MMT Assessment Detail MMT deferred. Independent SLR with 7 deg ext lag  -SS    Sensation    Sensation WNL? --   Sensation intact to light touch  -SS      User Key  (r) = Recorded By, (t) = Taken By, (c) = Cosigned By    Initials Name Provider Type     Horacio Davis, PT Physical Therapist    YOVANY Toussaint PTA Student PTA Student                            PT Assessment/Plan       07/27/17 1600       PT Assessment    Assessment Comments (P)  Pt 3 days post-op.  Pt in pain this date during exercises  -MO     PT Plan    PT Frequency (P)  4x/week;2x/week  -MO     Predicted Duration of Therapy Intervention (days/wks) (P)  4x/week for 2 weeks; 2x/week for 2 weeks  -MO     PT Plan Comments (P)  Possibly add SAQ next session along w/ gait training.  -MO       User Key  (r) = Recorded By, (t) = Taken By, (c) = Cosigned By    Initials Name Provider Type    YOVANY Toussaint PTA Student PTA Student                Modalities       07/27/17 1600          Ice    Ice Applied (P)  Yes  -MO      Location (P)  L knee w/ IFC supine and wedge for 20 min  -MO      Ice S/P Rx (P)  Yes  -MO      ELECTRICAL STIMULATION    Attended/Unattended (P)  Unattended  -MO      Stimulation Type (P)  IFC  -MO      Location/Electrode Placement/Other (P)  L knee w/ ice  -MO       Rx Minutes (P)  20 mins  -MO        User Key  (r) = Recorded By, (t) = Taken By, (c) = Cosigned By    Initials Name Provider Type    YOVANY Toussaint PTA Student PTA Student                Exercises       07/27/17 1600          Subjective Comments    Subjective Comments (P)  Pt states he is in pain today.  Pt cold water pack broke last night and in pain all night.   -MO      Subjective Pain    Able to rate subjective pain? (P)  yes  -MO      Pre-Treatment Pain Level (P)  7  -MO      Post-Treatment Pain Level (P)  3  -MO      Exercise 1    Exercise Name 1 (P)  Pro ll level 1 seat 19  -MO      Time (Minutes) 1 (P)  10  -MO      Exercise 2    Exercise Name 2 (P)  incline stretch  -MO      Sets 2 (P)  3  -MO      Time (Seconds) 2 (P)  30  -MO      Exercise 3    Exercise Name 3 (P)  standing hamstring stretch  -MO      Sets 3 (P)  3  -MO      Time (Seconds) 3 (P)  30  -MO      Exercise 4    Exercise Name 4 (P)  seated SLR  -MO      Sets 4 (P)  1  -MO      Reps 4 (P)  5  -MO      Time (Seconds) 4 (P)  --  -MO      Exercise 5    Exercise Name 5 (P)  ankle 4-way yellow tband  -MO      Sets 5 (P)  2  -MO      Reps 5 (P)  10  -MO      Exercise 6    Exercise Name 6 (P)  QS  -MO      Sets 6 (P)  2  -MO      Reps 6 (P)  10  -MO      Exercise 7    Exercise Name 7 (P)  heel slides w/ green strap  -MO      Sets 7 (P)  2  -MO      Reps 7 (P)  10  -MO      Time (Seconds) 7 (P)  5  -MO        User Key  (r) = Recorded By, (t) = Taken By, (c) = Cosigned By    Initials Name Provider Type    YOVANY Toussaint PTA Student PTA Student                   Balance Exercises (last 24 hours)      Balance Exercises       07/27/17 1600          Subjective Pain    Post-Treatment Pain Level (P)  3  -MO        User Key  (r) = Recorded By, (t) = Taken By, (c) = Cosigned By    Initials Name Provider Type    YOVANY Toussaint PTA Student PTA Student                     PT OP Goals       07/27/17 1600       PT Short Term Goals    STG Date to  Achieve (P)  08/16/17  -MO     STG 1 (P)  LEFS score to be >= 40/80  -MO     STG 1 Progress (P)  Met  -MO     STG 2 (P)  Note a >= 50% subjective improvement.  -MO     STG 2 Progress (P)  Met  -MO     STG 3 (P)  Ambulate without assistive device.  -MO     STG 3 Progress (P)  Not Met  -MO     STG 4 (P)  Knee extension to be 0 deg actively  -MO     STG 4 Progress (P)  Progressing  -MO     STG 5 (P)  Knee flexion AROM  to be >= 125 deg  -MO     Long Term Goals    LTG Date to Achieve (P)  --   TBA  -MO     LTG 1 (P)  Ind w/ final HEP  -MO     LTG 1 Progress (P)  Progressing;Ongoing  -MO     LTG 2 (P)  Inc R knee AROM to 0-125 degrees  -MO     LTG 2 Progress (P)  Not Met  -MO     LTG 3 (P)  Decrease R knee edema at joint line to 47cm or less  -MO     LTG 3 Progress (P)  Not Met  -MO     LTG 4 (P)  Increase LEFS score to 40/80 or better  -MO     LTG 4 Progress (P)  Not Met  -MO     LTG 5 (P)  Improve Hamsting flexibility from min to WNL  -MO     LTG 5 Progress (P)  Not Met  -MO     Time Calculation    PT Goal Re-Cert Due Date (P)  08/16/17  -MO       User Key  (r) = Recorded By, (t) = Taken By, (c) = Cosigned By    Initials Name Provider Type    YOVANY Toussaint, PTA Student PTA Student                    Outcome Measures       07/26/17 1700          Lower Extremity Functional Index    Any of your usual work, housework or school activities 0  -SS      Your usual hobbies, recreational or sporting activities 0  -SS      Getting into or out of the bath 0  -SS      Walking between rooms 0  -SS      Putting on your shoes or socks 0  -SS      Squatting 0  -SS      Lifting an object, like a bag of groceries from the floor 0  -SS      Performing light activities around your home 0  -SS      Performing heavy activities around your home 0  -SS      Getting into or out of a car 0  -SS      Walking 2 blocks 0  -SS      Walking a mile 0  -SS      Going up or down 10 stairs (about 1 flight of stairs) 0  -SS      Standing for 1 hour  0  -SS      Sitting for 1 hour 0  -SS      Running on even ground 0  -SS      Running on uneven ground 0  -SS      Making sharp turns while running fast 0  -SS      Hopping 0  -SS      Rolling over in bed 0  -SS      Total 0  -SS      Functional Assessment    Outcome Measure Options Lower Extremity Functional Scale (LEFS)  -SS        User Key  (r) = Recorded By, (t) = Taken By, (c) = Cosigned By    Initials Name Provider Type     Horacio Davis, PT Physical Therapist            Time Calculation:   Start Time: (P) 1600  Stop Time: (P) 1700  Time Calculation (min): (P) 60 min  Total Timed Code Minutes- PT: (P) 40 minute(s)    Therapy Charges for Today     Code Description Service Date Service Provider Modifiers Qty    87310723328 HC PT THER SUPP EA 15 MIN 7/27/2017 Carol Toussaint, PTA Student GP 1    48564610307 HC PT ELECTRICAL STIM UNATTENDED 7/27/2017 Caorl Toussaint PTA Student  1    63315201045 HC PT THER PROC EA 15 MIN 7/27/2017 Carol Toussaint, PTA Student GP 3        Addended:  Incorrect goal assessment of STG's that were carried over from previous TKR.  Pt has not met any current STG's at this time and LTG's are TBA.  Shawnee Duron PTA 7/27/17 at 17:30.             Carol Toussaint PTA Student  7/27/2017

## 2017-07-27 NOTE — THERAPY EVALUATION
Outpatient Physical Therapy Ortho Initial Evaluation  [facility]     Patient Name: Otf Murray  : 1955  MRN: [id number]  Today's Date: 2017      Visit Date: 2017  Attendance:  (16 visits remain for )  Subjective Improvement: n/a  Next MD Appt: 17  Recert Date: 17    Therapy Diagnosis: L TKA 17    There is no problem list on file for this patient.       Past Medical History:   Diagnosis Date   • Acute gastritis without bleeding    • Alcohol abuse counseling and surveillance of alcoholic    • Allergic rhinitis    • Anxiety state    • Attention deficit disorder of childhood with hyperactivity    • Attention deficit hyperactivity disorder    • Attention deficit hyperactivity disorder, predominantly hyperactive impulsive type    • Attention deficit hyperactivity disorder, predominantly inattentive type    • Backache    • Body mass index (bmi) 31.0-31.9, adult     Body mass index (BMI) 31.0-31.9, adult - BMI 33.5      • Burn of lower leg    • Candidiasis of skin    • Cardiac murmur, unspecified    • Decreased testosterone level    • Depressive disorder     Depressive disorder - acute on chronic      • Dysgraphia    • Edema    • Elbow joint pain    • Encounter for general adult medical examination with abnormal findings    • Encounter for screening for malignant neoplasm of colon    • Essential hypertension    • Essential tremor    • Ex-smoker    • Fatigue    • Gastro-esophageal reflux disease with esophagitis    • Gastroesophageal reflux disease    • Hyperlipidemia    • Hyperlipoproteinemia    • Impotence of organic origin    • Insomnia    • Intermittent palpitations    • Knee pain     Knee pain - patellofemoral      • Lateral epicondylitis    • Low back pain    • Male erectile disorder    • Male erectile dysfunction, unspecified    • Male hypogonadism    • Neck pain     Neck pain aggravated by recumbency      • Obese     Obese - BMI 33.0      • On long term drug  therapy    • Osteoarthrosis     Osteoarthrosis, unspecified whether generalized or localized, involving lower leg      • Other obesity    • Other specified diseases of anus and rectum     Other specified diseases of anus and rectum - rectal pain after c-scope prep      • Overweight    • Pain in left knee    • Pain in right knee    • Shoulder pain, right    • Spasm of back muscles    • Tachycardia, unspecified    • Tremor, unspecified    • Tubular adenoma of colon    • Unspecified essential hypertension         Past Surgical History:   Procedure Laterality Date   • COLONOSCOPY  03/16/2016    One polyp in the sigmoid colon.Resected and retrieved.        • CYST REMOVAL  10/27/2009     Excision of sebaceous cyst of the forehead, glabellar region.   • ENDOSCOPY  03/16/2016    Mildly severe esophagitis.Gastritis.Normal examined duodenum.Several biopsies obtained in the lower third of the esophagus.    • ENDOSCOPY AND COLONOSCOPY  04/12/2006     Normal colonoscopic examination    • INCISION AND DRAINAGE ABSCESS  10/24/2012   • INJECTION OF MEDICATION  04/21/2011    Depo Medrol (Methylprednisone) 80mg (1)        • INJECTION OF MEDICATION  02/01/2016    Kenalog (3)        • INJECTION OF MEDICATION  02/19/2016    Toradol (3)    • KNEE ARTHROSCOPY  04/02/2009     Medial meniscus tear of left knee   • SCROTUM EXPLORATION  07/02/2002     Excision of epidermal inclusion cyst. base of left scrotum   • SKIN BIOPSY  04/23/2012   • SKIN TAG REMOVAL  04/25/2002    Skin tag, left scrotum        Visit Dx:     ICD-10-CM ICD-9-CM   1. Status post left knee replacement Z96.652 V43.65   2. Chronic pain of left knee M25.562 719.46    G89.29 338.29   3. Primary localized osteoarthrosis, lower leg, left M17.12 715.16             Patient History       07/26/17 1600          History    Chief Complaint Pain  -SS      Type of Pain Knee pain  -SS      Date Current Problem(s) Began 07/24/17  -SS      Brief Description of Current Complaint Patient  underwent L total knee arthroplasty on 7/24/17 due to arthritis. Discharged from [employer] on 7/25/17. More painful today than the past 2 days. Has not worked on his HEP today. R TKA 6/8/17. Thinks that this knee replacement feels better than the right did.  male with children. Single story house with a basement. There is 1 step to enter. He is unsure of the number of steps down to his basement. Has a house at [facility] with 4-5 steps to enter.   -SS      Surgery Date: 07/24/17  -SS      Patient/Caregiver Goals Relieve pain;Return to prior level of function  -SS      Current Tobacco Use none  -SS      Smoking Status none  -SS      Patient's Rating of General Health Good  -SS      Hand Dominance right-handed  -SS      Occupation/sports/leisure activities [employer] - ; Hobbies - antique tractors and trucks, boat, jet ski  -SS      Pain     Pain Location Knee   left  -SS      Pain at Present 4  -SS      Pain at Best 1   since surgery  -SS      Pain at Worst 8   since surgery  -SS      Pain Frequency Constant/continuous  -SS      Pain Description Sharp;Aching  -SS      What Performance Factors Make the Current Problem(s) WORSE? moving, walking, putting weight through the knee  -SS      What Performance Factors Make the Current Problem(s) BETTER? rest  -SS      Is your sleep disturbed? No  -SS      Fall Risk Assessment    Any falls in the past year: No  -SS      Does patient have a fear of falling No  -SS      Daily Activities    Primary Language English  -      Safety    Are you being hurt, hit, or frightened by anyone at home or in your life? No  -SS      Are you being neglected by a caregiver No  -SS        User Key  (r) = Recorded By, (t) = Taken By, (c) = Cosigned By    Initials Name Provider Type    SS Horacio Davis, PT Physical Therapist                PT Ortho       07/26/17 1600    Subjective Comments    Subjective Comments see Therapy Patient History  -SS    Precautions and  Contraindications    Precautions/Limitations no known precautions/limitations  -SS    Subjective Pain    Able to rate subjective pain? yes  -SS    Pre-Treatment Pain Level 4  -SS    Post-Treatment Pain Level 6  -SS    Subjective Pain Comment increased pain from ice pressing knee into extension  -SS    Posture/Observations    Posture/Observations Comments bandage anterior left knee  -SS    ROM (Range of Motion)    General ROM Detail 0-  -SS    MMT (Manual Muscle Testing)    General MMT Assessment Detail MMT deferred. Independent SLR with 7 deg ext lag  -SS    Sensation    Sensation WNL? --   Sensation intact to light touch  -SS      User Key  (r) = Recorded By, (t) = Taken By, (c) = Cosigned By    Initials Name Provider Type    RADHA Davis, PT Physical Therapist                            Therapy Education       07/26/17 1600          Therapy Education    Education Details review HEP and encouraged compliance  -SS      Given HEP  -SS      Program New   HS stretch plus HEP from hospital  -SS      How Provided Verbal  -SS      Provided to Patient  -SS      Level of Understanding Verbalized  -SS        User Key  (r) = Recorded By, (t) = Taken By, (c) = Cosigned By    Initials Name Provider Type    RADHA Davis, PT Physical Therapist                PT OP Goals       07/26/17 1600       PT Short Term Goals    STG Date to Achieve 08/16/17  -     STG 1 LEFS score to be >= 40/80  -     STG 2 Note a >= 50% subjective improvement.  -     STG 3 Ambulate without assistive device.  -SS     STG 4 Knee extension to be 0 deg actively  -     STG 5 Knee flexion AROM  to be >= 125 deg  -     Long Term Goals    LTG Date to Achieve --   TBA  -     Time Calculation    PT Goal Re-Cert Due Date 08/16/17  -       User Key  (r) = Recorded By, (t) = Taken By, (c) = Cosigned By    Initials Name Provider Type    RADHA Davis, PT Physical Therapist                PT Assessment/Plan        07/26/17 1600       PT Assessment    Functional Limitations Impaired gait;Limitation in home management;Limitations in community activities;Performance in work activities  -     Impairments Endurance;Gait;Muscle strength;Pain;Range of motion  -     Assessment Comments Patient is 2 days s/p L TKA. Pain and limited ROM post-operatively. Feels better after Pro2. Pain in knee at completion of treatment from ice pushing knee into extension. Good effort  -     Rehab Potential Excellent  -SS     Patient/caregiver participated in establishment of treatment plan and goals Yes  -SS     Patient would benefit from skilled therapy intervention Yes  -SS     PT Plan    PT Frequency 4x/week;2x/week  -     Predicted Duration of Therapy Intervention (days/wks) 4x/wk for 2 weeks then 2x/wk for 2 weeks  -     Planned CPT's? PT EVAL MOD COMPLEXITY: 03422;PT THER PROC EA 15 MIN: 57615;PT HOT OR COLD PACK TREAT MCARE;PT ELECTRICAL STIM UNATTEND: ;PT THER SUPP EA 15 MIN  -     Physical Therapy Interventions (Optional Details) gait training;home exercise program;manual therapy techniques;modalities;patient/family education;ROM (Range of Motion);stair training;strengthening;stretching  -     PT Plan Comments ROM, stretching, strengthening, IFC estim with ice for pain, gait training,  -       User Key  (r) = Recorded By, (t) = Taken By, (c) = Cosigned By    Initials Name Provider Type     Horacio Davis, PT Physical Therapist                Modalities       07/26/17 1600          Ice    Ice Applied Yes  -SS      Location R knee concurrent with IFC estim   supine with LE elevated  -SS      Rx Minutes Other:   20 mins  -SS      Ice S/P Rx Yes  -SS      ELECTRICAL STIMULATION    Attended/Unattended Unattended  -      Stimulation Type IFC  -SS      Location/Electrode Placement/Other R knee concurrent with ice  -SS      Rx Minutes 20 mins  -SS        User Key  (r) = Recorded By, (t) = Taken By, (c) = Cosigned By     Initials Name Provider Type    SS Horacio Davis, PT Physical Therapist              Exercises       07/26/17 1600          Subjective Comments    Subjective Comments see Therapy Patient History  -SS      Subjective Pain    Able to rate subjective pain? yes  -SS      Pre-Treatment Pain Level 4  -SS      Post-Treatment Pain Level 6  -SS      Subjective Pain Comment increased pain from ice pressing knee into extension  -SS      Exercise 1    Exercise Name 1 Pro2, Seat 19, LE ROM  -SS      Cueing 1 Verbal  -SS      Time (Minutes) 1 10 min  -SS      Additional Comments Level 1  -SS      Exercise 2    Exercise Name 2 Incline calf stretch  -SS      Cueing 2 Verbal  -SS      Sets 2 3  -SS      Time (Seconds) 2 30 sec  -SS      Exercise 3    Exercise Name 3 Standing L HS stretch  -SS      Cueing 3 Verbal  -SS      Sets 3 3  -SS      Time (Seconds) 3 30 sec  -SS      Exercise 4    Exercise Name 4 Lunge stretch for flexion - L  -SS      Cueing 4 Verbal  -SS      Sets 4 3  -SS      Time (Seconds) 4 30 sec  -SS        User Key  (r) = Recorded By, (t) = Taken By, (c) = Cosigned By    Initials Name Provider Type    SS Horacio Davis, PT Physical Therapist                              Outcome Measures       07/26/17 1700          Lower Extremity Functional Index    Any of your usual work, housework or school activities 0  -SS      Your usual hobbies, recreational or sporting activities 0  -SS      Getting into or out of the bath 0  -SS      Walking between rooms 0  -SS      Putting on your shoes or socks 0  -SS      Squatting 0  -SS      Lifting an object, like a bag of groceries from the floor 0  -SS      Performing light activities around your home 0  -SS      Performing heavy activities around your home 0  -SS      Getting into or out of a car 0  -SS      Walking 2 blocks 0  -SS      Walking a mile 0  -SS      Going up or down 10 stairs (about 1 flight of stairs) 0  -SS      Standing for 1 hour 0  -SS      Sitting  for 1 hour 0  -SS      Running on even ground 0  -SS      Running on uneven ground 0  -SS      Making sharp turns while running fast 0  -SS      Hopping 0  -SS      Rolling over in bed 0  -SS      Total 0  -SS      Functional Assessment    Outcome Measure Options Lower Extremity Functional Scale (LEFS)  -SS        User Key  (r) = Recorded By, (t) = Taken By, (c) = Cosigned By    Initials Name Provider Type    SS Horacio Davis, PT Physical Therapist            Time Calculation:   Start Time: 1620  Stop Time: 1736  Time Calculation (min): 76 min     Therapy Charges for Today     Code Description Service Date Service Provider Modifiers Qty    39310524435  PT THER PROC EA 15 MIN 7/26/2017 Horacio Davis, PT GP 2    36150495235 HC PT ELECTRICAL STIM UNATTENDED 7/26/2017 Horacio Davis, PT  1    87129075216 HC PT THER SUPP EA 15 MIN 7/26/2017 Horcaio Davis, PT GP 1    33145254812 HC PT EVAL MOD COMPLEXITY 2 7/26/2017 Horacio Davis, PT GP 1                   Horacio Davis, PT, DPT, CHT  7/26/2017

## 2017-07-28 ENCOUNTER — HOSPITAL ENCOUNTER (OUTPATIENT)
Dept: PHYSICAL THERAPY | Facility: HOSPITAL | Age: 62
Setting detail: THERAPIES SERIES
End: 2017-07-28

## 2017-08-01 ENCOUNTER — HOSPITAL ENCOUNTER (OUTPATIENT)
Dept: PHYSICAL THERAPY | Facility: HOSPITAL | Age: 62
Setting detail: THERAPIES SERIES
Discharge: HOME | End: 2017-08-01

## 2017-08-01 DIAGNOSIS — M25.562 CHRONIC PAIN OF LEFT KNEE: ICD-10-CM

## 2017-08-01 DIAGNOSIS — G89.29 CHRONIC PAIN OF RIGHT KNEE: ICD-10-CM

## 2017-08-01 DIAGNOSIS — Z96.651 STATUS POST TOTAL RIGHT KNEE REPLACEMENT: ICD-10-CM

## 2017-08-01 DIAGNOSIS — G89.29 CHRONIC PAIN OF LEFT KNEE: ICD-10-CM

## 2017-08-01 DIAGNOSIS — M17.12 PRIMARY LOCALIZED OSTEOARTHROSIS, LOWER LEG, LEFT: ICD-10-CM

## 2017-08-01 DIAGNOSIS — M25.561 CHRONIC PAIN OF RIGHT KNEE: ICD-10-CM

## 2017-08-01 DIAGNOSIS — Z96.652 STATUS POST LEFT KNEE REPLACEMENT: Primary | ICD-10-CM

## 2017-08-01 PROCEDURE — 97110 THERAPEUTIC EXERCISES: CPT | Performed by: PHYSICAL THERAPY ASSISTANT

## 2017-08-01 PROCEDURE — G0283 ELEC STIM OTHER THAN WOUND: HCPCS | Performed by: PHYSICAL THERAPY ASSISTANT

## 2017-08-01 NOTE — THERAPY TREATMENT NOTE
Outpatient Physical Therapy Ortho Treatment Note  [facility]     Patient Name: Otf Murray  : 1955  MRN: [id number]  Today's Date: 2017      Visit Date: 2017    Visits 3/3 16 visits for    Recert Date 17   MD appointment 17   Pt reports  % improvement       Visit Dx:    ICD-10-CM ICD-9-CM   1. Status post left knee replacement Z96.652 V43.65   2. Chronic pain of left knee M25.562 719.46    G89.29 338.29   3. Primary localized osteoarthrosis, lower leg, left M17.12 715.16   4. Status post total right knee replacement Z96.651 V43.65   5. Chronic pain of right knee M25.561 719.46    G89.29 338.29       There is no problem list on file for this patient.       Past Medical History:   Diagnosis Date   • Acute gastritis without bleeding    • Alcohol abuse counseling and surveillance of alcoholic    • Allergic rhinitis    • Anxiety state    • Attention deficit disorder of childhood with hyperactivity    • Attention deficit hyperactivity disorder    • Attention deficit hyperactivity disorder, predominantly hyperactive impulsive type    • Attention deficit hyperactivity disorder, predominantly inattentive type    • Backache    • Body mass index (bmi) 31.0-31.9, adult     Body mass index (BMI) 31.0-31.9, adult - BMI 33.5      • Burn of lower leg    • Candidiasis of skin    • Cardiac murmur, unspecified    • Decreased testosterone level    • Depressive disorder     Depressive disorder - acute on chronic      • Dysgraphia    • Edema    • Elbow joint pain    • Encounter for general adult medical examination with abnormal findings    • Encounter for screening for malignant neoplasm of colon    • Essential hypertension    • Essential tremor    • Ex-smoker    • Fatigue    • Gastro-esophageal reflux disease with esophagitis    • Gastroesophageal reflux disease    • Hyperlipidemia    • Hyperlipoproteinemia    • Impotence of organic origin    • Insomnia    • Intermittent palpitations    •  Knee pain     Knee pain - patellofemoral      • Lateral epicondylitis    • Low back pain    • Male erectile disorder    • Male erectile dysfunction, unspecified    • Male hypogonadism    • Neck pain     Neck pain aggravated by recumbency      • Obese     Obese - BMI 33.0      • On long term drug therapy    • Osteoarthrosis     Osteoarthrosis, unspecified whether generalized or localized, involving lower leg      • Other obesity    • Other specified diseases of anus and rectum     Other specified diseases of anus and rectum - rectal pain after c-scope prep      • Overweight    • Pain in left knee    • Pain in right knee    • Shoulder pain, right    • Spasm of back muscles    • Tachycardia, unspecified    • Tremor, unspecified    • Tubular adenoma of colon    • Unspecified essential hypertension         Past Surgical History:   Procedure Laterality Date   • COLONOSCOPY  03/16/2016    One polyp in the sigmoid colon.Resected and retrieved.        • CYST REMOVAL  10/27/2009     Excision of sebaceous cyst of the forehead, glabellar region.   • ENDOSCOPY  03/16/2016    Mildly severe esophagitis.Gastritis.Normal examined duodenum.Several biopsies obtained in the lower third of the esophagus.    • ENDOSCOPY AND COLONOSCOPY  04/12/2006     Normal colonoscopic examination    • INCISION AND DRAINAGE ABSCESS  10/24/2012   • INJECTION OF MEDICATION  04/21/2011    Depo Medrol (Methylprednisone) 80mg (1)        • INJECTION OF MEDICATION  02/01/2016    Kenalog (3)        • INJECTION OF MEDICATION  02/19/2016    Toradol (3)    • KNEE ARTHROSCOPY  04/02/2009     Medial meniscus tear of left knee   • SCROTUM EXPLORATION  07/02/2002     Excision of epidermal inclusion cyst. base of left scrotum   • SKIN BIOPSY  04/23/2012   • SKIN TAG REMOVAL  04/25/2002    Skin tag, left scrotum              PT Ortho       08/01/17 1300    Subjective Comments    Subjective Comments Pt reports he is complaint with HEP, Pt reports his pan medication is  doing better since he changed his meds.  -    Subjective Pain    Able to rate subjective pain? yes  -    Pre-Treatment Pain Level 3  -    Post-Treatment Pain Level 2  -    Posture/Observations    Posture/Observations Comments bandage on anterior L knee on incision, presents with compression stockings  -    Gait Assessment/Treatment    Gait, Comment AMB with FWRW with ant gt  -      User Key  (r) = Recorded By, (t) = Taken By, (c) = Cosigned By    Initials Name Provider Type     Tony Way PTA Physical Therapy Assistant                            PT Assessment/Plan       08/01/17 1300       PT Assessment    Assessment Comments good tolerance with ther ex no c/o pain with exercises, Pt able to AMB with SPC safely, recommended Pt to start using cane for ambulation.  -     PT Plan    PT Frequency 4x/week;2x/week  -     Predicted Duration of Therapy Intervention (days/wks) 4x/week for 2 weeks, 2x/week for 2 weeks  -     PT Plan Comments Progress ROM   -       User Key  (r) = Recorded By, (t) = Taken By, (c) = Cosigned By    Initials Name Provider Type     Tony Way PTA Physical Therapy Assistant                Modalities       08/01/17 1300          Ice    Ice Applied Yes  -      Location L knee w/ IFC supine and wedge for 20 min  -      Rx Minutes 15 mins  -      Ice S/P Rx Yes  -      ELECTRICAL STIMULATION    Attended/Unattended Unattended  -      Stimulation Type IFC  -      Location/Electrode Placement/Other L knee w/ ice  -      Rx Minutes 15 mins  -        User Key  (r) = Recorded By, (t) = Taken By, (c) = Cosigned By    Initials Name Provider Type     Tony Way PTA Physical Therapy Assistant                Exercises       08/01/17 1300          Subjective Comments    Subjective Comments Pt reports he is complaint with HEP, Pt reports his pan medication is doing better since he changed his meds.  -      Subjective Pain    Able to rate subjective pain? yes   -JH      Pre-Treatment Pain Level 3  -JH      Post-Treatment Pain Level 2  -JH      Exercise 1    Exercise Name 1 PRO 2 S-17  -JH      Time (Minutes) 1 10  -JH      Additional Comments L2.0  -JH      Exercise 2    Exercise Name 2 incline stretch  -JH      Sets 2 3  -JH      Time (Seconds) 2 30  -JH      Exercise 3    Exercise Name 3 lunge stretch  -JH      Reps 3 10  -JH      Time (Seconds) 3 10  -JH      Exercise 4    Exercise Name 4 QS  -JH      Sets 4 2  -JH      Reps 4 10  -JH      Time (Seconds) 4 5 sec hold  -JH      Exercise 5    Exercise Name 5 SLR  -JH      Sets 5 2  -JH      Reps 5 10  -JH      Exercise 6    Exercise Name 6 HS with strap  -JH      Sets 6 2  -JH      Reps 6 10  -JH      Exercise 7    Exercise Name 7 AMB with SPC  -JH      Additional Comments 270'  -JH      Exercise 8    Exercise Name 8 LAQ  -JH      Sets 8 2  -JH      Reps 8 10  -JH      Exercise 9    Exercise Name 9 seated hip add  -JH      Sets 9 2  -JH      Reps 9 10  -JH      Exercise 10    Exercise Name 10 CR  -JH      Sets 10 2  -JH      Reps 10 10  -JH        User Key  (r) = Recorded By, (t) = Taken By, (c) = Cosigned By    Initials Name Provider Type     Tony Way, PTA Physical Therapy Assistant                               PT OP Goals       08/01/17 1300       PT Short Term Goals    STG Date to Achieve 08/16/17  -     STG 1 LEFS score to be >= 40/80  -     STG 1 Progress Met  -     STG 2 Note a >= 50% subjective improvement.  -     STG 2 Progress Met  -     STG 3 Ambulate without assistive device.  -     STG 3 Progress Not Met  -     STG 4 Knee extension to be 0 deg actively  -     STG 4 Progress Progressing  -     STG 5 Knee flexion AROM  to be >= 125 deg  -     Long Term Goals    LTG Date to Achieve --   TBA  -     LTG 1 Ind w/ final HEP  -     LTG 1 Progress Progressing;Ongoing  -     LTG 2 Inc R knee AROM to 0-125 degrees  -     LTG 2 Progress Not Met  -     LTG 3 Decrease R knee edema at  joint line to 47cm or less  -     LTG 3 Progress Not Met  -     LTG 4 Increase LEFS score to 40/80 or better  -     LTG 4 Progress Not Met  -     LTG 5 Improve [place] flexibility from min to WNL  -     LTG 5 Progress Not Met  -     Time Calculation    PT Goal Re-Cert Due Date 08/16/17  -       User Key  (r) = Recorded By, (t) = Taken By, (c) = Cosigned By    Initials Name Provider Type     Tony Way PTA Physical Therapy Assistant                    Time Calculation:   Start Time: 1302  Stop Time: 1412  Time Calculation (min): 70 min    Therapy Charges for Today     Code Description Service Date Service Provider Modifiers Qty    19425249260 HC PT THER PROC EA 15 MIN 8/1/2017 Tony Way PTA GP 4    57741034971 HC PT ELECTRICAL STIM UNATTENDED 8/1/2017 Tony Way PTA  1                    Tony Way PTA  8/1/2017

## 2017-08-02 ENCOUNTER — HOSPITAL ENCOUNTER (OUTPATIENT)
Dept: PHYSICAL THERAPY | Facility: HOSPITAL | Age: 62
Setting detail: THERAPIES SERIES
Discharge: HOME | End: 2017-08-02

## 2017-08-02 DIAGNOSIS — M17.12 PRIMARY LOCALIZED OSTEOARTHROSIS, LOWER LEG, LEFT: ICD-10-CM

## 2017-08-02 DIAGNOSIS — G89.29 CHRONIC PAIN OF RIGHT KNEE: ICD-10-CM

## 2017-08-02 DIAGNOSIS — G89.29 CHRONIC PAIN OF LEFT KNEE: ICD-10-CM

## 2017-08-02 DIAGNOSIS — Z96.651 STATUS POST TOTAL RIGHT KNEE REPLACEMENT: ICD-10-CM

## 2017-08-02 DIAGNOSIS — M25.562 CHRONIC PAIN OF LEFT KNEE: ICD-10-CM

## 2017-08-02 DIAGNOSIS — M25.561 CHRONIC PAIN OF RIGHT KNEE: ICD-10-CM

## 2017-08-02 DIAGNOSIS — Z96.652 STATUS POST LEFT KNEE REPLACEMENT: Primary | ICD-10-CM

## 2017-08-02 PROCEDURE — G0283 ELEC STIM OTHER THAN WOUND: HCPCS | Performed by: PHYSICAL THERAPY ASSISTANT

## 2017-08-02 PROCEDURE — 97110 THERAPEUTIC EXERCISES: CPT | Performed by: PHYSICAL THERAPY ASSISTANT

## 2017-08-02 NOTE — THERAPY TREATMENT NOTE
Outpatient Physical Therapy Ortho Treatment Note  [facility]     Patient Name: Otf Murray  : 1955  MRN: [id number]  Today's Date: 2017      Visit Date: 2017    Visits  for 15 visits for 2017   Recert Date 17   MD appointment 17   Pt reports  % improvement       Visit Dx:    ICD-10-CM ICD-9-CM   1. Status post left knee replacement Z96.652 V43.65   2. Chronic pain of left knee M25.562 719.46    G89.29 338.29   3. Primary localized osteoarthrosis, lower leg, left M17.12 715.16   4. Status post total right knee replacement Z96.651 V43.65   5. Chronic pain of right knee M25.561 719.46    G89.29 338.29       There is no problem list on file for this patient.       Past Medical History:   Diagnosis Date   • Acute gastritis without bleeding    • Alcohol abuse counseling and surveillance of alcoholic    • Allergic rhinitis    • Anxiety state    • Attention deficit disorder of childhood with hyperactivity    • Attention deficit hyperactivity disorder    • Attention deficit hyperactivity disorder, predominantly hyperactive impulsive type    • Attention deficit hyperactivity disorder, predominantly inattentive type    • Backache    • Body mass index (bmi) 31.0-31.9, adult     Body mass index (BMI) 31.0-31.9, adult - BMI 33.5      • Burn of lower leg    • Candidiasis of skin    • Cardiac murmur, unspecified    • Decreased testosterone level    • Depressive disorder     Depressive disorder - acute on chronic      • Dysgraphia    • Edema    • Elbow joint pain    • Encounter for general adult medical examination with abnormal findings    • Encounter for screening for malignant neoplasm of colon    • Essential hypertension    • Essential tremor    • Ex-smoker    • Fatigue    • Gastro-esophageal reflux disease with esophagitis    • Gastroesophageal reflux disease    • Hyperlipidemia    • Hyperlipoproteinemia    • Impotence of organic origin    • Insomnia    • Intermittent palpitations     • Knee pain     Knee pain - patellofemoral      • Lateral epicondylitis    • Low back pain    • Male erectile disorder    • Male erectile dysfunction, unspecified    • Male hypogonadism    • Neck pain     Neck pain aggravated by recumbency      • Obese     Obese - BMI 33.0      • On long term drug therapy    • Osteoarthrosis     Osteoarthrosis, unspecified whether generalized or localized, involving lower leg      • Other obesity    • Other specified diseases of anus and rectum     Other specified diseases of anus and rectum - rectal pain after c-scope prep      • Overweight    • Pain in left knee    • Pain in right knee    • Shoulder pain, right    • Spasm of back muscles    • Tachycardia, unspecified    • Tremor, unspecified    • Tubular adenoma of colon    • Unspecified essential hypertension         Past Surgical History:   Procedure Laterality Date   • COLONOSCOPY  03/16/2016    One polyp in the sigmoid colon.Resected and retrieved.        • CYST REMOVAL  10/27/2009     Excision of sebaceous cyst of the forehead, glabellar region.   • ENDOSCOPY  03/16/2016    Mildly severe esophagitis.Gastritis.Normal examined duodenum.Several biopsies obtained in the lower third of the esophagus.    • ENDOSCOPY AND COLONOSCOPY  04/12/2006     Normal colonoscopic examination    • INCISION AND DRAINAGE ABSCESS  10/24/2012   • INJECTION OF MEDICATION  04/21/2011    Depo Medrol (Methylprednisone) 80mg (1)        • INJECTION OF MEDICATION  02/01/2016    Kenalog (3)        • INJECTION OF MEDICATION  02/19/2016    Toradol (3)    • KNEE ARTHROSCOPY  04/02/2009     Medial meniscus tear of left knee   • SCROTUM EXPLORATION  07/02/2002     Excision of epidermal inclusion cyst. base of left scrotum   • SKIN BIOPSY  04/23/2012   • SKIN TAG REMOVAL  04/25/2002    Skin tag, left scrotum              PT Ortho       08/02/17 1300    Subjective Pain    Post-Treatment Pain Level 2  -JH      08/01/17 1300    Subjective Comments    Subjective  Comments Pt reports he is complaint with HEP, Pt reports his pan medication is doing better since he changed his meds.  -    Subjective Pain    Able to rate subjective pain? yes  -    Pre-Treatment Pain Level 3  -    Post-Treatment Pain Level 2  -    Posture/Observations    Posture/Observations Comments bandage on anterior L knee on incision, presents with compression stockings  -    Gait Assessment/Treatment    Gait, Comment AMB with FWRW with ant gt  -      User Key  (r) = Recorded By, (t) = Taken By, (c) = Cosigned By    Initials Name Provider Type    RINA Way PTA Physical Therapy Assistant                            PT Assessment/Plan       08/02/17 1300       PT Assessment    Assessment Comments decreased intensity this visit 2° Pt sore after last visit. Pt progressing well.  -     PT Plan    PT Frequency 4x/week;2x/week  -     PT Plan Comments measure ROM  -       User Key  (r) = Recorded By, (t) = Taken By, (c) = Cosigned By    Initials Name Provider Type    RINA Way PTA Physical Therapy Assistant                Modalities       08/02/17 1300          Ice    Ice Applied Yes  -      Location L knee w/ IFC supine and wedge for 20 min  -      Rx Minutes 15 mins  -      Ice S/P Rx Yes  -      ELECTRICAL STIMULATION    Attended/Unattended Unattended  -      Stimulation Type IFC  -      Location/Electrode Placement/Other L knee w/ ice  -      Rx Minutes 15 mins  -        User Key  (r) = Recorded By, (t) = Taken By, (c) = Cosigned By    Initials Name Provider Type    RINA Way PTA Physical Therapy Assistant                Exercises       08/02/17 1300          Subjective Comments    Subjective Comments Pt reports he was sore after last tx .  -      Subjective Pain    Able to rate subjective pain? yes  -      Pre-Treatment Pain Level 3  -      Post-Treatment Pain Level 2  -      Exercise 1    Exercise Name 1 PRO 2 S-17  -      Time (Minutes) 1 10   -JH      Additional Comments L 3.0  -JH      Exercise 2    Exercise Name 2 gastroc stretch with strap  -JH      Sets 2 3  -JH      Time (Seconds) 2 30  -JH      Exercise 3    Exercise Name 3 seated HS stretch  -JH      Sets 3 2  -JH      Time (Seconds) 3 30  -JH      Exercise 4    Exercise Name 4 LAQ  -JH      Sets 4 2  -JH      Reps 4 10  -JH      Exercise 5    Exercise Name 5 seated ham curl  -JH      Sets 5 2  -JH      Reps 5 10  -JH      Additional Comments red tb  -JH      Exercise 6    Exercise Name 6 step up fwd lat  -JH      Sets 6 2  -JH      Reps 6 10  -JH      Additional Comments 4 in  -JH      Exercise 7    Exercise Name 7 cr tr on airex  -JH      Sets 7 2  -JH      Reps 7 10  -JH      Exercise 8    Exercise Name 8 MS on airex  -JH      Sets 8 2  -JH      Reps 8 10  -JH      Exercise 9    Exercise Name 9 SLR  -JH      Sets 9 2  -JH      Reps 9 10  -JH      Exercise 10    Exercise Name 10 QS  -JH      Sets 10 2  -JH      Reps 10 10  -JH      Exercise 11    Exercise Name 11 HS with strap  -JH      Sets 11 2  -JH      Reps 11 10  -JH        User Key  (r) = Recorded By, (t) = Taken By, (c) = Cosigned By    Initials Name Provider Type     Tony Way PTA Physical Therapy Assistant                               PT OP Goals       08/02/17 1300       PT Short Term Goals    STG Date to Achieve 08/16/17  -     STG 1 LEFS score to be >= 40/80  -     STG 1 Progress Met  -     STG 2 Note a >= 50% subjective improvement.  -     STG 2 Progress Met  -     STG 3 Ambulate without assistive device.  -     STG 3 Progress Not Met  -     STG 4 Knee extension to be 0 deg actively  -     STG 4 Progress Progressing  -     STG 5 Knee flexion AROM  to be >= 125 deg  -     Long Term Goals    LTG Date to Achieve --   TBA  -JH     LTG 1 Ind w/ final HEP  -JH     LTG 1 Progress Progressing;Ongoing  -JH     LTG 2 Inc R knee AROM to 0-125 degrees  -     LTG 2 Progress Not Met  -     LTG 3 Decrease R knee  edema at joint line to 47cm or less  -     LTG 3 Progress Not Met  -     LTG 4 Increase LEFS score to 40/80 or better  -     LTG 4 Progress Not Met  -     LTG 5 Improve Hamsting flexibility from min to WNL  [facility] Not Met  -     Time Calculation    PT Goal Re-Cert Due Date 08/16/17  -       User Key  (r) = Recorded By, (t) = Taken By, (c) = Cosigned By    Initials Name Provider Type     Tony Way PTA Physical Therapy Assistant                    Time Calculation:   Start Time: 1300  Stop Time: 1358  Time Calculation (min): 58 min    Therapy Charges for Today     Code Description Service Date Service Provider Modifiers Qty    62537505550 HC PT THER PROC EA 15 MIN 8/2/2017 Tony Way PTA GP 3    63356504802 HC PT ELECTRICAL STIM UNATTENDED 8/2/2017 Tony Way PTA  1                    Tony Way PTA  8/2/2017

## 2017-08-03 ENCOUNTER — HOSPITAL ENCOUNTER (OUTPATIENT)
Dept: PHYSICAL THERAPY | Facility: HOSPITAL | Age: 62
Setting detail: THERAPIES SERIES
Discharge: HOME | End: 2017-08-03

## 2017-08-03 DIAGNOSIS — M17.12 PRIMARY LOCALIZED OSTEOARTHROSIS, LOWER LEG, LEFT: ICD-10-CM

## 2017-08-03 DIAGNOSIS — G89.29 CHRONIC PAIN OF LEFT KNEE: ICD-10-CM

## 2017-08-03 DIAGNOSIS — Z96.651 STATUS POST TOTAL RIGHT KNEE REPLACEMENT: ICD-10-CM

## 2017-08-03 DIAGNOSIS — M25.561 CHRONIC PAIN OF RIGHT KNEE: ICD-10-CM

## 2017-08-03 DIAGNOSIS — M25.562 CHRONIC PAIN OF LEFT KNEE: ICD-10-CM

## 2017-08-03 DIAGNOSIS — Z96.652 STATUS POST LEFT KNEE REPLACEMENT: Primary | ICD-10-CM

## 2017-08-03 DIAGNOSIS — G89.29 CHRONIC PAIN OF RIGHT KNEE: ICD-10-CM

## 2017-08-03 PROCEDURE — G0283 ELEC STIM OTHER THAN WOUND: HCPCS

## 2017-08-03 PROCEDURE — 97110 THERAPEUTIC EXERCISES: CPT

## 2017-08-03 NOTE — THERAPY TREATMENT NOTE
Outpatient Physical Therapy Ortho Treatment Note  [facility]     Patient Name: Otf Murray  : 1955  MRN: [id number]  Today's Date: 8/3/2017      Visit Date: 2017    Subjective Improvement:    Visits Attended:    Visits Approved 15   MD visit: 17   Recert Date: 17       Visit Dx:    ICD-10-CM ICD-9-CM   1. Status post left knee replacement Z96.652 V43.65   2. Chronic pain of left knee M25.562 719.46    G89.29 338.29   3. Primary localized osteoarthrosis, lower leg, left M17.12 715.16   4. Status post total right knee replacement Z96.651 V43.65   5. Chronic pain of right knee M25.561 719.46    G89.29 338.29       There is no problem list on file for this patient.       Past Medical History:   Diagnosis Date   • Acute gastritis without bleeding    • Alcohol abuse counseling and surveillance of alcoholic    • Allergic rhinitis    • Anxiety state    • Attention deficit disorder of childhood with hyperactivity    • Attention deficit hyperactivity disorder    • Attention deficit hyperactivity disorder, predominantly hyperactive impulsive type    • Attention deficit hyperactivity disorder, predominantly inattentive type    • Backache    • Body mass index (bmi) 31.0-31.9, adult     Body mass index (BMI) 31.0-31.9, adult - BMI 33.5      • Burn of lower leg    • Candidiasis of skin    • Cardiac murmur, unspecified    • Decreased testosterone level    • Depressive disorder     Depressive disorder - acute on chronic      • Dysgraphia    • Edema    • Elbow joint pain    • Encounter for general adult medical examination with abnormal findings    • Encounter for screening for malignant neoplasm of colon    • Essential hypertension    • Essential tremor    • Ex-smoker    • Fatigue    • Gastro-esophageal reflux disease with esophagitis    • Gastroesophageal reflux disease    • Hyperlipidemia    • Hyperlipoproteinemia    • Impotence of organic origin    • Insomnia    • Intermittent palpitations     • Knee pain     Knee pain - patellofemoral      • Lateral epicondylitis    • Low back pain    • Male erectile disorder    • Male erectile dysfunction, unspecified    • Male hypogonadism    • Neck pain     Neck pain aggravated by recumbency      • Obese     Obese - BMI 33.0      • On long term drug therapy    • Osteoarthrosis     Osteoarthrosis, unspecified whether generalized or localized, involving lower leg      • Other obesity    • Other specified diseases of anus and rectum     Other specified diseases of anus and rectum - rectal pain after c-scope prep      • Overweight    • Pain in left knee    • Pain in right knee    • Shoulder pain, right    • Spasm of back muscles    • Tachycardia, unspecified    • Tremor, unspecified    • Tubular adenoma of colon    • Unspecified essential hypertension         Past Surgical History:   Procedure Laterality Date   • COLONOSCOPY  03/16/2016    One polyp in the sigmoid colon.Resected and retrieved.        • CYST REMOVAL  10/27/2009     Excision of sebaceous cyst of the forehead, glabellar region.   • ENDOSCOPY  03/16/2016    Mildly severe esophagitis.Gastritis.Normal examined duodenum.Several biopsies obtained in the lower third of the esophagus.    • ENDOSCOPY AND COLONOSCOPY  04/12/2006     Normal colonoscopic examination    • INCISION AND DRAINAGE ABSCESS  10/24/2012   • INJECTION OF MEDICATION  04/21/2011    Depo Medrol (Methylprednisone) 80mg (1)        • INJECTION OF MEDICATION  02/01/2016    Kenalog (3)        • INJECTION OF MEDICATION  02/19/2016    Toradol (3)    • KNEE ARTHROSCOPY  04/02/2009     Medial meniscus tear of left knee   • SCROTUM EXPLORATION  07/02/2002     Excision of epidermal inclusion cyst. base of left scrotum   • SKIN BIOPSY  04/23/2012   • SKIN TAG REMOVAL  04/25/2002    Skin tag, left scrotum              PT Ortho       08/02/17 1300    Subjective Pain    Post-Treatment Pain Level 2  -JH      08/01/17 1300    Subjective Comments    Subjective  Comments Pt reports he is complaint with HEP, Pt reports his pan medication is doing better since he changed his meds.  -    Subjective Pain    Able to rate subjective pain? yes  -    Pre-Treatment Pain Level 3  -    Post-Treatment Pain Level 2  -    Posture/Observations    Posture/Observations Comments bandage on anterior L knee on incision, presents with compression stockings  -    Gait Assessment/Treatment    Gait, Comment AMB with FWRW with ant gt  -      User Key  (r) = Recorded By, (t) = Taken By, (c) = Cosigned By    Initials Name Provider Type     Tony Way PTA Physical Therapy Assistant                            PT Assessment/Plan       08/03/17 1400       PT Assessment    Assessment Comments Pt still lacking in ext ROM but doing well with pain and functional mobility.  -TW     PT Plan    PT Frequency 4x/week;2x/week  -TW     Predicted Duration of Therapy Intervention (days/wks) 4x/week for 2 weeks; 2x/week for 2 weeks  -TW     PT Plan Comments Pt to go to MD Monday. Needs MD note next visit.  -TW       User Key  (r) = Recorded By, (t) = Taken By, (c) = Cosigned By    Initials Name Provider Type     Dennis Hare PTA Physical Therapy Assistant                Modalities       08/03/17 1400          Ice    Ice Applied Yes  -TW      Location L knee w/ IFC supine and wedge for 20 min  -TW      Rx Minutes --   20 with IFC  -TW      Ice S/P Rx Yes  -TW      ELECTRICAL STIMULATION    Attended/Unattended Unattended  -TW      Stimulation Type IFC  -TW      Location/Electrode Placement/Other L knee with ice  -TW      Rx Minutes 20 mins  -TW        User Key  (r) = Recorded By, (t) = Taken By, (c) = Cosigned By    Initials Name Provider Type     Dennis Hare PTA Physical Therapy Assistant                Exercises       08/03/17 1400          Subjective Comments    Subjective Comments Pt reports it still hurts enough to keep him up at night.  -      Subjective Pain    Able to rate  subjective pain? yes  -TW      Pre-Treatment Pain Level 3  -TW      Exercise 1    Exercise Name 1 PRO 2 S-17  -TW      Time (Minutes) 1 10  -TW      Additional Comments L 3.0  -TW      Exercise 2    Exercise Name 2 incline stretch  -TW      Sets 2 3  -TW      Time (Seconds) 2 30  -TW      Exercise 3    Exercise Name 3 standing HS stretch  -TW      Sets 3 3  -TW      Time (Seconds) 3 30  -TW      Exercise 4    Exercise Name 4 toe raises on airex  -TW      Reps 4 20  -TW      Exercise 5    Exercise Name 5 mini squats on airex  -TW      Sets 5 2  -TW      Reps 5 10  -TW      Exercise 6    Exercise Name 6 step up fwd lat  -TW      Sets 6 2  -TW      Reps 6 10  -TW      Additional Comments 4 in  -TW      Exercise 7    Exercise Name 7 SLR  -TW      Sets 7 2  -TW      Reps 7 10  -TW        User Key  (r) = Recorded By, (t) = Taken By, (c) = Cosigned By    Initials Name Provider Type    TW Dennis Hare, PTA Physical Therapy Assistant                               PT OP Goals       08/03/17 1536 08/03/17 1400    PT Short Term Goals    STG Date to Achieve  08/16/17  -TW    STG 1  LEFS score to be >= 40/80  -TW    STG 1 Progress  Met  -TW    STG 2  Note a >= 50% subjective improvement.  -TW    STG 2 Progress  Met  -TW    STG 3  Ambulate without assistive device.  -TW    STG 3 Progress  Not Met  -TW    STG 4  Knee extension to be 0 deg actively  -TW    STG 4 Progress  Progressing  -TW    STG 5  Knee flexion AROM  to be >= 125 deg  -TW    Long Term Goals    LTG Date to Achieve  --   TBA  -TW    LTG 1  Ind w/ final HEP  -TW    LTG 1 Progress  Progressing;Ongoing  -TW    LTG 2  Inc R knee AROM to 0-125 degrees  -TW    LTG 2 Progress  Not Met  -TW    LTG 3  Decrease R knee edema at joint line to 47cm or less  -TW    LTG 3 Progress  Not Met  -TW    LTG 4  Increase LEFS score to 40/80 or better  -TW    LTG 4 Progress  Not Met  -TW    LTG 5  Improve Hamsting flexibility from min to WNL  -TW    LTG 5 Progress  Not Met  -TW    Time  Calculation    PT Goal Re-Cert Due Date 08/16/17  -TW       User Key  (r) = Recorded By, (t) = Taken By, (c) = Cosigned By    Initials Name Provider Type    ELIZABETH Hare PTA Physical Therapy Assistant                Therapy Education       08/03/17 1400          Therapy Education    Given HEP  -TW      Program New  -TW      How Provided Verbal  -TW      Provided to Patient  -TW      Level of Understanding Verbalized;Demonstrated  -TW        User Key  (r) = Recorded By, (t) = Taken By, (c) = Cosigned By    Initials Name Provider Type    ELIZABETH Hare PTA Physical Therapy Assistant                Time Calculation:   Start Time: 1433  Stop Time: 1533  Time Calculation (min): 60 min  Total Timed Code Minutes- PT: 60 minute(s)    Therapy Charges for Today     Code Description Service Date Service Provider Modifiers Qty    13504715439 HC PT THER SUPP EA 15 MIN 8/3/2017 Dennis Hare PTA GP 1    65458452500 HC PT THER PROC EA 15 MIN 8/3/2017 Dennis Hare PTA GP 3    04947102738 HC PT ELECTRICAL STIM UNATTENDED 8/3/2017 Dennis Hare PTA  1                    Dennis Hare PTA  8/3/2017

## 2017-08-04 ENCOUNTER — HOSPITAL ENCOUNTER (OUTPATIENT)
Dept: PHYSICAL THERAPY | Facility: HOSPITAL | Age: 62
Setting detail: THERAPIES SERIES
Discharge: HOME | End: 2017-08-04

## 2017-08-04 DIAGNOSIS — Z96.652 STATUS POST LEFT KNEE REPLACEMENT: Primary | ICD-10-CM

## 2017-08-04 PROCEDURE — G0283 ELEC STIM OTHER THAN WOUND: HCPCS

## 2017-08-04 PROCEDURE — 97110 THERAPEUTIC EXERCISES: CPT

## 2017-08-04 NOTE — THERAPY TREATMENT NOTE
Outpatient Physical Therapy Ortho Treatment Note  [facility]     Patient Name: Otf Murray  : 1955  MRN: [id number]  Today's Date: 2017      Visit Date: 2017     Sub imp 55%  Visit (16)  MD 17  RE 17    Visit Dx:    ICD-10-CM ICD-9-CM   1. Status post left knee replacement Z96.652 V43.65       There is no problem list on file for this patient.       Past Medical History:   Diagnosis Date   • Acute gastritis without bleeding    • Alcohol abuse counseling and surveillance of alcoholic    • Allergic rhinitis    • Anxiety state    • Attention deficit disorder of childhood with hyperactivity    • Attention deficit hyperactivity disorder    • Attention deficit hyperactivity disorder, predominantly hyperactive impulsive type    • Attention deficit hyperactivity disorder, predominantly inattentive type    • Backache    • Body mass index (bmi) 31.0-31.9, adult     Body mass index (BMI) 31.0-31.9, adult - BMI 33.5      • Burn of lower leg    • Candidiasis of skin    • Cardiac murmur, unspecified    • Decreased testosterone level    • Depressive disorder     Depressive disorder - acute on chronic      • Dysgraphia    • Edema    • Elbow joint pain    • Encounter for general adult medical examination with abnormal findings    • Encounter for screening for malignant neoplasm of colon    • Essential hypertension    • Essential tremor    • Ex-smoker    • Fatigue    • Gastro-esophageal reflux disease with esophagitis    • Gastroesophageal reflux disease    • Hyperlipidemia    • Hyperlipoproteinemia    • Impotence of organic origin    • Insomnia    • Intermittent palpitations    • Knee pain     Knee pain - patellofemoral      • Lateral epicondylitis    • Low back pain    • Male erectile disorder    • Male erectile dysfunction, unspecified    • Male hypogonadism    • Neck pain     Neck pain aggravated by recumbency      • Obese     Obese - BMI 33.0      • On long term drug therapy    •  Osteoarthrosis     Osteoarthrosis, unspecified whether generalized or localized, involving lower leg      • Other obesity    • Other specified diseases of anus and rectum     Other specified diseases of anus and rectum - rectal pain after c-scope prep      • Overweight    • Pain in left knee    • Pain in right knee    • Shoulder pain, right    • Spasm of back muscles    • Tachycardia, unspecified    • Tremor, unspecified    • Tubular adenoma of colon    • Unspecified essential hypertension         Past Surgical History:   Procedure Laterality Date   • COLONOSCOPY  03/16/2016    One polyp in the sigmoid colon.Resected and retrieved.        • CYST REMOVAL  10/27/2009     Excision of sebaceous cyst of the forehead, glabellar region.   • ENDOSCOPY  03/16/2016    Mildly severe esophagitis.Gastritis.Normal examined duodenum.Several biopsies obtained in the lower third of the esophagus.    • ENDOSCOPY AND COLONOSCOPY  04/12/2006     Normal colonoscopic examination    • INCISION AND DRAINAGE ABSCESS  10/24/2012   • INJECTION OF MEDICATION  04/21/2011    Depo Medrol (Methylprednisone) 80mg (1)        • INJECTION OF MEDICATION  02/01/2016    Kenalog (3)        • INJECTION OF MEDICATION  02/19/2016    Toradol (3)    • KNEE ARTHROSCOPY  04/02/2009     Medial meniscus tear of left knee   • SCROTUM EXPLORATION  07/02/2002     Excision of epidermal inclusion cyst. base of left scrotum   • SKIN BIOPSY  04/23/2012   • SKIN TAG REMOVAL  04/25/2002    Skin tag, left scrotum              PT Ortho       08/04/17 0800    Posture/Observations    Posture/Observations Comments (P)  wearing KAIDEN hose B  -NEVIN    ROM (Range of Motion)    General ROM Detail (P)  0-5-115  -NEVIN    Gait Assessment/Treatment    Gait, Comment (P)  Amb with quad cane  -NEVIN      08/02/17 1300    Subjective Pain    Post-Treatment Pain Level 2  -JH      08/01/17 1300    Subjective Comments    Subjective Comments Pt reports he is complaint with HEP, Pt reports his pan  medication is doing better since he changed his meds.  -    Subjective Pain    Able to rate subjective pain? yes  -    Pre-Treatment Pain Level 3  -    Post-Treatment Pain Level 2  -    Posture/Observations    Posture/Observations Comments bandage on anterior L knee on incision, presents with compression stockings  -    Gait Assessment/Treatment    Gait, Comment AMB with FWRW with ant gt  -      User Key  (r) = Recorded By, (t) = Taken By, (c) = Cosigned By    Initials Name Provider Type     Tony Way PTA Physical Therapy Assistant    NEVIN Morse, [facility]                             PT Assessment/Plan       08/04/17 0855 08/03/17 1400    PT Assessment    Assessment Comments (P)  completes rx. MD note sent with patient  - Pt still lacking in ext ROM but doing well with pain and functional mobility.  -TW    PT Plan    PT Frequency  4x/week;2x/week  -    Predicted Duration of Therapy Intervention (days/wks)  4x/week for 2 weeks; 2x/week for 2 weeks  -    PT Plan Comments (P)  cont per poc for AROM, gait, Quad  strength  - Pt to go to MD Monday. Needs MD note next visit.  -TW      User Key  (r) = Recorded By, (t) = Taken By, (c) = Cosigned By    Initials Name Provider Type     Dennis Hare PTA Physical Therapy Assistant    NEVIN Morse, [facility]                 Modalities       08/04/17 0800 08/03/17 1400       Ice    Ice Applied (P)  Yes  -NEVIN Yes  -TW     Location (P)  L knee w/ IFC supine and wedge for 20 min  - L knee w/ IFC supine and wedge for 20 min  -TW     Rx Minutes (P)  --   20 with IFC  -NEVIN --   20 with IFC  -TW     Ice S/P Rx (P)  Yes  -NEVIN Yes  -TW     ELECTRICAL STIMULATION    Attended/Unattended (P)  Unattended  - Unattended  -TW     Stimulation Type (P)  IFC  -NEVIN IFC  -TW     Location/Electrode Placement/Other (P)  L knee with ice  -NEVIN L knee with ice  -TW     Rx Minutes (P)  20 mins  -NEVIN 20 mins  -TW       User Key  (r) = Recorded By,  (t) = Taken By, (c) = Cosigned By    Initials Name Provider Type    TW Dennis Hare, PTA Physical Therapy Assistant    NEVIN Morse, [facility]                 Exercises       08/04/17 0800 08/03/17 1400       Subjective Comments    Subjective Comments (P)  Reports pain is not too bad today, although has a lot of swelling.  -NEVIN Pt reports it still hurts enough to keep him up at night.  -TW     Subjective Pain    Able to rate subjective pain? (P)  yes  -NEVIN yes  -TW     Pre-Treatment Pain Level (P)  3  -NEVIN 3  -TW     Post-Treatment Pain Level (P)  2  -NEVIN      Exercise 1    Exercise Name 1 (P)  PRO 2 S-15  -NEVIN PRO 2 S-17  -TW     Time (Minutes) 1 (P)  10  -NEVIN 10  -TW     Additional Comments (P)  L3  -NEVIN L 3.0  -TW     Exercise 2    Exercise Name 2 (P)  incline stretch  -NEVIN incline stretch  -TW     Sets 2 (P)  3  -NEVIN 3  -TW     Time (Seconds) 2 (P)  30  -NEVIN 30  -TW     Exercise 3    Exercise Name 3 (P)  standing HS stretch  -NEVIN standing HS stretch  -TW     Sets 3 (P)  3  -NEVIN 3  -TW     Time (Seconds) 3 (P)  30  -NEVIN 30  -TW     Exercise 4    Exercise Name 4 (P)  toe raises on airex  -NEVIN toe raises on airex  -TW     Reps 4 (P)  20  -NEVIN 20  -TW     Exercise 5    Exercise Name 5 (P)  mini squats on airex  -NEVIN mini squats on airex  -TW     Sets 5 (P)  2  -NEVIN 2  -TW     Reps 5 (P)  10  -NEVIN 10  -TW     Exercise 6    Exercise Name 6 (P)  step up fwd lat  -NEVIN step up fwd lat  -TW     Sets 6 (P)  2  -NEVIN 2  -TW     Reps 6 (P)  10  -NEVIN 10  -TW     Additional Comments  4 in  -TW     Exercise 7    Exercise Name 7 (P)  SLR  -NEVIN SLR  -TW     Sets 7 (P)  2  -NEVIN 2  -TW     Reps 7 (P)  10  -NEVIN 10  -TW     Exercise 8    Exercise Name 8 (P)  lunge stretch  -NEVIN      Sets 8 (P)  3  -NEVIN      Time (Seconds) 8 (P)  30  -NEVIN      Exercise 9    Exercise Name 9 (P)  ext prop  -NEVIN      Time (Minutes) 9 (P)  5  -NEVIN      Exercise 10    Exercise Name 10 (P)  SAQ  -NEVIN      Sets 10 (P)  2  -NEVIN      Reps 10 (P)  10  -NEVIN      Exercise 11     Exercise Name 11 (P)  Heel slides with strap  -      Sets 11 (P)  2  -NEVIN      Reps 11 (P)  10  -NEVIN        User Key  (r) = Recorded By, (t) = Taken By, (c) = Cosigned By    Initials Name Provider Type    ELIZABETH Hare, PTA Physical Therapy Assistant    NEVIN Morse, [facility]                                PT OP Goals       08/04/17 0800 08/03/17 1536    PT Short Term Goals    STG Date to Achieve (P)  08/16/17  -NEVIN     STG 1 (P)  LEFS score to be >= 40/80  -NEVIN     STG 1 Progress (P)  Met  -NEVIN     STG 2 (P)  Note a >= 50% subjective improvement.  -NEVIN     STG 2 Progress (P)  Met  -NEVIN     STG 3 (P)  Ambulate without assistive device.  -NEVIN     STG 3 Progress (P)  Not Met  -NEVIN     STG 4 (P)  Knee extension to be 0 deg actively  -NEVIN     STG 4 Progress (P)  Progressing  -NEVIN     STG 5 (P)  Knee flexion AROM  to be >= 125 deg  -NEVIN     STG 5 Progress (P)  Not Met  -NEVIN     Long Term Goals    LTG Date to Achieve (P)  --   TBA  -NEVIN     LTG 1 (P)  Ind w/ final HEP  -NEVIN     LTG 1 Progress (P)  Progressing;Ongoing  -NEVIN     LTG 2 (P)  Inc R knee AROM to 0-125 degrees  -NEVIN     LTG 2 Progress (P)  Not Met  -NEVIN     LTG 3 (P)  Decrease R knee edema at joint line to 47cm or less  -NEVIN     LTG 3 Progress (P)  Not Met  -NEVIN     LTG 4 (P)  Increase LEFS score to 40/80 or better  -NEVIN     LTG 4 Progress (P)  Not Met  -NEVIN     LTG 5 (P)  Improve Hamsting flexibility from min to WNL  -     LTG 5 Progress (P)  Not Met  -NEVIN     Time Calculation    PT Goal Re-Cert Due Date  08/16/17  -TW      08/03/17 1400       PT Short Term Goals    STG Date to Achieve 08/16/17  -TW     STG 1 LEFS score to be >= 40/80  -     STG 1 Progress Met  -     STG 2 Note a >= 50% subjective improvement.  -TW     STG 2 Progress Met  -TW     STG 3 Ambulate without assistive device.  -TW     STG 3 Progress Not Met  -TW     STG 4 Knee extension to be 0 deg actively  -TW     STG 4 Progress Progressing  -TW     STG 5 Knee flexion AROM  to be >= 125  deg  -TW     Long Term Goals    LTG Date to Achieve --   TBA  -TW     LTG 1 Ind w/ final HEP  -TW     LTG 1 Progress Progressing;Ongoing  -TW     LTG 2 Inc R knee AROM to 0-125 degrees  -TW     LTG 2 Progress Not Met  -TW     LTG 3 Decrease R knee edema at joint line to 47cm or less  -TW     LTG 3 Progress Not Met  -TW     LTG 4 Increase LEFS score to 40/80 or better  -TW     LTG 4 Progress Not Met  -TW     LTG 5 Improve Hamsting flexibility from min to WNL  -TW     LTG 5 Progress Not Met  -TW       User Key  (r) = Recorded By, (t) = Taken By, (c) = Cosigned By    Initials Name Provider Type    TW Dennis Hare PTA Physical Therapy Assistant    NEVIN Morse, [facility]                 Therapy Education       08/04/17 0800 08/03/17 1400       Therapy Education    Given (P)  HEP  -NEVIN HEP  -TW     Program (P)  Reinforced  -NEVIN New  -TW     How Provided (P)  Verbal  -NEVIN Verbal  -TW     Provided to (P)  Patient  -NEVIN Patient  -TW     Level of Understanding (P)  Verbalized  -NEVIN Verbalized;Demonstrated  -TW       User Key  (r) = Recorded By, (t) = Taken By, (c) = Cosigned By    Initials Name Provider Type    ELIZABETH Hare PTA Physical Therapy Assistant    NEVIN Morse, [facility]                 Time Calculation:   Start Time: (P) 0802  Stop Time: (P) 0908  Time Calculation (min): (P) 66 min  Total Timed Code Minutes- PT: (P) 46 minute(s)    Therapy Charges for Today     Code Description Service Date Service Provider Modifiers Qty    10840756955 HC PT THER SUPP EA 15 MIN 8/4/2017 Tony Morse, ATC  1    15469590801 HC PT ELECTRICAL STIM UNATTENDED 8/4/2017 Tony Morse, ATC  1    78207859010 HC PT THER PROC EA 15 MIN 8/4/2017 Tony Morse, ATC  3                    Tony Morse ATC  8/4/2017

## 2017-08-08 ENCOUNTER — HOSPITAL ENCOUNTER (OUTPATIENT)
Dept: PHYSICAL THERAPY | Facility: HOSPITAL | Age: 62
Setting detail: THERAPIES SERIES
Discharge: HOME | End: 2017-08-08

## 2017-08-08 DIAGNOSIS — Z96.652 STATUS POST LEFT KNEE REPLACEMENT: Primary | ICD-10-CM

## 2017-08-08 PROCEDURE — G0283 ELEC STIM OTHER THAN WOUND: HCPCS

## 2017-08-08 PROCEDURE — 97110 THERAPEUTIC EXERCISES: CPT

## 2017-08-08 NOTE — THERAPY TREATMENT NOTE
Outpatient Physical Therapy Ortho Treatment Note  [facility]     Patient Name: Otf Murray  : 1955  MRN: [id number]  Today's Date: 2017      Visit Date: 2017     Subjective Improvement 60%  Visits 7/8  Visits approved 16 visits remaining for 2017  RTMD 2017  Recert DAte 2017          Visit Dx:    ICD-10-CM ICD-9-CM   1. Status post left knee replacement Z96.652 V43.65       There is no problem list on file for this patient.       Past Medical History:   Diagnosis Date   • Acute gastritis without bleeding    • Alcohol abuse counseling and surveillance of alcoholic    • Allergic rhinitis    • Anxiety state    • Attention deficit disorder of childhood with hyperactivity    • Attention deficit hyperactivity disorder    • Attention deficit hyperactivity disorder, predominantly hyperactive impulsive type    • Attention deficit hyperactivity disorder, predominantly inattentive type    • Backache    • Body mass index (bmi) 31.0-31.9, adult     Body mass index (BMI) 31.0-31.9, adult - BMI 33.5      • Burn of lower leg    • Candidiasis of skin    • Cardiac murmur, unspecified    • Decreased testosterone level    • Depressive disorder     Depressive disorder - acute on chronic      • Dysgraphia    • Edema    • Elbow joint pain    • Encounter for general adult medical examination with abnormal findings    • Encounter for screening for malignant neoplasm of colon    • Essential hypertension    • Essential tremor    • Ex-smoker    • Fatigue    • Gastro-esophageal reflux disease with esophagitis    • Gastroesophageal reflux disease    • Hyperlipidemia    • Hyperlipoproteinemia    • Impotence of organic origin    • Insomnia    • Intermittent palpitations    • Knee pain     Knee pain - patellofemoral      • Lateral epicondylitis    • Low back pain    • Male erectile disorder    • Male erectile dysfunction, unspecified    • Male hypogonadism    • Neck pain     Neck pain aggravated by  recumbency      • Obese     Obese - BMI 33.0      • On long term drug therapy    • Osteoarthrosis     Osteoarthrosis, unspecified whether generalized or localized, involving lower leg      • Other obesity    • Other specified diseases of anus and rectum     Other specified diseases of anus and rectum - rectal pain after c-scope prep      • Overweight    • Pain in left knee    • Pain in right knee    • Shoulder pain, right    • Spasm of back muscles    • Tachycardia, unspecified    • Tremor, unspecified    • Tubular adenoma of colon    • Unspecified essential hypertension         Past Surgical History:   Procedure Laterality Date   • COLONOSCOPY  03/16/2016    One polyp in the sigmoid colon.Resected and retrieved.        • CYST REMOVAL  10/27/2009     Excision of sebaceous cyst of the forehead, glabellar region.   • ENDOSCOPY  03/16/2016    Mildly severe esophagitis.Gastritis.Normal examined duodenum.Several biopsies obtained in the lower third of the esophagus.    • ENDOSCOPY AND COLONOSCOPY  04/12/2006     Normal colonoscopic examination    • INCISION AND DRAINAGE ABSCESS  10/24/2012   • INJECTION OF MEDICATION  04/21/2011    Depo Medrol (Methylprednisone) 80mg (1)        • INJECTION OF MEDICATION  02/01/2016    Kenalog (3)        • INJECTION OF MEDICATION  02/19/2016    Toradol (3)    • KNEE ARTHROSCOPY  04/02/2009     Medial meniscus tear of left knee   • SCROTUM EXPLORATION  07/02/2002     Excision of epidermal inclusion cyst. base of left scrotum   • SKIN BIOPSY  04/23/2012   • SKIN TAG REMOVAL  04/25/2002    Skin tag, left scrotum              PT Ortho       08/08/17 1400    Subjective Comments    Subjective Comments Patient has orders from MD to cont therapy 3x weeks for 2 weeks.  -CP    Subjective Pain    Able to rate subjective pain? yes  -CP    Pre-Treatment Pain Level 6  -CP    Subjective Pain Comment increase pain today went to [place] over the weekend.  -CP    Posture/Observations     "Posture/Observations Comments no KAIDEN hose  -CP    MMT (Manual Muscle Testing)    General MMT Assessment Detail quad lag with SLR  -CP    Gait Assessment/Treatment    Gait, [place]ck[place] Level conditional independence  -CP    Gait, Assistive Device quad cane  -CP    Gait, Gait Deviations antalgic  -CP    Gait, Comment decrease knee ext with heel strike  -CP      User Key  (r) = Recorded By, (t) = Taken By, (c) = Cosigned By    Initials Name Provider Type    CP Amy Santoyo PTA Physical Therapy Assistant                            PT Assessment/Plan       08/08/17 1518       PT Assessment    Assessment Comments Increase AROM after ther ex and stretching.  Patient tolerated RX very well.  Appears compliant with HEP  -CP     PT Plan    PT Frequency 3x/week  -CP     Predicted Duration of Therapy Intervention (days/wks) 2 weeks  -CP     PT Plan Comments cont with POC. Resume step up 4\", TKE with theraband  -CP       User Key  (r) = Recorded By, (t) = Taken By, (c) = Cosigned By    Initials Name Provider Type    CP Amy Santoyo PTA Physical Therapy Assistant                Modalities       08/08/17 1400          Ice    Ice Applied Yes  -CP      Location left knee with IFC  -CP      Rx Minutes --   20 minutes with IFC  -CP      Ice S/P Rx Yes  -CP      ELECTRICAL STIMULATION    Attended/Unattended Unattended  -CP      Stimulation Type IFC  -CP      Location/Electrode Placement/Other Left Knee  -CP      Rx Minutes 20 mins  -CP        User Key  (r) = Recorded By, (t) = Taken By, (c) = Cosigned By    Initials Name Provider Type    CP Amy Santoyo PTA Physical Therapy Assistant                Exercises       08/08/17 1400          Subjective Comments    Subjective Comments Patient has orders from MD to cont therapy 3x weeks for 2 weeks.  -CP      Subjective Pain    Able to rate subjective pain? yes  -CP      Pre-Treatment Pain Level 6  -CP      Post-Treatment Pain Level 1  -CP      Subjective Pain Comment increase " pain today went to [place] over the weekend.  -CP      Exercise 1    Exercise Name 1 Pro II seat 15 level 2  -CP      Time (Minutes) 1 10  -CP      Exercise 2    Exercise Name 2 incline stretch  -CP      Sets 2 3  -CP      Time (Seconds) 2 30  -CP      Exercise 3    Exercise Name 3 Stanidng HS Stretch  -CP      Exercise 4    Exercise Name 4 heel/toe raises  -CP      Sets 4 2  -CP      Reps 4 10  -CP      Exercise 5    Exercise Name 5 mini squats  -CP      Sets 5 2  -CP      Reps 5 10  -CP      Exercise 6    Exercise Name 6 side stepping at taping table  -CP      Reps 6 5  -CP      Exercise 7    Exercise Name 7 High Kick marching at taping table  -CP      Reps 7 5  -CP      Exercise 8    Exercise Name 8 Lunge stretch  -CP      Sets 8 3  -CP      Time (Seconds) 8 30  -CP      Exercise 9    Exercise Name 9 retro gait at taping table with large steps  -CP      Reps 9 5  -CP      Exercise 10    Exercise Name 10 LAQ  -CP      Sets 10 2  -CP      Reps 10 10  -CP      Time (Seconds) 10 5  -CP      Exercise 11    Exercise Name 11 LLPS knee fl stretch in seated  -CP      Reps 11 5  -CP      Time (Seconds) 11 30  -CP      Exercise 12    Exercise Name 12 heels slides with strap  -CP      Sets 12 2  -CP      Reps 12 10  -CP      Exercise 13    Exercise Name 13 SLR  -CP      Sets 13 2  -CP      Reps 13 10  -CP      Exercise 14    Exercise Name 14 heel prop  -CP      Sets 14 1  -CP      Time (Minutes) 14 2  -CP        User Key  (r) = Recorded By, (t) = Taken By, (c) = Cosigned By    Initials Name Provider Type    CP Amy Santoyo, PTA Physical Therapy Assistant                               PT OP Goals       08/08/17 1500       PT Short Term Goals    STG Date to Achieve 08/16/17  -CP     STG 1 LEFS score to be >= 40/80  -CP     STG 1 Progress Met  -CP     STG 2 Note a >= 50% subjective improvement.  -CP     STG 2 Progress Met  -CP     STG 3 Ambulate without assistive device.  -CP     STG 3 Progress Not Met  -CP     STG 4 Knee  extension to be 0 deg actively  -CP     STG 4 Progress Progressing  -CP     STG 5 Knee flexion AROM  to be >= 125 deg  -CP     Long Term Goals    LTG Date to Achieve --   TBA  -CP     LTG 1 Ind w/ final HEP  -CP     LTG 1 Progress Progressing;Ongoing  -CP     LTG 2 Inc R knee AROM to 0-125 degrees  -CP     LTG 2 Progress Not Met  -CP     LTG 3 Decrease R knee edema at joint line to 47cm or less  -CP     LTG 3 Progress Not Met  -CP     LTG 4 Increase LEFS score to 40/80 or better  -CP     LTG 4 Progress Not Met  -CP     LTG 5 Improve Hamsting flexibility from min to WNL  -CP     LTG 5 Progress Not Met  -CP     Time Calculation    PT Goal Re-Cert Due Date 08/16/17  -CP       User Key  (r) = Recorded By, (t) = Taken By, (c) = Cosigned By    Initials Name Provider Type    CP Amy Santoyo PTA Physical Therapy Assistant                Therapy Education       08/08/17 1430          Therapy Education    Education Details LAQ, LLPS Stretch seated, heel prop  -CP      Given HEP  -CP      Program New  -CP      How Provided Verbal;Demonstration  -CP      Provided to Patient  -CP      Level of Understanding Verbalized;Demonstrated  -CP        User Key  (r) = Recorded By, (t) = Taken By, (c) = Cosigned By    Initials Name Provider Type    CP Amy Santoyo PTA Physical Therapy Assistant                Time Calculation:   Start Time: 1353  Stop Time: 1509  Time Calculation (min): 76 min  Total Timed Code Minutes- PT: 67 minute(s)    Therapy Charges for Today     Code Description Service Date Service Provider Modifiers Qty    76639012169 HC PT THER PROC EA 15 MIN 8/8/2017 Amy Santoyo PTA GP 3    60820716711 HC PT ELECTRICAL STIM UNATTENDED 8/8/2017 Amy Santoyo PTA  1    99477547101 HC PT THER SUPP EA 15 MIN 8/8/2017 Amy Santoyo PTA GP 1                    Amy Santoyo PTA  8/8/2017

## 2017-08-09 ENCOUNTER — HOSPITAL ENCOUNTER (OUTPATIENT)
Dept: PHYSICAL THERAPY | Facility: HOSPITAL | Age: 62
Setting detail: THERAPIES SERIES
Discharge: HOME | End: 2017-08-09

## 2017-08-09 DIAGNOSIS — G89.29 CHRONIC PAIN OF LEFT KNEE: ICD-10-CM

## 2017-08-09 DIAGNOSIS — M17.12 PRIMARY LOCALIZED OSTEOARTHROSIS, LOWER LEG, LEFT: ICD-10-CM

## 2017-08-09 DIAGNOSIS — Z96.652 STATUS POST LEFT KNEE REPLACEMENT: Primary | ICD-10-CM

## 2017-08-09 DIAGNOSIS — M25.562 CHRONIC PAIN OF LEFT KNEE: ICD-10-CM

## 2017-08-09 PROCEDURE — G0283 ELEC STIM OTHER THAN WOUND: HCPCS | Performed by: PHYSICAL THERAPIST

## 2017-08-09 PROCEDURE — 97110 THERAPEUTIC EXERCISES: CPT | Performed by: PHYSICAL THERAPIST

## 2017-08-10 ENCOUNTER — HOSPITAL ENCOUNTER (OUTPATIENT)
Dept: PHYSICAL THERAPY | Facility: HOSPITAL | Age: 62
Setting detail: THERAPIES SERIES
Discharge: HOME | End: 2017-08-10

## 2017-08-10 DIAGNOSIS — G89.29 CHRONIC PAIN OF LEFT KNEE: ICD-10-CM

## 2017-08-10 DIAGNOSIS — M25.562 CHRONIC PAIN OF LEFT KNEE: ICD-10-CM

## 2017-08-10 DIAGNOSIS — Z96.652 STATUS POST LEFT KNEE REPLACEMENT: Primary | ICD-10-CM

## 2017-08-10 PROCEDURE — G0283 ELEC STIM OTHER THAN WOUND: HCPCS

## 2017-08-10 PROCEDURE — 97110 THERAPEUTIC EXERCISES: CPT

## 2017-08-10 NOTE — THERAPY TREATMENT NOTE
Outpatient Physical Therapy Ortho Treatment Note  [facility]     Patient Name: Otf Murray  : 1955  MRN: [id number]  Today's Date: 8/10/2017      Visit Date: 08/10/2017     Subjective Improvement 40%  Visits /  Visits approved 16 remaining for 2017  RTMD 2017  Recert Date 2017    Left TKA on 2017    Visit Dx:    ICD-10-CM ICD-9-CM   1. Status post left knee replacement Z96.652 V43.65   2. Chronic pain of left knee M25.562 719.46    G89.29 338.29       There is no problem list on file for this patient.       Past Medical History:   Diagnosis Date   • Acute gastritis without bleeding    • Alcohol abuse counseling and surveillance of alcoholic    • Allergic rhinitis    • Anxiety state    • Attention deficit disorder of childhood with hyperactivity    • Attention deficit hyperactivity disorder    • Attention deficit hyperactivity disorder, predominantly hyperactive impulsive type    • Attention deficit hyperactivity disorder, predominantly inattentive type    • Backache    • Body mass index (bmi) 31.0-31.9, adult     Body mass index (BMI) 31.0-31.9, adult - BMI 33.5      • Burn of lower leg    • Candidiasis of skin    • Cardiac murmur, unspecified    • Decreased testosterone level    • Depressive disorder     Depressive disorder - acute on chronic      • Dysgraphia    • Edema    • Elbow joint pain    • Encounter for general adult medical examination with abnormal findings    • Encounter for screening for malignant neoplasm of colon    • Essential hypertension    • Essential tremor    • Ex-smoker    • Fatigue    • Gastro-esophageal reflux disease with esophagitis    • Gastroesophageal reflux disease    • Hyperlipidemia    • Hyperlipoproteinemia    • Impotence of organic origin    • Insomnia    • Intermittent palpitations    • Knee pain     Knee pain - patellofemoral      • Lateral epicondylitis    • Low back pain    • Male erectile disorder    • Male erectile dysfunction,  unspecified    • Male hypogonadism    • Neck pain     Neck pain aggravated by recumbency      • Obese     Obese - BMI 33.0      • On long term drug therapy    • Osteoarthrosis     Osteoarthrosis, unspecified whether generalized or localized, involving lower leg      • Other obesity    • Other specified diseases of anus and rectum     Other specified diseases of anus and rectum - rectal pain after c-scope prep      • Overweight    • Pain in left knee    • Pain in right knee    • Shoulder pain, right    • Spasm of back muscles    • Tachycardia, unspecified    • Tremor, unspecified    • Tubular adenoma of colon    • Unspecified essential hypertension         Past Surgical History:   Procedure Laterality Date   • COLONOSCOPY  03/16/2016    One polyp in the sigmoid colon.Resected and retrieved.        • CYST REMOVAL  10/27/2009     Excision of sebaceous cyst of the forehead, glabellar region.   • ENDOSCOPY  03/16/2016    Mildly severe esophagitis.Gastritis.Normal examined duodenum.Several biopsies obtained in the lower third of the esophagus.    • ENDOSCOPY AND COLONOSCOPY  04/12/2006     Normal colonoscopic examination    • INCISION AND DRAINAGE ABSCESS  10/24/2012   • INJECTION OF MEDICATION  04/21/2011    Depo Medrol (Methylprednisone) 80mg (1)        • INJECTION OF MEDICATION  02/01/2016    Kenalog (3)        • INJECTION OF MEDICATION  02/19/2016    Toradol (3)    • KNEE ARTHROSCOPY  04/02/2009     Medial meniscus tear of left knee   • SCROTUM EXPLORATION  07/02/2002     Excision of epidermal inclusion cyst. base of left scrotum   • SKIN BIOPSY  04/23/2012   • SKIN TAG REMOVAL  04/25/2002    Skin tag, left scrotum              PT Ortho       08/10/17 1400    Subjective Pain    Able to rate subjective pain? yes  -CP    Pre-Treatment Pain Level 3  -CP    Posture/Observations    Observations Edema  -CP    ROM (Range of Motion)    General ROM Detail 0- after stretching  -CP    Gait Assessment/Treatment    Gait,  Haines Level independent  -CP    Gait, Gait Deviations antalgic  -CP      08/09/17 1300    Posture/Observations    Observations Edema   left lower extremity  -SS    Posture/Observations Comments No assistive device. No KAIDEN hose  -SS    ROM (Range of Motion)    General ROM Detail 0-3-105/116 after heel slides   AAROM 123  -SS      08/08/17 1400    Subjective Comments    Subjective Comments Patient has orders from MD to cont therapy 3x weeks for 2 weeks.  -CP    Subjective Pain    Able to rate subjective pain? yes  -CP    Pre-Treatment Pain Level 6  -CP    Subjective Pain Comment increase pain today went to [place] over the weekend.  -CP    Posture/Observations    Posture/Observations Comments no KAIDEN hose  -CP    MMT (Manual Muscle Testing)    General MMT Assessment Detail quad lag with SLR  -CP    Gait Assessment/Treatment    Gait, Haines Level conditional independence  -CP    Gait, Assistive Device quad cane  -CP    Gait, Gait Deviations antalgic  -CP    Gait, Comment decrease knee ext with heel strike  -CP      User Key  (r) = Recorded By, (t) = Taken By, (c) = Cosigned By    Initials Name Provider Type     Horacio Davis, PT DPT Physical Therapist    CP Amy Santoyo PTA Physical Therapy Assistant                            PT Assessment/Plan       08/10/17 1531       PT Assessment    Assessment Comments Patient progressing nicely.  Increase rom.  -CP     PT Plan    PT Frequency 3x/week  -CP     Predicted Duration of Therapy Intervention (days/wks) 2 weeks  -CP     PT Plan Comments cont with POC.   -CP       User Key  (r) = Recorded By, (t) = Taken By, (c) = Cosigned By    Initials Name Provider Type    CP Amy Santoyo PTA Physical Therapy Assistant                Modalities       08/10/17 1400          Ice    Ice Applied Yes  -CP      Location left knee with IFC  -CP      Rx Minutes --   20  -CP      Ice S/P Rx Yes  -CP      ELECTRICAL STIMULATION    Attended/Unattended Unattended   "-CP      Stimulation Type IFC  -CP      Location/Electrode Placement/Other Left Knee  -CP      Rx Minutes 20 mins  -CP        User Key  (r) = Recorded By, (t) = Taken By, (c) = Cosigned By    Initials Name Provider Type    CP Amy Sanotyo PTA Physical Therapy Assistant                Exercises       08/10/17 1400          Subjective Comments    Subjective Comments States he has not done much yesterday or today.  Keeping the knee iced and elevated to decrease swelling  -CP      Subjective Pain    Able to rate subjective pain? yes  -CP      Pre-Treatment Pain Level 3  -CP      Post-Treatment Pain Level 1  -CP      Exercise 1    Exercise Name 1 Pro II level 1 seat 17  -CP      Time (Minutes) 1 10  -CP      Exercise 2    Exercise Name 2 Incline stretch  -CP      Sets 2 3  -CP      Time (Seconds) 2 30  -CP      Exercise 3    Exercise Name 3 Standing HS Stretch  -CP      Sets 3 3  -CP      Time (Seconds) 3 30  -CP      Exercise 4    Exercise Name 4 Lunge STretch  -CP      Sets 4 3  -CP      Time (Seconds) 4 30  -CP      Exercise 5    Exercise Name 5 Heel/toe raises  -CP      Sets 5 3  -CP      Reps 5 10  -CP      Exercise 6    Exercise Name 6 Step up 4\"  -CP      Sets 6 2  -CP      Reps 6 10  -CP      Exercise 7    Exercise Name 7 Lat step up 4\"  -CP      Sets 7 2  -CP      Reps 7 10  -CP      Exercise 8    Exercise Name 8 Mini Squats  -CP      Sets 8 2  -CP      Reps 8 10  -CP      Exercise 9    Exercise Name 9 Standing hip 3 way  -CP      Reps 9 15  -CP      Exercise 10    Exercise Name 10 LLPS seated fknee fl  -CP      Sets 10 5  -CP      Time (Seconds) 10 30  -CP      Exercise 11    Exercise Name 11 heel slides with strap  -CP      Reps 11 20  -CP      Exercise 12    Exercise Name 12 heel prop  -CP      Sets 12 2  -CP      Time (Minutes) 12 2  -CP        User Key  (r) = Recorded By, (t) = Taken By, (c) = Cosigned By    Initials Name Provider Type    CP Amy Santoyo PTA Physical Therapy Assistant    "                            PT OP Goals       08/10/17 1500       PT Short Term Goals    STG Date to Achieve 08/16/17  -CP     STG 1 LEFS score to be >= 40/80  -CP     STG 1 Progress Met  -CP     STG 2 Note a >= 50% subjective improvement.  -CP     STG 2 Progress Met  -CP     STG 3 Ambulate without assistive device.  -CP     STG 3 Progress Met  -CP     STG 4 Knee extension to be 0 deg actively  -CP     STG 4 Progress Progressing  -CP     STG 5 Knee flexion AROM  to be >= 125 deg  -CP     STG 5 Progress Progressing  -CP     Long Term Goals    LTG Date to Achieve --   TBA  -CP     LTG 1 Ind w/ final HEP  -CP     LTG 1 Progress Progressing;Ongoing  -CP     LTG 2 Inc R knee AROM to 0-125 degrees  -CP     LTG 2 Progress Not Met  -CP     LTG 3 Decrease R knee edema at joint line to 47cm or less  -CP     LTG 3 Progress Not Met  -CP     LTG 4 Increase LEFS score to 40/80 or better  -CP     LTG 4 Progress Not Met  -CP     LTG 5 Improve [place] flexibility from min to WNL  -CP     LTG 5 Progress Not Met  -CP     Time Calculation    PT Goal Re-Cert Due Date 08/16/17  -CP       User Key  (r) = Recorded By, (t) = Taken By, (c) = Cosigned By    Initials Name Provider Type    CP Amy Santoyo PTA Physical Therapy Assistant                Therapy Education       08/10/17 1531          Therapy Education    Given Edema management  -CP      Program Reinforced  -CP      How Provided Verbal  -CP      Provided to Patient  -CP      Level of Understanding Verbalized  -CP        User Key  (r) = Recorded By, (t) = Taken By, (c) = Cosigned By    Initials Name Provider Type    CP Amy Santoyo PTA Physical Therapy Assistant                Time Calculation:   Start Time: 1435  Stop Time: 1540  Time Calculation (min): 65 min  Total Timed Code Minutes- PT: 65 minute(s)    Therapy Charges for Today     Code Description Service Date Service Provider Modifiers Qty    21730314713 HC PT THER PROC EA 15 MIN 8/10/2017 Amy Santoyo PTA GP 3     80759139257 HC PT ELECTRICAL STIM UNATTENDED 8/10/2017 Amy Santoyo, PTA  1    41255399675 HC PT THER SUPP EA 15 MIN 8/10/2017 Amy Santoyo PTA GP 1                    Amy Santoyo, PTA  8/10/2017

## 2017-08-10 NOTE — THERAPY TREATMENT NOTE
Outpatient Physical Therapy Ortho Treatment Note  [facility]     Patient Name: Otf Murray  : 1955  MRN: [id number]  Today's Date: 2017      Visit Date: 2017  Attendance:    Subjective Improvement: 40%  Next MD Appt: 17  Recert Date: 17    Therapy Diagnosis: L TKA 17    Visit Dx:    ICD-10-CM ICD-9-CM   1. Status post left knee replacement Z96.652 V43.65   2. Chronic pain of left knee M25.562 719.46    G89.29 338.29   3. Primary localized osteoarthrosis, lower leg, left M17.12 715.16       There is no problem list on file for this patient.       Past Medical History:   Diagnosis Date   • Acute gastritis without bleeding    • Alcohol abuse counseling and surveillance of alcoholic    • Allergic rhinitis    • Anxiety state    • Attention deficit disorder of childhood with hyperactivity    • Attention deficit hyperactivity disorder    • Attention deficit hyperactivity disorder, predominantly hyperactive impulsive type    • Attention deficit hyperactivity disorder, predominantly inattentive type    • Backache    • Body mass index (bmi) 31.0-31.9, adult     Body mass index (BMI) 31.0-31.9, adult - BMI 33.5      • Burn of lower leg    • Candidiasis of skin    • Cardiac murmur, unspecified    • Decreased testosterone level    • Depressive disorder     Depressive disorder - acute on chronic      • Dysgraphia    • Edema    • Elbow joint pain    • Encounter for general adult medical examination with abnormal findings    • Encounter for screening for malignant neoplasm of colon    • Essential hypertension    • Essential tremor    • Ex-smoker    • Fatigue    • Gastro-esophageal reflux disease with esophagitis    • Gastroesophageal reflux disease    • Hyperlipidemia    • Hyperlipoproteinemia    • Impotence of organic origin    • Insomnia    • Intermittent palpitations    • Knee pain     Knee pain - patellofemoral      • Lateral epicondylitis    • Low back pain    • Male erectile  disorder    • Male erectile dysfunction, unspecified    • Male hypogonadism    • Neck pain     Neck pain aggravated by recumbency      • Obese     Obese - BMI 33.0      • On long term drug therapy    • Osteoarthrosis     Osteoarthrosis, unspecified whether generalized or localized, involving lower leg      • Other obesity    • Other specified diseases of anus and rectum     Other specified diseases of anus and rectum - rectal pain after c-scope prep      • Overweight    • Pain in left knee    • Pain in right knee    • Shoulder pain, right    • Spasm of back muscles    • Tachycardia, unspecified    • Tremor, unspecified    • Tubular adenoma of colon    • Unspecified essential hypertension         Past Surgical History:   Procedure Laterality Date   • COLONOSCOPY  03/16/2016    One polyp in the sigmoid colon.Resected and retrieved.        • CYST REMOVAL  10/27/2009     Excision of sebaceous cyst of the forehead, glabellar region.   • ENDOSCOPY  03/16/2016    Mildly severe esophagitis.Gastritis.Normal examined duodenum.Several biopsies obtained in the lower third of the esophagus.    • ENDOSCOPY AND COLONOSCOPY  04/12/2006     Normal colonoscopic examination    • INCISION AND DRAINAGE ABSCESS  10/24/2012   • INJECTION OF MEDICATION  04/21/2011    Depo Medrol (Methylprednisone) 80mg (1)        • INJECTION OF MEDICATION  02/01/2016    Kenalog (3)        • INJECTION OF MEDICATION  02/19/2016    Toradol (3)    • KNEE ARTHROSCOPY  04/02/2009     Medial meniscus tear of left knee   • SCROTUM EXPLORATION  07/02/2002     Excision of epidermal inclusion cyst. base of left scrotum   • SKIN BIOPSY  04/23/2012   • SKIN TAG REMOVAL  04/25/2002    Skin tag, left scrotum              PT Ortho       08/09/17 1300    Posture/Observations    Observations Edema   left lower extremity  -SS    Posture/Observations Comments No assistive device. No KAIDEN hose  -SS    ROM (Range of Motion)    General ROM Detail 0-3-105/116 after heel slides  "  AAROM 123  -      User Key  (r) = Recorded By, (t) = Taken By, (c) = Cosigned By    Initials Name Provider Type     Horacio Davis, PT DPT Physical Therapist                            PT Assessment/Plan       08/09/17 1300       PT Assessment    Functional Limitations Impaired gait;Limitation in home management;Limitations in community activities;Performance in work activities  -     Impairments Endurance;Gait;Muscle strength;Pain;Range of motion  -     Assessment Comments Patient overdoes activity. He was cautioned not to overdo activity at home. He is progressing at this time.   -     Rehab Potential Excellent  -     Patient/caregiver participated in establishment of treatment plan and goals Yes  -SS     Patient would benefit from skilled therapy intervention Yes  -SS     PT Plan    PT Frequency 3x/week  -     Predicted Duration of Therapy Intervention (days/wks) 3 weeks  -     PT Plan Comments Will recheck next week. Continue to work on AROM. Strengthening as tolerated; however, not as important at this time.  -       User Key  (r) = Recorded By, (t) = Taken By, (c) = Cosigned By    Initials Name Provider Type     Horacio Davis, PT DPT Physical Therapist                Modalities       08/09/17 1300          Ice    Location left knee with IFC  -SS      Rx Minutes --   20 minutes with IFC  -SS      Ice S/P Rx Yes  -      ELECTRICAL STIMULATION    Attended/Unattended Unattended  -      Stimulation Type IFC  -SS      Location/Electrode Placement/Other Left Knee  -      Rx Minutes 20 mins  -        User Key  (r) = Recorded By, (t) = Taken By, (c) = Cosigned By    Initials Name Provider Type     Horacio Davis, PT DPT Physical Therapist                Exercises       08/09/17 1300          Subjective Comments    Subjective Comments 40% subjective improvement. Knee still hurts, but he thinks he is \"past the worst of it.\" Excruciating pain (8-9/10) in the medial " "knee when knee is around 90 degrees. Reports that Dr. Tenorio told him that it was from the hardware. Really swollen when wakes up in the mornings.   -SS      Subjective Pain    Able to rate subjective pain? yes  -SS      Pre-Treatment Pain Level 4  -SS      Exercise 1    Exercise Name 1 Pro2, Seat 16, ROM/Strength  -SS      Time (Minutes) 1 10  -SS      Additional Comments Level 4  -SS      Exercise 2    Exercise Name 2 incline stretch  -SS      Sets 2 3  -SS      Time (Seconds) 2 30  -SS      Exercise 3    Exercise Name 3 Standing HS Stretch  -SS      Sets 3 3  -SS      Time (Seconds) 3 30  -SS      Exercise 4    Exercise Name 4 Lunge stretch for flexion  -SS      Sets 4 3  -SS      Time (Seconds) 4 30  -SS      Exercise 5    Exercise Name 5 heel/toe raises   -SS      Sets 5 3  -SS      Reps 5 10  -SS      Exercise 6    Exercise Name 6 4\" step ups - fwd & lat  -SS      Sets 6 2  -SS      Reps 6 10  -SS      Exercise 7    Exercise Name 7 T-band TKE  -SS      Sets 7 1  -SS      Reps 7 30  -SS      Additional Comments green  -SS      Exercise 8    Exercise Name 8 seated soleus stretch B  -SS      Sets 8 3  -SS      Time (Seconds) 8 30 sec hold  -SS      Exercise 9    Exercise Name 9 heel slides with strap  -SS      Sets 9 3  -SS      Reps 9 10  -SS        User Key  (r) = Recorded By, (t) = Taken By, (c) = Cosigned By    Initials Name Provider Type     Horacio Davis, PT DPT Physical Therapist                               PT OP Goals       08/09/17 1300       PT Short Term Goals    STG Date to Achieve 08/16/17  -     STG 1 LEFS score to be >= 40/80  -     STG 2 Note a >= 50% subjective improvement.  -SS     STG 2 Progress Met  -SS     STG 3 Ambulate without assistive device.  -SS     STG 3 Progress Met  -     STG 4 Knee extension to be 0 deg actively  -SS     STG 4 Progress Progressing  -SS     STG 5 Knee flexion AROM  to be >= 125 deg  -     STG 5 Progress Progressing  -SS     Long Term Goals    LTG " Date to Achieve --   TBA  -SS     Time Calculation    PT Goal Re-Cert Due Date 08/16/17  -SS       User Key  (r) = Recorded By, (t) = Taken By, (c) = Cosigned By    Initials Name Provider Type    RADHA Davis, PT DPT Physical Therapist                Therapy Education       08/09/17 1300          Therapy Education    Given HEP;Symptoms/condition management  -SS      Program Reinforced  -SS      How Provided Verbal  -SS      Provided to Patient  -SS      Level of Understanding Verbalized  -SS        User Key  (r) = Recorded By, (t) = Taken By, (c) = Cosigned By    Initials Name Provider Type    RADHA Davis, PT DPT Physical Therapist                Time Calculation:   Start Time: 1348  Stop Time: 1500  Time Calculation (min): 72 min  Total Timed Code Minutes- PT: 45 minute(s)    Therapy Charges for Today     Code Description Service Date Service Provider Modifiers Qty    27114988217 HC PT THER PROC EA 15 MIN 8/9/2017 Horacio Davis PT DPT GP 3    33601103332 HC PT ELECTRICAL STIM UNATTENDED 8/9/2017 Horacio Davis PT DPT  1    29859938073 HC PT THER SUPP EA 15 MIN 8/9/2017 Horacio Davis, PT DPT GP 1                    Horacio Davis, PT, DPT, CHT  8/9/2017

## 2017-08-14 ENCOUNTER — HOSPITAL ENCOUNTER (OUTPATIENT)
Dept: PHYSICAL THERAPY | Facility: HOSPITAL | Age: 62
Setting detail: THERAPIES SERIES
Discharge: HOME | End: 2017-08-14

## 2017-08-14 DIAGNOSIS — G89.29 CHRONIC PAIN OF LEFT KNEE: ICD-10-CM

## 2017-08-14 DIAGNOSIS — M25.562 CHRONIC PAIN OF LEFT KNEE: ICD-10-CM

## 2017-08-14 DIAGNOSIS — Z96.652 STATUS POST LEFT KNEE REPLACEMENT: Primary | ICD-10-CM

## 2017-08-14 DIAGNOSIS — M17.12 PRIMARY LOCALIZED OSTEOARTHROSIS, LOWER LEG, LEFT: ICD-10-CM

## 2017-08-14 PROCEDURE — 97110 THERAPEUTIC EXERCISES: CPT | Performed by: PHYSICAL THERAPIST

## 2017-08-14 NOTE — THERAPY TREATMENT NOTE
Outpatient Physical Therapy Ortho Treatment Note  [facility]     Patient Name: Otf Murray  : 1955  MRN: [id number]  Today's Date: 2017      Visit Date: 2017  Attendance: 10/10   Subjective Improvement: 60%  Next MD Appt: 17  Recert Date: 17     Therapy Diagnosis: L TKA 17     Visit Dx:    ICD-10-CM ICD-9-CM   1. Status post left knee replacement Z96.652 V43.65   2. Chronic pain of left knee M25.562 719.46    G89.29 338.29   3. Primary localized osteoarthrosis, lower leg, left M17.12 715.16       There is no problem list on file for this patient.       Past Medical History:   Diagnosis Date   • Acute gastritis without bleeding    • Alcohol abuse counseling and surveillance of alcoholic    • Allergic rhinitis    • Anxiety state    • Attention deficit disorder of childhood with hyperactivity    • Attention deficit hyperactivity disorder    • Attention deficit hyperactivity disorder, predominantly hyperactive impulsive type    • Attention deficit hyperactivity disorder, predominantly inattentive type    • Backache    • Body mass index (bmi) 31.0-31.9, adult     Body mass index (BMI) 31.0-31.9, adult - BMI 33.5      • Burn of lower leg    • Candidiasis of skin    • Cardiac murmur, unspecified    • Decreased testosterone level    • Depressive disorder     Depressive disorder - acute on chronic      • Dysgraphia    • Edema    • Elbow joint pain    • Encounter for general adult medical examination with abnormal findings    • Encounter for screening for malignant neoplasm of colon    • Essential hypertension    • Essential tremor    • Ex-smoker    • Fatigue    • Gastro-esophageal reflux disease with esophagitis    • Gastroesophageal reflux disease    • Hyperlipidemia    • Hyperlipoproteinemia    • Impotence of organic origin    • Insomnia    • Intermittent palpitations    • Knee pain     Knee pain - patellofemoral      • Lateral epicondylitis    • Low back pain    • Male  erectile disorder    • Male erectile dysfunction, unspecified    • Male hypogonadism    • Neck pain     Neck pain aggravated by recumbency      • Obese     Obese - BMI 33.0      • On long term drug therapy    • Osteoarthrosis     Osteoarthrosis, unspecified whether generalized or localized, involving lower leg      • Other obesity    • Other specified diseases of anus and rectum     Other specified diseases of anus and rectum - rectal pain after c-scope prep      • Overweight    • Pain in left knee    • Pain in right knee    • Shoulder pain, right    • Spasm of back muscles    • Tachycardia, unspecified    • Tremor, unspecified    • Tubular adenoma of colon    • Unspecified essential hypertension         Past Surgical History:   Procedure Laterality Date   • COLONOSCOPY  03/16/2016    One polyp in the sigmoid colon.Resected and retrieved.        • CYST REMOVAL  10/27/2009     Excision of sebaceous cyst of the forehead, glabellar region.   • ENDOSCOPY  03/16/2016    Mildly severe esophagitis.Gastritis.Normal examined duodenum.Several biopsies obtained in the lower third of the esophagus.    • ENDOSCOPY AND COLONOSCOPY  04/12/2006     Normal colonoscopic examination    • INCISION AND DRAINAGE ABSCESS  10/24/2012   • INJECTION OF MEDICATION  04/21/2011    Depo Medrol (Methylprednisone) 80mg (1)        • INJECTION OF MEDICATION  02/01/2016    Kenalog (3)        • INJECTION OF MEDICATION  02/19/2016    Toradol (3)    • KNEE ARTHROSCOPY  04/02/2009     Medial meniscus tear of left knee   • SCROTUM EXPLORATION  07/02/2002     Excision of epidermal inclusion cyst. base of left scrotum   • SKIN BIOPSY  04/23/2012   • SKIN TAG REMOVAL  04/25/2002    Skin tag, left scrotum              PT Ortho       08/14/17 1400    ROM (Range of Motion)    General ROM Detail 0-5-120  -SS      User Key  (r) = Recorded By, (t) = Taken By, (c) = Cosigned By    Initials Name Provider Type    RADHA Davis, PT DPT Physical Therapist     "                        PT Assessment/Plan       08/14/17 1400       PT Assessment    Functional Limitations Impaired gait;Limitation in home management;Limitations in community activities;Performance in work activities  -     Impairments Endurance;Gait;Muscle strength;Pain;Range of motion  -     Assessment Comments Good effort. Flexion is progressing. Continued limited extension.  -SS     Rehab Potential Excellent  -SS     Patient/caregiver participated in establishment of treatment plan and goals Yes  -SS     Patient would benefit from skilled therapy intervention Yes  -SS     PT Plan    PT Frequency 3x/week  -SS     Predicted Duration of Therapy Intervention (days/wks) 2  -SS     PT Plan Comments Anticipate D/C at end of week. Push extension ROM.  -       User Key  (r) = Recorded By, (t) = Taken By, (c) = Cosigned By    Initials Name Provider Type     Horacio Davis, PT DPT Physical Therapist                Modalities       08/14/17 1400          Ice    Ice Applied Yes  -SS      Location left knee  -      Rx Minutes --   20 mins  -SS      Ice S/P Rx Yes  -SS        User Key  (r) = Recorded By, (t) = Taken By, (c) = Cosigned By    Initials Name Provider Type     Horacio Davis, PT DPT Physical Therapist                Exercises       08/14/17 1400          Subjective Comments    Subjective Comments Knee is feeling better. \"I get stronger every day.\" Tries to walk in normal pattern. Will be weaning himself off pain meds this week. Feels catch in medial knee during Pro2, but not at home. Able to ascend and descend stairs step over step. Descending steps more difficult when knee feels stiff.  -      Subjective Pain    Able to rate subjective pain? yes  -SS      Pre-Treatment Pain Level 0  -SS      Post-Treatment Pain Level 0  -      Subjective Pain Comment catch in medial knee relieved by not abducting hip during knee motion  -      Exercise 1    Exercise Name 1 Pro2, Seat 18  -SS      " Time (Minutes) 1 12  -SS      Additional Comments Level 5  -SS      Exercise 2    Exercise Name 2 incline stretch  -SS      Sets 2 3  -SS      Time (Seconds) 2 30  -SS      Exercise 3    Exercise Name 3 Standing HS Stretch  -SS      Sets 3 3  -SS      Time (Seconds) 3 30  -SS      Exercise 4    Exercise Name 4 Lunge stretch for flexion  -SS      Sets 4 3  -SS      Time (Seconds) 4 30  -SS      Exercise 5    Exercise Name 5 SLS  -SS      Cueing 5 Verbal  -SS      Sets 5 1  -SS      Reps 5 5  -SS      Exercise 6    Exercise Name 6 Wall sits  -SS      Cueing 6 Demo  -SS      Sets 6 1  -SS      Reps 6 10  -SS      Time (Seconds) 6 5 sec hold  -SS      Exercise 7    Exercise Name 7 Cybex Hip Abd  -SS      Cueing 7 Verbal  -SS      Sets 7 3  -SS      Reps 7 10  -SS      Additional Comments 50#  -SS      Exercise 8    Exercise Name 8 Cybex Hip Add  -SS      Cueing 8 Verbal  -SS      Sets 8 3  -SS      Reps 8 10  -SS      Additional Comments 60#  -SS      Exercise 9    Exercise Name 9 Cybex LP2  -SS      Cueing 9 Verbal  -SS      Sets 9 2  -SS      Reps 9 10  -SS      Additional Comments 110#  -SS      Exercise 10    Exercise Name 10 Standing ham curls  -SS      Cueing 10 Demo  -SS      Reps 10 10  -SS      Exercise 11    Exercise Name 11 heel prop  -SS      Cueing 11 Verbal  -SS      Time (Minutes) 11 3  -SS      Additional Comments 2# weight  -SS      Exercise 12    Exercise Name 12 Heel slide with strap  -SS      Cueing 12 Verbal  -SS      Reps 12 20  -SS      Exercise 13    Exercise Name 13 SAQ   large bolster  -SS      Cueing 13 Verbal  -SS      Sets 13 1  -SS      Reps 13 20  -SS      Additional Comments 3#  -SS        User Key  (r) = Recorded By, (t) = Taken By, (c) = Cosigned By    Initials Name Provider Type    SS Horacio Davis, PT DPT Physical Therapist                               PT OP Goals       08/14/17 1400       PT Short Term Goals    STG Date to Achieve 08/16/17  -SS     STG 1 LEFS score to be >=  40/80  -SS     STG 2 Note a >= 50% subjective improvement.  -SS     STG 2 Progress Met  -SS     STG 3 Ambulate without assistive device.  -SS     STG 3 Progress Met  -SS     STG 4 Knee extension to be 0 deg actively  -SS     STG 4 Progress Progressing  -SS     STG 5 Knee flexion AROM  to be >= 125 deg  -SS     STG 5 Progress Progressing  -SS     Long Term Goals    LTG Date to Achieve --   TBA  -SS     Time Calculation    PT Goal Re-Cert Due Date 08/16/17  -SS       User Key  (r) = Recorded By, (t) = Taken By, (c) = Cosigned By    Initials Name Provider Type    SS Horacio Davis, PT DPT Physical Therapist                Therapy Education       08/14/17 1400          Therapy Education    Given HEP  -SS      Program Reinforced  -SS      How Provided Verbal  -SS      Provided to Patient  -SS      Level of Understanding Verbalized  -SS        User Key  (r) = Recorded By, (t) = Taken By, (c) = Cosigned By    Initials Name Provider Type     Horacio Davis, PT DPT Physical Therapist                Time Calculation:   Start Time: 1436  Stop Time: 1553  Time Calculation (min): 77 min  Total Timed Code Minutes- PT: 57 minute(s)    Therapy Charges for Today     Code Description Service Date Service Provider Modifiers Qty    48520621464 HC PT THER PROC EA 15 MIN 8/14/2017 Horacio Davis, PT DPT GP 4    13810974641 HC PT THER SUPP EA 15 MIN 8/14/2017 Horacio Davis, PT DPT GP 1                    Horacio Davis, PT, DPT, CHT  8/14/2017

## 2017-08-16 ENCOUNTER — HOSPITAL ENCOUNTER (OUTPATIENT)
Dept: PHYSICAL THERAPY | Facility: HOSPITAL | Age: 62
Setting detail: THERAPIES SERIES
Discharge: HOME | End: 2017-08-16

## 2017-08-16 DIAGNOSIS — Z96.652 STATUS POST LEFT KNEE REPLACEMENT: Primary | ICD-10-CM

## 2017-08-16 DIAGNOSIS — M25.562 CHRONIC PAIN OF LEFT KNEE: ICD-10-CM

## 2017-08-16 DIAGNOSIS — G89.29 CHRONIC PAIN OF LEFT KNEE: ICD-10-CM

## 2017-08-16 DIAGNOSIS — M17.12 PRIMARY LOCALIZED OSTEOARTHROSIS, LOWER LEG, LEFT: ICD-10-CM

## 2017-08-16 PROCEDURE — 97110 THERAPEUTIC EXERCISES: CPT | Performed by: PHYSICAL THERAPIST

## 2017-08-16 NOTE — THERAPY DISCHARGE NOTE
Outpatient Physical Therapy Ortho Progress Note/Discharge Summary  [facility]     Patient Name: Otf Murray  : 1955  MRN: [id number]  Today's Date: 2017      Visit Date: 2017  Attendance:    Subjective Improvement: 90-95%  Next MD Appt: 17      Therapy Diagnosis: L TKA 17     Visit Dx:    ICD-10-CM ICD-9-CM   1. Status post left knee replacement Z96.652 V43.65   2. Chronic pain of left knee M25.562 719.46    G89.29 338.29   3. Primary localized osteoarthrosis, lower leg, left M17.12 715.16       There is no problem list on file for this patient.       Past Medical History:   Diagnosis Date   • Acute gastritis without bleeding    • Alcohol abuse counseling and surveillance of alcoholic    • Allergic rhinitis    • Anxiety state    • Attention deficit disorder of childhood with hyperactivity    • Attention deficit hyperactivity disorder    • Attention deficit hyperactivity disorder, predominantly hyperactive impulsive type    • Attention deficit hyperactivity disorder, predominantly inattentive type    • Backache    • Body mass index (bmi) 31.0-31.9, adult     Body mass index (BMI) 31.0-31.9, adult - BMI 33.5      • Burn of lower leg    • Candidiasis of skin    • Cardiac murmur, unspecified    • Decreased testosterone level    • Depressive disorder     Depressive disorder - acute on chronic      • Dysgraphia    • Edema    • Elbow joint pain    • Encounter for general adult medical examination with abnormal findings    • Encounter for screening for malignant neoplasm of colon    • Essential hypertension    • Essential tremor    • Ex-smoker    • Fatigue    • Gastro-esophageal reflux disease with esophagitis    • Gastroesophageal reflux disease    • Hyperlipidemia    • Hyperlipoproteinemia    • Impotence of organic origin    • Insomnia    • Intermittent palpitations    • Knee pain     Knee pain - patellofemoral      • Lateral epicondylitis    • Low back pain    • Male  erectile disorder    • Male erectile dysfunction, unspecified    • Male hypogonadism    • Neck pain     Neck pain aggravated by recumbency      • Obese     Obese - BMI 33.0      • On long term drug therapy    • Osteoarthrosis     Osteoarthrosis, unspecified whether generalized or localized, involving lower leg      • Other obesity    • Other specified diseases of anus and rectum     Other specified diseases of anus and rectum - rectal pain after c-scope prep      • Overweight    • Pain in left knee    • Pain in right knee    • Shoulder pain, right    • Spasm of back muscles    • Tachycardia, unspecified    • Tremor, unspecified    • Tubular adenoma of colon    • Unspecified essential hypertension         Past Surgical History:   Procedure Laterality Date   • COLONOSCOPY  03/16/2016    One polyp in the sigmoid colon.Resected and retrieved.        • CYST REMOVAL  10/27/2009     Excision of sebaceous cyst of the forehead, glabellar region.   • ENDOSCOPY  03/16/2016    Mildly severe esophagitis.Gastritis.Normal examined duodenum.Several biopsies obtained in the lower third of the esophagus.    • ENDOSCOPY AND COLONOSCOPY  04/12/2006     Normal colonoscopic examination    • INCISION AND DRAINAGE ABSCESS  10/24/2012   • INJECTION OF MEDICATION  04/21/2011    Depo Medrol (Methylprednisone) 80mg (1)        • INJECTION OF MEDICATION  02/01/2016    Kenalog (3)        • INJECTION OF MEDICATION  02/19/2016    Toradol (3)    • KNEE ARTHROSCOPY  04/02/2009     Medial meniscus tear of left knee   • SCROTUM EXPLORATION  07/02/2002     Excision of epidermal inclusion cyst. base of left scrotum   • SKIN BIOPSY  04/23/2012   • SKIN TAG REMOVAL  04/25/2002    Skin tag, left scrotum      Changes in Medications: weaning from pain medications  Changes in MD Orders: none noted  Number of Work Days Lost: approximately 2 months due to B knee replacements            PT Ortho       08/16/17 1100    Precautions and Contraindications     Precautions/Limitations no known precautions/limitations  -SS    Precautions none  -SS    Contraindications none  -SS    Subjective Pain    Post-Treatment Pain Level 0  -SS    Posture/Observations    Posture/Observations Comments Ambulates without assistive device. Gait consistent with decreased left knee extension ROM. Scar tight, but loosening up.  -SS    ROM (Range of Motion)    General ROM Detail 0-5-123  -SS    MMT (Manual Muscle Testing)    General MMT Assessment Detail Rectus femoris, hip, and knee strength 5/5 each  -SS      08/14/17 1400    ROM (Range of Motion)    General ROM Detail 0-5-120  -SS      User Key  (r) = Recorded By, (t) = Taken By, (c) = Cosigned By    Initials Name Provider Type     Horacio Davis, PT DPT Physical Therapist                                    Exercises       08/16/17 1100          Subjective Comments    Subjective Comments Really tried to stretch the knee out straight yesterday. Painful at the time. Has the swelling under control. Able to sleep on couch but not in the bed. Occasionally catching and pain in the medial left knee. Is tapering off his pain medication.  -SS      Subjective Pain    Able to rate subjective pain? yes  -SS      Pre-Treatment Pain Level 1  -SS      Post-Treatment Pain Level 0  -SS      Exercise 1    Exercise Name 1 Pro2, Seat 18, Pedal 2, ROM and endurance  -SS      Time (Minutes) 1 15  -SS      Additional Comments Level 5  -SS      Exercise 2    Exercise Name 2 incline stretch  -SS      Sets 2 3  -SS      Time (Seconds) 2 30  -SS      Exercise 3    Exercise Name 3 Standing HS Stretch  -SS      Sets 3 3  -SS      Time (Seconds) 3 30  -SS      Exercise 4    Exercise Name 4 Lunge stretch for flexion  -SS      Sets 4 3  -SS      Time (Seconds) 4 30  -SS      Exercise 5    Exercise Name 5 Heel slides with strap  -SS      Reps 5 25  -SS        User Key  (r) = Recorded By, (t) = Taken By, (c) = Cosigned By    Initials Name Provider Type    RADHA Leonard  Attila Davis, PT DPT Physical Therapist                               PT OP Goals       08/16/17 1100       PT Short Term Goals    STG Date to Achieve 08/16/17  -SS     STG 1 LEFS score to be >= 40/80  -SS     STG 1 Progress Met  -SS     STG 2 Note a >= 50% subjective improvement.  -SS     STG 2 Progress Met  -SS     STG 3 Ambulate without assistive device.  -SS     STG 3 Progress Met  -SS     STG 4 Knee extension to be 0 deg actively  -SS     STG 4 Progress Progressing  -SS     STG 5 Knee flexion AROM  to be >= 125 deg  -SS     STG 5 Progress Progressing  -SS     Long Term Goals    LTG Date to Achieve --   TBA  -SS       User Key  (r) = Recorded By, (t) = Taken By, (c) = Cosigned By    Initials Name Provider Type    RADHA Davis, PT DPT Physical Therapist                Therapy Education       08/16/17 1100          Therapy Education    Given HEP  -SS      Program Reinforced  -SS      How Provided Verbal  -SS      Provided to Patient  -SS      Level of Understanding Verbalized  -SS        User Key  (r) = Recorded By, (t) = Taken By, (c) = Cosigned By    Initials Name Provider Type    RADHA Davis, PT DPT Physical Therapist                Outcome Measures       08/16/17 1100          Lower Extremity Functional Index    Any of your usual work, housework or school activities 3  -SS      Your usual hobbies, recreational or sporting activities 3  -SS      Getting into or out of the bath 3  -SS      Walking between rooms 3  -SS      Putting on your shoes or socks 3  -SS      Squatting 1  -SS      Lifting an object, like a bag of groceries from the floor 3  -SS      Performing light activities around your home 3  -SS      Performing heavy activities around your home 2  -SS      Getting into or out of a car 3  -SS      Walking 2 blocks 2  -SS      Walking a mile 2  -SS      Going up or down 10 stairs (about 1 flight of stairs) 2  -SS      Standing for 1 hour 2  -SS      Sitting for 1 hour 4  -SS       Running on even ground 2  -SS      Running on uneven ground 2  -SS      Making sharp turns while running fast 1  -SS      Hopping 1  -SS      Rolling over in bed 3  -SS      Total 48  -SS      Functional Assessment    Outcome Measure Options Lower Extremity Functional Scale (LEFS)  -SS        User Key  (r) = Recorded By, (t) = Taken By, (c) = Cosigned By    Initials Name Provider Type    SS Horacio Davis, PT DPT Physical Therapist            Time Calculation:   Start Time: 1057  Stop Time: 1159  Time Calculation (min): 62 min  Total Timed Code Minutes- PT: 42 minute(s)    Therapy Charges for Today     Code Description Service Date Service Provider Modifiers Qty    89299836844 HC PT THER PROC EA 15 MIN 8/16/2017 Horacio Davis, PT DPT GP 3    44045714116 HC PT THER SUPP EA 15 MIN 8/16/2017 Horacio Davis, PT DPT GP 1               OP PT Discharge Summary  Date of Discharge: 08/16/17  Reason for Discharge: Independent  Outcomes Achieved: Patient able to partially achieve established goals  Discharge Destination: Home with home program  Discharge Instructions: Contact P.T. clinic if questions or problems arise.      Horacio Davis, PT, DPT, CHT  8/16/2017

## 2017-08-17 ENCOUNTER — APPOINTMENT (OUTPATIENT)
Dept: PHYSICAL THERAPY | Facility: HOSPITAL | Age: 62
End: 2017-08-17

## 2017-08-18 ENCOUNTER — APPOINTMENT (OUTPATIENT)
Dept: PHYSICAL THERAPY | Facility: HOSPITAL | Age: 62
End: 2017-08-18

## 2017-11-27 ENCOUNTER — TRANSCRIBE ORDERS (OUTPATIENT)
Dept: PHYSICAL THERAPY | Facility: HOSPITAL | Age: 62
End: 2017-11-27

## 2017-11-27 DIAGNOSIS — G89.29 CHRONIC LOW BACK PAIN, UNSPECIFIED BACK PAIN LATERALITY, WITH SCIATICA PRESENCE UNSPECIFIED: Primary | ICD-10-CM

## 2017-11-27 DIAGNOSIS — M54.5 CHRONIC LOW BACK PAIN, UNSPECIFIED BACK PAIN LATERALITY, WITH SCIATICA PRESENCE UNSPECIFIED: Primary | ICD-10-CM

## 2017-12-06 ENCOUNTER — HOSPITAL ENCOUNTER (OUTPATIENT)
Dept: PHYSICAL THERAPY | Facility: HOSPITAL | Age: 62
Setting detail: THERAPIES SERIES
Discharge: HOME | End: 2017-12-06

## 2017-12-06 DIAGNOSIS — M54.5 CHRONIC BILATERAL LOW BACK PAIN, WITH SCIATICA PRESENCE UNSPECIFIED: Primary | ICD-10-CM

## 2017-12-06 DIAGNOSIS — G89.29 CHRONIC BILATERAL LOW BACK PAIN, WITH SCIATICA PRESENCE UNSPECIFIED: Primary | ICD-10-CM

## 2017-12-06 PROCEDURE — 97140 MANUAL THERAPY 1/> REGIONS: CPT | Performed by: PHYSICAL THERAPIST

## 2017-12-06 PROCEDURE — 97162 PT EVAL MOD COMPLEX 30 MIN: CPT | Performed by: PHYSICAL THERAPIST

## 2017-12-07 NOTE — THERAPY EVALUATION
Outpatient Physical Therapy Ortho Initial Evaluation  [facility]     Patient Name: Otf Murray  : 1955  MRN: [id number]  Today's Date: 2017      Visit Date: 2017  Attendance:  (5 visits)  Subjective Improvement: n/a  Next MD Appt: SUN  Recert Date: 17    Therapy Diagnosis: LBP w/ sciatica; muscle spasm         Past Medical History:   Diagnosis Date   • Acute gastritis without bleeding    • Alcohol abuse counseling and surveillance of alcoholic    • Allergic rhinitis    • Anxiety state    • Attention deficit disorder of childhood with hyperactivity    • Attention deficit hyperactivity disorder    • Attention deficit hyperactivity disorder, predominantly hyperactive impulsive type    • Attention deficit hyperactivity disorder, predominantly inattentive type    • Backache    • Body mass index (bmi) 31.0-31.9, adult     Body mass index (BMI) 31.0-31.9, adult - BMI 33.5      • Burn of lower leg    • Candidiasis of skin    • Cardiac murmur, unspecified    • Decreased testosterone level    • Depressive disorder     Depressive disorder - acute on chronic      • Dysgraphia    • Edema    • Elbow joint pain    • Encounter for general adult medical examination with abnormal findings    • Encounter for screening for malignant neoplasm of colon    • Essential hypertension    • Essential tremor    • Ex-smoker    • Fatigue    • Gastro-esophageal reflux disease with esophagitis    • Gastroesophageal reflux disease    • Hyperlipidemia    • Hyperlipoproteinemia    • Impotence of organic origin    • Insomnia    • Intermittent palpitations    • Knee pain     Knee pain - patellofemoral      • Lateral epicondylitis    • Low back pain    • Male erectile disorder    • Male erectile dysfunction, unspecified    • Male hypogonadism    • Neck pain     Neck pain aggravated by recumbency      • Obese     Obese - BMI 33.0      • On long term drug therapy    • Osteoarthrosis     Osteoarthrosis, unspecified  whether generalized or localized, involving lower leg      • Other obesity    • Other specified diseases of anus and rectum     Other specified diseases of anus and rectum - rectal pain after c-scope prep      • Overweight    • Pain in left knee    • Pain in right knee    • Shoulder pain, right    • Spasm of back muscles    • Tachycardia, unspecified    • Tremor, unspecified    • Tubular adenoma of colon    • Unspecified essential hypertension         Past Surgical History:   Procedure Laterality Date   • COLONOSCOPY  03/16/2016    One polyp in the sigmoid colon.Resected and retrieved.        • CYST REMOVAL  10/27/2009     Excision of sebaceous cyst of the forehead, glabellar region.   • ENDOSCOPY  03/16/2016    Mildly severe esophagitis.Gastritis.Normal examined duodenum.Several biopsies obtained in the lower third of the esophagus.    • ENDOSCOPY AND COLONOSCOPY  04/12/2006     Normal colonoscopic examination    • INCISION AND DRAINAGE ABSCESS  10/24/2012   • INJECTION OF MEDICATION  04/21/2011    Depo Medrol (Methylprednisone) 80mg (1)        • INJECTION OF MEDICATION  02/01/2016    Kenalog (3)        • INJECTION OF MEDICATION  02/19/2016    Toradol (3)    • KNEE ARTHROSCOPY  04/02/2009     Medial meniscus tear of left knee   • SCROTUM EXPLORATION  07/02/2002     Excision of epidermal inclusion cyst. base of left scrotum   • SKIN BIOPSY  04/23/2012   • SKIN TAG REMOVAL  04/25/2002    Skin tag, left scrotum        Visit Dx:     ICD-10-CM ICD-9-CM   1. Chronic bilateral low back pain, with sciatica presence unspecified M54.5 724.2    G89.29 338.29             Patient History       12/06/17 1700       History    Chief Complaint Pain  -RADHA (r) MD (edmund) RADHA (c)     Type of Pain Back pain;Lower Extremity / Leg  -RADHA (kellie) MD (edmund) RADHA (c)     Date Current Problem(s) Began --   09/2017  -RADHA (kellie) MD (demund) RADHA (c)     Brief Description of Current Complaint Pt currently complains of sciatica with pain radiating from left buttock down  "posterior leg. Pt who previously had both knees replaced, was walking around at a mall in September and had pain in lower back. Pt later felt a \"pinching\" in left lower back and started to have pain at night in the middle of his left buttock. Pt was given a steroid shot, NSAID pills, and flexeril which did not alleviate symptoms significantly. Since then, pain has progressed to his posterior left lower leg. Pt went to massage therapy 2 weeks ago where they reportedly worked on his piriformis which helped alleviate his symptoms.  -SS (r) MD (t) SS (c)     Previous treatment for THIS PROBLEM Injections;Massage;Medication  -SS (r) MD (t) SS (c)     Patient/Caregiver Goals Relieve pain;Return to prior level of function  -SS (r) MD (t) SS (c)     Current Tobacco Use None  -SS (r) MD (t) SS (c)     Smoking Status None  -SS (r) MD (t) SS (c)     Patient's Rating of General Health Poor  -SS (r) MD (t) SS (c)     Hand Dominance right-handed  -SS (r) MD (t) SS (c)     Occupation/sports/leisure activities Occupation:  for [employer]; Hobbies: Fix and drive APR tractors  -SS (r) MD (t) SS (c)     Related/Recent Hospitalizations No  -SS (r) MD (t) SS (c)     Pain     Pain Location Leg  -SS (r) MD (t) SS (c)     Pain at Present 4  -SS (r) MD (t) SS (c)     Pain at Best 4  -SS (r) MD (t) SS (c)     Pain at Worst 10  -SS (r) MD (t) SS (c)     Pain Frequency Constant/continuous  -SS (r) MD (t) SS (c)     Pain Description Sharp  -SS (r) MD (t) SS (c)     What Performance Factors Make the Current Problem(s) WORSE? standing, walking, carrying objects  -SS (r) MD (t) SS (c)     What Performance Factors Make the Current Problem(s) BETTER? Certain body positions  -SS (r) MD (t) SS (c)     Is your sleep disturbed? Yes  -SS (r) MD (t) SS (c)     Is medication used to assist with sleep? Yes  -SS (r) MD (t) SS (c)     Fall Risk Assessment    Any falls in the past year: No  -SS (r) MD (t) SS (c)     Number of " falls reported in the last 12 months 0  -SS (r) MD (t) SS (c)     Does patient have a fear of falling Yes (comment)  -SS (r) MD (t) SS (c)     Daily Activities    Primary Language English  -SS (r) MD (t) SS (c)     Safety    Are you being hurt, hit, or frightened by anyone at home or in your life? No  -SS (r) MD (t) SS (c)     Are you being neglected by a caregiver No  -SS (r) MD (t) SS (c)       User Key  (r) = Recorded By, (t) = Taken By, (c) = Cosigned By    Initials Name Provider Type    SS Horacio Davis, PT DPT Physical Therapist    MD Alecia Hill, PT Student PT Student                PT Ortho       12/06/17 1700    Subjective Comments    Subjective Comments see patient history  -SS (r) MD (t) SS (c)    Precautions and Contraindications    Precautions/Limitations no known precautions/limitations  -SS (r) MD (t) SS (c)    Precautions none  -SS    Contraindications none  -SS    Subjective Pain    Able to rate subjective pain? yes  -SS (r) MD (t) SS (c)    Pre-Treatment Pain Level 4  -SS (r) MD (t) SS (c)    Post-Treatment Pain Level 2  -SS (r) MD (t) SS (c)    Posture/Observations    Forward Head Mild;Increased  -SS (r) MD (t) SS (c)    Cervical Lordosis Mild;Increased  -SS (r) MD (t) SS (c)    Thoracic Kyphosis Normal  -SS (r) MD (t) SS (c)    Scapular Elevation Left:;Elevated  -SS (r) MD (t) SS (c)    Lumbar lordosis Normal  -SS (r) MD (t) SS (c)    Posture/Observations Comments Upslip L hemipelvis  -SS    Sensory Screen for Light Touch- Lower Quarter Clearing    L2 (anterior mid thigh) Intact;Bilateral:  -SS (r) MD (t) SS (c)    L3 (distal anterior thigh) Intact;Bilateral:  -SS (r) MD (t) SS (c)    L4 (medial lower leg/foot) Intact;Bilateral:  -SS (r) MD (t) SS (c)    L5 (lateral lower leg/great toe) Intact;Bilateral:  -SS (r) MD (t) SS (c)    S1 (bottom of foot) Intact;Bilateral:  -SS (r) MD (t) SS (c)    Special Tests/Palpation    Special Tests/Palpation --   Left leg slump test positive  -SS (r)  MD (t) SS (c)    Lumbosacral Palpation    SI Left:;Tender  -SS (r) MD (t) SS (c)    Piriformis Bilateral:;Guarded/taut  -SS (r) MD (t) SS (c)    Gluteus Fritz Bilateral:;Guarded/taut  -SS (r) MD (t) SS (c)    Quadratus Lumborum Bilateral:;Guarded/taut  -SS (r) MD (t) SS (c)    Erector Spinae (Paraspinals) Bilateral:;Guarded/taut  -SS (r) MD (t) SS (c)    Hamstring Left:;Tender  -SS (r) MD (t) SS (c)    Trunk    Flexion AROM Deficit Fingers to Toes  -SS (r) MD (t) SS (c)    Extension AROM Deficit WFL  -SS (r) MD (t) SS (c)    Lt Lat Flexion AROM Deficit Fingers to middle of fibula   pain  -SS (r) MD (t) SS (c)    Right Lateral Flexion AROM Deficit Fingers to middle of fibula   pain  -SS (r) MD (t) SS (c)    Lt Rotation AROM Deficit WFL   pain  -SS (r) MD (t) SS (c)    Right Rotation AROM Deficit WFL  -SS (r) MD (t) SS (c)    Gait Assessment/Treatment    Gait, Comment Pt demonstrated decreased bilateral arm swing, decreased bilateral pelvis rotation  -SS (r) MD (t) SS (c)      User Key  (r) = Recorded By, (t) = Taken By, (c) = Cosigned By    Initials Name Provider Type    SS Horacio Davis, PT DPT Physical Therapist    MD Alecia Hill, PT Student PT Student                            Therapy Education       12/06/17 1700          Therapy Education    Education Details Piriformis stretch, QL stretch  -SS (r) MD (t) SS (c)      Given HEP  -SS (r) MD (t) SS (c)      Program New  -SS (r) MD (t) SS (c)      How Provided Verbal;Written  -SS (r) MD (t) SS (c)      Provided to Patient  -SS (r) MD (t) SS (c)      Level of Understanding Teach back education performed;Verbalized;Demonstrated  -SS (r) MD (t) SS (c)        User Key  (r) = Recorded By, (t) = Taken By, (c) = Cosigned By    Initials Name Provider Type    SS Horacio Davis, PT DPT Physical Therapist    MD Alecia Hill, PT Student PT Student                PT OP Goals       12/06/17 1700       PT Short Term Goals    STG Date to Achieve 12/27/17  -SS  (r) MD (t) SS (c)     STG 1 Decrease Modified Oswestry score to </= 15/50= 30%  -SS (r) MD (t) SS (c)     STG 2 Subjective improvement 30%  -SS (r) MD (t) SS (c)     Long Term Goals    LTG Date to Achieve 01/17/18  -SS (r) MD (t) SS (c)     LTG 1 Decrease Modified Owestry score to </= 10/50=20%   -SS (r) MD (t) SS (c)     LTG 2 Pain symptoms </= 2/10 at all times.  -SS (r) MD (t) SS (c)     LTG 3 Independent with HEP.  -SS (r) MD (t) SS (c)     Time Calculation    PT Goal Re-Cert Due Date 12/27/17  -SS (r) MD (t) SS (c)       User Key  (r) = Recorded By, (t) = Taken By, (c) = Cosigned By    Initials Name Provider Type    SS Horacio Davis, PT DPT Physical Therapist    MD Alecia Hill, PT Student PT Student                PT Assessment/Plan       12/06/17 1700       PT Assessment    Functional Limitations Limitations in functional capacity and performance  -SS (r) MD (t) SS (c)     Impairments Pain;Range of motion;Muscle strength;Endurance  -SS (r) MD (t) SS (c)     Assessment Comments Pt is 62 y.o. male with chronic lower back pain and symptoms consistent with sciatica. Patient would benefit from skilled physical therapy in order to attenuate pain symptoms to decrease limitations in functional activities. Pt noted stretching exercises alleviated some pain symptoms. Corrected pt L upslip.  -SS (r) MD (t) SS (c)     Rehab Potential Good  -RADHA (r) MD (t) SS (c)     Patient/caregiver participated in establishment of treatment plan and goals Yes  -SS (r) MD (t) SS (c)     Patient would benefit from skilled therapy intervention Yes  -RADHA (r) MD (t) SS (c)     PT Plan    PT Frequency 1x/week;2x/week  -SS     Predicted Duration of Therapy Intervention (days/wks) 6 weeks  -RADHA (r) MD (t) SS (c)     Physical Therapy Interventions (Optional Details) aquatics exercise;strengthening;ROM (Range of Motion);modalities;manual therapy techniques;home exercise program;stretching  -SS (r) MD (t) RADHA (c)     PT Plan Comments  Stretching, strengthening, manual therapy, aquatic therapy, ice/heat as needed for pain, possible dry needling  -SS       User Key  (r) = Recorded By, (t) = Taken By, (c) = Cosigned By    Initials Name Provider Type     Horacio Davis, PT DPT Physical Therapist    MD Alecia Hill, PT Student PT Student                  Exercises       12/06/17 1700          Subjective Comments    Subjective Comments see patient history  -SS (r) MD (t) SS (c)      Subjective Pain    Able to rate subjective pain? yes  -SS (r) MD (t) SS (c)      Pre-Treatment Pain Level 4  -SS (r) MD (t) SS (c)      Post-Treatment Pain Level 2  -SS (r) MD (t) SS (c)      Exercise 1    Exercise Name 1 Piriformis Stretch  -SS (r) MD (t) SS (c)      Cueing 1 Verbal  -SS (r) MD (t) SS (c)      Sets 1 3  -SS (r) MD (t) SS (c)      Time (Seconds) 1 30 seconds  -SS (r) MD (t) SS (c)      Exercise 2    Exercise Name 2 QL stretch  -SS (r) MD (t) SS (c)      Cueing 2 Verbal  -SS (r) MD (t) SS (c)      Sets 2 3  -SS (r) MD (t) SS (c)      Time (Seconds) 2 30 seconds  -SS (r) MD (t) SS (c)        User Key  (r) = Recorded By, (t) = Taken By, (c) = Cosigned By    Initials Name Provider Type     Horacio Davis, PT DPT Physical Therapist    MD Alecia Hill, PT Student PT Student           Manual Rx (last 36 hours)      Manual Treatments       12/06/17 1700          Manual Rx 1    Manual Rx 1 Location L Pelvis Upslip Correction  -SS (r) MD (t) SS (c)      Manual Rx 1 Type MET  -SS (r) MD (t) SS (c)        User Key  (r) = Recorded By, (t) = Taken By, (c) = Cosigned By    Initials Name Provider Type     Horacio Davis, PT DPT Physical Therapist    MD Alecia Hill, PT Student PT Student                            Outcome Measures       12/06/17 1700          Modified Oswestry    Modified Oswestry Score/Comments 40%  -SS (r) MD (t) SS (c)      Functional Assessment    Outcome Measure Options Modified Owestry  -RADHA (r) MD (t) RADHA (c)        User  Key  (r) = Recorded By, (t) = Taken By, (c) = Cosigned By    Initials Name Provider Type     Horacio Davis, PT DPT Physical Therapist    MD Alecia Hill, PT Student PT Student            Time Calculation:   Start Time: 1559  Stop Time: 1639  Time Calculation (min): 40 min     Therapy Charges for Today     Code Description Service Date Service Provider Modifiers Qty    90580873345 HC PT MANUAL THERAPY EA 15 MIN 12/6/2017 Horacio Davis, PT DPT GP 1    08996658202 HC PT EVAL MOD COMPLEXITY 2 12/6/2017 Horacio Davis, PT DPT GP 1                   Horacio Davis, PT, DPT, CHT  12/6/2017

## 2017-12-08 ENCOUNTER — APPOINTMENT (OUTPATIENT)
Dept: PHYSICAL THERAPY | Facility: HOSPITAL | Age: 62
End: 2017-12-08

## 2017-12-12 ENCOUNTER — HOSPITAL ENCOUNTER (OUTPATIENT)
Dept: PHYSICAL THERAPY | Facility: HOSPITAL | Age: 62
Setting detail: THERAPIES SERIES
Discharge: HOME | End: 2017-12-12

## 2017-12-12 DIAGNOSIS — M54.5 CHRONIC BILATERAL LOW BACK PAIN, WITH SCIATICA PRESENCE UNSPECIFIED: Primary | ICD-10-CM

## 2017-12-12 DIAGNOSIS — G89.29 CHRONIC BILATERAL LOW BACK PAIN, WITH SCIATICA PRESENCE UNSPECIFIED: Primary | ICD-10-CM

## 2017-12-12 PROCEDURE — 97110 THERAPEUTIC EXERCISES: CPT | Performed by: PHYSICAL THERAPY ASSISTANT

## 2017-12-12 NOTE — THERAPY TREATMENT NOTE
Outpatient Physical Therapy Ortho Treatment Note  [facility]     Patient Name: Otf Murray  : 1955  MRN: [id number]  Today's Date: 2017      Visit Date: 2017  Attendance: 2/ (5 visits)  Subjective Improvement: n/a  Next MD Appt: SUN  Recert Date: 17  Visit Dx:    ICD-10-CM ICD-9-CM   1. Chronic bilateral low back pain, with sciatica presence unspecified M54.5 724.2    G89.29 338.29       There is no problem list on file for this patient.       Past Medical History:   Diagnosis Date   • Acute gastritis without bleeding    • Alcohol abuse counseling and surveillance of alcoholic    • Allergic rhinitis    • Anxiety state    • Attention deficit disorder of childhood with hyperactivity    • Attention deficit hyperactivity disorder    • Attention deficit hyperactivity disorder, predominantly hyperactive impulsive type    • Attention deficit hyperactivity disorder, predominantly inattentive type    • Backache    • Body mass index (bmi) 31.0-31.9, adult     Body mass index (BMI) 31.0-31.9, adult - BMI 33.5      • Burn of lower leg    • Candidiasis of skin    • Cardiac murmur, unspecified    • Decreased testosterone level    • Depressive disorder     Depressive disorder - acute on chronic      • Dysgraphia    • Edema    • Elbow joint pain    • Encounter for general adult medical examination with abnormal findings    • Encounter for screening for malignant neoplasm of colon    • Essential hypertension    • Essential tremor    • Ex-smoker    • Fatigue    • Gastro-esophageal reflux disease with esophagitis    • Gastroesophageal reflux disease    • Hyperlipidemia    • Hyperlipoproteinemia    • Impotence of organic origin    • Insomnia    • Intermittent palpitations    • Knee pain     Knee pain - patellofemoral      • Lateral epicondylitis    • Low back pain    • Male erectile disorder    • Male erectile dysfunction, unspecified    • Male hypogonadism    • Neck pain     Neck pain  aggravated by recumbency      • Obese     Obese - BMI 33.0      • On long term drug therapy    • Osteoarthrosis     Osteoarthrosis, unspecified whether generalized or localized, involving lower leg      • Other obesity    • Other specified diseases of anus and rectum     Other specified diseases of anus and rectum - rectal pain after c-scope prep      • Overweight    • Pain in left knee    • Pain in right knee    • Shoulder pain, right    • Spasm of back muscles    • Tachycardia, unspecified    • Tremor, unspecified    • Tubular adenoma of colon    • Unspecified essential hypertension         Past Surgical History:   Procedure Laterality Date   • COLONOSCOPY  03/16/2016    One polyp in the sigmoid colon.Resected and retrieved.        • CYST REMOVAL  10/27/2009     Excision of sebaceous cyst of the forehead, glabellar region.   • ENDOSCOPY  03/16/2016    Mildly severe esophagitis.Gastritis.Normal examined duodenum.Several biopsies obtained in the lower third of the esophagus.    • ENDOSCOPY AND COLONOSCOPY  04/12/2006     Normal colonoscopic examination    • INCISION AND DRAINAGE ABSCESS  10/24/2012   • INJECTION OF MEDICATION  04/21/2011    Depo Medrol (Methylprednisone) 80mg (1)        • INJECTION OF MEDICATION  02/01/2016    Kenalog (3)        • INJECTION OF MEDICATION  02/19/2016    Toradol (3)    • KNEE ARTHROSCOPY  04/02/2009     Medial meniscus tear of left knee   • SCROTUM EXPLORATION  07/02/2002     Excision of epidermal inclusion cyst. base of left scrotum   • SKIN BIOPSY  04/23/2012   • SKIN TAG REMOVAL  04/25/2002    Skin tag, left scrotum              PT Ortho       12/12/17 0800    Precautions and Contraindications    Precautions/Limitations no known precautions/limitations  -    Subjective Pain    Post-Treatment Pain Level 2  -      User Key  (r) = Recorded By, (t) = Taken By, (c) = Cosigned By    Initials Name Provider Type     Tony Way PTA Physical Therapy Assistant                             PT Assessment/Plan       12/12/17 0800       PT Assessment    Assessment Comments good tolerance with ther ex had decrease in pain.  -     PT Plan    PT Frequency 1x/week;2x/week  -     PT Plan Comments pro ll next visit  -       User Key  (r) = Recorded By, (t) = Taken By, (c) = Cosigned By    Initials Name Provider Type     Tony Way PTA Physical Therapy Assistant                    Exercises       12/12/17 0800          Subjective Comments    Subjective Comments Pt reports he is a little better  -      Subjective Pain    Able to rate subjective pain? yes  -      Pre-Treatment Pain Level 3  -JH      Post-Treatment Pain Level 2  -JH      Exercise 1    Exercise Name 1 LTR  -JH      Reps 1 10  -JH      Time (Seconds) 1 10  -JH      Exercise 2    Exercise Name 2 bridge with GS  -JH      Sets 2 2  -JH      Reps 2 10  -JH      Time (Seconds) 2 5 sec hold  -JH      Exercise 3    Exercise Name 3 B supine piriformis stretch  -JH      Reps 3 3  -JH      Time (Seconds) 3 30  -JH      Exercise 4    Exercise Name 4 BKLL  -JH      Sets 4 2  -JH      Reps 4 10  -JH      Exercise 5    Exercise Name 5 supine hip add  -JH      Sets 5 2  -JH      Reps 5 10  -JH      Exercise 6    Exercise Name 6 supine hip abd  -JH      Sets 6 2  -JH      Reps 6 10  -JH      Exercise 7    Exercise Name 7 alignment corrected  -JH      Time (Minutes) 7 5  -JH      Exercise 8    Exercise Name 8 HELIO  -JH      Time (Minutes) 8 3  -JH        User Key  (r) = Recorded By, (t) = Taken By, (c) = Cosigned By    Initials Name Provider Type     Tony Way PTA Physical Therapy Assistant                               PT OP Goals       12/12/17 0800       PT Short Term Goals    STG Date to Achieve 12/27/17  -     STG 1 Decrease Modified Oswetry score to </= 15/50= 30%  -JH     STG 2 Subjective improvement 30%  -JH     Long Term Goals    LTG Date to Achieve 01/17/18  -     LTG 1 Decrease Modified Oswestry score to </= 10/50=20%   -JH      LTG 2 Pain symptoms </= 2/10 at all times.  -     LTG 3 Independent with HEP.  -     Time Calculation    PT Goal Re-Cert Due Date 12/27/17  -       User Key  (r) = Recorded By, (t) = Taken By, (c) = Cosigned By    Initials Name Provider Type     Tony Way PTA Physical Therapy Assistant                         Time Calculation:   Start Time: 0800  Stop Time: 0841  Time Calculation (min): 41 min    Therapy Charges for Today     Code Description Service Date Service Provider Modifiers Qty    34933557535 HC PT THER PROC EA 15 MIN 12/12/2017 Tony Way PTA GP 3                    Tony Way PTA  12/12/2017

## 2017-12-14 ENCOUNTER — HOSPITAL ENCOUNTER (OUTPATIENT)
Dept: PHYSICAL THERAPY | Facility: HOSPITAL | Age: 62
Setting detail: THERAPIES SERIES
Discharge: HOME | End: 2017-12-14

## 2017-12-14 DIAGNOSIS — M54.5 CHRONIC BILATERAL LOW BACK PAIN, WITH SCIATICA PRESENCE UNSPECIFIED: Primary | ICD-10-CM

## 2017-12-14 DIAGNOSIS — G89.29 CHRONIC BILATERAL LOW BACK PAIN, WITH SCIATICA PRESENCE UNSPECIFIED: Primary | ICD-10-CM

## 2017-12-14 PROCEDURE — 97110 THERAPEUTIC EXERCISES: CPT | Performed by: PHYSICAL THERAPIST

## 2017-12-14 NOTE — THERAPY TREATMENT NOTE
Outpatient Physical Therapy Ortho Treatment Note  [facility]     Patient Name: Otf Murray  : 1955  MRN: [id number]  Today's Date: 2017      Visit Date: 2017  Attendance:   Subjective % Improvement: N/A  Recert Date: 2018  MD appointment: TBD    Therapy Diagnosis: LBP w/ sciatica; muscle spasm    Visit Dx:    ICD-10-CM ICD-9-CM   1. Chronic bilateral low back pain, with sciatica presence unspecified M54.5 724.2    G89.29 338.29       There is no problem list on file for this patient.       Past Medical History:   Diagnosis Date   • Acute gastritis without bleeding    • Alcohol abuse counseling and surveillance of alcoholic    • Allergic rhinitis    • Anxiety state    • Attention deficit disorder of childhood with hyperactivity    • Attention deficit hyperactivity disorder    • Attention deficit hyperactivity disorder, predominantly hyperactive impulsive type    • Attention deficit hyperactivity disorder, predominantly inattentive type    • Backache    • Body mass index (bmi) 31.0-31.9, adult     Body mass index (BMI) 31.0-31.9, adult - BMI 33.5      • Burn of lower leg    • Candidiasis of skin    • Cardiac murmur, unspecified    • Decreased testosterone level    • Depressive disorder     Depressive disorder - acute on chronic      • Dysgraphia    • Edema    • Elbow joint pain    • Encounter for general adult medical examination with abnormal findings    • Encounter for screening for malignant neoplasm of colon    • Essential hypertension    • Essential tremor    • Ex-smoker    • Fatigue    • Gastro-esophageal reflux disease with esophagitis    • Gastroesophageal reflux disease    • Hyperlipidemia    • Hyperlipoproteinemia    • Impotence of organic origin    • Insomnia    • Intermittent palpitations    • Knee pain     Knee pain - patellofemoral      • Lateral epicondylitis    • Low back pain    • Male erectile disorder    • Male erectile dysfunction, unspecified    • Male  hypogonadism    • Neck pain     Neck pain aggravated by recumbency      • Obese     Obese - BMI 33.0      • On long term drug therapy    • Osteoarthrosis     Osteoarthrosis, unspecified whether generalized or localized, involving lower leg      • Other obesity    • Other specified diseases of anus and rectum     Other specified diseases of anus and rectum - rectal pain after c-scope prep      • Overweight    • Pain in left knee    • Pain in right knee    • Shoulder pain, right    • Spasm of back muscles    • Tachycardia, unspecified    • Tremor, unspecified    • Tubular adenoma of colon    • Unspecified essential hypertension         Past Surgical History:   Procedure Laterality Date   • COLONOSCOPY  03/16/2016    One polyp in the sigmoid colon.Resected and retrieved.        • CYST REMOVAL  10/27/2009     Excision of sebaceous cyst of the forehead, glabellar region.   • ENDOSCOPY  03/16/2016    Mildly severe esophagitis.Gastritis.Normal examined duodenum.Several biopsies obtained in the lower third of the esophagus.    • ENDOSCOPY AND COLONOSCOPY  04/12/2006     Normal colonoscopic examination    • INCISION AND DRAINAGE ABSCESS  10/24/2012   • INJECTION OF MEDICATION  04/21/2011    Depo Medrol (Methylprednisone) 80mg (1)        • INJECTION OF MEDICATION  02/01/2016    Kenalog (3)        • INJECTION OF MEDICATION  02/19/2016    Toradol (3)    • KNEE ARTHROSCOPY  04/02/2009     Medial meniscus tear of left knee   • SCROTUM EXPLORATION  07/02/2002     Excision of epidermal inclusion cyst. base of left scrotum   • SKIN BIOPSY  04/23/2012   • SKIN TAG REMOVAL  04/25/2002    Skin tag, left scrotum              PT Ortho       12/14/17 0730    Subjective Comments    Subjective Comments Reports feeling better this AM. Reports radicular symptoms yesterday, has massage 1x a week and pain night. States using the hot tub last night and this AM and that assists with the way he feels.   -BB    Precautions and Contraindications     "Precautions/Limitations no known precautions/limitations  -BB    Precautions none  -BB    Contraindications none  -BB    Subjective Pain    Able to rate subjective pain? yes  -BB    Pre-Treatment Pain Level 0  -BB    Post-Treatment Pain Level 2  -BB    Subjective Pain Comment \"3-4/10 this AM  -BB    Posture/Observations    Posture/Observations Comments Left upslip noted this AM.   -BB      12/12/17 0800    Precautions and Contraindications    Precautions/Limitations no known precautions/limitations  -    Subjective Pain    Post-Treatment Pain Level 2  -      User Key  (r) = Recorded By, (t) = Taken By, (c) = Cosigned By    Initials Name Provider Type    BB Rupinder Link, PT Physical Therapist    JH Tony Way, PTA Physical Therapy Assistant                            PT Assessment/Plan       12/14/17 0730       PT Assessment    Assessment Comments Patient tolerated treatment well today with discomfort noted after treatment on left side. Manual and verbal cues needed to improve pelvic mobility. Patient declined ice but agreed to ice this evening if needed.   -BB     Patient would benefit from skilled therapy intervention Yes  -BB     PT Plan    PT Frequency 1x/week;2x/week  -BB     Predicted Duration of Therapy Intervention (days/wks) 6 weeks (2 visits remain)  -BB     PT Plan Comments Continue progressing core stabilization. Monitor SI alignment  -BB       User Key  (r) = Recorded By, (t) = Taken By, (c) = Cosigned By    Initials Name Provider Type    MILADY Link, PT Physical Therapist                Modalities       12/14/17 0730          Ice    Patient denies application of Ice Yes  -        User Key  (r) = Recorded By, (t) = Taken By, (c) = Cosigned By    Initials Name Provider Type    BB Rupinder Link, PT Physical Therapist                Exercises       12/14/17 0730          Subjective Comments    Subjective Comments Reports feeling better this AM. Reports radicular symptoms yesterday, has " "massage 1x a week and pain night. States using the hot tub last night and this AM and that assists with the way he feels.   -BB      Subjective Pain    Able to rate subjective pain? yes  -BB      Pre-Treatment Pain Level 0  -BB      Post-Treatment Pain Level 2  -BB      Subjective Pain Comment \"3-4/10 this AM  -BB      Exercise 1    Exercise Name 1 Pro 2 L3 for strength/endurance   -BB      Time (Minutes) 1 10'  -BB      Exercise 2    Exercise Name 2 Incline stretch   -BB      Sets 2 3  -BB      Time (Seconds) 2 30  -BB      Exercise 3    Exercise Name 3 Standing HS stretch   -BB      Reps 3 3  -BB      Time (Seconds) 3 30\"  -BB      Additional Comments bilateral   -BB      Exercise 4    Exercise Name 4 Left upslip correction (SI gap and illium distraction)  -BB      Time (Minutes) 4 10'  -BB      Exercise 5    Exercise Name 5 Sitting piriformis strech   -BB      Sets 5 3  -BB      Time (Seconds) 5 30\"   -BB      Additional Comments bilateral  -BB      Exercise 6    Exercise Name 6 Pelvic tilt on disc   -BB      Sets 6 1  -BB      Reps 6 10  -BB      Exercise 7    Exercise Name 7 Pelvic hip hikes on disc   -BB      Sets 7 1  -BB      Reps 7 10  -BB      Exercise 8    Exercise Name 8 Pelvic rotations on pball   -BB      Sets 8 1  -BB      Reps 8 10  -BB      Additional Comments cw/ccw  -BB      Exercise 9    Exercise Name 9 CC antitrunk rotation steo outs   -BB      Sets 9 1  -BB      Reps 9 10  -BB      Additional Comments 4 plates   -BB        User Key  (r) = Recorded By, (t) = Taken By, (c) = Cosigned By    Initials Name Provider Type    BB Rupinder Link, PT Physical Therapist                               PT OP Goals       12/14/17 0730       PT Short Term Goals    STG Date to Achieve 12/27/17  -BB     STG 1 Decrease Modified Oswetry score to </= 15/50= 30%  -BB     STG 1 Progress Progressing  -BB     STG 2 Subjective improvement 30%  -BB     STG 2 Progress Met  -BB     STG 2 Progress Comments 65-75%  -BB     " Long Term Goals    LTG Date to Achieve 01/17/18  -BB     LTG 1 Decrease Modified Oswestry score to </= 10/50=20%   -BB     LTG 1 Progress Progressing  -BB     LTG 2 Pain symptoms </= 2/10 at all times.  -BB     LTG 2 Progress Progressing  -BB     LTG 3 Independent with HEP.  -BB     LTG 3 Progress Progressing  -BB     Time Calculation    PT Goal Re-Cert Due Date 12/27/17  -BB       User Key  (r) = Recorded By, (t) = Taken By, (c) = Cosigned By    Initials Name Provider Type    MILADY Link, PT Physical Therapist          Therapy Education  Education Details: Pelvic tilts on pillow at home, sitting piriformis stretch   Given: HEP, Symptoms/condition management  Program: New  How Provided: Verbal  Provided to: Patient  Level of Understanding: Demonstrated              Time Calculation:   Start Time: 0730  Stop Time: 0817  Time Calculation (min): 47 min  Total Timed Code Minutes- PT: 47 minute(s)    Therapy Charges for Today     Code Description Service Date Service Provider Modifiers Qty    [id number]  PT THER PROC EA 15 MIN 12/14/2017 Rupinder Link PT GP 3                    Rupinder Link PT  12/14/2017

## 2017-12-18 ENCOUNTER — HOSPITAL ENCOUNTER (OUTPATIENT)
Dept: PHYSICAL THERAPY | Facility: HOSPITAL | Age: 62
Setting detail: THERAPIES SERIES
Discharge: HOME | End: 2017-12-18

## 2017-12-18 DIAGNOSIS — M54.5 CHRONIC BILATERAL LOW BACK PAIN, WITH SCIATICA PRESENCE UNSPECIFIED: Primary | ICD-10-CM

## 2017-12-18 DIAGNOSIS — G89.29 CHRONIC BILATERAL LOW BACK PAIN, WITH SCIATICA PRESENCE UNSPECIFIED: Primary | ICD-10-CM

## 2017-12-18 PROCEDURE — 97110 THERAPEUTIC EXERCISES: CPT | Performed by: PHYSICAL THERAPY ASSISTANT

## 2017-12-18 NOTE — THERAPY TREATMENT NOTE
Outpatient Physical Therapy Ortho Treatment Note  [facility]     Patient Name: Otf Murray  : 1955  MRN: [id number]  Today's Date: 2017      Visit Date: 2017  Attendance:   Subjective % Improvement: N/A  Recert Date: 2018  MD appointment: TBD  Visit Dx:    ICD-10-CM ICD-9-CM   1. Chronic bilateral low back pain, with sciatica presence unspecified M54.5 724.2    G89.29 338.29       There is no problem list on file for this patient.       Past Medical History:   Diagnosis Date   • Acute gastritis without bleeding    • Alcohol abuse counseling and surveillance of alcoholic    • Allergic rhinitis    • Anxiety state    • Attention deficit disorder of childhood with hyperactivity    • Attention deficit hyperactivity disorder    • Attention deficit hyperactivity disorder, predominantly hyperactive impulsive type    • Attention deficit hyperactivity disorder, predominantly inattentive type    • Backache    • Body mass index (bmi) 31.0-31.9, adult     Body mass index (BMI) 31.0-31.9, adult - BMI 33.5      • Burn of lower leg    • Candidiasis of skin    • Cardiac murmur, unspecified    • Decreased testosterone level    • Depressive disorder     Depressive disorder - acute on chronic      • Dysgraphia    • Edema    • Elbow joint pain    • Encounter for general adult medical examination with abnormal findings    • Encounter for screening for malignant neoplasm of colon    • Essential hypertension    • Essential tremor    • Ex-smoker    • Fatigue    • Gastro-esophageal reflux disease with esophagitis    • Gastroesophageal reflux disease    • Hyperlipidemia    • Hyperlipoproteinemia    • Impotence of organic origin    • Insomnia    • Intermittent palpitations    • Knee pain     Knee pain - patellofemoral      • Lateral epicondylitis    • Low back pain    • Male erectile disorder    • Male erectile dysfunction, unspecified    • Male hypogonadism    • Neck pain     Neck pain aggravated by  recumbency      • Obese     Obese - BMI 33.0      • On long term drug therapy    • Osteoarthrosis     Osteoarthrosis, unspecified whether generalized or localized, involving lower leg      • Other obesity    • Other specified diseases of anus and rectum     Other specified diseases of anus and rectum - rectal pain after c-scope prep      • Overweight    • Pain in left knee    • Pain in right knee    • Shoulder pain, right    • Spasm of back muscles    • Tachycardia, unspecified    • Tremor, unspecified    • Tubular adenoma of colon    • Unspecified essential hypertension         Past Surgical History:   Procedure Laterality Date   • COLONOSCOPY  03/16/2016    One polyp in the sigmoid colon.Resected and retrieved.        • CYST REMOVAL  10/27/2009     Excision of sebaceous cyst of the forehead, glabellar region.   • ENDOSCOPY  03/16/2016    Mildly severe esophagitis.Gastritis.Normal examined duodenum.Several biopsies obtained in the lower third of the esophagus.    • ENDOSCOPY AND COLONOSCOPY  04/12/2006     Normal colonoscopic examination    • INCISION AND DRAINAGE ABSCESS  10/24/2012   • INJECTION OF MEDICATION  04/21/2011    Depo Medrol (Methylprednisone) 80mg (1)        • INJECTION OF MEDICATION  02/01/2016    Kenalog (3)        • INJECTION OF MEDICATION  02/19/2016    Toradol (3)    • KNEE ARTHROSCOPY  04/02/2009     Medial meniscus tear of left knee   • SCROTUM EXPLORATION  07/02/2002     Excision of epidermal inclusion cyst. base of left scrotum   • SKIN BIOPSY  04/23/2012   • SKIN TAG REMOVAL  04/25/2002    Skin tag, left scrotum                              PT Assessment/Plan       12/18/17 0900       PT Assessment    Assessment Comments tolerates tx well with no c/o allignment waas good this date  -     PT Plan    PT Frequency 1x/week  -     PT Plan Comments D/C to HEP [employer] machines  -       User Key  (r) = Recorded By, (t) = Taken By, (c) = Cosigned By    Initials Name Provider Type    RINA BAKER  "ANNCY Way Physical Therapy Assistant                    Exercises       12/18/17 0800          Subjective Comments    Subjective Comments Pt reports he didn't do anything yesterday, PT states he is doing good and getting better.  -      Subjective Pain    Able to rate subjective pain? yes  -      Pre-Treatment Pain Level 1  -      Exercise 1    Exercise Name 1 Pro 2 L4 for strength/endurance   -      Time (Minutes) 1 10'  -      Exercise 2    Exercise Name 2 Incline stretch   -      Sets 2 3  -      Time (Seconds) 2 30  -JH      Exercise 3    Exercise Name 3 Standing HS stretch   -      Reps 3 3  -      Time (Seconds) 3 30\"  -      Exercise 4    Exercise Name 4 B sitting piriformis stretrch  -      Reps 4 3  -      Time (Seconds) 4 30  -      Exercise 5    Exercise Name 5 pelvic tilt on [facility]  -      Sets 5 1  -      Reps 5 20  -      Exercise 6    Exercise Name 6 Lateral tilts on [facility]      Sets 6 1  -      Reps 6 20  -      Exercise 7    Exercise Name 7 pelvic circles on [facility]      Sets 7 1  -      Reps 7 20  -      Exercise 8    Exercise Name 8 B chops with MED ball sitting on [facility]  -      Sets 8 2  -      Reps 8 10  -      Additional Comments 2# ball  -      Exercise 9    Exercise Name 9 bridges on [facility]      Sets 9 2  -      Reps 9 10  -      Exercise 10    Exercise Name 10 sahrmanns L 2  -      Sets 10 2  -      Reps 10 10  -      Exercise 11    Exercise Name 11 seated trunk rot with Med ball.  -      Sets 11 2  -      Reps 11 10  -      Additional Comments 4#  -      Exercise 12    Exercise Name 12 sit to stand from   -      Sets 12 2  -      Reps 12 10  -        User Key  (r) = Recorded By, (t) = Taken By, (c) = Cosigned By    Initials Name Provider Type     Tony Way PTA Physical Therapy Assistant                               PT OP Goals       12/18/17 0900       PT Short Term Goals    STG Date to Achieve " 12/27/17  -     STG 1 Decrease Modified Oswetry score to </= 15/50= 30%  -     STG 1 Progress Progressing  -     STG 2 Subjective improvement 30%  -     STG 2 Progress Met  -     Long Term Goals    LTG Date to Achieve 01/17/18  -     LTG 1 Decrease Modified Oswestry score to </= 10/50=20%   -     LTG 1 Progress Progressing  -     LTG 2 Pain symptoms </= 2/10 at all times.  -     LTG 2 Progress Progressing  -     LTG 3 Independent with HEP.  -     LTG 3 Progress Progressing  -     Time Calculation    PT Goal Re-Cert Due Date 12/27/17  -       User Key  (r) = Recorded By, (t) = Taken By, (c) = Cosigned By    Initials Name Provider Type     Tony Way PTA Physical Therapy Assistant                         Time Calculation:   Start Time: 0845  Stop Time: 0925  Time Calculation (min): 40 min    Therapy Charges for Today     Code Description Service Date Service Provider Modifiers Qty    [id number] HC PT THER PROC EA 15 MIN 12/18/2017 Tony Way PTA GP 3                    Tony Way PTA  12/18/2017

## 2017-12-21 ENCOUNTER — OFFICE VISIT (OUTPATIENT)
Dept: BEHAVIORAL HEALTH | Facility: CLINIC | Age: 62
End: 2017-12-21

## 2017-12-21 DIAGNOSIS — F33.1 MODERATE EPISODE OF RECURRENT MAJOR DEPRESSIVE DISORDER (HCC): Primary | ICD-10-CM

## 2017-12-21 DIAGNOSIS — F90.0 ADHD, PREDOMINANTLY INATTENTIVE TYPE: ICD-10-CM

## 2017-12-21 PROCEDURE — 90791 PSYCH DIAGNOSTIC EVALUATION: CPT | Performed by: PSYCHOLOGIST

## 2017-12-21 NOTE — PROGRESS NOTES
2017    Otf Murray, a 62 y.o. male, was seen today for initial appointment lasting 45 minutes.  Patient is referred by Juan Carlos JIMENES.     SUBJECTIVE:  The patient has requested a reevaluation of attention deficit hyperactivity disorder.  I originally evaluated him for ADHD about 10 years ago.  He was treated with stimulant medication.  Was also treated for depression with Wellbutrin at 450 mg daily.  And Vyvanse 70 mg daily.  However after his father  he became more depressed and quit taking all of his medications.  Without his medicine he has a hard time getting tasks done, he gets distracted while driving, and has a hard time focusing.  This patient been  for 22 years and the relationship is going well.  He works as an  for [employer].    FAMILY HISTORY:  Positive for ADHD and mood disorders.    MENTAL STATUS:  Patient presents as a man who looks his stated age, mood is upbeat and appropriate.  He's oriented ×3, thoughts are goal-directed and logical.  There is no evidence of a personality disorder or a substance abuse disorder.  He reports that he can be scatterbrained, he starts things without finishing them, his mind tends to wander when he is trying to concentrate, he interrupts people during conversations, he is forgetful, and he procrastinates.  While driving he often misses his turns.  He sleeps well at night, has good energy during the day.  He continues to have mild-to-moderate depression.  He worries about getting behind on his projects.  He is not suicidal, homicidal, does not have hallucinations.    DIAGNOSIS:    ICD-10-CM ICD-9-CM   1. Moderate episode of recurrent major depressive disorder F33.1 296.32   2. ADHD, predominantly inattentive type F90.0 314.00       ASSESSMENT PLAN:  Plan is to evaluate for ADHD in the mood disorder.  He was given [facility] rating scales for he and his wife to complete, and I'll testing with the [facility] computerized  continuous performance test.          This document has been electronically signed by Serafin Quezada EdD on December 21, 2017 9:33 AM

## 2017-12-26 ENCOUNTER — HOSPITAL ENCOUNTER (OUTPATIENT)
Dept: PHYSICAL THERAPY | Facility: HOSPITAL | Age: 62
Setting detail: THERAPIES SERIES
Discharge: HOME | End: 2017-12-26

## 2017-12-26 DIAGNOSIS — M54.5 CHRONIC BILATERAL LOW BACK PAIN, WITH SCIATICA PRESENCE UNSPECIFIED: Primary | ICD-10-CM

## 2017-12-26 DIAGNOSIS — G89.29 CHRONIC BILATERAL LOW BACK PAIN, WITH SCIATICA PRESENCE UNSPECIFIED: Primary | ICD-10-CM

## 2017-12-26 PROCEDURE — 97110 THERAPEUTIC EXERCISES: CPT | Performed by: PHYSICAL THERAPY ASSISTANT

## 2017-12-26 NOTE — THERAPY TREATMENT NOTE
Outpatient Physical Therapy Ortho Treatment Note  [facility]     Patient Name: Otf Murray  : 1955  MRN: [id number]  Today's Date: 2017      Visit Date: 2017  Attendance:   Subjective % Improvement: N/A  Recert Date: 2018  MD appointment: TBD  Visit Dx:    ICD-10-CM ICD-9-CM   1. Chronic bilateral low back pain, with sciatica presence unspecified M54.5 724.2    G89.29 338.29       There is no problem list on file for this patient.       Past Medical History:   Diagnosis Date   • Acute gastritis without bleeding    • Alcohol abuse counseling and surveillance of alcoholic    • Allergic rhinitis    • Anxiety state    • Attention deficit disorder of childhood with hyperactivity    • Attention deficit hyperactivity disorder    • Attention deficit hyperactivity disorder, predominantly hyperactive impulsive type    • Attention deficit hyperactivity disorder, predominantly inattentive type    • Backache    • Body mass index (bmi) 31.0-31.9, adult     Body mass index (BMI) 31.0-31.9, adult - BMI 33.5      • Burn of lower leg    • Candidiasis of skin    • Cardiac murmur, unspecified    • Decreased testosterone level    • Depressive disorder     Depressive disorder - acute on chronic      • Dysgraphia    • Edema    • Elbow joint pain    • Encounter for general adult medical examination with abnormal findings    • Encounter for screening for malignant neoplasm of colon    • Essential hypertension    • Essential tremor    • Ex-smoker    • Fatigue    • Gastro-esophageal reflux disease with esophagitis    • Gastroesophageal reflux disease    • Hyperlipidemia    • Hyperlipoproteinemia    • Impotence of organic origin    • Insomnia    • Intermittent palpitations    • Knee pain     Knee pain - patellofemoral      • Lateral epicondylitis    • Low back pain    • Male erectile disorder    • Male erectile dysfunction, unspecified    • Male hypogonadism    • Neck pain     Neck pain aggravated by  recumbency      • Obese     Obese - BMI 33.0      • On long term drug therapy    • Osteoarthrosis     Osteoarthrosis, unspecified whether generalized or localized, involving lower leg      • Other obesity    • Other specified diseases of anus and rectum     Other specified diseases of anus and rectum - rectal pain after c-scope prep      • Overweight    • Pain in left knee    • Pain in right knee    • Shoulder pain, right    • Spasm of back muscles    • Tachycardia, unspecified    • Tremor, unspecified    • Tubular adenoma of colon    • Unspecified essential hypertension         Past Surgical History:   Procedure Laterality Date   • COLONOSCOPY  03/16/2016    One polyp in the sigmoid colon.Resected and retrieved.        • CYST REMOVAL  10/27/2009     Excision of sebaceous cyst of the forehead, glabellar region.   • ENDOSCOPY  03/16/2016    Mildly severe esophagitis.Gastritis.Normal examined duodenum.Several biopsies obtained in the lower third of the esophagus.    • ENDOSCOPY AND COLONOSCOPY  04/12/2006     Normal colonoscopic examination    • INCISION AND DRAINAGE ABSCESS  10/24/2012   • INJECTION OF MEDICATION  04/21/2011    Depo Medrol (Methylprednisone) 80mg (1)        • INJECTION OF MEDICATION  02/01/2016    Kenalog (3)        • INJECTION OF MEDICATION  02/19/2016    Toradol (3)    • KNEE ARTHROSCOPY  04/02/2009     Medial meniscus tear of left knee   • SCROTUM EXPLORATION  07/02/2002     Excision of epidermal inclusion cyst. base of left scrotum   • SKIN BIOPSY  04/23/2012   • SKIN TAG REMOVAL  04/25/2002    Skin tag, left scrotum                              PT Assessment/Plan       12/26/17 0900       PT Assessment    Assessment Comments Pt had decrease in pain post tx, Pt able to tolerate all ther ex well, good response to STM to piriformis  -RINA       User Key  (r) = Recorded By, (t) = Taken By, (c) = Cosigned By    Initials Name Provider Type    RINA Way PTA Physical Therapy Assistant     "                Exercises       12/26/17 0900          Subjective Comments    Subjective Comments Pt reports he was having some pain 2 days ago and could hardly walk, Pt states he has more taisha in the morning.  -JH      Subjective Pain    Able to rate subjective pain? yes  -      Pre-Treatment Pain Level 3  -JH      Exercise 1    Exercise Name 1 Pro 2 L4 for strength/endurance   -      Time (Minutes) 1 10'  -JH      Exercise 2    Exercise Name 2 Incline stretch   -      Sets 2 3  -JH      Time (Seconds) 2 30  -JH      Exercise 3    Exercise Name 3 Standing HS stretch   -      Reps 3 3  -JH      Time (Seconds) 3 30\"  -JH      Exercise 4    Exercise Name 4 B sitting piriformis stretrch  -      Reps 4 3  -JH      Time (Seconds) 4 30  -JH      Exercise 5    Exercise Name 5 pelvic tilt on Pball  -      Sets 5 1  -JH      Reps 5 20  -JH      Exercise 6    Exercise Name 6 Lateral tilts on Pball  -      Sets 6 1  -JH      Reps 6 20  -JH      Exercise 7    Exercise Name 7 pelvic circles on Pball  -      Sets 7 1  -JH      Reps 7 20  -JH      Exercise 8    Exercise Name 8 B chops with MED ball sitting on Pball  -      Sets 8 2  -JH      Reps 8 10  -JH      Exercise 9    Exercise Name 9 sit to stand from 22 in mat  -      Sets 9 1  -JH      Reps 9 20  -JH      Exercise 10    Exercise Name 10 B supine SLR  -      Sets 10 2  -JH      Reps 10 10  -JH      Exercise 11    Exercise Name 11 bridge on Pball  -      Sets 11 2  -JH      Reps 11 15  -JH      Exercise 12    Exercise Name 12 PPU  -JH      Sets 12 2  -JH      Reps 12 10  -JH      Exercise 13    Exercise Name 13 prone heel press  -      Sets 13 2  -JH      Reps 13 10  -JH      Time (Seconds) 13 3 sec hold  -JH      Exercise 14    Exercise Name 14 HELIO  -JH      Time (Minutes) 14 5  -JH      Exercise 15    Exercise Name 15 STM to L piriformis  -      Time (Minutes) 15 4  -JH        User Key  (r) = Recorded By, (t) = Taken By, (c) = Cosigned By    " Initials Name Provider Type     Tony Way PTA Physical Therapy Assistant                               PT OP Goals       12/26/17 0900       PT Short Term Goals    STG Date to Achieve 12/27/17  -     STG 1 Decrease Modified Oswetry score to </= 15/50= 30%  -     STG 1 Progress Progressing  -     STG 2 Subjective improvement 30%  -     STG 2 Progress Met  -     Long Term Goals    LTG Date to Achieve 01/17/18  -     LTG 1 Decrease Modified Oswestry score to </= 10/50=20%   -     LTG 1 Progress Progressing  -     LTG 2 Pain symptoms </= 2/10 at all times.  -     LTG 2 Progress Progressing  -     LTG 3 Independent with HEP.  -     LTG 3 Progress Progressing  -     Time Calculation    PT Goal Re-Cert Due Date 12/27/17  -       User Key  (r) = Recorded By, (t) = Taken By, (c) = Cosigned By    Initials Name Provider Type     Tony Way PTA Physical Therapy Assistant                         Time Calculation:   Start Time: 0900  Stop Time: 0945  Time Calculation (min): 45 min    Therapy Charges for Today     Code Description Service Date Service Provider Modifiers Qty    16371113857 HC PT THER PROC EA 15 MIN 12/26/2017 Tony Way PTA GP 3                    Tony Way PTA  12/26/2017

## 2018-01-05 ENCOUNTER — HOSPITAL ENCOUNTER (OUTPATIENT)
Dept: PHYSICAL THERAPY | Facility: HOSPITAL | Age: 63
Setting detail: THERAPIES SERIES
Discharge: HOME | End: 2018-01-05

## 2018-01-05 DIAGNOSIS — M54.5 CHRONIC BILATERAL LOW BACK PAIN, WITH SCIATICA PRESENCE UNSPECIFIED: Primary | ICD-10-CM

## 2018-01-05 DIAGNOSIS — G89.29 CHRONIC BILATERAL LOW BACK PAIN, WITH SCIATICA PRESENCE UNSPECIFIED: Primary | ICD-10-CM

## 2018-01-05 PROCEDURE — 97110 THERAPEUTIC EXERCISES: CPT

## 2018-01-06 NOTE — THERAPY PROGRESS REPORT/RE-CERT
Outpatient Physical Therapy Ortho Progress Note  [facility]     Patient Name: Otf Murray  : 1955  MRN: [id number]  Today's Date: 2018      Visit Date: 2018  Attendance:  (30 visits/yr)  Subjective Improvement: 80%  Next MD Appt: PRN  Recert Date: 18    Therapy Diagnosis: LBP w/ sciatica; muscle spasm         Past Medical History:   Diagnosis Date   • Acute gastritis without bleeding    • Alcohol abuse counseling and surveillance of alcoholic    • Allergic rhinitis    • Anxiety state    • Attention deficit disorder of childhood with hyperactivity    • Attention deficit hyperactivity disorder    • Attention deficit hyperactivity disorder, predominantly hyperactive impulsive type    • Attention deficit hyperactivity disorder, predominantly inattentive type    • Backache    • Body mass index (bmi) 31.0-31.9, adult     Body mass index (BMI) 31.0-31.9, adult - BMI 33.5      • Burn of lower leg    • Candidiasis of skin    • Cardiac murmur, unspecified    • Decreased testosterone level    • Depressive disorder     Depressive disorder - acute on chronic      • Dysgraphia    • Edema    • Elbow joint pain    • Encounter for general adult medical examination with abnormal findings    • Encounter for screening for malignant neoplasm of colon    • Essential hypertension    • Essential tremor    • Ex-smoker    • Fatigue    • Gastro-esophageal reflux disease with esophagitis    • Gastroesophageal reflux disease    • Hyperlipidemia    • Hyperlipoproteinemia    • Impotence of organic origin    • Insomnia    • Intermittent palpitations    • Knee pain     Knee pain - patellofemoral      • Lateral epicondylitis    • Low back pain    • Male erectile disorder    • Male erectile dysfunction, unspecified    • Male hypogonadism    • Neck pain     Neck pain aggravated by recumbency      • Obese     Obese - BMI 33.0      • On long term drug therapy    • Osteoarthrosis     Osteoarthrosis, unspecified  whether generalized or localized, involving lower leg      • Other obesity    • Other specified diseases of anus and rectum     Other specified diseases of anus and rectum - rectal pain after c-scope prep      • Overweight    • Pain in left knee    • Pain in right knee    • Shoulder pain, right    • Spasm of back muscles    • Tachycardia, unspecified    • Tremor, unspecified    • Tubular adenoma of colon    • Unspecified essential hypertension         Past Surgical History:   Procedure Laterality Date   • COLONOSCOPY  03/16/2016    One polyp in the sigmoid colon.Resected and retrieved.        • CYST REMOVAL  10/27/2009     Excision of sebaceous cyst of the forehead, glabellar region.   • ENDOSCOPY  03/16/2016    Mildly severe esophagitis.Gastritis.Normal examined duodenum.Several biopsies obtained in the lower third of the esophagus.    • ENDOSCOPY AND COLONOSCOPY  04/12/2006     Normal colonoscopic examination    • INCISION AND DRAINAGE ABSCESS  10/24/2012   • INJECTION OF MEDICATION  04/21/2011    Depo Medrol (Methylprednisone) 80mg (1)        • INJECTION OF MEDICATION  02/01/2016    Kenalog (3)        • INJECTION OF MEDICATION  02/19/2016    Toradol (3)    • KNEE ARTHROSCOPY  04/02/2009     Medial meniscus tear of left knee   • SCROTUM EXPLORATION  07/02/2002     Excision of epidermal inclusion cyst. base of left scrotum   • SKIN BIOPSY  04/23/2012   • SKIN TAG REMOVAL  04/25/2002    Skin tag, left scrotum        Visit Dx:     ICD-10-CM ICD-9-CM   1. Chronic bilateral low back pain, with sciatica presence unspecified M54.5 724.2    G89.29 338.29     Changes in Medications: none noted  Changes in MD Orders: none noted  Number of Work Days Lost: none              PT Ortho       01/05/18 0757    Precautions and Contraindications    Precautions/Limitations no known precautions/limitations  -KH    Posture/Observations    Posture/Observations Comments R anterior torsion;   -KH    Trunk    Flexion AROM Deficit  fingertips to toes; notes stretch in glutes  -SS    Extension AROM Deficit WFLs; pain L ischial tuberosity  -SS    Lt Lat Flexion AROM Deficit WNLs  -SS    Right Lateral Flexion AROM Deficit WNLs  -SS    Left Hip    Hip Flexion Gross Movement (4+/5) good plus  -SS    Hip Extension Gluteus Fritz (4-/5) good minus  -SS    Hip Ext Gluteus Fritz with Hamstrings (4/5) good  -SS    Hip ABduction Gross Movement (5/5) normal  -SS    Hip ADduction Gross Movement (5/5) normal  -SS    Right Hip    Hip Flexion Gross Movement (5/5) normal  -SS    Hip Extension Gluteus Fritz (4/5) good  -SS    Hip Ext Gluteus Fritz with Hamstrings (4+/5) good plus  -SS    Hip ABduction Gross Movement (5/5) normal  -SS    Hip ADduction Gross Movement (5/5) normal  -SS    Left Knee    Knee Extension Gross Movement (5/5) normal  -SS    Knee Flexion Gross Movement (5/5) normal  -SS    Right Knee    Knee Extension Gross Movement (5/5) normal  -SS    Knee Flexion Gross Movement (5/5) normal  -SS      User Key  (r) = Recorded By, (t) = Taken By, (c) = Cosigned By    Initials Name Provider Type    SS Horacio Davis, PT DPT Physical Therapist    CHELSEA Lamas PTA Physical Therapy Assistant                                  PT OP Goals       01/05/18 0757       PT Short Term Goals    STG Date to Achieve --   further deferred  -SS     STG 1 Decrease Modified Oswestry score to </= 15/50= 30%  -SS     STG 1 Progress Met  -SS     STG 2 Subjective improvement 30%  -SS     STG 2 Progress Met  -SS     Long Term Goals    LTG Date to Achieve 01/26/18  -SS     LTG 1 Decrease Modified Oswestry score to </= 10/50=20%   -SS     LTG 1 Progress Progressing  -SS     LTG 2 Pain symptoms </= 2/10 at all times.  -SS     LTG 2 Progress Progressing  -SS     LTG 3 Independent with HEP.  -SS     LTG 3 Progress Progressing  -SS     LTG 4 B glute max strength 5/5  -SS     LTG 4 Progress New  -SS     Time Calculation    PT Goal Re-Cert Due Date 01/26/18  -CHELSEA      "  User Key  (r) = Recorded By, (t) = Taken By, (c) = Cosigned By    Initials Name Provider Type    SS Horacio Davis, PT DPT Physical Therapist    CHELSEA Lamas PTA Physical Therapy Assistant                PT Assessment/Plan       01/05/18 0800 01/05/18 0757    PT Assessment    Functional Limitations Limitations in functional capacity and performance  -SS     Impairments Pain;Range of motion;Muscle strength;Endurance  -SS     Assessment Comments Improving. Would benefit from hip extensor and trunk extensor strengthening.  -SS some pain and discomfort in the left posterior thigh with multifidus step ups.   -KH    Rehab Potential Good  -SS     Patient/caregiver participated in establishment of treatment plan and goals Yes  -SS     Patient would benefit from skilled therapy intervention Yes  -SS     PT Plan    PT Frequency 1x/week;2x/week  -SS     Predicted Duration of Therapy Intervention (days/wks) 3 weeks  -SS     PT Plan Comments Manual therapy, stretching, LE and core strengthening, heat/ice as needed for pain.  -       User Key  (r) = Recorded By, (t) = Taken By, (c) = Cosigned By    Initials Name Provider Type    SS Horacio Davis, PT DPT Physical Therapist    CHELSEA Lamas PTA Physical Therapy Assistant                  Exercises       01/05/18 0754          Subjective Comments    Subjective Comments Pt reports that he is 80% better.  States that his pain is very minimal this morning.   -      Subjective Pain    Able to rate subjective pain? yes  -      Pre-Treatment Pain Level 1  -      Post-Treatment Pain Level 0  -      Exercise 1    Exercise Name 1 Pro ll LE strength  -      Time (Minutes) 1 10'  -KH      Additional Comments level 4  -      Exercise 2    Exercise Name 2 incline stretch  -KH      Sets 2 3  -KH      Time (Seconds) 2 30\"  -KH      Exercise 3    Exercise Name 3 standing hamstring stretch B  -KH      Sets 3 3  -KH      Time (Seconds) 3 30\"  -KH      Exercise 4    " "Exercise Name 4 B seated piriformis stretch  -KH      Sets 4 3  -KH      Time (Seconds) 4 30\"  -KH      Exercise 5    Exercise Name 5 prone alt LE ext   -KH      Sets 5 2  -KH      Reps 5 10  -KH      Exercise 6    Exercise Name 6 prone alt UE ext  -KH      Sets 6 2  -KH      Reps 6 10  -KH      Exercise 7    Exercise Name 7 prone \"superman's\"  -KH      Sets 7 1  -KH      Reps 7 10  -KH      Exercise 8    Exercise Name 8 bridges w/ physioball  -KH      Sets 8 2  -KH      Reps 8 10  -KH      Exercise 9    Exercise Name 9 Tball pelvic tilts 4 way  -KH      Reps 9 20  -KH      Exercise 10    Exercise Name 10 tball overhead lift plyoball  -KH      Sets 10 2  -KH      Reps 10 10  -KH      Additional Comments 6# plyo  -KH      Exercise 11    Exercise Name 11 ME to R anterior torsion  -KH      Exercise 12    Exercise Name 12 tball chops/lifts  -KH      Sets 12 2  -KH      Reps 12 10  -KH      Additional Comments 6# plyoball  -KH      Exercise 13    Exercise Name 13 multifidus step ups w/ ext  -KH      Sets 13 2  -KH      Reps 13 10  -KH      Additional Comments 6 inch  -KH      Exercise 14    Exercise Name 14 standing lunge stretch w/ leg high for proximal hamstring stretch  -KH      Sets 14 3  -KH      Time (Seconds) 14 30\"  -KH        User Key  (r) = Recorded By, (t) = Taken By, (c) = Cosigned By    Initials Name Provider Type    CHELSEA Lamas PTA Physical Therapy Assistant                        Outcome Measure Options: Modified Owestry  Modified Oswestry  Modified Oswestry Score/Comments: 30%      Time Calculation:   Start Time: 0757  Stop Time: 0853  Time Calculation (min): 56 min  Total Timed Code Minutes- PT: 53 minute(s)     Therapy Charges for Today     Code Description Service Date Service Provider Modifiers Qty    30832412977 HC PT THER SUPP EA 15 MIN 1/5/2018 Horacio Davis, PT DPT GP 1                   Horacio Davis, PT, DPT, CHT  1/5/2018      "

## 2018-01-10 ENCOUNTER — HOSPITAL ENCOUNTER (OUTPATIENT)
Dept: PHYSICAL THERAPY | Facility: HOSPITAL | Age: 63
Setting detail: THERAPIES SERIES
Discharge: HOME | End: 2018-01-10

## 2018-01-10 DIAGNOSIS — G89.29 CHRONIC BILATERAL LOW BACK PAIN, WITH SCIATICA PRESENCE UNSPECIFIED: Primary | ICD-10-CM

## 2018-01-10 DIAGNOSIS — M54.5 CHRONIC BILATERAL LOW BACK PAIN, WITH SCIATICA PRESENCE UNSPECIFIED: Primary | ICD-10-CM

## 2018-01-10 PROCEDURE — 97140 MANUAL THERAPY 1/> REGIONS: CPT

## 2018-01-10 PROCEDURE — 97110 THERAPEUTIC EXERCISES: CPT

## 2018-01-10 NOTE — THERAPY TREATMENT NOTE
Outpatient Physical Therapy Ortho Treatment Note  [facility]     Patient Name: Otf Murray  : 1955  MRN: [id number]  Today's Date: 1/10/2018      Visit Date: 01/10/2018     Subjective Improvement: 80%  Attendance:   ( for )  Next MD Visit : PRN  Recert Date:  18      Therapy Diagnosis:  LBP w/ sciatica; muscle spasm        Visit Dx:    ICD-10-CM ICD-9-CM   1. Chronic bilateral low back pain, with sciatica presence unspecified M54.5 724.2    G89.29 338.29       There is no problem list on file for this patient.       Past Medical History:   Diagnosis Date   • Acute gastritis without bleeding    • Alcohol abuse counseling and surveillance of alcoholic    • Allergic rhinitis    • Anxiety state    • Attention deficit disorder of childhood with hyperactivity    • Attention deficit hyperactivity disorder    • Attention deficit hyperactivity disorder, predominantly hyperactive impulsive type    • Attention deficit hyperactivity disorder, predominantly inattentive type    • Backache    • Body mass index (bmi) 31.0-31.9, adult     Body mass index (BMI) 31.0-31.9, adult - BMI 33.5      • Burn of lower leg    • Candidiasis of skin    • Cardiac murmur, unspecified    • Decreased testosterone level    • Depressive disorder     Depressive disorder - acute on chronic      • Dysgraphia    • Edema    • Elbow joint pain    • Encounter for general adult medical examination with abnormal findings    • Encounter for screening for malignant neoplasm of colon    • Essential hypertension    • Essential tremor    • Ex-smoker    • Fatigue    • Gastro-esophageal reflux disease with esophagitis    • Gastroesophageal reflux disease    • Hyperlipidemia    • Hyperlipoproteinemia    • Impotence of organic origin    • Insomnia    • Intermittent palpitations    • Knee pain     Knee pain - patellofemoral      • Lateral epicondylitis    • Low back pain    • Male erectile disorder    • Male erectile  dysfunction, unspecified    • Male hypogonadism    • Neck pain     Neck pain aggravated by recumbency      • Obese     Obese - BMI 33.0      • On long term drug therapy    • Osteoarthrosis     Osteoarthrosis, unspecified whether generalized or localized, involving lower leg      • Other obesity    • Other specified diseases of anus and rectum     Other specified diseases of anus and rectum - rectal pain after c-scope prep      • Overweight    • Pain in left knee    • Pain in right knee    • Shoulder pain, right    • Spasm of back muscles    • Tachycardia, unspecified    • Tremor, unspecified    • Tubular adenoma of colon    • Unspecified essential hypertension         Past Surgical History:   Procedure Laterality Date   • COLONOSCOPY  03/16/2016    One polyp in the sigmoid colon.Resected and retrieved.        • CYST REMOVAL  10/27/2009     Excision of sebaceous cyst of the forehead, glabellar region.   • ENDOSCOPY  03/16/2016    Mildly severe esophagitis.Gastritis.Normal examined duodenum.Several biopsies obtained in the lower third of the esophagus.    • ENDOSCOPY AND COLONOSCOPY  04/12/2006     Normal colonoscopic examination    • INCISION AND DRAINAGE ABSCESS  10/24/2012   • INJECTION OF MEDICATION  04/21/2011    Depo Medrol (Methylprednisone) 80mg (1)        • INJECTION OF MEDICATION  02/01/2016    Kenalog (3)        • INJECTION OF MEDICATION  02/19/2016    Toradol (3)    • KNEE ARTHROSCOPY  04/02/2009     Medial meniscus tear of left knee   • SCROTUM EXPLORATION  07/02/2002     Excision of epidermal inclusion cyst. base of left scrotum   • SKIN BIOPSY  04/23/2012   • SKIN TAG REMOVAL  04/25/2002    Skin tag, left scrotum              PT Ortho       01/10/18 0750    Precautions and Contraindications    Precautions/Limitations no known precautions/limitations  -    Posture/Observations    Posture/Observations Comments lumbars deep on left; no torsion at SI or sacurm   -      User Key  (r) = Recorded By, (t) =  "Taken By, (c) = Cosigned By    Initials Name Provider Type    CHELSEA Lamas PTA Physical Therapy Assistant                            PT Assessment/Plan       01/10/18 0750       PT Assessment    Assessment Comments increased pain with treatment this date; no change with manual but alignment corrected.   -     PT Plan    PT Frequency 1x/week;2x/week  -     Predicted Duration of Therapy Intervention (days/wks) 3 weeks  -     PT Plan Comments Continue to increase as pt tolerates. Manual as needed.  Needs core strength  -       User Key  (r) = Recorded By, (t) = Taken By, (c) = Cosigned By    Initials Name Provider Type    CHELSEA Lamas PTA Physical Therapy Assistant                Modalities       01/10/18 0750          Ice    Patient reports will apply ice at home to involved area Yes  -        User Key  (r) = Recorded By, (t) = Taken By, (c) = Cosigned By    Initials Name Provider Type    CHELSEA Lamas PTA Physical Therapy Assistant                Exercises       01/10/18 0750          Subjective Comments    Subjective Comments Pt reports that he had increased pain after last treatment down both legs l>R.  Reports that he had pain for three days and had to go get a massage which helped and you were able to sleep again.   -      Subjective Pain    Able to rate subjective pain? yes  -      Pre-Treatment Pain Level 2  -KH      Post-Treatment Pain Level 5  -KH      Exercise 1    Exercise Name 1 pro ll LE strength  -      Time (Minutes) 1 10'  -KH      Additional Comments level 4  -KH      Exercise 2    Exercise Name 2 incline stretch  -KH      Sets 2 3  -KH      Time (Seconds) 2 30\"  -KH      Exercise 3    Exercise Name 3 standing hamstring stretch  -KH      Sets 3 3  -KH      Time (Seconds) 3 30\"  -KH      Exercise 4    Exercise Name 4 seated B pirformis stretch  -KH      Sets 4 3  -KH      Time (Seconds) 4 30\"  -KH      Exercise 5    Exercise Name 5 sit to stands from mat table with aerobic step  " -KH      Sets 5 2  -KH      Reps 5 10  -KH      Additional Comments unable to complete secondary to pain   -      Exercise 6    Exercise Name 6 tball seated pelvic tilts 4 way  -KH      Reps 6 30  -KH      Exercise 7    Exercise Name 7 educated pt with hamstring stretches supine at home.   -KH      Time (Minutes) 7 5'  -KH      Exercise 8    Exercise Name 8 manual see flowsheet  -KH      Time (Minutes) 8 10'  -KH        User Key  (r) = Recorded By, (t) = Taken By, (c) = Cosigned By    Initials Name Provider Type    CHELSEA Lamas PTA Physical Therapy Assistant                        Manual Rx (last 36 hours)      Manual Treatments       01/10/18 0750          Manual Rx 1    Manual Rx 1 Location L LE distraction/ME to lumbars  -      Manual Rx 1 Duration 10'  -KH        User Key  (r) = Recorded By, (t) = Taken By, (c) = Cosigned By    Initials Name Provider Type    CHELSEA Lamas PTA Physical Therapy Assistant                PT OP Goals       01/10/18 0750       PT Short Term Goals    STG 1 STG further deferred at this time.   -     Long Term Goals    LTG Date to Achieve 01/26/18  -     LTG 1 Decrease Modified Oswestry score to </= 10/50=20%   -     LTG 1 Progress Progressing  -     LTG 2 Pain symptoms </= 2/10 at all times.  -     LTG 2 Progress Progressing  -     LTG 3 Independent with HEP.  -     LTG 3 Progress Progressing  -     LTG 4 B glute max strength 5/5  -     LTG 4 Progress New  -     Time Calculation    PT Goal Re-Cert Due Date 01/26/18  -       User Key  (r) = Recorded By, (t) = Taken By, (c) = Cosigned By    Initials Name Provider Type    CHELSEA Lamas PTA Physical Therapy Assistant                         Time Calculation:   Start Time: 0750  Stop Time: 0850  Time Calculation (min): 60 min  Total Timed Code Minutes- PT: 60 minute(s)    Therapy Charges for Today     Code Description Service Date Service Provider Modifiers Qty    [id number]  PT THER PROC EA 15 MIN 1/10/2018  Kristin Lamas, PTA GP 3    [id number] HC PT MANUAL THERAPY EA 15 MIN 1/10/2018 Kristin Lamas PTA GP 1                    Kristin Lamas PTA  1/10/2018

## 2018-01-12 ENCOUNTER — APPOINTMENT (OUTPATIENT)
Dept: PHYSICAL THERAPY | Facility: HOSPITAL | Age: 63
End: 2018-01-12

## 2018-01-16 ENCOUNTER — HOSPITAL ENCOUNTER (OUTPATIENT)
Dept: PHYSICAL THERAPY | Facility: HOSPITAL | Age: 63
Setting detail: THERAPIES SERIES
Discharge: HOME | End: 2018-01-16

## 2018-01-16 DIAGNOSIS — M54.5 CHRONIC BILATERAL LOW BACK PAIN, WITH SCIATICA PRESENCE UNSPECIFIED: Primary | ICD-10-CM

## 2018-01-16 DIAGNOSIS — G89.29 CHRONIC BILATERAL LOW BACK PAIN, WITH SCIATICA PRESENCE UNSPECIFIED: Primary | ICD-10-CM

## 2018-01-16 PROCEDURE — 97110 THERAPEUTIC EXERCISES: CPT

## 2018-01-16 NOTE — THERAPY TREATMENT NOTE
Outpatient Physical Therapy Ortho Treatment Note  [facility]     Patient Name: Otf Murray  : 1955  MRN: [id number]  Today's Date: 2018      Visit Date: 2018     Subjective Improvement: 95%  Attendance:  8 (30/yr)  Next MD Visit : PRN  Recert Date:  18      Therapy Diagnosis:  LBP w/ sciatica; muscle spasm        Visit Dx:    ICD-10-CM ICD-9-CM   1. Chronic bilateral low back pain, with sciatica presence unspecified M54.5 724.2    G89.29 338.29       There is no problem list on file for this patient.       Past Medical History:   Diagnosis Date   • Acute gastritis without bleeding    • Alcohol abuse counseling and surveillance of alcoholic    • Allergic rhinitis    • Anxiety state    • Attention deficit disorder of childhood with hyperactivity    • Attention deficit hyperactivity disorder    • Attention deficit hyperactivity disorder, predominantly hyperactive impulsive type    • Attention deficit hyperactivity disorder, predominantly inattentive type    • Backache    • Body mass index (bmi) 31.0-31.9, adult     Body mass index (BMI) 31.0-31.9, adult - BMI 33.5      • Burn of lower leg    • Candidiasis of skin    • Cardiac murmur, unspecified    • Decreased testosterone level    • Depressive disorder     Depressive disorder - acute on chronic      • Dysgraphia    • Edema    • Elbow joint pain    • Encounter for general adult medical examination with abnormal findings    • Encounter for screening for malignant neoplasm of colon    • Essential hypertension    • Essential tremor    • Ex-smoker    • Fatigue    • Gastro-esophageal reflux disease with esophagitis    • Gastroesophageal reflux disease    • Hyperlipidemia    • Hyperlipoproteinemia    • Impotence of organic origin    • Insomnia    • Intermittent palpitations    • Knee pain     Knee pain - patellofemoral      • Lateral epicondylitis    • Low back pain    • Male erectile disorder    • Male erectile dysfunction,  unspecified    • Male hypogonadism    • Neck pain     Neck pain aggravated by recumbency      • Obese     Obese - BMI 33.0      • On long term drug therapy    • Osteoarthrosis     Osteoarthrosis, unspecified whether generalized or localized, involving lower leg      • Other obesity    • Other specified diseases of anus and rectum     Other specified diseases of anus and rectum - rectal pain after c-scope prep      • Overweight    • Pain in left knee    • Pain in right knee    • Shoulder pain, right    • Spasm of back muscles    • Tachycardia, unspecified    • Tremor, unspecified    • Tubular adenoma of colon    • Unspecified essential hypertension         Past Surgical History:   Procedure Laterality Date   • COLONOSCOPY  03/16/2016    One polyp in the sigmoid colon.Resected and retrieved.        • CYST REMOVAL  10/27/2009     Excision of sebaceous cyst of the forehead, glabellar region.   • ENDOSCOPY  03/16/2016    Mildly severe esophagitis.Gastritis.Normal examined duodenum.Several biopsies obtained in the lower third of the esophagus.    • ENDOSCOPY AND COLONOSCOPY  04/12/2006     Normal colonoscopic examination    • INCISION AND DRAINAGE ABSCESS  10/24/2012   • INJECTION OF MEDICATION  04/21/2011    Depo Medrol (Methylprednisone) 80mg (1)        • INJECTION OF MEDICATION  02/01/2016    Kenalog (3)        • INJECTION OF MEDICATION  02/19/2016    Toradol (3)    • KNEE ARTHROSCOPY  04/02/2009     Medial meniscus tear of left knee   • SCROTUM EXPLORATION  07/02/2002     Excision of epidermal inclusion cyst. base of left scrotum   • SKIN BIOPSY  04/23/2012   • SKIN TAG REMOVAL  04/25/2002    Skin tag, left scrotum              PT Ortho       01/16/18 0754    Precautions and Contraindications    Precautions/Limitations no known precautions/limitations  -    Posture/Observations    Posture/Observations Comments no malalignment noted this date.   -      User Key  (r) = Recorded By, (t) = Taken By, (c) = Cosigned By  "   Initials Name Provider Type    CHELSEA Lamas PTA Physical Therapy Assistant                            PT Assessment/Plan       01/16/18 0754       PT Assessment    Assessment Comments good tolernace to treatment.  no increased pain with cybex equipment.    -     PT Plan    PT Frequency 1x/week;2x/week  -CHELSEA     Predicted Duration of Therapy Intervention (days/wks) 3 weeks  -     PT Plan Comments Continue through next week then possible d/c to independent managment at that time.   -CHELSEA       User Key  (r) = Recorded By, (t) = Taken By, (c) = Cosigned By    Initials Name Provider Type    CHELSEA Lamas PTA Physical Therapy Assistant                Modalities       01/16/18 0754          Ice    Patient reports will apply ice at home to involved area Yes  -        User Key  (r) = Recorded By, (t) = Taken By, (c) = Cosigned By    Initials Name Provider Type    CHELSEA Lamas PTA Physical Therapy Assistant                Exercises       01/16/18 0754          Subjective Comments    Subjective Comments Pt reports that he had decreased pain after last visit but by the end of the day he felt great that night and has been good ever since/  -      Subjective Pain    Able to rate subjective pain? yes  -KH      Pre-Treatment Pain Level 1  -KH      Post-Treatment Pain Level 0  -KH      Exercise 1    Exercise Name 1 pro ll LE strength  -KH      Time (Minutes) 1 10'  -KH      Additional Comments level 4.5  -KH      Exercise 2    Exercise Name 2 incline stretch  -KH      Sets 2 3  -KH      Time (Seconds) 2 30\"  -KH      Exercise 3    Exercise Name 3 standing hamstring stretch  -KH      Sets 3 3  -KH      Time (Seconds) 3 30\"  -KH      Exercise 4    Exercise Name 4 seated B pirformis stretch  -KH      Sets 4 3  -KH      Time (Seconds) 4 30\"  -KH      Exercise 5    Exercise Name 5 cybex incline pull   -KH      Sets 5 3  -KH      Reps 5 10  -KH      Additional Comments 90#  -KH      Exercise 6    Exercise Name 6 cybex row  " "-KH      Sets 6 3  -KH      Reps 6 10  -KH      Additional Comments 70#  -KH      Exercise 7    Exercise Name 7 cybex back ext  -KH      Sets 7 3  -KH      Reps 7 10  -KH      Additional Comments 90#  -KH      Exercise 8    Exercise Name 8 cybex leg press  -KH      Sets 8 3  -KH      Reps 8 10  -KH      Additional Comments 150#  -KH      Exercise 9    Exercise Name 9 Treadmill 0%  -KH      Time (Minutes) 9 10'  -KH      Additional Comments mph 1/9-2.2  -KH      Exercise 10    Exercise Name 10 standing hamstring stretches  -KH      Sets 10 3  -KH      Time (Seconds) 10 30\"  -        User Key  (r) = Recorded By, (t) = Taken By, (c) = Cosigned By    Initials Name Provider Type    CHELSEA Lamas PTA Physical Therapy Assistant                               PT OP Goals       01/16/18 0754       PT Short Term Goals    STG Date to Achieve --   further deferred  -     STG 1 STG further deferred at this time.   -     Long Term Goals    LTG Date to Achieve 01/26/18  -     LTG 1 Decrease Modified Oswestry score to </= 10/50=20%   -     LTG 1 Progress Progressing  -KH     LTG 2 Pain symptoms </= 2/10 at all times.  -KH     LTG 2 Progress Progressing  -KH     LTG 3 Independent with HEP.  -     LTG 3 Progress Progressing  -     LTG 4 B glute max strength 5/5  -     LTG 4 Progress New  -     Time Calculation    PT Goal Re-Cert Due Date 01/26/18  -       User Key  (r) = Recorded By, (t) = Taken By, (c) = Cosigned By    Initials Name Provider Type    CHELSEA Lamas PTA Physical Therapy Assistant                         Time Calculation:   Start Time: 0754  Stop Time: 0848  Time Calculation (min): 54 min  Total Timed Code Minutes- PT: 54 minute(s)    Therapy Charges for Today     Code Description Service Date Service Provider Modifiers Qty    [id number]  PT THER PROC EA 15 MIN 1/16/2018 Kristin Lamas PTA GP 4                    Kristin Lamas PTA  1/16/2018     "

## 2018-01-18 ENCOUNTER — HOSPITAL ENCOUNTER (OUTPATIENT)
Dept: PHYSICAL THERAPY | Facility: HOSPITAL | Age: 63
Setting detail: THERAPIES SERIES
Discharge: HOME | End: 2018-01-18

## 2018-01-18 DIAGNOSIS — G89.29 CHRONIC BILATERAL LOW BACK PAIN, WITH SCIATICA PRESENCE UNSPECIFIED: Primary | ICD-10-CM

## 2018-01-18 DIAGNOSIS — M54.5 CHRONIC BILATERAL LOW BACK PAIN, WITH SCIATICA PRESENCE UNSPECIFIED: Primary | ICD-10-CM

## 2018-01-18 PROCEDURE — 97110 THERAPEUTIC EXERCISES: CPT

## 2018-01-18 NOTE — THERAPY TREATMENT NOTE
Outpatient Physical Therapy Ortho Treatment Note  [facility]     Patient Name: Otf Murray  : 1955  MRN: [id number]  Today's Date: 2018      Visit Date: 2018      Subjective Improvement: 95%  Attendance:   (30/yr)  Next MD Visit : PRN  Recert Date:  18      Therapy Diagnosis:   LBP w/ sciatica; muscle spasm      Visit Dx:    ICD-10-CM ICD-9-CM   1. Chronic bilateral low back pain, with sciatica presence unspecified M54.5 724.2    G89.29 338.29       There is no problem list on file for this patient.       Past Medical History:   Diagnosis Date   • Acute gastritis without bleeding    • Alcohol abuse counseling and surveillance of alcoholic    • Allergic rhinitis    • Anxiety state    • Attention deficit disorder of childhood with hyperactivity    • Attention deficit hyperactivity disorder    • Attention deficit hyperactivity disorder, predominantly hyperactive impulsive type    • Attention deficit hyperactivity disorder, predominantly inattentive type    • Backache    • Body mass index (bmi) 31.0-31.9, adult     Body mass index (BMI) 31.0-31.9, adult - BMI 33.5      • Burn of lower leg    • Candidiasis of skin    • Cardiac murmur, unspecified    • Decreased testosterone level    • Depressive disorder     Depressive disorder - acute on chronic      • Dysgraphia    • Edema    • Elbow joint pain    • Encounter for general adult medical examination with abnormal findings    • Encounter for screening for malignant neoplasm of colon    • Essential hypertension    • Essential tremor    • Ex-smoker    • Fatigue    • Gastro-esophageal reflux disease with esophagitis    • Gastroesophageal reflux disease    • Hyperlipidemia    • Hyperlipoproteinemia    • Impotence of organic origin    • Insomnia    • Intermittent palpitations    • Knee pain     Knee pain - patellofemoral      • Lateral epicondylitis    • Low back pain    • Male erectile disorder    • Male erectile dysfunction,  unspecified    • Male hypogonadism    • Neck pain     Neck pain aggravated by recumbency      • Obese     Obese - BMI 33.0      • On long term drug therapy    • Osteoarthrosis     Osteoarthrosis, unspecified whether generalized or localized, involving lower leg      • Other obesity    • Other specified diseases of anus and rectum     Other specified diseases of anus and rectum - rectal pain after c-scope prep      • Overweight    • Pain in left knee    • Pain in right knee    • Shoulder pain, right    • Spasm of back muscles    • Tachycardia, unspecified    • Tremor, unspecified    • Tubular adenoma of colon    • Unspecified essential hypertension         Past Surgical History:   Procedure Laterality Date   • COLONOSCOPY  03/16/2016    One polyp in the sigmoid colon.Resected and retrieved.        • CYST REMOVAL  10/27/2009     Excision of sebaceous cyst of the forehead, glabellar region.   • ENDOSCOPY  03/16/2016    Mildly severe esophagitis.Gastritis.Normal examined duodenum.Several biopsies obtained in the lower third of the esophagus.    • ENDOSCOPY AND COLONOSCOPY  04/12/2006     Normal colonoscopic examination    • INCISION AND DRAINAGE ABSCESS  10/24/2012   • INJECTION OF MEDICATION  04/21/2011    Depo Medrol (Methylprednisone) 80mg (1)        • INJECTION OF MEDICATION  02/01/2016    Kenalog (3)        • INJECTION OF MEDICATION  02/19/2016    Toradol (3)    • KNEE ARTHROSCOPY  04/02/2009     Medial meniscus tear of left knee   • SCROTUM EXPLORATION  07/02/2002     Excision of epidermal inclusion cyst. base of left scrotum   • SKIN BIOPSY  04/23/2012   • SKIN TAG REMOVAL  04/25/2002    Skin tag, left scrotum              PT Ortho       01/18/18 0830    Precautions and Contraindications    Precautions/Limitations no known precautions/limitations  -    Posture/Observations    Posture/Observations Comments lumbars deep on L  -KH      01/16/18 0754    Precautions and Contraindications    Precautions/Limitations  "no known precautions/limitations  -    Posture/Observations    Posture/Observations Comments no malalignment noted this date.   -      User Key  (r) = Recorded By, (t) = Taken By, (c) = Cosigned By    Initials Name Provider Type    CHELSEA Kristin LamasNANCY Physical Therapy Assistant                            PT Assessment/Plan       01/18/18 0830       PT Assessment    Assessment Comments alignment corrected with ME this date.  Continues to respond well to current treatment plan.   -     PT Plan    PT Frequency 1x/week;2x/week  -     Predicted Duration of Therapy Intervention (days/wks) 3 weeks  -     PT Plan Comments Continue through next next with possible d/c at that time.   -       User Key  (r) = Recorded By, (t) = Taken By, (c) = Cosigned By    Initials Name Provider Type    CHELSEA Kristin LamasNANCY Physical Therapy Assistant                    Exercises       01/18/18 0830          Subjective Comments    Subjective Comments Pt reports that he has had come increased soreness since last visit.  Beleives it is just muscle soreness and not increasd pain   -      Subjective Pain    Able to rate subjective pain? yes  -      Pre-Treatment Pain Level 2  -KH      Post-Treatment Pain Level 0  -KH      Exercise 1    Exercise Name 1 pro ll LE strength  -      Time (Minutes) 1 12'  -KH      Additional Comments level 4  -KH      Exercise 2    Exercise Name 2 incline stretch  -KH      Sets 2 3  -KH      Time (Seconds) 2 30\"  -KH      Exercise 3    Exercise Name 3 standing hamstring stretch  -KH      Sets 3 3  -KH      Time (Seconds) 3 30\"  -KH      Exercise 4    Exercise Name 4 seated B pirformis stretch  -KH      Sets 4 3  -KH      Time (Seconds) 4 30\"  -KH      Exercise 5    Exercise Name 5 Check alignment   -KH      Additional Comments ME to rotated lumbar  -KH      Exercise 6    Exercise Name 6 cybex inlcine pul  -KH      Sets 6 3  -KH      Reps 6 10  -KH      Additional Comments 90#  -KH      Exercise 7    " "Exercise Name 7 cybex row  -KH      Sets 7 3  -KH      Reps 7 10  -KH      Additional Comments 70#  -KH      Exercise 8    Exercise Name 8 cybex back ext  -KH      Sets 8 3  -KH      Reps 8 10  -KH      Additional Comments 90#  -KH      Exercise 9    Exercise Name 9 cybex leg press  -KH      Sets 9 3  -KH      Reps 9 10  -KH      Additional Comments 150#  -KH      Exercise 10    Exercise Name 10 treadmill 0% grade  -KH      Time (Minutes) 10 10'  -KH      Additional Comments mph 2.2  -KH      Exercise 11    Exercise Name 11 standing hamstring stretch  -KH      Sets 11 3  -KH      Time (Seconds) 11 30\"  -        User Key  (r) = Recorded By, (t) = Taken By, (c) = Cosigned By    Initials Name Provider Type    CHELSEA Lamas PTA Physical Therapy Assistant                               PT OP Goals       01/18/18 0830       PT Short Term Goals    STG Date to Achieve --   further deferred  -     STG 1 STG further deferred at this time.   -     Long Term Goals    LTG Date to Achieve 01/26/18  -     LTG 1 Decrease Modified Oswestry score to </= 10/50=20%   -     LTG 1 Progress Progressing  -     LTG 2 Pain symptoms </= 2/10 at all times.  -     LTG 2 Progress Progressing  -     LTG 3 Independent with HEP.  -     LTG 3 Progress Progressing  -     LTG 4 B glute max strength 5/5  -     LTG 4 Progress New  -     Time Calculation    PT Goal Re-Cert Due Date 01/26/18  -       User Key  (r) = Recorded By, (t) = Taken By, (c) = Cosigned By    Initials Name Provider Type    CHELSEA Lamas PTA Physical Therapy Assistant                         Time Calculation:   Start Time: 0830  Stop Time: 0925  Time Calculation (min): 55 min  Total Timed Code Minutes- PT: 55 minute(s)    Therapy Charges for Today     Code Description Service Date Service Provider Modifiers Qty    [id number]  PT THER PROC EA 15 MIN 1/18/2018 Kristin Lamas PTA GP 4                    Kristin Lamas PTA  1/18/2018     "

## 2018-01-19 ENCOUNTER — OFFICE VISIT (OUTPATIENT)
Dept: BEHAVIORAL HEALTH | Facility: CLINIC | Age: 63
End: 2018-01-19

## 2018-01-19 DIAGNOSIS — F90.0 ADHD, PREDOMINANTLY INATTENTIVE TYPE: Primary | ICD-10-CM

## 2018-01-19 PROCEDURE — 90834 PSYTX W PT 45 MINUTES: CPT | Performed by: PSYCHOLOGIST

## 2018-01-19 NOTE — PROGRESS NOTES
2018    Otf Murray, a 62 y.o. male, was seen today for initial appointment lasting 45 minutes.  Patient is referred by Juan Carlos JIMENES.     SUBJECTIVE:  The patient has requested a reevaluation of attention deficit hyperactivity disorder.  I originally evaluated him for ADHD about 10 years ago.  He was treated with stimulant medication.  Was also treated for depression with Wellbutrin at 450 mg daily.  And Vyvanse 70 mg daily.  However after his father  he became more depressed and quit taking all of his medications.  Without his medicine he has a hard time getting tasks done, he gets distracted while driving, and has a hard time focusing.  This patient been  for 22 years and the relationship is going well.  He works as an  for [employer].    FAMILY HISTORY:  Positive for ADHD and mood disorders.    MENTAL STATUS:  Patient presents as a man who looks his stated age, mood is upbeat and appropriate.  He's oriented ×3, thoughts are goal-directed and logical.  There is no evidence of a personality disorder or a substance abuse disorder.  He reports that he can be scatterbrained, he starts things without finishing them, his mind tends to wander when he is trying to concentrate, he interrupts people during conversations, he is forgetful, and he procrastinates.  While driving he often misses his turns.  He sleeps well at night, has good energy during the day.  He continues to have mild-to-moderate depression.  He worries about getting behind on his projects.  He is not suicidal, homicidal, does not have hallucinations.    This evaluation consisted of psychological testing with the Mindi computerized continuous performance test, and a review of several previous psychological evaluations.  The Mindi requires the patient to click a mouse button for certain visual and auditory targets displayed on the computer.  In addition, the patient has to refrain from clicking on the mouse button  for visual and auditory distractor non-targets.  Scores on the [place] have a mean of 100 and a standard deviation of 15 points, any score of 80 or lower identifies a significant problem area.  The patient's scores were impulsivity 90, attention 59, hyperactivity 104.  The test results show that the patient made errors by not responding when a response was called for, indicating inattention.     The patient lost focus several times when his/her attention tended to wander.  There was a lot of inconsistency in the patient's response times, also showing fluctuating attention.       Several previous evaluations by clinical psychologist, and neuropsychologist alcohol found clear evidence of attention deficit hyperactivity disorder.        DIAGNOSIS:    ICD-10-CM ICD-9-CM   1. ADHD, predominantly inattentive type F90.0 314.00       ASSESSMENT PLAN:  Recommendations are for this patient to see his physician for continued treatment of attention deficit hyperactivity disorder, and continued treatment of his depression          This document has been electronically signed by Serafin Quezada EdD on January 19, 2018 9:40 AM

## 2018-01-23 ENCOUNTER — HOSPITAL ENCOUNTER (OUTPATIENT)
Dept: PHYSICAL THERAPY | Facility: HOSPITAL | Age: 63
Setting detail: THERAPIES SERIES
Discharge: HOME | End: 2018-01-23

## 2018-01-23 DIAGNOSIS — M54.5 CHRONIC BILATERAL LOW BACK PAIN, WITH SCIATICA PRESENCE UNSPECIFIED: Primary | ICD-10-CM

## 2018-01-23 DIAGNOSIS — G89.29 CHRONIC BILATERAL LOW BACK PAIN, WITH SCIATICA PRESENCE UNSPECIFIED: Primary | ICD-10-CM

## 2018-01-23 PROCEDURE — 97110 THERAPEUTIC EXERCISES: CPT

## 2018-01-23 NOTE — THERAPY TREATMENT NOTE
Outpatient Physical Therapy Ortho Treatment Note  [facility]     Patient Name: Otf Murray  : 1955  MRN: [id number]  Today's Date: 2018      Visit Date: 2018     Subjective Improvement: 95%  Attendance:  10/10 (30/yr)  Next MD Visit : PRN  Recert Date:  18      Therapy Diagnosis:  LBP w/ sciatica; muscle spasm        Visit Dx:    ICD-10-CM ICD-9-CM   1. Chronic bilateral low back pain, with sciatica presence unspecified M54.5 724.2    G89.29 338.29       Patient Active Problem List   Diagnosis   • ADHD, predominantly inattentive type        Past Medical History:   Diagnosis Date   • Acute gastritis without bleeding    • Alcohol abuse counseling and surveillance of alcoholic    • Allergic rhinitis    • Anxiety state    • Attention deficit disorder of childhood with hyperactivity    • Attention deficit hyperactivity disorder    • Attention deficit hyperactivity disorder, predominantly hyperactive impulsive type    • Attention deficit hyperactivity disorder, predominantly inattentive type    • Backache    • Body mass index (bmi) 31.0-31.9, adult     Body mass index (BMI) 31.0-31.9, adult - BMI 33.5      • Burn of lower leg    • Candidiasis of skin    • Cardiac murmur, unspecified    • Decreased testosterone level    • Depressive disorder     Depressive disorder - acute on chronic      • Dysgraphia    • Edema    • Elbow joint pain    • Encounter for general adult medical examination with abnormal findings    • Encounter for screening for malignant neoplasm of colon    • Essential hypertension    • Essential tremor    • Ex-smoker    • Fatigue    • Gastro-esophageal reflux disease with esophagitis    • Gastroesophageal reflux disease    • Hyperlipidemia    • Hyperlipoproteinemia    • Impotence of organic origin    • Insomnia    • Intermittent palpitations    • Knee pain     Knee pain - patellofemoral      • Lateral epicondylitis    • Low back pain    • Male erectile disorder    •  Male erectile dysfunction, unspecified    • Male hypogonadism    • Neck pain     Neck pain aggravated by recumbency      • Obese     Obese - BMI 33.0      • On long term drug therapy    • Osteoarthrosis     Osteoarthrosis, unspecified whether generalized or localized, involving lower leg      • Other obesity    • Other specified diseases of anus and rectum     Other specified diseases of anus and rectum - rectal pain after c-scope prep      • Overweight    • Pain in left knee    • Pain in right knee    • Shoulder pain, right    • Spasm of back muscles    • Tachycardia, unspecified    • Tremor, unspecified    • Tubular adenoma of colon    • Unspecified essential hypertension         Past Surgical History:   Procedure Laterality Date   • COLONOSCOPY  03/16/2016    One polyp in the sigmoid colon.Resected and retrieved.        • CYST REMOVAL  10/27/2009     Excision of sebaceous cyst of the forehead, glabellar region.   • ENDOSCOPY  03/16/2016    Mildly severe esophagitis.Gastritis.Normal examined duodenum.Several biopsies obtained in the lower third of the esophagus.    • ENDOSCOPY AND COLONOSCOPY  04/12/2006     Normal colonoscopic examination    • INCISION AND DRAINAGE ABSCESS  10/24/2012   • INJECTION OF MEDICATION  04/21/2011    Depo Medrol (Methylprednisone) 80mg (1)        • INJECTION OF MEDICATION  02/01/2016    Kenalog (3)        • INJECTION OF MEDICATION  02/19/2016    Toradol (3)    • KNEE ARTHROSCOPY  04/02/2009     Medial meniscus tear of left knee   • SCROTUM EXPLORATION  07/02/2002     Excision of epidermal inclusion cyst. base of left scrotum   • SKIN BIOPSY  04/23/2012   • SKIN TAG REMOVAL  04/25/2002    Skin tag, left scrotum              PT Ortho       01/23/18 0755    Precautions and Contraindications    Precautions/Limitations no known precautions/limitations  -    Posture/Observations    Posture/Observations Comments no apperant distress noted this date.   -      User Key  (r) = Recorded By,  "(t) = Taken By, (c) = Cosigned By    Initials Name Provider Type    CHELSEA Lamas PTA Physical Therapy Assistant                            PT Assessment/Plan       01/23/18 0755       PT Assessment    Assessment Comments indpendent with all fitness routine and stretched without increased pain.   -     PT Plan    PT Frequency 1x/week;2x/week  -     Predicted Duration of Therapy Intervention (days/wks) 3 weeks  -     PT Plan Comments Recheck scheduled for 02/05/18 with possible d/c at that time. Pt to continue on his own for 2 weeks at this time and then reassess at that day.   -       User Key  (r) = Recorded By, (t) = Taken By, (c) = Cosigned By    Initials Name Provider Type    CHELSEA Lamas PTA Physical Therapy Assistant                    Exercises       01/23/18 075          Subjective Comments    Subjective Comments Pt reports that he is having very minimal pain over the weekend and today.   -      Subjective Pain    Able to rate subjective pain? yes  -      Pre-Treatment Pain Level 1  -KH      Post-Treatment Pain Level 0  -KH      Exercise 1    Exercise Name 1 pro ll LE strength  -KH      Time (Minutes) 1 12'  -KH      Additional Comments level 5  -KH      Exercise 2    Exercise Name 2 incline stretch  -KH      Sets 2 3  -KH      Time (Seconds) 2 30\"  -KH      Exercise 3    Exercise Name 3 standing hamstring stretch  -KH      Sets 3 3  -KH      Time (Seconds) 3 30\"  -KH      Exercise 4    Exercise Name 4 seated B pirformis stretch  -KH      Sets 4 3  -KH      Time (Seconds) 4 30\"  -KH      Exercise 6    Exercise Name 6 cybex inlcine pul  -KH      Sets 6 2  -KH      Reps 6 10  -KH      Additional Comments 90#  -KH      Exercise 7    Exercise Name 7 cybex row  -KH      Sets 7 2  -KH      Reps 7 10  -KH      Additional Comments 90#  -KH      Exercise 8    Exercise Name 8 cybex back ext  -KH      Sets 8 2  -KH      Reps 8 10  -KH      Additional Comments 90#  -KH      Exercise 9    Exercise Name 9 " "cybex leg press  -      Sets 9 2  -      Reps 9 10  -KH      Additional Comments 150#  -      Exercise 10    Exercise Name 10 cybex hip abd/add   -KH      Sets 10 2  -KH      Reps 10 10  -KH      Additional Comments 75#  -      Exercise 11    Exercise Name 11 standing hamstring stretch  -      Sets 11 3  -KH      Time (Seconds) 11 30\"  -        User Key  (r) = Recorded By, (t) = Taken By, (c) = Cosigned By    Initials Name Provider Type    CHELSEA Lamas PTA Physical Therapy Assistant                               PT OP Goals       01/23/18 0755       PT Short Term Goals    STG Date to Achieve --   further deferred  -     STG 1 STG further deferred at this time.   -     Long Term Goals    LTG Date to Achieve 01/26/18  -     LTG 1 Decrease Modified Oswestry score to </= 10/50=20%   -     LTG 1 Progress Progressing  -     LTG 2 Pain symptoms </= 2/10 at all times.  -     LTG 2 Progress Met  -     LTG 3 Independent with HEP.  -     LTG 3 Progress Met  -     LTG 4 B glute max strength 5/5  -     LTG 4 Progress Met  -     Time Calculation    PT Goal Re-Cert Due Date 01/26/18  -       User Key  (r) = Recorded By, (t) = Taken By, (c) = Cosigned By    Initials Name Provider Type    CHELSEA Lamas PTA Physical Therapy Assistant                         Time Calculation:   Start Time: 0755  Stop Time: 0845  Time Calculation (min): 50 min  Total Timed Code Minutes- PT: 50 minute(s)    Therapy Charges for Today     Code Description Service Date Service Provider Modifiers Qty    [id number]  PT THER PROC EA 15 MIN 1/23/2018 Kristin Lamas PTA GP 3                    Kristin Lamas PTA  1/23/2018     "

## 2018-02-05 ENCOUNTER — HOSPITAL ENCOUNTER (OUTPATIENT)
Dept: PHYSICAL THERAPY | Facility: HOSPITAL | Age: 63
Setting detail: THERAPIES SERIES
Discharge: HOME | End: 2018-02-05

## 2018-02-05 DIAGNOSIS — M54.5 CHRONIC BILATERAL LOW BACK PAIN, WITH SCIATICA PRESENCE UNSPECIFIED: Primary | ICD-10-CM

## 2018-02-05 DIAGNOSIS — G89.29 CHRONIC BILATERAL LOW BACK PAIN, WITH SCIATICA PRESENCE UNSPECIFIED: Primary | ICD-10-CM

## 2018-02-05 PROCEDURE — 97110 THERAPEUTIC EXERCISES: CPT | Performed by: PHYSICAL THERAPIST

## 2018-02-05 NOTE — THERAPY DISCHARGE NOTE
Outpatient Physical Therapy Ortho Progress Note/Discharge Summary  [facility]     Patient Name: tOf Murray  : 1955  MRN: [id number]  Today's Date: 2018      Visit Date: 2018  Attendance:   Subjective Improvement: 100%  Next MD Appt: PRN    Therapy Diagnosis: LBP w/ sciatica; muscle spasm    Visit Dx:    ICD-10-CM ICD-9-CM   1. Chronic bilateral low back pain, with sciatica presence unspecified M54.5 724.2    G89.29 338.29     Changes in Medications: stopped Wellbutrin, restarted Vyvanse  Changes in MD Orders: none noted  Number of Work Days Lost: none         Past Medical History:   Diagnosis Date   • Acute gastritis without bleeding    • Alcohol abuse counseling and surveillance of alcoholic    • Allergic rhinitis    • Anxiety state    • Attention deficit disorder of childhood with hyperactivity    • Attention deficit hyperactivity disorder    • Attention deficit hyperactivity disorder, predominantly hyperactive impulsive type    • Attention deficit hyperactivity disorder, predominantly inattentive type    • Backache    • Body mass index (bmi) 31.0-31.9, adult     Body mass index (BMI) 31.0-31.9, adult - BMI 33.5      • Burn of lower leg    • Candidiasis of skin    • Cardiac murmur, unspecified    • Decreased testosterone level    • Depressive disorder     Depressive disorder - acute on chronic      • Dysgraphia    • Edema    • Elbow joint pain    • Encounter for general adult medical examination with abnormal findings    • Encounter for screening for malignant neoplasm of colon    • Essential hypertension    • Essential tremor    • Ex-smoker    • Fatigue    • Gastro-esophageal reflux disease with esophagitis    • Gastroesophageal reflux disease    • Hyperlipidemia    • Hyperlipoproteinemia    • Impotence of organic origin    • Insomnia    • Intermittent palpitations    • Knee pain     Knee pain - patellofemoral      • Lateral epicondylitis    • Low back pain    • Male  erectile disorder    • Male erectile dysfunction, unspecified    • Male hypogonadism    • Neck pain     Neck pain aggravated by recumbency      • Obese     Obese - BMI 33.0      • On long term drug therapy    • Osteoarthrosis     Osteoarthrosis, unspecified whether generalized or localized, involving lower leg      • Other obesity    • Other specified diseases of anus and rectum     Other specified diseases of anus and rectum - rectal pain after c-scope prep      • Overweight    • Pain in left knee    • Pain in right knee    • Shoulder pain, right    • Spasm of back muscles    • Tachycardia, unspecified    • Tremor, unspecified    • Tubular adenoma of colon    • Unspecified essential hypertension         Past Surgical History:   Procedure Laterality Date   • COLONOSCOPY  03/16/2016    One polyp in the sigmoid colon.Resected and retrieved.        • CYST REMOVAL  10/27/2009     Excision of sebaceous cyst of the forehead, glabellar region.   • ENDOSCOPY  03/16/2016    Mildly severe esophagitis.Gastritis.Normal examined duodenum.Several biopsies obtained in the lower third of the esophagus.    • ENDOSCOPY AND COLONOSCOPY  04/12/2006     Normal colonoscopic examination    • INCISION AND DRAINAGE ABSCESS  10/24/2012   • INJECTION OF MEDICATION  04/21/2011    Depo Medrol (Methylprednisone) 80mg (1)        • INJECTION OF MEDICATION  02/01/2016    Kenalog (3)        • INJECTION OF MEDICATION  02/19/2016    Toradol (3)    • KNEE ARTHROSCOPY  04/02/2009     Medial meniscus tear of left knee   • SCROTUM EXPLORATION  07/02/2002     Excision of epidermal inclusion cyst. base of left scrotum   • SKIN BIOPSY  04/23/2012   • SKIN TAG REMOVAL  04/25/2002    Skin tag, left scrotum              PT Ortho       02/05/18 0800    Precautions and Contraindications    Precautions/Limitations no known precautions/limitations  -SS    Lumbosacral Palpation    SI --   outflare L hemipelvis  -SS    Trunk    Flexion AROM Deficit fingertips to  toes; stretches on HS  -SS    Extension AROM Deficit WFLs; no pain  -SS    Left Hip    Hip Flexion Gross Movement (5/5) normal  -SS    Hip Extension Gluteus Fritz (5/5) normal  -SS    Hip Ext Gluteus Fritz with Hamstrings (5/5) normal  -SS    Hip ABduction Gross Movement (5/5) normal  -SS    Hip ADduction Gross Movement (5/5) normal  -SS    Right Hip    Hip Flexion Gross Movement (5/5) normal  -SS    Hip Extension Gluteus Fritz (5/5) normal  -SS    Hip Ext Gluteus Fritz with Hamstrings (5/5) normal  -SS    Hip ABduction Gross Movement (5/5) normal  -SS    Hip ADduction Gross Movement (5/5) normal  -SS      User Key  (r) = Recorded By, (t) = Taken By, (c) = Cosigned By    Initials Name Provider Type    RADHA Davis, PT DPT Physical Therapist                            PT Assessment/Plan       02/05/18 0800       PT Assessment    Functional Limitations Other (comment)   none noted at present  -SS     Impairments Other (comment)   none noted at present  -SS     Assessment Comments Independent with HEP. Some discomfort with old couch. Recommended he not use that piece of furniture or replace it. Patient MMI with P.T. at present.  -SS     Rehab Potential Good  -SS     Patient/caregiver participated in establishment of treatment plan and goals Yes  -SS     Patient would benefit from skilled therapy intervention No  -SS     PT Plan    PT Frequency Other (comment)   D/C P.T.  -SS     PT Plan Comments D/C P.T.  -SS       User Key  (r) = Recorded By, (t) = Taken By, (c) = Cosigned By    Initials Name Provider Type    RADHA Davis, PT DPT Physical Therapist                    Exercises       02/05/18 0800          Subjective Comments    Subjective Comments Patient reports that he had a good week last week. However, he did a lot of sitting on a low, worn-out couch more this weekend and his back is bothering him more. States he is concerned if his alignment will stay. Notices pain most rolling in  bed and being on that couch. Feels tight in the L posterior thigh. 100% subjective improvement. Medication changes: stopped Welbutrin, restarted Vyvanse  -SS      Subjective Pain    Able to rate subjective pain? yes  -SS      Pre-Treatment Pain Level 0  -SS      Post-Treatment Pain Level 0  -SS      Exercise 1    Exercise Name 1 Pro2, Seat 8  -SS      Cueing 1 Verbal  -SS      Time (Minutes) 1 12 mins  -SS      Additional Comments Level 5  -SS      Exercise 2    Exercise Name 2 Standing HS stretch  -SS      Cueing 2 Verbal  -SS      Sets 2 3  -SS      Time (Seconds) 2 30 sec hold  -SS      Exercise 3    Exercise Name 3 Incline calf stretch  -SS      Cueing 3 Verbal  -SS      Sets 3 3  -SS      Time (Seconds) 3 30 sec hold  -SS      Exercise 4    Exercise Name 4 Seated piriformis stretch  -SS      Cueing 4 Verbal  -SS      Sets 4 3  -SS      Time (Seconds) 4 30 sec hold  -SS      Exercise 5    Exercise Name 5 Seated trunk rotation stretch  -SS      Cueing 5 --   verbal, demo  -SS      Sets 5 3  -SS      Time (Seconds) 5 30 sec hold  -SS      Exercise 6    Exercise Name 6 Cybex hip abd  -SS      Cueing 6 Verbal  -SS      Sets 6 3  -SS      Reps 6 10  -SS      Additional Comments 80#  -SS      Exercise 7    Exercise Name 7 Cybex Hip Add  -SS      Cueing 7 Verbal  -SS      Sets 7 3  -SS      Reps 7 10  -SS      Additional Comments 75#  -SS      Exercise 8    Exercise Name 8 Cybex Rotary Torso - B  -SS      Cueing 8 --   verbal; demo  -SS      Sets 8 1  -SS      Reps 8 10  -SS      Additional Comments 35#  -SS        User Key  (r) = Recorded By, (t) = Taken By, (c) = Cosigned By    Initials Name Provider Type    SS Horacio Davis, PT DPT Physical Therapist                               PT OP Goals       02/05/18 0800       PT Short Term Goals    STG Date to Achieve --   further deferred  -SS     STG 1 STG further deferred at this time.   -SS     Long Term Goals    LTG Date to Achieve 01/26/18  -SS     LTG 1  Decrease Modified Oswestry score to </= 10/50=20%   -SS     LTG 1 Progress Met  -SS     LTG 2 Pain symptoms </= 2/10 at all times.  -SS     LTG 2 Progress Met  -SS     LTG 3 Independent with HEP.  -SS     LTG 3 Progress Met  -SS     LTG 4 B glute max strength 5/5  -     LTG 4 Progress Met  -SS       User Key  (r) = Recorded By, (t) = Taken By, (c) = Cosigned By    Initials Name Provider Type     Horacio Davis, PT DPT Physical Therapist          Therapy Education  Given: HEP, Symptoms/condition management  Program: Reinforced  How Provided: Verbal  Provided to: Patient  Level of Understanding: Verbalized    Outcome Measure Options: Modified [place]  Modified Oswestry  Modified Oswestry Score/Comments: 2%      Time Calculation:   Start Time: 0758  Stop Time: 0843  Time Calculation (min): 45 min    Therapy Charges for Today     Code Description Service Date Service Provider Modifiers Qty    18580186747 HC PT THER PROC EA 15 MIN 2/5/2018 Horacio Davis, PT DPT GP 3               OP PT Discharge Summary  Date of Discharge: 02/05/18  Reason for Discharge: All goals achieved  Outcomes Achieved: Able to achieve all goals within established timeline  Discharge Destination: Home with home program      Horacio Davis, PT, DPT, CHT  2/5/2018

## 2019-12-09 ENCOUNTER — TRANSCRIBE ORDERS (OUTPATIENT)
Dept: PHYSICAL THERAPY | Facility: HOSPITAL | Age: 64
End: 2019-12-09

## 2019-12-09 DIAGNOSIS — M54.2 NECK PAIN: Primary | ICD-10-CM

## 2019-12-10 ENCOUNTER — HOSPITAL ENCOUNTER (OUTPATIENT)
Dept: PHYSICAL THERAPY | Facility: HOSPITAL | Age: 64
Discharge: HOME | End: 2019-12-10
Admitting: FAMILY MEDICINE

## 2019-12-10 DIAGNOSIS — M54.2 NECK PAIN, CHRONIC: Primary | ICD-10-CM

## 2019-12-10 DIAGNOSIS — G89.29 NECK PAIN, CHRONIC: Primary | ICD-10-CM

## 2019-12-10 PROCEDURE — 97161 PT EVAL LOW COMPLEX 20 MIN: CPT

## 2019-12-10 PROCEDURE — 97530 THERAPEUTIC ACTIVITIES: CPT

## 2019-12-10 PROCEDURE — 97535 SELF CARE MNGMENT TRAINING: CPT

## 2019-12-10 NOTE — THERAPY EVALUATION
Outpatient Physical Therapy Ortho Initial Evaluation  [facility]     Patient Name: Otf Murray  : 1955  MRN: [id number]  Today's Date: 12/10/2019      Visit Date: 12/10/2019    Patient Active Problem List   Diagnosis   • ADHD, predominantly inattentive type        Past Medical History:   Diagnosis Date   • Acute gastritis without bleeding    • Alcohol abuse counseling and surveillance of alcoholic    • Allergic rhinitis    • Anxiety state    • Attention deficit disorder of childhood with hyperactivity    • Attention deficit hyperactivity disorder    • Attention deficit hyperactivity disorder, predominantly hyperactive impulsive type    • Attention deficit hyperactivity disorder, predominantly inattentive type    • Backache    • Body mass index (bmi) 31.0-31.9, adult     Body mass index (BMI) 31.0-31.9, adult - BMI 33.5      • Burn of lower leg    • Candidiasis of skin    • Cardiac murmur, unspecified    • Decreased testosterone level    • Depressive disorder     Depressive disorder - acute on chronic      • Dysgraphia    • Edema    • Elbow joint pain    • Encounter for general adult medical examination with abnormal findings    • Encounter for screening for malignant neoplasm of colon    • Essential hypertension    • Essential tremor    • Ex-smoker    • Fatigue    • Gastro-esophageal reflux disease with esophagitis    • Gastroesophageal reflux disease    • Hyperlipidemia    • Hyperlipoproteinemia    • Impotence of organic origin    • Insomnia    • Intermittent palpitations    • Knee pain     Knee pain - patellofemoral      • Lateral epicondylitis    • Low back pain    • Male erectile disorder    • Male erectile dysfunction, unspecified    • Male hypogonadism    • Neck pain     Neck pain aggravated by recumbency      • Obese     Obese - BMI 33.0      • On long term drug therapy    • Osteoarthrosis     Osteoarthrosis, unspecified whether generalized or localized, involving lower leg      • Other  obesity    • Other specified diseases of anus and rectum     Other specified diseases of anus and rectum - rectal pain after c-scope prep      • Overweight    • Pain in left knee    • Pain in right knee    • Shoulder pain, right    • Spasm of back muscles    • Tachycardia, unspecified    • Tremor, unspecified    • Tubular adenoma of colon    • Unspecified essential hypertension         Past Surgical History:   Procedure Laterality Date   • COLONOSCOPY  03/16/2016    One polyp in the sigmoid colon.Resected and retrieved.        • CYST REMOVAL  10/27/2009     Excision of sebaceous cyst of the forehead, glabellar region.   • ENDOSCOPY  03/16/2016    Mildly severe esophagitis.Gastritis.Normal examined duodenum.Several biopsies obtained in the lower third of the esophagus.    • ENDOSCOPY AND COLONOSCOPY  04/12/2006     Normal colonoscopic examination    • INCISION AND DRAINAGE ABSCESS  10/24/2012   • INJECTION OF MEDICATION  04/21/2011    Depo Medrol (Methylprednisone) 80mg (1)        • INJECTION OF MEDICATION  02/01/2016    Kenalog (3)        • INJECTION OF MEDICATION  02/19/2016    Toradol (3)    • KNEE ARTHROSCOPY  04/02/2009     Medial meniscus tear of left knee   • SCROTUM EXPLORATION  07/02/2002     Excision of epidermal inclusion cyst. base of left scrotum   • SKIN BIOPSY  04/23/2012   • SKIN TAG REMOVAL  04/25/2002    Skin tag, left scrotum        Visit Dx:     ICD-10-CM ICD-9-CM   1. Neck pain, chronic M54.2 723.1    G89.29 338.29         Patient History     Row Name 12/10/19 0900             History    Chief Complaint  Pain;Numbness;Tinglings  -      Type of Pain  Neck pain;Upper Extremity / Arm  -      Date Current Problem(s) Began  12/03/19  -      Brief Description of Current Complaint  Pt states has had chronic neck pain for quite a few years.  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  -       Patient/Caregiver Goals  Relieve pain;Return to prior level of function;Improve mobility;Improve strength;Know what to do to help the symptoms  -      Current Tobacco Use  none  -      Smoking Status  quit ~1 year  -      Patient's Rating of General Health  Fair  -      Hand Dominance  right-handed  -      Occupation/sports/leisure activities  ; yoga  -         Pain     Pain Location  Neck;Arm  -      Pain at Present  2  -      Pain at Worst  8  -      Pain Frequency  Intermittent  -      Pain Description  Aching;Burning;Sharp;Tingling;Numbness  -      What Performance Factors Make the Current Problem(s) WORSE?  driving, computer work  -      What Performance Factors Make the Current Problem(s) BETTER?  heat, massage  -         Daily Activities    Primary Language  English  -      Barriers to learning  None  -      Pt Participated in POC and Goals  Yes  -         Safety    Are you being hurt, hit, or frightened by anyone at home or in your life?  No  -      Are you being neglected by a caregiver  No  -        User Key  (r) = Recorded By, (t) = Taken By, (c) = Cosigned By    Initials Name Provider Type     Santosh Bledsoe, PT Physical Therapist          PT Ortho     Row Name 12/10/19 0900       Subjective Pain    Able to rate subjective pain?  yes  -    Pre-Treatment Pain Level  2  -    Post-Treatment Pain Level  1  -    Subjective Pain Comment  Pt reported neck ROM improved in comfort and ability post HEP instruction training  -       Posture/Observations    Alignment Options  Forward head;Rounded shoulders  -    Forward Head  Mild  -    Rounded Shoulders  Bilateral:;Moderate  -       General ROM    GENERAL ROM COMMENTS  B shlds AROM WFL all planes, noted pain B GH during ABD con/ecc due to reported arthritis.  Cervical AROM flex WNL, ext 80% limitation, B rot & SB 50% limitation ea.  -       MMT (Manual Muscle Testing)    General MMT Comments  B shlds  at least 3/5 all planes except ABD limited to 3-/5 due to pain, cerv strength grossly 3/5 all planes within available ROM  -       Sensation    Sensation WNL?  WFL  -      User Key  (r) = Recorded By, (t) = Taken By, (c) = Cosigned By    Initials Name Provider Type    Santosh Yoo, PT Physical Therapist                      Therapy Education  Education Details: HEP, POC, Symptomology  Given: HEP, Symptoms/condition management, Pain management, Posture/body mechanics  Program: New  How Provided: Verbal, Demonstration  Provided to: Patient  Level of Understanding: Teach back education performed, Verbalized, Demonstrated  66065 - PT Self Care/Mgmt Minutes: 10     PT OP Goals     Row Name 12/10/19 1000          PT Short Term Goals    STG Date to Achieve  12/31/19  -     STG 1  Pt to improve NDI by 10%  -     STG 1 Progress  New  -     STG 2  Pt to be independent c initial HEP  -     STG 2 Progress  New  -     STG 3  Pt to report/demo 50% improved Sx/function  -     STG 3 Progress  New  -     STG 4  Pt to demo improved shld/shld girdle/cervical strength all planes by at least 1/2 degree.  -     STG 4 Progress  [facility]        Long Term Goals    LTG Date to Achieve  01/21/20  -     LTG 1  Pt to improve NDI to less than 10% disability  -     LTG 1 Progress  New  [facility]     LTG 2  Pt to report 80% improved Sx/function  -     LTG 2 Progress  [facility]     LTG 3  Pt to be independent c progressive HEP for proprioceptive enhancement, posture, strength, stability, tissue length:tension normalization  -     LTG 3 Progress  New  -AH        Time Calculation    PT Goal Re-Cert Due Date  12/31/19  -       User Key  (r) = Recorded By, (t) = Taken By, (c) = Cosigned By    Initials Name Provider Type    Santosh Yoo, PT Physical Therapist          PT Assessment/Plan     Row Name 12/10/19 1000          PT Assessment    Functional Limitations  Decreased safety during functional  activities;Limitation in home management;Limitations in community activities;Limitations in functional capacity and performance;Performance in leisure activities;Performance in self-care ADL;Performance in work activities  -     Impairments  Impaired flexibility;Joint mobility;Muscle strength;Pain;Poor body mechanics;Posture;Range of motion;Sensation  -     Assessment Comments  63 y/o male presents to PT c chronic neck pain & R UE radicular Sx (intermittent c activity).  Pt stated it's gotten to point that he can't participate in his yoga activities because of the neck pain and also B shld pains from arthritis, but would like to eventually get back to those.  Pt notes driving is the worst exacerbatory activity c rotation of cevical spine to R & spinal extension activities.  Pt noted immediate improvement in cervical mobility/ROM post session activities, c reduced pinching & radicular Sx.  -     Please refer to paper survey for additional self-reported information  Yes  -     Rehab Potential  Good  -     Patient/caregiver participated in establishment of treatment plan and goals  Yes  -     Patient would benefit from skilled therapy intervention  Yes  -        PT Plan    PT Frequency  2x/week  -     Predicted Duration of Therapy Intervention (Therapy Eval)  4-6 wks  -     Planned CPT's?  PT EVAL LOW COMPLEXITY: 09035;PT RE-EVAL: 48899;PT THER PROC EA 15 MIN: 57215;PT THER ACT EA 15 MIN: 36727;PT MANUAL THERAPY EA 15 MIN: 68962;PT NEUROMUSC RE-EDUCATION EA 15 MIN: 86980;PT SELF CARE/HOME MGMT/TRAIN EA 15: 97644;PT ELECTRICAL STIM UNATTEND: ;PT ULTRASOUND EA 15 MIN: 52487;PT THER SUPP EA 15 MIN  -     Physical Therapy Interventions (Optional Details)  home exercise program;joint mobilization;manual therapy techniques;modalities;neuromuscular re-education;patient/family education;postural re-education;ROM (Range of Motion);strengthening;stretching;swiss ball techniques;taping  -       User Key  " (r) = Recorded By, (t) = Taken By, (c) = Cosigned By    Initials Name Provider Type     Santosh Bledsoe, PT Physical Therapist            OP Exercises     Row Name 12/10/19 1000 12/10/19 0900          Subjective Pain    Able to rate subjective pain?  --  yes  -     Pre-Treatment Pain Level  --  2  -AH     Post-Treatment Pain Level  --  1  -AH     Subjective Pain Comment  --  Pt reported neck ROM improved in comfort and ability post HEP instruction training  -        Total Minutes    01449 - PT Therapeutic Activity Minutes  15  -AH  --        Exercise 1    Exercise Name 1  --  Elephant/Reverse elephant stretches c active tension reach end ranges  -     Cueing 1  --  Verbal;Demo;Tactile  -     Sets 1  --  2  -     Reps 1  --  5 ea  -     Time 1  --  5\"  -     Additional Comments  --  HEP for proprioceptive enhancement of normalization & dissociation of postural tissue tensions to allow for reduced aberrant tension & greater arthrokinematics/rythmns of movement for driving and desk computer tolerance work  -        Exercise 2    Exercise Name 2  --  Wall plank AP pelvic drivers  -     Cueing 2  --  Verbal;Demo;Tactile  -     Sets 2  --  1  -     Reps 2  --  10  -     Additional Comments  --  HEP proprioceptive enhancement for postural tension normalization & improving shld girdle strength/stability  -       User Key  (r) = Recorded By, (t) = Taken By, (c) = Cosigned By    Initials Name Provider Type     Santosh Bledsoe, PT Physical Therapist                        Outcome Measure Options: Neck Disability Index (NDI)  Neck Disability Index  Section 1 - Pain Intensity: The pain is very mild at the moment.  Section 2 - Personal Care: I can look after myself normally, but it causes extra pain.  Section 3 - Lifting: I can lift heavy weights, but it gives me extra pain.  Section 4 - Work: I can only do my usual work, but no more  Section 5 - Headaches: I have no headaches at all.  Section 6 " - Concentration: I can concentrate fully with slight difficulty.  Section 7 - Sleeping: My sleep is moderately disturbed for up to 2-3 hours.  Section 8 - Driving: I can drive as long as I want with slight neck pain.  Section 9 - Reading: I can read as much as I want with moderate neck pain.  Section 10 - Recreation: I have some neck pain with a few recreational activities.  Neck Disability Index Score: 13  Neck Disability Index Comments: 26% disability      Time Calculation:     Start Time: 0930  Stop Time: 1008  Time Calculation (min): 38 min  Total Timed Code Minutes- PT: 25 minute(s)     Therapy Charges for Today     Code Description Service Date Service Provider Modifiers Qty    67235714495 HC PT THERAPEUTIC ACT EA 15 MIN 12/10/2019 Santosh Bledsoe, PT GP 1    99248186998 HC PT SELF CARE/MGMT/TRAIN EA 15 MIN 12/10/2019 Santosh Bledsoe, PT GP 1    25518897423 HC PT EVAL LOW COMPLEXITY 1 12/10/2019 Santosh Bledsoe, PT GP 1          PT G-Codes  Outcome Measure Options: Neck Disability Index (NDI)  Neck Disability Index Score: 13         Santosh Bledsoe PT  12/10/2019

## 2019-12-12 ENCOUNTER — HOSPITAL ENCOUNTER (OUTPATIENT)
Dept: PHYSICAL THERAPY | Facility: HOSPITAL | Age: 64
Setting detail: THERAPIES SERIES
Discharge: HOME | End: 2019-12-12

## 2019-12-12 DIAGNOSIS — G89.29 NECK PAIN, CHRONIC: Primary | ICD-10-CM

## 2019-12-12 DIAGNOSIS — M54.2 NECK PAIN, CHRONIC: Primary | ICD-10-CM

## 2019-12-12 PROCEDURE — G0283 ELEC STIM OTHER THAN WOUND: HCPCS | Performed by: PHYSICAL THERAPIST

## 2019-12-12 PROCEDURE — 97140 MANUAL THERAPY 1/> REGIONS: CPT | Performed by: PHYSICAL THERAPIST

## 2019-12-12 NOTE — THERAPY TREATMENT NOTE
Outpatient Physical Therapy Ortho Treatment Note  [facility]     Patient Name: Otf Murray  : 1955  MRN: [id number]  Today's Date: 2019      Visit Date: 2019  Attendance: 2/2  Subjective % Improvement: 0%  Recert Date: 19  MD appointment: Monday to get order for foot    Visit Dx:    ICD-10-CM ICD-9-CM   1. Neck pain, chronic M54.2 723.1    G89.29 338.29       Patient Active Problem List   Diagnosis   • ADHD, predominantly inattentive type        Past Medical History:   Diagnosis Date   • Acute gastritis without bleeding    • Alcohol abuse counseling and surveillance of alcoholic    • Allergic rhinitis    • Anxiety state    • Attention deficit disorder of childhood with hyperactivity    • Attention deficit hyperactivity disorder    • Attention deficit hyperactivity disorder, predominantly hyperactive impulsive type    • Attention deficit hyperactivity disorder, predominantly inattentive type    • Backache    • Body mass index (bmi) 31.0-31.9, adult     Body mass index (BMI) 31.0-31.9, adult - BMI 33.5      • Burn of lower leg    • Candidiasis of skin    • Cardiac murmur, unspecified    • Decreased testosterone level    • Depressive disorder     Depressive disorder - acute on chronic      • Dysgraphia    • Edema    • Elbow joint pain    • Encounter for general adult medical examination with abnormal findings    • Encounter for screening for malignant neoplasm of colon    • Essential hypertension    • Essential tremor    • Ex-smoker    • Fatigue    • Gastro-esophageal reflux disease with esophagitis    • Gastroesophageal reflux disease    • Hyperlipidemia    • Hyperlipoproteinemia    • Impotence of organic origin    • Insomnia    • Intermittent palpitations    • Knee pain     Knee pain - patellofemoral      • Lateral epicondylitis    • Low back pain    • Male erectile disorder    • Male erectile dysfunction, unspecified    • Male hypogonadism    • Neck pain     Neck pain  aggravated by recumbency      • Obese     Obese - BMI 33.0      • On long term drug therapy    • Osteoarthrosis     Osteoarthrosis, unspecified whether generalized or localized, involving lower leg      • Other obesity    • Other specified diseases of anus and rectum     Other specified diseases of anus and rectum - rectal pain after c-scope prep      • Overweight    • Pain in left knee    • Pain in right knee    • Shoulder pain, right    • Spasm of back muscles    • Tachycardia, unspecified    • Tremor, unspecified    • Tubular adenoma of colon    • Unspecified essential hypertension         Past Surgical History:   Procedure Laterality Date   • COLONOSCOPY  03/16/2016    One polyp in the sigmoid colon.Resected and retrieved.        • CYST REMOVAL  10/27/2009     Excision of sebaceous cyst of the forehead, glabellar region.   • ENDOSCOPY  03/16/2016    Mildly severe esophagitis.Gastritis.Normal examined duodenum.Several biopsies obtained in the lower third of the esophagus.    • ENDOSCOPY AND COLONOSCOPY  04/12/2006     Normal colonoscopic examination    • INCISION AND DRAINAGE ABSCESS  10/24/2012   • INJECTION OF MEDICATION  04/21/2011    Depo Medrol (Methylprednisone) 80mg (1)        • INJECTION OF MEDICATION  02/01/2016    Kenalog (3)        • INJECTION OF MEDICATION  02/19/2016    Toradol (3)    • KNEE ARTHROSCOPY  04/02/2009     Medial meniscus tear of left knee   • SCROTUM EXPLORATION  07/02/2002     Excision of epidermal inclusion cyst. base of left scrotum   • SKIN BIOPSY  04/23/2012   • SKIN TAG REMOVAL  04/25/2002    Skin tag, left scrotum        PT Ortho     Row Name 12/12/19 0800       Subjective Pain    Post-Treatment Pain Level  0  -BB       Posture/Observations    Posture/Observations Comments  No distress. antalgics on RLE. No significant tone noted of subocciptals. Reported radicular symptoms with pressure to muscle   -BB      User Key  (r) = Recorded By, (t) = Taken By, (c) = Cosigned By     "Initials Name Provider Type    Rupinder Tiwari PT Physical Therapist                      PT Assessment/Plan     Row Name 12/12/19 0800          PT Assessment    Assessment Comments  Tolerated treatment well with reports of \"neck feels looser\" Noted continued symptoms between shoulder blades.   -BB        PT Plan    PT Frequency  2x/week  -BB     Predicted Duration of Therapy Intervention (Therapy Eval)  4-6 weeks   -BB     PT Plan Comments  monitor response to manual and radicular symptoms on RUE.   -BB       User Key  (r) = Recorded By, (t) = Taken By, (c) = Cosigned By    Initials Name Provider Type    Rupinder Tiwari PT Physical Therapist            OP Exercises     Row Name 12/12/19 0800             Subjective Comments    Subjective Comments  Went to Chiropractor. Also reports masage therapy is the only thing that gives him relief. Reports has not done his HEP. Having reports having L foot pain.   -BB         Subjective Pain    Able to rate subjective pain?  yes  -BB      Pre-Treatment Pain Level  2  -BB      Post-Treatment Pain Level  0  -BB         Exercise 1    Exercise Name 1  See manual  -BB      Time 1  25'  -BB         Exercise 2    Exercise Name 2  HEP review for UT and LS stretch   -BB         Exercise 3    Exercise Name 3  See modalities   -BB        User Key  (r) = Recorded By, (t) = Taken By, (c) = Cosigned By    Initials Name Provider Type    Rupinder Tiwari PT Physical Therapist                      Manual Rx (last 36 hours)      Manual Treatments     Row Name 12/12/19 0700             Manual Rx 1    Manual Rx 1 Location  OA release  -BB      Manual Rx 1 Duration  5'  -BB         Manual Rx 2    Manual Rx 2 Location  Manual cervical traction  -BB      Manual Rx 2 Duration  15'  -BB         Manual Rx 3    Manual Rx 3 Location  Subocc. R, CFM  -BB      Manual Rx 3 Duration  5'  -BB        User Key  (r) = Recorded By, (t) = Taken By, (c) = Cosigned By    Initials Name Provider Type    BB " Rupinder Link, PT Physical Therapist          PT OP Goals     Row Name 12/12/19 0800          PT Short Term Goals    STG Date to Achieve  12/31/19  -BB     STG 1  Pt to improve NDI by 10%  -BB     STG 1 Progress  Not Met  -BB     STG 2  Pt to be independent c initial HEP  -BB     STG 2 Progress  Not Met  -BB     STG 3  Pt to report/demo 50% improved Sx/function  -BB     STG 3 Progress  Not Met  -BB     STG 4  Pt to demo improved shld/shld girdle/cervical strength all planes by at least 1/2 degree.  -BB     STG 4 Progress  Not Met  -BB        Long Term Goals    LTG Date to Achieve  01/21/20  -BB     LTG 1  Pt to improve NDI to less than 10% disability  -BB     LTG 1 Progress  Not Met  -BB     LTG 2  Pt to report 80% improved Sx/function  -BB     LTG 2 Progress  Not Met  -BB     LTG 3  Pt to be independent c progressive HEP for proprioceptive enhancement, posture, strength, stability, tissue length:tension normalization  -BB     LTG 3 Progress  Not Met  -BB        Time Calculation    PT Goal Re-Cert Due Date  12/31/19  -BB       User Key  (r) = Recorded By, (t) = Taken By, (c) = Cosigned By    Initials Name Provider Type    BB Rupinder Link PT Physical Therapist                         Time Calculation:   Start Time: 0815  Stop Time: 0904  Time Calculation (min): 49 min  Therapy Charges for Today     Code Description Service Date Service Provider Modifiers Qty    82800747292 HC PT MANUAL THERAPY EA 15 MIN 12/12/2019 Rupinder Link, PT GP 2    57174600419 HC PT ELECTRICAL STIM UNATTENDED 12/12/2019 Rupinder Link, PT  1                    Rupinder Link, PT  12/12/2019

## 2019-12-17 ENCOUNTER — HOSPITAL ENCOUNTER (OUTPATIENT)
Dept: PHYSICAL THERAPY | Facility: HOSPITAL | Age: 64
Setting detail: THERAPIES SERIES
Discharge: HOME | End: 2019-12-17

## 2019-12-17 DIAGNOSIS — G89.29 NECK PAIN, CHRONIC: Primary | ICD-10-CM

## 2019-12-17 DIAGNOSIS — M54.2 NECK PAIN, CHRONIC: Primary | ICD-10-CM

## 2019-12-17 PROCEDURE — 97140 MANUAL THERAPY 1/> REGIONS: CPT

## 2019-12-17 PROCEDURE — 97110 THERAPEUTIC EXERCISES: CPT

## 2019-12-17 NOTE — THERAPY TREATMENT NOTE
Outpatient Physical Therapy Ortho Treatment Note  [facility]     Patient Name: Otf Murray  : 1955  MRN: [id number]  Today's Date: 2019      Visit Date: 2019     Subjective Improvement: 0%  Attendance:  3/3 (30/yr)  Next MD Visit : Md sending order for foot over on Thursday??  Recert Date:  19      Therapy Diagnosis:  Neck Pain; Cervical Radiculopathy        Visit Dx:    ICD-10-CM ICD-9-CM   1. Neck pain, chronic M54.2 723.1    G89.29 338.29       Patient Active Problem List   Diagnosis   • ADHD, predominantly inattentive type        Past Medical History:   Diagnosis Date   • Acute gastritis without bleeding    • Alcohol abuse counseling and surveillance of alcoholic    • Allergic rhinitis    • Anxiety state    • Attention deficit disorder of childhood with hyperactivity    • Attention deficit hyperactivity disorder    • Attention deficit hyperactivity disorder, predominantly hyperactive impulsive type    • Attention deficit hyperactivity disorder, predominantly inattentive type    • Backache    • Body mass index (bmi) 31.0-31.9, adult     Body mass index (BMI) 31.0-31.9, adult - BMI 33.5      • Burn of lower leg    • Candidiasis of skin    • Cardiac murmur, unspecified    • Decreased testosterone level    • Depressive disorder     Depressive disorder - acute on chronic      • Dysgraphia    • Edema    • Elbow joint pain    • Encounter for general adult medical examination with abnormal findings    • Encounter for screening for malignant neoplasm of colon    • Essential hypertension    • Essential tremor    • Ex-smoker    • Fatigue    • Gastro-esophageal reflux disease with esophagitis    • Gastroesophageal reflux disease    • Hyperlipidemia    • Hyperlipoproteinemia    • Impotence of organic origin    • Insomnia    • Intermittent palpitations    • Knee pain     Knee pain - patellofemoral      • Lateral epicondylitis    • Low back pain    • Male erectile disorder    • Male  erectile dysfunction, unspecified    • Male hypogonadism    • Neck pain     Neck pain aggravated by recumbency      • Obese     Obese - BMI 33.0      • On long term drug therapy    • Osteoarthrosis     Osteoarthrosis, unspecified whether generalized or localized, involving lower leg      • Other obesity    • Other specified diseases of anus and rectum     Other specified diseases of anus and rectum - rectal pain after c-scope prep      • Overweight    • Pain in left knee    • Pain in right knee    • Shoulder pain, right    • Spasm of back muscles    • Tachycardia, unspecified    • Tremor, unspecified    • Tubular adenoma of colon    • Unspecified essential hypertension         Past Surgical History:   Procedure Laterality Date   • COLONOSCOPY  03/16/2016    One polyp in the sigmoid colon.Resected and retrieved.        • CYST REMOVAL  10/27/2009     Excision of sebaceous cyst of the forehead, glabellar region.   • ENDOSCOPY  03/16/2016    Mildly severe esophagitis.Gastritis.Normal examined duodenum.Several biopsies obtained in the lower third of the esophagus.    • ENDOSCOPY AND COLONOSCOPY  04/12/2006     Normal colonoscopic examination    • INCISION AND DRAINAGE ABSCESS  10/24/2012   • INJECTION OF MEDICATION  04/21/2011    Depo Medrol (Methylprednisone) 80mg (1)        • INJECTION OF MEDICATION  02/01/2016    Kenalog (3)        • INJECTION OF MEDICATION  02/19/2016    Toradol (3)    • KNEE ARTHROSCOPY  04/02/2009     Medial meniscus tear of left knee   • SCROTUM EXPLORATION  07/02/2002     Excision of epidermal inclusion cyst. base of left scrotum   • SKIN BIOPSY  04/23/2012   • SKIN TAG REMOVAL  04/25/2002    Skin tag, left scrotum        PT Ortho     Row Name 12/17/19 1000       Subjective Pain    Able to rate subjective pain?  yes  -CHELSEA      User Key  (r) = Recorded By, (t) = Taken By, (c) = Cosigned By    Initials Name Provider Type    Kristin Garcia PTA Physical Therapy Assistant                      PT  "Assessment/Plan     Row Name 12/17/19 1000          PT Assessment    Assessment Comments  10% of tingling with cervical extension after treatment this date than what he usually gets with activties. Responding well to manual and extension exercises this date; added to HEP;   -        PT Plan    PT Frequency  2x/week  -     Predicted Duration of Therapy Intervention (Therapy Eval)  4-6 wks  -     PT Plan Comments  Possible cervical traction, will consult with primary PT; Increase strength and postural stabilkity as able. Manual as needed.   -       User Key  (r) = Recorded By, (t) = Taken By, (c) = Cosigned By    Initials Name Provider Type    Kristin Garcia, PTA Physical Therapy Assistant            OP Exercises     Row Name 12/17/19 1000             Subjective Comments    Subjective Comments  Pt reports that he still has the spot in his shoulder blade that is aggrevating and thinks it was aggrevated more by some of his HEP; Still getting some numbess in the finger tips on R side.   -         Subjective Pain    Able to rate subjective pain?  yes  -      Pre-Treatment Pain Level  2  -KH      Post-Treatment Pain Level  0  -KH         Exercise 1    Exercise Name 1  Scapular mobes  -KH      Cueing 1  Verbal  -KH      Time 1  5'  -KH         Exercise 2    Exercise Name 2  Manual: cervical distraction, MFR/STM to R UT  -KH      Time 2  10'  -KH         Exercise 3    Exercise Name 3  supine chin tucks  -KH      Cueing 3  Verbal  -KH      Sets 3  2  -KH      Reps 3  10  -KH         Exercise 4    Exercise Name 4  HELIO cervical ext to neutral  -KH      Cueing 4  Verbal  -KH      Sets 4  2  -KH      Reps 4  10  -KH         Exercise 5    Exercise Name 5  Prone Porter Cervical Ext with overpressure  -      Cueing 5  Verbal  -KH      Sets 5  4  -KH      Time 5  15\"  -KH         Exercise 6    Exercise Name 6  Prone on elbows w/ overpressure retractions  -KH      Cueing 6  Verbal  -KH      Sets 6  2  -KH      Reps 6  " 10  -         Exercise 7    Exercise Name 7  seated cervical retraction with overpressure  -      Cueing 7  Verbal  -      Sets 7  2  -KH      Reps 7  10  -        User Key  (r) = Recorded By, (t) = Taken By, (c) = Cosigned By    Initials Name Provider Type    Kristin Garcia PTA Physical Therapy Assistant                       PT OP Goals     Row Name 12/17/19 1000          PT Short Term Goals    STG Date to Achieve  12/31/19  -     STG 1  Pt to improve NDI by 10%  -     STG 1 Progress  Not Met  -     STG 2  Pt to be independent c initial HEP  -     STG 2 Progress  Not Met  -     STG 3  Pt to report/demo 50% improved Sx/function  -     STG 3 Progress  Not Met  -     STG 4  Pt to demo improved shld/shld girdle/cervical strength all planes by at least 1/2 degree.  -     STG 4 Progress  Not Met  -        Long Term Goals    LTG Date to Achieve  01/21/20  -     LTG 1  Pt to improve NDI to less than 10% disability  -     LTG 1 Progress  Not Met  -     LTG 2  Pt to report 80% improved Sx/function  -     LTG 2 Progress  Not Met  -     LTG 3  Pt to be independent c progressive HEP for proprioceptive enhancement, posture, strength, stability, tissue length:tension normalization  -     LTG 3 Progress  Not Met  -        Time Calculation    PT Goal Re-Cert Due Date  12/31/19  -       User Key  (r) = Recorded By, (t) = Taken By, (c) = Cosigned By    Initials Name Provider Type    Kristin Garcia PTA Physical Therapy Assistant                         Time Calculation:   Start Time: 1055  Stop Time: 1150  Time Calculation (min): 55 min  Total Timed Code Minutes- PT: 40 minute(s)  Therapy Charges for Today     Code Description Service Date Service Provider Modifiers Qty    16940157015 HC PT THER SUPP EA 15 MIN 12/17/2019 Kristin Lamas, NANCY GP 1    83162538939 HC PT MANUAL THERAPY EA 15 MIN 12/17/2019 Kristin Lamas PTA GP 1    17053064743 HC PT THER PROC EA 15 MIN 12/17/2019 Kristin Lamas, NANCY GP  2                    Kristin Lamas, PTA  12/17/2019

## 2019-12-19 ENCOUNTER — HOSPITAL ENCOUNTER (OUTPATIENT)
Dept: PHYSICAL THERAPY | Facility: HOSPITAL | Age: 64
Setting detail: THERAPIES SERIES
Discharge: HOME | End: 2019-12-19

## 2019-12-19 DIAGNOSIS — M54.2 NECK PAIN, CHRONIC: Primary | ICD-10-CM

## 2019-12-19 DIAGNOSIS — G89.29 NECK PAIN, CHRONIC: Primary | ICD-10-CM

## 2019-12-19 PROCEDURE — 97110 THERAPEUTIC EXERCISES: CPT

## 2019-12-19 PROCEDURE — G0283 ELEC STIM OTHER THAN WOUND: HCPCS

## 2019-12-19 PROCEDURE — 97012 MECHANICAL TRACTION THERAPY: CPT

## 2019-12-19 NOTE — THERAPY TREATMENT NOTE
Outpatient Physical Therapy Ortho Treatment Note  [facility]     Patient Name: Otf Murray  : 1955  MRN: [id number]  Today's Date: 2019      Visit Date: 2019     Subjective Improvement: 0%  Attendance:   (30/yr)  Next MD Visit : PRN  Recert Date:  19        Therapy Diagnosis:  Neck Pain; Cervical Radiculopathy    Visit Dx:    ICD-10-CM ICD-9-CM   1. Neck pain, chronic M54.2 723.1    G89.29 338.29       Patient Active Problem List   Diagnosis   • ADHD, predominantly inattentive type        Past Medical History:   Diagnosis Date   • Acute gastritis without bleeding    • Alcohol abuse counseling and surveillance of alcoholic    • Allergic rhinitis    • Anxiety state    • Attention deficit disorder of childhood with hyperactivity    • Attention deficit hyperactivity disorder    • Attention deficit hyperactivity disorder, predominantly hyperactive impulsive type    • Attention deficit hyperactivity disorder, predominantly inattentive type    • Backache    • Body mass index (bmi) 31.0-31.9, adult     Body mass index (BMI) 31.0-31.9, adult - BMI 33.5      • Burn of lower leg    • Candidiasis of skin    • Cardiac murmur, unspecified    • Decreased testosterone level    • Depressive disorder     Depressive disorder - acute on chronic      • Dysgraphia    • Edema    • Elbow joint pain    • Encounter for general adult medical examination with abnormal findings    • Encounter for screening for malignant neoplasm of colon    • Essential hypertension    • Essential tremor    • Ex-smoker    • Fatigue    • Gastro-esophageal reflux disease with esophagitis    • Gastroesophageal reflux disease    • Hyperlipidemia    • Hyperlipoproteinemia    • Impotence of organic origin    • Insomnia    • Intermittent palpitations    • Knee pain     Knee pain - patellofemoral      • Lateral epicondylitis    • Low back pain    • Male erectile disorder    • Male erectile dysfunction, unspecified    • Male  hypogonadism    • Neck pain     Neck pain aggravated by recumbency      • Obese     Obese - BMI 33.0      • On long term drug therapy    • Osteoarthrosis     Osteoarthrosis, unspecified whether generalized or localized, involving lower leg      • Other obesity    • Other specified diseases of anus and rectum     Other specified diseases of anus and rectum - rectal pain after c-scope prep      • Overweight    • Pain in left knee    • Pain in right knee    • Shoulder pain, right    • Spasm of back muscles    • Tachycardia, unspecified    • Tremor, unspecified    • Tubular adenoma of colon    • Unspecified essential hypertension         Past Surgical History:   Procedure Laterality Date   • COLONOSCOPY  03/16/2016    One polyp in the sigmoid colon.Resected and retrieved.        • CYST REMOVAL  10/27/2009     Excision of sebaceous cyst of the forehead, glabellar region.   • ENDOSCOPY  03/16/2016    Mildly severe esophagitis.Gastritis.Normal examined duodenum.Several biopsies obtained in the lower third of the esophagus.    • ENDOSCOPY AND COLONOSCOPY  04/12/2006     Normal colonoscopic examination    • INCISION AND DRAINAGE ABSCESS  10/24/2012   • INJECTION OF MEDICATION  04/21/2011    Depo Medrol (Methylprednisone) 80mg (1)        • INJECTION OF MEDICATION  02/01/2016    Kenalog (3)        • INJECTION OF MEDICATION  02/19/2016    Toradol (3)    • KNEE ARTHROSCOPY  04/02/2009     Medial meniscus tear of left knee   • SCROTUM EXPLORATION  07/02/2002     Excision of epidermal inclusion cyst. base of left scrotum   • SKIN BIOPSY  04/23/2012   • SKIN TAG REMOVAL  04/25/2002    Skin tag, left scrotum        PT Ortho     Row Name 12/19/19 1100       Posture/Observations    Posture/Observations Comments  no distress noted; fwd head posture  -      User Key  (r) = Recorded By, (t) = Taken By, (c) = Cosigned By    Initials Name Provider Type    Kristin Garcia PTA Physical Therapy Assistant                      PT  Assessment/Plan     Row Name 12/19/19 1100          PT Assessment    Assessment Comments  had some pulling in the right upper trap after pro ll but relieved with mechaincal traction; performed this date at pt request and states that he responded well to last manual treatment. Good response to treatment this date and had no pain post traction.   -KH        PT Plan    PT Frequency  2x/week  -KH     Predicted Duration of Therapy Intervention (Therapy Eval)  4-6 weeks  -KH     PT Plan Comments  Continue with traction as benfical; Pt has new order for Left foot; Continue as able.   -KH       User Key  (r) = Recorded By, (t) = Taken By, (c) = Cosigned By    Initials Name Provider Type    Kristin Garcia PTA Physical Therapy Assistant          Modalities     Row Name 12/19/19 1100             Moist Heat    MH Applied  Yes  -KH      Location  Cervical  -KH      Rx Minutes  15 mins  -KH      MH Prior to Rx  Yes  -KH      MH S/P Rx  Yes  -KH         Traction 68215    Traction Type  Cervical  -KH      Rx Minutes  15  -KH      Duration  Intermittent  -KH      Position  Hook-lying  -KH      Weight  -- 32/12  -KH      Hold  60  -KH      Relax  20  -KH      Progression  1  -KH      Regression  1  -KH        User Key  (r) = Recorded By, (t) = Taken By, (c) = Cosigned By    Initials Name Provider Type    Kristin Garcia PTA Physical Therapy Assistant        OP Exercises     Row Name 12/19/19 1100             Subjective Comments    Subjective Comments  Pt reports that she didn't have any pain prior to PT this date; Reports compliance with HEP  -KH         Subjective Pain    Able to rate subjective pain?  yes  -KH      Pre-Treatment Pain Level  0  -KH      Post-Treatment Pain Level  0  -KH         Exercise 1    Exercise Name 1  Pro ll UE strength  -KH      Cueing 1  Verbal  -KH      Time 1  8'  -KH         Exercise 2    Exercise Name 2  Cervical Traction-Mechanical  w/ MHP  -KH      Time 2  15'  -KH      Additional Comments  see  modalities  -         Exercise 3    Exercise Name 3  IFC w/ MHP  -      Time 3  15'  -      Additional Comments  see modalities  -        User Key  (r) = Recorded By, (t) = Taken By, (c) = Cosigned By    Initials Name Provider Type    Kristin Garcia PTA Physical Therapy Assistant                       PT OP Goals     Row Name 12/19/19 1100          PT Short Term Goals    STG Date to Achieve  12/31/19  -     STG 1  Pt to improve NDI by 10%  -     STG 1 Progress  Not Met  -     STG 2  Pt to be independent c initial HEP  -     STG 2 Progress  Not Met  -     STG 3  Pt to report/demo 50% improved Sx/function  -     STG 3 Progress  Not Met  -     STG 4  Pt to demo improved shld/shld girdle/cervical strength all planes by at least 1/2 degree.  -     STG 4 Progress  Not Met  -        Long Term Goals    LTG Date to Achieve  01/21/20  -     LTG 1  Pt to improve NDI to less than 10% disability  -     LTG 1 Progress  Not Met  -     LTG 2  Pt to report 80% improved Sx/function  -     LTG 2 Progress  Not Met  -     LTG 3  Pt to be independent c progressive HEP for proprioceptive enhancement, posture, strength, stability, tissue length:tension normalization  -     LTG 3 Progress  Not Met  -        Time Calculation    PT Goal Re-Cert Due Date  12/31/19  [facility]       User Key  (r) = Recorded By, (t) = Taken By, (c) = Cosigned By    Initials Name Provider Type    Kristin Garcia PTA Physical Therapy Assistant                         Time Calculation:   Start Time: 1100  Stop Time: 1151  Time Calculation (min): 51 min  Therapy Charges for Today     Code Description Service Date Service Provider Modifiers Qty    76431254176 HC PT TRACTION CERVICAL 12/19/2019 Kristin Lamas, PTA GP 1    34460802182 HC PT THER PROC EA 15 MIN 12/19/2019 Kristin Lamas PTA GP 1    65398262576 HC PT ELECTRICAL STIM UNATTENDED 12/19/2019 Kristin Lamas PTA  1    44453809165 HC PT THER SUPP EA 15 MIN 12/19/2019 Kristin Lamas PTA  GP 1                    Kristin Lamas, PTA  12/19/2019

## 2019-12-23 ENCOUNTER — HOSPITAL ENCOUNTER (OUTPATIENT)
Dept: PHYSICAL THERAPY | Facility: HOSPITAL | Age: 64
Setting detail: THERAPIES SERIES
Discharge: HOME | End: 2019-12-23

## 2019-12-23 DIAGNOSIS — M54.2 NECK PAIN, CHRONIC: Primary | ICD-10-CM

## 2019-12-23 DIAGNOSIS — G89.29 NECK PAIN, CHRONIC: Primary | ICD-10-CM

## 2019-12-23 PROCEDURE — G0283 ELEC STIM OTHER THAN WOUND: HCPCS

## 2019-12-23 PROCEDURE — 97012 MECHANICAL TRACTION THERAPY: CPT

## 2019-12-23 NOTE — THERAPY TREATMENT NOTE
Outpatient Physical Therapy Ortho Treatment Note  [facility]     Patient Name: Otf Murray  : 1955  MRN: [id number]  Today's Date: 2019      Visit Date: 2019     Subjective Improvement not sure  Visits 5/5  Visits approved 30  RTMD PRN  Recert Date 2019    Neck pain    Visit Dx:    ICD-10-CM ICD-9-CM   1. Neck pain, chronic M54.2 723.1    G89.29 338.29       Patient Active Problem List   Diagnosis   • ADHD, predominantly inattentive type        Past Medical History:   Diagnosis Date   • Acute gastritis without bleeding    • Alcohol abuse counseling and surveillance of alcoholic    • Allergic rhinitis    • Anxiety state    • Attention deficit disorder of childhood with hyperactivity    • Attention deficit hyperactivity disorder    • Attention deficit hyperactivity disorder, predominantly hyperactive impulsive type    • Attention deficit hyperactivity disorder, predominantly inattentive type    • Backache    • Body mass index (bmi) 31.0-31.9, adult     Body mass index (BMI) 31.0-31.9, adult - BMI 33.5      • Burn of lower leg    • Candidiasis of skin    • Cardiac murmur, unspecified    • Decreased testosterone level    • Depressive disorder     Depressive disorder - acute on chronic      • Dysgraphia    • Edema    • Elbow joint pain    • Encounter for general adult medical examination with abnormal findings    • Encounter for screening for malignant neoplasm of colon    • Essential hypertension    • Essential tremor    • Ex-smoker    • Fatigue    • Gastro-esophageal reflux disease with esophagitis    • Gastroesophageal reflux disease    • Hyperlipidemia    • Hyperlipoproteinemia    • Impotence of organic origin    • Insomnia    • Intermittent palpitations    • Knee pain     Knee pain - patellofemoral      • Lateral epicondylitis    • Low back pain    • Male erectile disorder    • Male erectile dysfunction, unspecified    • Male hypogonadism    • Neck pain     Neck pain  aggravated by recumbency      • Obese     Obese - BMI 33.0      • On long term drug therapy    • Osteoarthrosis     Osteoarthrosis, unspecified whether generalized or localized, involving lower leg      • Other obesity    • Other specified diseases of anus and rectum     Other specified diseases of anus and rectum - rectal pain after c-scope prep      • Overweight    • Pain in left knee    • Pain in right knee    • Shoulder pain, right    • Spasm of back muscles    • Tachycardia, unspecified    • Tremor, unspecified    • Tubular adenoma of colon    • Unspecified essential hypertension         Past Surgical History:   Procedure Laterality Date   • COLONOSCOPY  03/16/2016    One polyp in the sigmoid colon.Resected and retrieved.        • CYST REMOVAL  10/27/2009     Excision of sebaceous cyst of the forehead, glabellar region.   • ENDOSCOPY  03/16/2016    Mildly severe esophagitis.Gastritis.Normal examined duodenum.Several biopsies obtained in the lower third of the esophagus.    • ENDOSCOPY AND COLONOSCOPY  04/12/2006     Normal colonoscopic examination    • INCISION AND DRAINAGE ABSCESS  10/24/2012   • INJECTION OF MEDICATION  04/21/2011    Depo Medrol (Methylprednisone) 80mg (1)        • INJECTION OF MEDICATION  02/01/2016    Kenalog (3)        • INJECTION OF MEDICATION  02/19/2016    Toradol (3)    • KNEE ARTHROSCOPY  04/02/2009     Medial meniscus tear of left knee   • SCROTUM EXPLORATION  07/02/2002     Excision of epidermal inclusion cyst. base of left scrotum   • SKIN BIOPSY  04/23/2012   • SKIN TAG REMOVAL  04/25/2002    Skin tag, left scrotum                        PT Assessment/Plan     Row Name 12/23/19 3180          PT Assessment    Assessment Comments  Very good tolerance to traction..  Reports little to no numbness in UE after traction  -CP        PT Plan    PT Frequency  2x/week  -CP     Predicted Duration of Therapy Intervention (Therapy Eval)  6-8 weeks  -CP     PT Plan Comments  Cont wtih POC.   Recheck next visit  -CP       User Key  (r) = Recorded By, (t) = Taken By, (c) = Cosigned By    Initials Name Provider Type    Amy Fajardo, NANCY Physical Therapy Assistant          Modalities     Row Name 12/23/19 1200             Moist Heat    MH Applied  Yes  -CP      Location  cspine  -CP      Rx Minutes  15 mins  -CP      MH S/P Rx  Yes  -CP         ELECTRICAL STIMULATION    Attended/Unattended  Unattended  -CP      Stimulation Type  IFC  -CP      Location/Electrode Placement/Other  cspine  -CP       PT E-Stim Unattended (Manual) Minutes  15  -CP         Traction 73963    Traction Type  Cervical  -CP      Rx Minutes  15  -CP      Duration  Intermittent  -CP      Position  Hook-lying  -CP      Weight  -- 30/20  -CP      Hold  60  -CP      Relax  20  -CP      Progression  1  -CP      Regression  1  -CP        User Key  (r) = Recorded By, (t) = Taken By, (c) = Cosigned By    Initials Name Provider Type    Amy Fajardo PTA Physical Therapy Assistant        OP Exercises     Row Name 12/23/19 1200             Subjective Comments    Subjective Comments  Patient states that if he moves his arm a certain way, his arm will lose some of the feeling in his arm  -CP         Subjective Pain    Able to rate subjective pain?  yes  -CP      Pre-Treatment Pain Level  0  -CP      Post-Treatment Pain Level  0  -CP      Subjective Pain Comment  no pain in neck at present time  -CP         Exercise 1    Exercise Name 1  Patient arrived early and rode the pro II on his own  -CP         Exercise 2    Exercise Name 2  mechanical cervical t raction  -CP      Time 2  15  -CP      Additional Comments  see modalities  -CP         Exercise 3    Exercise Name 3  ifc with MHP  -CP        User Key  (r) = Recorded By, (t) = Taken By, (c) = Cosigned By    Initials Name Provider Type    Amy Fajardo PTA Physical Therapy Assistant                       PT OP Goals     Row Name 12/23/19 1300          PT Short Term  Goals    STG Date to Achieve  12/31/19  -CP     STG 1  Pt to improve NDI by 10%  -CP     STG 1 Progress  Not Met  -CP     STG 2  Pt to be independent c initial HEP  -CP     STG 2 Progress  Not Met  -CP     STG 3  Pt to report/demo 50% improved Sx/function  -CP     STG 3 Progress  Not Met  -CP     STG 4  Pt to demo improved shld/shld girdle/cervical strength all planes by at least 1/2 degree.  -CP     STG 4 Progress  Not Met  -CP        Long Term Goals    LTG Date to Achieve  01/21/20  -CP     LTG 1  Pt to improve NDI to less than 10% disability  -CP     LTG 1 Progress  Not Met  -CP     LTG 2  Pt to report 80% improved Sx/function  -CP     LTG 2 Progress  Not Met  -CP     LTG 3  Pt to be independent c progressive HEP for proprioceptive enhancement, posture, strength, stability, tissue length:tension normalization  -CP     LTG 3 Progress  Not Met  -CP        Time Calculation    PT Goal Re-Cert Due Date  12/31/19  -CP       User Key  (r) = Recorded By, (t) = Taken By, (c) = Cosigned By    Initials Name Provider Type    CP Amy Santoyo, NANCY Physical Therapy Assistant                         Time Calculation:   Start Time: 1300  Stop Time: 1345  Time Calculation (min): 45 min  Total Timed Code Minutes- PT: 33 minute(s)  Therapy Charges for Today     Code Description Service Date Service Provider Modifiers Qty    34937829866 HC PT ELECTRICAL STIM UNATTENDED 12/23/2019 Amy Santoyo, PTA  1    02707586039 HC PT TRACTION CERVICAL 12/23/2019 Amy Santoyo, NANCY GP 1                    Amy Santoyo PTA  12/23/2019

## 2019-12-30 ENCOUNTER — HOSPITAL ENCOUNTER (OUTPATIENT)
Dept: PHYSICAL THERAPY | Facility: HOSPITAL | Age: 64
Setting detail: THERAPIES SERIES
Discharge: HOME | End: 2019-12-30

## 2019-12-30 DIAGNOSIS — M79.672 FOOT PAIN, LEFT: ICD-10-CM

## 2019-12-30 DIAGNOSIS — M54.2 NECK PAIN, CHRONIC: Primary | ICD-10-CM

## 2019-12-30 DIAGNOSIS — G89.29 NECK PAIN, CHRONIC: Primary | ICD-10-CM

## 2019-12-30 PROCEDURE — 97164 PT RE-EVAL EST PLAN CARE: CPT

## 2019-12-30 PROCEDURE — 97530 THERAPEUTIC ACTIVITIES: CPT

## 2019-12-30 NOTE — THERAPY RE-EVALUATION
Outpatient Physical Therapy Ortho Re-Evaluation  [facility]     Patient Name: Otf Murray  : 1955  MRN: [id number]  Today's Date: 2019      Visit Date: 2019    Patient Active Problem List   Diagnosis   • ADHD, predominantly inattentive type        Past Medical History:   Diagnosis Date   • Acute gastritis without bleeding    • Alcohol abuse counseling and surveillance of alcoholic    • Allergic rhinitis    • Anxiety state    • Attention deficit disorder of childhood with hyperactivity    • Attention deficit hyperactivity disorder    • Attention deficit hyperactivity disorder, predominantly hyperactive impulsive type    • Attention deficit hyperactivity disorder, predominantly inattentive type    • Backache    • Body mass index (bmi) 31.0-31.9, adult     Body mass index (BMI) 31.0-31.9, adult - BMI 33.5      • Burn of lower leg    • Candidiasis of skin    • Cardiac murmur, unspecified    • Decreased testosterone level    • Depressive disorder     Depressive disorder - acute on chronic      • Dysgraphia    • Edema    • Elbow joint pain    • Encounter for general adult medical examination with abnormal findings    • Encounter for screening for malignant neoplasm of colon    • Essential hypertension    • Essential tremor    • Ex-smoker    • Fatigue    • Gastro-esophageal reflux disease with esophagitis    • Gastroesophageal reflux disease    • Hyperlipidemia    • Hyperlipoproteinemia    • Impotence of organic origin    • Insomnia    • Intermittent palpitations    • Knee pain     Knee pain - patellofemoral      • Lateral epicondylitis    • Low back pain    • Male erectile disorder    • Male erectile dysfunction, unspecified    • Male hypogonadism    • Neck pain     Neck pain aggravated by recumbency      • Obese     Obese - BMI 33.0      • On long term drug therapy    • Osteoarthrosis     Osteoarthrosis, unspecified whether generalized or localized, involving lower leg      • Other  obesity    • Other specified diseases of anus and rectum     Other specified diseases of anus and rectum - rectal pain after c-scope prep      • Overweight    • Pain in left knee    • Pain in right knee    • Shoulder pain, right    • Spasm of back muscles    • Tachycardia, unspecified    • Tremor, unspecified    • Tubular adenoma of colon    • Unspecified essential hypertension         Past Surgical History:   Procedure Laterality Date   • COLONOSCOPY  03/16/2016    One polyp in the sigmoid colon.Resected and retrieved.        • CYST REMOVAL  10/27/2009     Excision of sebaceous cyst of the forehead, glabellar region.   • ENDOSCOPY  03/16/2016    Mildly severe esophagitis.Gastritis.Normal examined duodenum.Several biopsies obtained in the lower third of the esophagus.    • ENDOSCOPY AND COLONOSCOPY  04/12/2006     Normal colonoscopic examination    • INCISION AND DRAINAGE ABSCESS  10/24/2012   • INJECTION OF MEDICATION  04/21/2011    Depo Medrol (Methylprednisone) 80mg (1)        • INJECTION OF MEDICATION  02/01/2016    Kenalog (3)        • INJECTION OF MEDICATION  02/19/2016    Toradol (3)    • KNEE ARTHROSCOPY  04/02/2009     Medial meniscus tear of left knee   • SCROTUM EXPLORATION  07/02/2002     Excision of epidermal inclusion cyst. base of left scrotum   • SKIN BIOPSY  04/23/2012   • SKIN TAG REMOVAL  04/25/2002    Skin tag, left scrotum        Visit Dx:     ICD-10-CM ICD-9-CM   1. Neck pain, chronic M54.2 723.1    G89.29 338.29   2. Foot pain, left M79.672 729.5             PT Ortho     Row Name 12/30/19 1100       Subjective Comments    Subjective Comments  Pt reports neck is improving, Sx are lessening and noting improved cerv ext ability  -AH       Subjective Pain    Able to rate subjective pain?  yes  -AH       Posture/Observations    Forward Head  Mild  -AH    Rounded Shoulders  Bilateral:;Mild  -AH       General ROM    GENERAL ROM COMMENTS  Cervical AROM flex WNL, ext 50% limitation, B rot 30%  limitation, B SB 50% limitation. L ankle/foot ROM WFL all planes  -       MMT (Manual Muscle Testing)    General MMT Comments  L PF & foot intrinsic strength demos 2+/5.  -       Sensation    Sensation WNL?  WFL  -    Additional Comments  Note mild temporary increase in N&T R UE throughout session activities.  -       Gait/Stairs Assessment/Training    Deviations/Abnormal Patterns (Gait)  left sided deviations;antalgic mid stance phase  -    Left Sided Gait Deviations  weight shift ability decreased  -    Comment (Gait/Stairs)  no AD, mild antalgia noted mid stance secondary to reduced heel-toe (foot/ankle) biomechanical arthrokinematics action of transition from heel strike to toe off.  -      User Key  (r) = Recorded By, (t) = Taken By, (c) = Cosigned By    Initials Name Provider Type    Santosh Yoo, PT Physical Therapist                      Therapy Education  Education Details: HEP, POC, Symptomology  Given: HEP, Symptoms/condition management, Pain management, Posture/body mechanics  Program: Progressed, New  How Provided: Verbal, Demonstration, Written  Provided to: Patient  Level of Understanding: Teach back education performed, Verbalized, Demonstrated     PT OP Goals     Row Name 12/30/19 1100          PT Short Term Goals    STG Date to Achieve  12/31/19  -     STG 1  Pt to improve NDI by 10%  -     STG 1 Progress  Not Met  -     STG 2  Pt to be independent c initial HEP  -     STG 2 Progress  Not Met  [facility]     STG 3  Pt to report/demo 50% improved Sx/function  -     STG 3 Progress  Not Met  -     STG 4  Pt to demo improved shld/shld girdle/cervical strength all planes by at least 1/2 degree.  -     STG 4 Progress  Not Met  [facility]     STG 5  Pt to improve LEFS by 9 points  -     STG 5 Progress  New  -        Long Term Goals    LTG Date to Achieve  01/21/20  -     LTG 1  Pt to improve NDI to less than 10% disability  -     LTG 1 Progress  Not Met  [facility]     LTG 2  Pt to  report 80% improved Sx/function  -     LTG 2 Progress  Not Met  -     LTG 3  Pt to be independent c progressive HEP for proprioceptive enhancement, posture, strength, stability, tissue length:tension normalization  -     LTG 3 Progress  Not Met  -     LTG 4  Pt to improve LEFS to 64/80 or better  -     LTG 4 Progress  New  -       User Key  (r) = Recorded By, (t) = Taken By, (c) = Cosigned By    Initials Name Provider Type     Santosh Bledsoe, PT Physical Therapist          PT Assessment/Plan     Row Name 12/30/19 1100          PT Assessment    Functional Limitations  Decreased safety during functional activities;Limitation in home management;Limitations in community activities;Limitations in functional capacity and performance;Performance in leisure activities;Performance in self-care ADL;Performance in work activities;Impaired gait  -     Impairments  Impaired flexibility;Joint mobility;Muscle strength;Pain;Poor body mechanics;Posture;Range of motion;Sensation;Gait  -     Assessment Comments  Received referral from same provider for adding in L foot to Dx & PT episode.  Re-eval completed today.  65 y/o male returns for reassessment.  Pt is progressing well and noting improved Sx management and postural control/awareness of upper body.  Pt's L foot presents c chronic mild plantar fasciitis Sx and responded very well to HEP activity instructions today, noting reduced antalgia post session.  Pt demo's gradual overall progress and is improving as expected relative to his neck Sx.  -     Please refer to paper survey for additional self-reported information  Yes  -     Rehab Potential  Good  -     Patient/caregiver participated in establishment of treatment plan and goals  Yes  -     Patient would benefit from skilled therapy intervention  Yes  -        PT Plan    PT Frequency  2x/week  -     Predicted Duration of Therapy Intervention (Therapy Eval)  6-8 wks  -     Planned CPT's?  PT  "RE-EVAL: 64886;PT THER PROC EA 15 MIN: 70348;PT THER ACT EA 15 MIN: 10898;PT MANUAL THERAPY EA 15 MIN: 75237;PT NEUROMUSC RE-EDUCATION EA 15 MIN: 54913;PT SELF CARE/HOME MGMT/TRAIN EA 15: 10608;PT ELECTRICAL STIM UNATTEND: ;PT ULTRASOUND EA 15 MIN: 12662;PT THER SUPP EA 15 MIN  -     PT Plan Comments  Continue POC & progress as able.  Add dynamic/balistic stretch/strength combo'd movements for L foot & foot intrinsic strengthening for arch stability.  -       User Key  (r) = Recorded By, (t) = Taken By, (c) = Cosigned By    Initials Name Provider Type     Santosh Bledsoe, PT Physical Therapist            OP Exercises     Row Name 12/30/19 1203 12/30/19 1100          Subjective Comments    Subjective Comments  --  Pt reports neck is improving, Sx are lessening and noting improved cerv ext ability  -        Subjective Pain    Able to rate subjective pain?  --  yes  -     Pre-Treatment Pain Level  --  2 L foot  -     Post-Treatment Pain Level  --  1  -        Total Minutes    76413 - PT Therapeutic Activity Minutes  38  -AH  --        Exercise 1    Exercise Name 1  --  Re-eval  -     Time 1  --  9  -AH     Additional Comments  --  see ortho, added foot to POC  -        Exercise 2    Exercise Name 2  --  Doorway/wall elephant stretch c AROM cerv rotation  -     Cueing 2  --  Verbal;Tactile;Demo  -     Sets 2  --  1  -AH     Reps 2  --  10  -AH     Time 2  --  1\"  -     Additional Comments  --  HEP progression  -        Exercise 3    Exercise Name 3  --  Barrel hug/bruggers  -     Cueing 3  --  Verbal;Tactile;Demo  -     Sets 3  --  1  -AH     Reps 3  --  5  -AH     Time 3  --  3\"  -     Additional Comments  --  HEP progression  -        Exercise 4    Exercise Name 4  --  Wall L LE lunge triplanar ankle mobilization  -     Cueing 4  --  Verbal;Demo  -     Sets 4  --  2  -AH     Reps 4  --  5  -AH     Time 4  --  2\"  -     Additional Comments  --  HEP progression  -        " "Exercise 5    Exercise Name 5  --  Wall L LE lunge DF mobilization/PF pump  -     Cueing 5  --  Verbal;Demo  -     Sets 5  --  2  -AH     Reps 5  --  5  -AH     Time 5  --  1\"  -     Additional Comments  --  HEP progression  -        Exercise 6    Exercise Name 6  --  mama bear incline AROM calf raises  -     Cueing 6  --  Verbal;Demo  -     Sets 6  --  2  -AH     Reps 6  --  10  -AH     Time 6  --  2\"  -     Additional Comments  --  HEP progression  -       User Key  (r) = Recorded By, (t) = Taken By, (c) = Cosigned By    Initials Name Provider Type    Santosh Yoo, PT Physical Therapist                        Outcome Measure Options: Neck Disability Index (NDI), Lower Extremity Functional Scale (LEFS)  Lower Extremity Functional Index  Any of your usual work, housework or school activities: Quite a bit of difficulty  Your usual hobbies, recreational or sporting activities: Quite a bit of difficulty  Getting into or out of the bath: Quite a bit of difficulty  Walking between rooms: Quite a bit of difficulty  Putting on your shoes or socks: No difficulty  Squatting: Quite a bit of difficulty  Lifting an object, like a bag of groceries from the floor: A little bit of difficulty  Performing light activities around your home: Quite a bit of difficulty  Performing heavy activities around your home: Quite a bit of difficulty  Getting into or out of a car: A little bit of difficulty  Walking 2 blocks: Extreme difficulty or unable to perform activity  Walking a mile: Extreme difficulty or unable to perform activity  Going up or down 10 stairs (about 1 flight of stairs): Quite a bit of difficulty  Standing for 1 hour: Extreme difficulty or unable to perform activity  Sitting for 1 hour: No difficulty  Running on even ground: Extreme difficulty or unable to perform activity  Running on uneven ground: Extreme difficulty or unable to perform activity  Making sharp turns while running fast: Extreme " difficulty or unable to perform activity  Hopping: Extreme difficulty or unable to perform activity  Rolling over in bed: No difficulty  Total: 26  Neck Disability Index  Section 1 - Pain Intensity: The pain is very mild at the moment.  Section 2 - Personal Care: I can look after myself normally, but it causes extra pain.  Section 3 - Lifting: Pain prevents me from lifting heavy weights, but I can manage light weights if they are conveniently positioned.  Section 4 - Work: I can't do my usual work.  Section 5 - Headaches: I have no headaches at all.  Section 6 - Concentration: I can concentrate fully without difficulty.  Section 7 - Sleeping: My sleep is slightly disturbed for less than 1 hour.  Section 8 - Driving: I can drive as long as I want with slight neck pain.  Section 9 - Reading: I can read as much as I want with no neck pain.  Section 10 - Recreation: I have neck pain with most recreational activities.  Neck Disability Index Score: 13  Neck Disability Index Comments: 26% disability      Time Calculation:     Start Time: 1100  Stop Time: 1147  Time Calculation (min): 47 min  Total Timed Code Minutes- PT: 38 minute(s)     Therapy Charges for Today     Code Description Service Date Service Provider Modifiers Qty    63280295797  PT THERAPEUTIC ACT EA 15 MIN 12/30/2019 Santosh Bledsoe, PT GP 3    [id number]  PT RE-EVAL ESTABLISHED PLAN 2 12/30/2019 Santosh Bledsoe, PT GP 1          PT G-Codes  Outcome Measure Options: Neck Disability Index (NDI), Lower Extremity Functional Scale (LEFS)  Total: 26  Neck Disability Index Score: 13         Santosh Bledsoe PT  12/30/2019

## 2020-01-06 ENCOUNTER — HOSPITAL ENCOUNTER (OUTPATIENT)
Dept: PHYSICAL THERAPY | Facility: HOSPITAL | Age: 65
Setting detail: THERAPIES SERIES
Discharge: HOME | End: 2020-01-06

## 2020-01-06 DIAGNOSIS — G89.29 NECK PAIN, CHRONIC: Primary | ICD-10-CM

## 2020-01-06 DIAGNOSIS — M79.672 FOOT PAIN, LEFT: ICD-10-CM

## 2020-01-06 DIAGNOSIS — M54.2 NECK PAIN, CHRONIC: Primary | ICD-10-CM

## 2020-01-06 PROCEDURE — 97110 THERAPEUTIC EXERCISES: CPT

## 2020-01-06 PROCEDURE — 97012 MECHANICAL TRACTION THERAPY: CPT

## 2020-01-06 PROCEDURE — G0283 ELEC STIM OTHER THAN WOUND: HCPCS

## 2020-01-06 NOTE — THERAPY TREATMENT NOTE
"    Outpatient Physical Therapy Ortho Treatment Note  [facility]     Patient Name: Otf Murray  : 1955  MRN: [id number]  Today's Date: 2020      Visit Date: 2020     Subjective Improvement: \"some\"  Attendance:   (;  for )  Next MD Visit : PRN  Recert Date:  2020      Therapy Diagnosis:  Cervical Pain; L Ankle/Foot pain        Visit Dx:    ICD-10-CM ICD-9-CM   1. Neck pain, chronic M54.2 723.1    G89.29 338.29   2. Foot pain, left M79.672 729.5       Patient Active Problem List   Diagnosis   • ADHD, predominantly inattentive type        Past Medical History:   Diagnosis Date   • Acute gastritis without bleeding    • Alcohol abuse counseling and surveillance of alcoholic    • Allergic rhinitis    • Anxiety state    • Attention deficit disorder of childhood with hyperactivity    • Attention deficit hyperactivity disorder    • Attention deficit hyperactivity disorder, predominantly hyperactive impulsive type    • Attention deficit hyperactivity disorder, predominantly inattentive type    • Backache    • Body mass index (bmi) 31.0-31.9, adult     Body mass index (BMI) 31.0-31.9, adult - BMI 33.5      • Burn of lower leg    • Candidiasis of skin    • Cardiac murmur, unspecified    • Decreased testosterone level    • Depressive disorder     Depressive disorder - acute on chronic      • Dysgraphia    • Edema    • Elbow joint pain    • Encounter for general adult medical examination with abnormal findings    • Encounter for screening for malignant neoplasm of colon    • Essential hypertension    • Essential tremor    • Ex-smoker    • Fatigue    • Gastro-esophageal reflux disease with esophagitis    • Gastroesophageal reflux disease    • Hyperlipidemia    • Hyperlipoproteinemia    • Impotence of organic origin    • Insomnia    • Intermittent palpitations    • Knee pain     Knee pain - patellofemoral      • Lateral epicondylitis    • Low back pain    • Male erectile disorder  "   • Male erectile dysfunction, unspecified    • Male hypogonadism    • Neck pain     Neck pain aggravated by recumbency      • Obese     Obese - BMI 33.0      • On long term drug therapy    • Osteoarthrosis     Osteoarthrosis, unspecified whether generalized or localized, involving lower leg      • Other obesity    • Other specified diseases of anus and rectum     Other specified diseases of anus and rectum - rectal pain after c-scope prep      • Overweight    • Pain in left knee    • Pain in right knee    • Shoulder pain, right    • Spasm of back muscles    • Tachycardia, unspecified    • Tremor, unspecified    • Tubular adenoma of colon    • Unspecified essential hypertension         Past Surgical History:   Procedure Laterality Date   • COLONOSCOPY  03/16/2016    One polyp in the sigmoid colon.Resected and retrieved.        • CYST REMOVAL  10/27/2009     Excision of sebaceous cyst of the forehead, glabellar region.   • ENDOSCOPY  03/16/2016    Mildly severe esophagitis.Gastritis.Normal examined duodenum.Several biopsies obtained in the lower third of the esophagus.    • ENDOSCOPY AND COLONOSCOPY  04/12/2006     Normal colonoscopic examination    • INCISION AND DRAINAGE ABSCESS  10/24/2012   • INJECTION OF MEDICATION  04/21/2011    Depo Medrol (Methylprednisone) 80mg (1)        • INJECTION OF MEDICATION  02/01/2016    Kenalog (3)        • INJECTION OF MEDICATION  02/19/2016    Toradol (3)    • KNEE ARTHROSCOPY  04/02/2009     Medial meniscus tear of left knee   • SCROTUM EXPLORATION  07/02/2002     Excision of epidermal inclusion cyst. base of left scrotum   • SKIN BIOPSY  04/23/2012   • SKIN TAG REMOVAL  04/25/2002    Skin tag, left scrotum        PT Ortho     Row Name 01/06/20 1100       Subjective Comments    Subjective Comments  Pt reports that for the last week he really hasn't done much so his neck and his foot/ankle haven't been hurting as bad; Wearing shoes with greater arch support which seems to help   -       Precautions and Contraindications    Precautions/Limitations  no known precautions/limitations  -       Subjective Pain    Able to rate subjective pain?  yes  -    Post-Treatment Pain Level  0  -KH       Posture/Observations    Posture/Observations Comments  gait non antalgic; no distress noted;   -      User Key  (r) = Recorded By, (t) = Taken By, (c) = Cosigned By    Initials Name Provider Type    Kristin Garcia PTA Physical Therapy Assistant                      PT Assessment/Plan     Row Name 01/06/20 1100          PT Assessment    Assessment Comments  no pain in the left ankle or foot with exercises performed this date; at pt request and ok'd by PT continued with mechanical cervical traction; states that he did have some numbness in the R UE with wall angels this date  -        PT Plan    PT Frequency  2x/week  -     Predicted Duration of Therapy Intervention (Therapy Eval)  6-8 weeks  -     PT Plan Comments  Continue with current POC: Increase strength and stability as able and mechanical traction as toleated  -       User Key  (r) = Recorded By, (t) = Taken By, (c) = Cosigned By    Initials Name Provider Type    Kristin Garcia PTA Physical Therapy Assistant          Modalities     Row Name 01/06/20 1100             Moist Heat    MH Applied  Yes  -      Location  Cervical Spine Region w/ IFC  -KH      Rx Minutes  10 mins  -KH      MH S/P Rx  Yes  -         ELECTRICAL STIMULATION    Attended/Unattended  Unattended  -      Stimulation Type  IFC  -KH      Location/Electrode Placement/Other  Cervical Region w/ MHP  -KH       PT E-Stim Unattended (Manual) Minutes  10  -KH         Traction 71456    Traction Type  Cervical  -KH      Rx Minutes  15  -KH      Duration  Intermittent  -KH      Position  Hook-lying  -KH      Weight  -- 30/20  -KH      Hold  60  -KH      Relax  10  -KH      Progression  1  -KH      Regression  1  -KH        User Key  (r) = Recorded By, (t) = Taken By,  "(c) = Cosigned By    Initials Name Provider Type    CHELSEA Lamas KristinNANCY judd Physical Therapy Assistant        OP Exercises     Row Name 01/06/20 1100             Subjective Comments    Subjective Comments  Pt reports that for the last week he really hasn't done much so his neck and his foot/ankle haven't been hurting as bad; Wearing shoes with greater arch support which seems to help  -KH         Subjective Pain    Able to rate subjective pain?  yes  -KH      Pre-Treatment Pain Level  0  -KH      Post-Treatment Pain Level  0  -KH         Total Minutes    57516 - PT Therapeutic Exercise Minutes  40  -KH         Exercise 1    Exercise Name 1  Pro ll UE/LE strength  -KH      Cueing 1  Verbal  -KH      Time 1  15'  -KH      Additional Comments  (started on his own at 1055)  -KH         Exercise 2    Exercise Name 2  doorway stretch  -KH      Cueing 2  Verbal  -KH      Sets 2  3  -KH      Time 2  30\"  -KH      Additional Comments  hands high  -KH         Exercise 3    Exercise Name 3  Barrel hug/bruggers  -KH      Cueing 3  Verbal;Tactile;Demo  -KH      Sets 3  1  -KH      Reps 3  10  -KH      Time 3  3\"  -KH         Exercise 4    Exercise Name 4  Wall L LE lunge triplanar ankle mobilization  -KH      Cueing 4  Verbal;Demo  -KH      Sets 4  1  -KH      Reps 4  10 each  -KH      Time 4  2\"  -KH         Exercise 5    Exercise Name 5  Fwd Step ups w/ upright posture holding 4# plyoball  -KH      Cueing 5  Verbal  -KH      Sets 5  2  -KH      Reps 5  10  -KH      Additional Comments  4\" step L LE only  -KH         Exercise 6    Exercise Name 6  Latearl Step ups L with UE pressouts w/ plyoball  -KH      Cueing 6  Verbal  -KH      Sets 6  2  -KH      Reps 6  10  -KH      Additional Comments  4\" step L only   -KH         Exercise 7    Exercise Name 7  Airex with B UE \"Sunrises\"  -KH      Cueing 7  Verbal  -KH      Sets 7  2  -KH      Reps 7  10  -KH      Additional Comments  2# DB  -KH         Exercise 8    Exercise Name 8  " Cervical Traction  -      Time 8  15'  -      Additional Comments  see modalities  -        User Key  (r) = Recorded By, (t) = Taken By, (c) = Cosigned By    Initials Name Provider Type    Kristin Garcia PTA Physical Therapy Assistant                       PT OP Goals     Row Name 01/06/20 1100          PT Short Term Goals    STG Date to Achieve  12/31/19  -     STG 1  Pt to improve NDI by 10%  -     STG 1 Progress  Not Met  -     STG 2  Pt to be independent c initial HEP  -     STG 2 Progress  Not Met  -     STG 3  Pt to report/demo 50% improved Sx/function  -     STG 3 Progress  Not Met  -     STG 4  Pt to demo improved shld/shld girdle/cervical strength all planes by at least 1/2 degree.  -     STG 4 Progress  Not Met  -     STG 5  Pt to improve LEFS by 9 points  -     STG 5 Progress  Progressing  -        Long Term Goals    LTG Date to Achieve  01/21/20  -     LTG 1  Pt to improve NDI to less than 10% disability  -     LTG 1 Progress  Not Met  -     LTG 2  Pt to report 80% improved Sx/function  -     LTG 2 Progress  Not Met  -     LTG 3  Pt to be independent c progressive HEP for proprioceptive enhancement, posture, strength, stability, tissue length:tension normalization  -     LTG 3 Progress  Not Met  -     LTG 4  Pt to improve LEFS to 64/80 or better  -     LTG 4 Progress  New  -        Time Calculation    PT Goal Re-Cert Due Date  01/20/20  -       User Key  (r) = Recorded By, (t) = Taken By, (c) = Cosigned By    Initials Name Provider Type    Kristin Garcia PTA Physical Therapy Assistant                         Time Calculation:   Start Time: 1100  Stop Time: 1205  Time Calculation (min): 65 min  Total Timed Code Minutes- PT: 55 minute(s)  Therapy Charges for Today     Code Description Service Date Service Provider Modifiers Qty    57520691464 HC PT THER SUPP EA 15 MIN 1/6/2020 Kristin Lamas PTA GP 1    14645392116 HC PT ELECTRICAL STIM UNATTENDED 1/6/2020  Kristin Lamas, PTA  1    37144564872 HC PT TRACTION CERVICAL 1/6/2020 Kristin Lamas, PTA GP 1    97338773946 HC PT THER PROC EA 15 MIN 1/6/2020 Kristin Lamas PTA GP 2                    Kristin Lamas PTA  1/6/2020

## 2020-01-08 ENCOUNTER — HOSPITAL ENCOUNTER (OUTPATIENT)
Dept: PHYSICAL THERAPY | Facility: HOSPITAL | Age: 65
Setting detail: THERAPIES SERIES
Discharge: HOME | End: 2020-01-08

## 2020-01-08 DIAGNOSIS — G89.29 NECK PAIN, CHRONIC: Primary | ICD-10-CM

## 2020-01-08 DIAGNOSIS — M79.672 FOOT PAIN, LEFT: ICD-10-CM

## 2020-01-08 DIAGNOSIS — M54.2 NECK PAIN, CHRONIC: Primary | ICD-10-CM

## 2020-01-08 PROCEDURE — 97110 THERAPEUTIC EXERCISES: CPT

## 2020-01-08 PROCEDURE — G0283 ELEC STIM OTHER THAN WOUND: HCPCS

## 2020-01-08 PROCEDURE — 97012 MECHANICAL TRACTION THERAPY: CPT

## 2020-01-08 NOTE — THERAPY TREATMENT NOTE
"    Outpatient Physical Therapy Ortho Treatment Note  [facility]     Patient Name: Otf Murray  : 1955  MRN: [id number]  Today's Date: 2020      Visit Date: 2020     Subjective Improvement: \"some\"  Attendance:   (30;  for )  Next MD Visit : PRN  Recert Date:  2020        Therapy Diagnosis:  Cervical Pain; L Ankle/Foot pain    Visit Dx:    ICD-10-CM ICD-9-CM   1. Neck pain, chronic M54.2 723.1    G89.29 338.29   2. Foot pain, left M79.672 729.5       Patient Active Problem List   Diagnosis   • ADHD, predominantly inattentive type        Past Medical History:   Diagnosis Date   • Acute gastritis without bleeding    • Alcohol abuse counseling and surveillance of alcoholic    • Allergic rhinitis    • Anxiety state    • Attention deficit disorder of childhood with hyperactivity    • Attention deficit hyperactivity disorder    • Attention deficit hyperactivity disorder, predominantly hyperactive impulsive type    • Attention deficit hyperactivity disorder, predominantly inattentive type    • Backache    • Body mass index (bmi) 31.0-31.9, adult     Body mass index (BMI) 31.0-31.9, adult - BMI 33.5      • Burn of lower leg    • Candidiasis of skin    • Cardiac murmur, unspecified    • Decreased testosterone level    • Depressive disorder     Depressive disorder - acute on chronic      • Dysgraphia    • Edema    • Elbow joint pain    • Encounter for general adult medical examination with abnormal findings    • Encounter for screening for malignant neoplasm of colon    • Essential hypertension    • Essential tremor    • Ex-smoker    • Fatigue    • Gastro-esophageal reflux disease with esophagitis    • Gastroesophageal reflux disease    • Hyperlipidemia    • Hyperlipoproteinemia    • Impotence of organic origin    • Insomnia    • Intermittent palpitations    • Knee pain     Knee pain - patellofemoral      • Lateral epicondylitis    • Low back pain    • Male erectile disorder    • " Male erectile dysfunction, unspecified    • Male hypogonadism    • Neck pain     Neck pain aggravated by recumbency      • Obese     Obese - BMI 33.0      • On long term drug therapy    • Osteoarthrosis     Osteoarthrosis, unspecified whether generalized or localized, involving lower leg      • Other obesity    • Other specified diseases of anus and rectum     Other specified diseases of anus and rectum - rectal pain after c-scope prep      • Overweight    • Pain in left knee    • Pain in right knee    • Shoulder pain, right    • Spasm of back muscles    • Tachycardia, unspecified    • Tremor, unspecified    • Tubular adenoma of colon    • Unspecified essential hypertension         Past Surgical History:   Procedure Laterality Date   • COLONOSCOPY  03/16/2016    One polyp in the sigmoid colon.Resected and retrieved.        • CYST REMOVAL  10/27/2009     Excision of sebaceous cyst of the forehead, glabellar region.   • ENDOSCOPY  03/16/2016    Mildly severe esophagitis.Gastritis.Normal examined duodenum.Several biopsies obtained in the lower third of the esophagus.    • ENDOSCOPY AND COLONOSCOPY  04/12/2006     Normal colonoscopic examination    • INCISION AND DRAINAGE ABSCESS  10/24/2012   • INJECTION OF MEDICATION  04/21/2011    Depo Medrol (Methylprednisone) 80mg (1)        • INJECTION OF MEDICATION  02/01/2016    Kenalog (3)        • INJECTION OF MEDICATION  02/19/2016    Toradol (3)    • KNEE ARTHROSCOPY  04/02/2009     Medial meniscus tear of left knee   • SCROTUM EXPLORATION  07/02/2002     Excision of epidermal inclusion cyst. base of left scrotum   • SKIN BIOPSY  04/23/2012   • SKIN TAG REMOVAL  04/25/2002    Skin tag, left scrotum        PT Ortho     Row Name 01/08/20 1100       Precautions and Contraindications    Precautions/Limitations  no known precautions/limitations  -       Posture/Observations    Posture/Observations Comments  gait non antalgic; no distress noted;   -      User Key  (r) =  Recorded By, (t) = Taken By, (c) = Cosigned By    Initials Name Provider Type    Kristin Garcia PTA Physical Therapy Assistant                      PT Assessment/Plan     Row Name 01/08/20 1100          PT Assessment    Assessment Comments  continues to respond well to treatment with cervical traction increase; Liked the cybex equipment and wishes to continue this to become independent fitness program  -        PT Plan    PT Frequency  2x/week  -     Predicted Duration of Therapy Intervention (Therapy Eval)  6-8 weeks  -     PT Plan Comments  Will continue with current POC; Cybex leg press next  -       User Key  (r) = Recorded By, (t) = Taken By, (c) = Cosigned By    Initials Name Provider Type    Kristin Garcia PTA Physical Therapy Assistant          Modalities     Row Name 01/08/20 1100             Moist Heat    MH Applied  Yes  -      Location  cspine  -KH      Rx Minutes  15 mins  -KH      MH S/P Rx  Yes  -         ELECTRICAL STIMULATION    Attended/Unattended  Unattended  -      Stimulation Type  IFC  -      Location/Electrode Placement/Other  Cervical Region w/ MHP  -KH         Traction 01338    Traction Type  Cervical  -      Rx Minutes  15  -KH      Position  Hook-lying  -KH      Weight  -- 33/20  -KH      Hold  60  -KH      Relax  20  -KH      Progression  1  -KH      Regression  1  -KH        User Key  (r) = Recorded By, (t) = Taken By, (c) = Cosigned By    Initials Name Provider Type    Kristin Garcia PTA Physical Therapy Assistant        OP Exercises     Row Name 01/08/20 1100             Subjective Comments    Subjective Comments  Pt reports that he is feeling well today; Really felt good after the traction last visit; Foot has no pain and no radicular symptoms noted in UE's   -KH         Subjective Pain    Able to rate subjective pain?  yes  -KH      Pre-Treatment Pain Level  0  -KH      Post-Treatment Pain Level  0  -KH         Total Minutes    36855 - PT Therapeutic Exercise  Minutes  30  -KH         Exercise 1    Exercise Name 1  Pro ll UE/LE strength  -      Cueing 1  Verbal  -KH      Time 1  10  -KH         Exercise 2    Exercise Name 2  Mechanical Cervical Traction  -      Time 2  20'  -KH         Exercise 3    Exercise Name 3  cybex inlcine pull  -KH      Cueing 3  Verbal  -KH      Sets 3  3  -KH      Reps 3  10  -KH      Additional Comments  75#  -KH         Exercise 4    Exercise Name 4  cybex row  -KH      Cueing 4  Verbal  -KH      Sets 4  3  -KH      Reps 4  10  -KH      Additional Comments  60#  -KH         Exercise 5    Exercise Name 5  IFC w/ MHP   -        User Key  (r) = Recorded By, (t) = Taken By, (c) = Cosigned By    Initials Name Provider Type    Kristin Garcia, PTA Physical Therapy Assistant                       PT OP Goals     Row Name 01/08/20 1100          PT Short Term Goals    STG Date to Achieve  12/31/19  -     STG 1  Pt to improve NDI by 10%  -     STG 1 Progress  Not Met  -     STG 2  Pt to be independent c initial HEP  -     STG 2 Progress  Not Met  -     STG 3  Pt to report/demo 50% improved Sx/function  -     STG 3 Progress  Not Met  -     STG 4  Pt to demo improved shld/shld girdle/cervical strength all planes by at least 1/2 degree.  -     STG 4 Progress  Not Met  -     STG 5  Pt to improve LEFS by 9 points  -     STG 5 Progress  Progressing  -        Long Term Goals    LTG Date to Achieve  01/21/20  -     LTG 1  Pt to improve NDI to less than 10% disability  -     LTG 1 Progress  Not Met  -     LTG 2  Pt to report 80% improved Sx/function  -     LTG 2 Progress  Not Met  -     LTG 3  Pt to be independent c progressive HEP for proprioceptive enhancement, posture, strength, stability, tissue length:tension normalization  -     LTG 3 Progress  Not Met  -     LTG 4  Pt to improve LEFS to 64/80 or better  -     LTG 4 Progress  New  -        Time Calculation    PT Goal Re-Cert Due Date  01/20/20  -       User  Key  (r) = Recorded By, (t) = Taken By, (c) = Cosigned By    Initials Name Provider Type     Kristin Lamas PTA Physical Therapy Assistant                         Time Calculation:   Start Time: 1055  Stop Time: 1200  Time Calculation (min): 65 min  Total Timed Code Minutes- PT: 50 minute(s)  Therapy Charges for Today     Code Description Service Date Service Provider Modifiers Qty    59029574785 HC PT THER PROC EA 15 MIN 1/8/2020 Kristin Lamas PTA GP 2    50969706453 HC PT ELECTRICAL STIM UNATTENDED 1/8/2020 Kristin Lamas PTA  1    90205237792 HC PT THER SUPP EA 15 MIN 1/8/2020 Kristin Lamas PTA GP 1    98139925872 HC PT TRACTION CERVICAL 1/8/2020 Kristin Lamas PTA GP 1                    Kristin Lamas PTA  1/8/2020

## 2020-01-13 ENCOUNTER — HOSPITAL ENCOUNTER (OUTPATIENT)
Dept: PHYSICAL THERAPY | Facility: HOSPITAL | Age: 65
Setting detail: THERAPIES SERIES
Discharge: HOME | End: 2020-01-13

## 2020-01-13 DIAGNOSIS — G89.29 NECK PAIN, CHRONIC: Primary | ICD-10-CM

## 2020-01-13 DIAGNOSIS — M54.2 NECK PAIN, CHRONIC: Primary | ICD-10-CM

## 2020-01-13 DIAGNOSIS — M79.672 FOOT PAIN, LEFT: ICD-10-CM

## 2020-01-13 PROCEDURE — 97110 THERAPEUTIC EXERCISES: CPT

## 2020-01-13 PROCEDURE — 97012 MECHANICAL TRACTION THERAPY: CPT

## 2020-01-13 PROCEDURE — G0283 ELEC STIM OTHER THAN WOUND: HCPCS

## 2020-01-13 NOTE — THERAPY TREATMENT NOTE
"    Outpatient Physical Therapy Ortho Treatment Note  [facility]     Patient Name: Otf Murray  : 1955  MRN: [id number]  Today's Date: 2020      Visit Date: 2020     Subjective Improvement: \"some\"  Attendance:   (; 3/3 for )  Next MD Visit : PRN  Recert Date:  2020        Therapy Diagnosis:  Cervical Pain; L Ankle/Foot pain    Visit Dx:    ICD-10-CM ICD-9-CM   1. Neck pain, chronic M54.2 723.1    G89.29 338.29   2. Foot pain, left M79.672 729.5       Patient Active Problem List   Diagnosis   • ADHD, predominantly inattentive type        Past Medical History:   Diagnosis Date   • Acute gastritis without bleeding    • Alcohol abuse counseling and surveillance of alcoholic    • Allergic rhinitis    • Anxiety state    • Attention deficit disorder of childhood with hyperactivity    • Attention deficit hyperactivity disorder    • Attention deficit hyperactivity disorder, predominantly hyperactive impulsive type    • Attention deficit hyperactivity disorder, predominantly inattentive type    • Backache    • Body mass index (bmi) 31.0-31.9, adult     Body mass index (BMI) 31.0-31.9, adult - BMI 33.5      • Burn of lower leg    • Candidiasis of skin    • Cardiac murmur, unspecified    • Decreased testosterone level    • Depressive disorder     Depressive disorder - acute on chronic      • Dysgraphia    • Edema    • Elbow joint pain    • Encounter for general adult medical examination with abnormal findings    • Encounter for screening for malignant neoplasm of colon    • Essential hypertension    • Essential tremor    • Ex-smoker    • Fatigue    • Gastro-esophageal reflux disease with esophagitis    • Gastroesophageal reflux disease    • Hyperlipidemia    • Hyperlipoproteinemia    • Impotence of organic origin    • Insomnia    • Intermittent palpitations    • Knee pain     Knee pain - patellofemoral      • Lateral epicondylitis    • Low back pain    • Male erectile disorder  "   • Male erectile dysfunction, unspecified    • Male hypogonadism    • Neck pain     Neck pain aggravated by recumbency      • Obese     Obese - BMI 33.0      • On long term drug therapy    • Osteoarthrosis     Osteoarthrosis, unspecified whether generalized or localized, involving lower leg      • Other obesity    • Other specified diseases of anus and rectum     Other specified diseases of anus and rectum - rectal pain after c-scope prep      • Overweight    • Pain in left knee    • Pain in right knee    • Shoulder pain, right    • Spasm of back muscles    • Tachycardia, unspecified    • Tremor, unspecified    • Tubular adenoma of colon    • Unspecified essential hypertension         Past Surgical History:   Procedure Laterality Date   • COLONOSCOPY  03/16/2016    One polyp in the sigmoid colon.Resected and retrieved.        • CYST REMOVAL  10/27/2009     Excision of sebaceous cyst of the forehead, glabellar region.   • ENDOSCOPY  03/16/2016    Mildly severe esophagitis.Gastritis.Normal examined duodenum.Several biopsies obtained in the lower third of the esophagus.    • ENDOSCOPY AND COLONOSCOPY  04/12/2006     Normal colonoscopic examination    • INCISION AND DRAINAGE ABSCESS  10/24/2012   • INJECTION OF MEDICATION  04/21/2011    Depo Medrol (Methylprednisone) 80mg (1)        • INJECTION OF MEDICATION  02/01/2016    Kenalog (3)        • INJECTION OF MEDICATION  02/19/2016    Toradol (3)    • KNEE ARTHROSCOPY  04/02/2009     Medial meniscus tear of left knee   • SCROTUM EXPLORATION  07/02/2002     Excision of epidermal inclusion cyst. base of left scrotum   • SKIN BIOPSY  04/23/2012   • SKIN TAG REMOVAL  04/25/2002    Skin tag, left scrotum        PT Ortho     Row Name 01/13/20 1400       Precautions and Contraindications    Precautions/Limitations  no known precautions/limitations  -KH       Subjective Pain    Post-Treatment Pain Level  0  -KH       Posture/Observations    Posture/Observations Comments  gait non  antalgic; no distress noted;   -      User Key  (r) = Recorded By, (t) = Taken By, (c) = Cosigned By    Initials Name Provider Type    Kristin Garcia PTA Physical Therapy Assistant                      PT Assessment/Plan     Row Name 01/13/20 1400          PT Assessment    Assessment Comments  no increase in exercise this date; focused on modalities to help decrease pain which did help.   -        PT Plan    PT Frequency  2x/week  -CHELSEA     Predicted Duration of Therapy Intervention (Therapy Eval)  6-8 weeks  -     PT Plan Comments  Recheck with PT scheduled for next week Will progress as able. Cybex leg press next.   -       User Key  (r) = Recorded By, (t) = Taken By, (c) = Cosigned By    Initials Name Provider Type    Kristin Garcia PTA Physical Therapy Assistant          Modalities     Row Name 01/13/20 1400             Moist Heat    MH Applied  Yes  -      Location  cspine  -KH      Rx Minutes  15 mins  -KH      MH S/P Rx  Yes  -         ELECTRICAL STIMULATION    Attended/Unattended  Unattended  -      Stimulation Type  IFC  -      Location/Electrode Placement/Other  Cervical Region w/ MHP  -       PT E-Stim Unattended (Manual) Minutes  15  -KH         Traction 60111    Traction Type  Cervical  -      Rx Minutes  20  -KH      Position  Hook-lying  -KH      Weight  -- 33/20  -KH      Hold  60  -KH      Relax  20  -KH      Progression  1  -KH      Regression  1  -KH        User Key  (r) = Recorded By, (t) = Taken By, (c) = Cosigned By    Initials Name Provider Type    Kristin Garcia PTA Physical Therapy Assistant        OP Exercises     Row Name 01/13/20 1400             Subjective Comments    Subjective Comments  Pt reports that his feet are going better but has had increased pain in his lower back and R shoulder the last two days and unsure why;  Doesn't believe it was anything to do with PT last week.   -CHELSEA         Subjective Pain    Able to rate subjective pain?  yes  -CHELSEA       Pre-Treatment Pain Level  3  -KH      Post-Treatment Pain Level  0  -         Exercise 1    Exercise Name 1  Pro ll UE/LE strength  -      Cueing 1  Verbal  -      Time 1  10  -KH         Exercise 2    Exercise Name 2  Mechanical Cervical Traction  -      Time 2  20'  -        User Key  (r) = Recorded By, (t) = Taken By, (c) = Cosigned By    Initials Name Provider Type     Kristin Lamas PTA Physical Therapy Assistant                       PT OP Goals     Row Name 01/13/20 1400          PT Short Term Goals    STG Date to Achieve  12/31/19  -     STG 1  Pt to improve NDI by 10%  -     STG 1 Progress  Not Met  -     STG 2  Pt to be independent c initial HEP  -     STG 2 Progress  Not Met  -     STG 3  Pt to report/demo 50% improved Sx/function  -     STG 3 Progress  Not Met  -     STG 4  Pt to demo improved shld/shld girdle/cervical strength all planes by at least 1/2 degree.  -     STG 4 Progress  Not Met  -     STG 5  Pt to improve LEFS by 9 points  -     STG 5 Progress  Progressing  -        Long Term Goals    LTG Date to Achieve  01/21/20  -     LTG 1  Pt to improve NDI to less than 10% disability  -     LTG 1 Progress  Not Met  -     LTG 2  Pt to report 80% improved Sx/function  -     LTG 2 Progress  Not Met  -     LTG 3  Pt to be independent c progressive HEP for proprioceptive enhancement, posture, strength, stability, tissue length:tension normalization  -     LTG 3 Progress  Not Met  -     LTG 4  Pt to improve LEFS to 64/80 or better  -     LTG 4 Progress  New  -        Time Calculation    PT Goal Re-Cert Due Date  01/20/20  -       User Key  (r) = Recorded By, (t) = Taken By, (c) = Cosigned By    Initials Name Provider Type    Kristin Garcia PTA Physical Therapy Assistant                         Time Calculation:   Start Time: 1345  Stop Time: 1447  Time Calculation (min): 62 min  Total Timed Code Minutes- PT: 27 minute(s)  Therapy Charges for Today      Code Description Service Date Service Provider Modifiers Qty    77211578574 HC PT TRACTION CERVICAL 1/13/2020 Kristin Lamas, PTA GP 1    14072591269 HC PT THER SUPP EA 15 MIN 1/13/2020 Kristin Lamas, NANCY GP 1    62113721071 HC PT ELECTRICAL STIM UNATTENDED 1/13/2020 Kristin Lamas, PTA  1    47607676754 HC PT THER PROC EA 15 MIN 1/13/2020 Kristin Lamas, NANCY GP 1                    Kristin Lamas PTA  1/13/2020

## 2020-01-15 ENCOUNTER — HOSPITAL ENCOUNTER (OUTPATIENT)
Dept: PHYSICAL THERAPY | Facility: HOSPITAL | Age: 65
Setting detail: THERAPIES SERIES
Discharge: HOME | End: 2020-01-15

## 2020-01-15 DIAGNOSIS — G89.29 NECK PAIN, CHRONIC: Primary | ICD-10-CM

## 2020-01-15 DIAGNOSIS — M79.672 FOOT PAIN, LEFT: ICD-10-CM

## 2020-01-15 DIAGNOSIS — M54.2 NECK PAIN, CHRONIC: Primary | ICD-10-CM

## 2020-01-15 PROCEDURE — 97012 MECHANICAL TRACTION THERAPY: CPT

## 2020-01-15 PROCEDURE — G0283 ELEC STIM OTHER THAN WOUND: HCPCS

## 2020-01-15 PROCEDURE — 97110 THERAPEUTIC EXERCISES: CPT

## 2020-01-15 NOTE — THERAPY TREATMENT NOTE
"    Outpatient Physical Therapy Ortho Treatment Note  [facility]     Patient Name: Otf Murray  : 1955  MRN: [id number]  Today's Date: 1/15/2020      Visit Date: 01/15/2020     Subjective Improvement: \"some\"  Attendance:  10/10 (; 3/3 for )  Next MD Visit : PRN  Recert Date:  2020        Therapy Diagnosis:  Cervical Pain; L Ankle/Foot pain    Visit Dx:    ICD-10-CM ICD-9-CM   1. Neck pain, chronic M54.2 723.1    G89.29 338.29   2. Foot pain, left M79.672 729.5       Patient Active Problem List   Diagnosis   • ADHD, predominantly inattentive type        Past Medical History:   Diagnosis Date   • Acute gastritis without bleeding    • Alcohol abuse counseling and surveillance of alcoholic    • Allergic rhinitis    • Anxiety state    • Attention deficit disorder of childhood with hyperactivity    • Attention deficit hyperactivity disorder    • Attention deficit hyperactivity disorder, predominantly hyperactive impulsive type    • Attention deficit hyperactivity disorder, predominantly inattentive type    • Backache    • Body mass index (bmi) 31.0-31.9, adult     Body mass index (BMI) 31.0-31.9, adult - BMI 33.5      • Burn of lower leg    • Candidiasis of skin    • Cardiac murmur, unspecified    • Decreased testosterone level    • Depressive disorder     Depressive disorder - acute on chronic      • Dysgraphia    • Edema    • Elbow joint pain    • Encounter for general adult medical examination with abnormal findings    • Encounter for screening for malignant neoplasm of colon    • Essential hypertension    • Essential tremor    • Ex-smoker    • Fatigue    • Gastro-esophageal reflux disease with esophagitis    • Gastroesophageal reflux disease    • Hyperlipidemia    • Hyperlipoproteinemia    • Impotence of organic origin    • Insomnia    • Intermittent palpitations    • Knee pain     Knee pain - patellofemoral      • Lateral epicondylitis    • Low back pain    • Male erectile disorder  "   • Male erectile dysfunction, unspecified    • Male hypogonadism    • Neck pain     Neck pain aggravated by recumbency      • Obese     Obese - BMI 33.0      • On long term drug therapy    • Osteoarthrosis     Osteoarthrosis, unspecified whether generalized or localized, involving lower leg      • Other obesity    • Other specified diseases of anus and rectum     Other specified diseases of anus and rectum - rectal pain after c-scope prep      • Overweight    • Pain in left knee    • Pain in right knee    • Shoulder pain, right    • Spasm of back muscles    • Tachycardia, unspecified    • Tremor, unspecified    • Tubular adenoma of colon    • Unspecified essential hypertension         Past Surgical History:   Procedure Laterality Date   • COLONOSCOPY  03/16/2016    One polyp in the sigmoid colon.Resected and retrieved.        • CYST REMOVAL  10/27/2009     Excision of sebaceous cyst of the forehead, glabellar region.   • ENDOSCOPY  03/16/2016    Mildly severe esophagitis.Gastritis.Normal examined duodenum.Several biopsies obtained in the lower third of the esophagus.    • ENDOSCOPY AND COLONOSCOPY  04/12/2006     Normal colonoscopic examination    • INCISION AND DRAINAGE ABSCESS  10/24/2012   • INJECTION OF MEDICATION  04/21/2011    Depo Medrol (Methylprednisone) 80mg (1)        • INJECTION OF MEDICATION  02/01/2016    Kenalog (3)        • INJECTION OF MEDICATION  02/19/2016    Toradol (3)    • KNEE ARTHROSCOPY  04/02/2009     Medial meniscus tear of left knee   • SCROTUM EXPLORATION  07/02/2002     Excision of epidermal inclusion cyst. base of left scrotum   • SKIN BIOPSY  04/23/2012   • SKIN TAG REMOVAL  04/25/2002    Skin tag, left scrotum        PT Ortho     Row Name 01/15/20 1400       Precautions and Contraindications    Precautions/Limitations  no known precautions/limitations  -       Posture/Observations    Posture/Observations Comments  gait non antalgic; no distress noted;   -      User Key  (r) =  Recorded By, (t) = Taken By, (c) = Cosigned By    Initials Name Provider Type    Kristin Garcia PTA Physical Therapy Assistant                      PT Assessment/Plan     Row Name 01/15/20 1400          PT Assessment    Assessment Comments  progressing well with traction and fitness routine. no increased pain; did have some numbness in the right UE after the pro ll but subsided after stopped.   -        PT Plan    PT Frequency  2x/week  -CHELSEA     Predicted Duration of Therapy Intervention (Therapy Eval)  6-8 weeks  -     PT Plan Comments  Recheck with PT next week; Possible d/c to independent program and fitness membership at that time.   -       User Key  (r) = Recorded By, (t) = Taken By, (c) = Cosigned By    Initials Name Provider Type    Kristin Garcia PTA Physical Therapy Assistant          Modalities     Row Name 01/15/20 1400             Moist Heat    MH Applied  Yes  -      Location  cspine  -KH      Rx Minutes  15 mins  -KH      MH S/P Rx  Yes  -KH         ELECTRICAL STIMULATION    Attended/Unattended  Unattended  -      Stimulation Type  IFC  -      Location/Electrode Placement/Other  Cervical Region w/ MHP  -KH       PT E-Stim Unattended (Manual) Minutes  15  -KH         Traction 66179    Traction Type  Cervical  -KH      Rx Minutes  20  -KH      Position  Hook-lying  -KH      Weight  -- 33/20  -KH      Hold  60  -KH      Relax  20  -KH      Progression  1  -KH      Regression  1  -KH        User Key  (r) = Recorded By, (t) = Taken By, (c) = Cosigned By    Initials Name Provider Type    Kristin Garcia PTA Physical Therapy Assistant        OP Exercises     Row Name 01/15/20 1400             Subjective Comments    Subjective Comments  Been feeling better since last treatment. Did get some numbness in the right UE but doesn't ever last long;   -         Subjective Pain    Able to rate subjective pain?  yes  -CHELSEA      Pre-Treatment Pain Level  1  -KH      Post-Treatment Pain Level  0   (Pended)   -         Exercise 1    Exercise Name 1  Pro ll LE/UE strength  -      Cueing 1  Verbal  -      Time 1  10'  -KH         Exercise 2    Exercise Name 2  Mechanical Cervical Traction  -      Time 2  20'  -KH         Exercise 3    Exercise Name 3  cybex hip abd  -KH      Cueing 3  Verbal  -KH      Sets 3  3  -KH      Reps 3  10  -KH      Additional Comments  70#  -KH         Exercise 4    Exercise Name 4  cybex hip add   -KH      Cueing 4  Verbal  -KH      Sets 4  3  -KH      Reps 4  10  -KH      Additional Comments  70#  -KH         Exercise 5    Exercise Name 5  cybex leg press   -      Cueing 5  Verbal  -KH      Sets 5  3  -KH      Reps 5  10  -KH      Additional Comments  150# sled 4  -KH        User Key  (r) = Recorded By, (t) = Taken By, (c) = Cosigned By    Initials Name Provider Type    Kristin Garcia, NANCY Physical Therapy Assistant                       PT OP Goals     Row Name 01/15/20 1400          PT Short Term Goals    STG Date to Achieve  12/31/19  -     STG 1  Pt to improve NDI by 10%  -     STG 1 Progress  Not Met  -     STG 2  Pt to be independent c initial HEP  -     STG 2 Progress  Not Met  -     STG 3  Pt to report/demo 50% improved Sx/function  -     STG 3 Progress  Not Met  -     STG 4  Pt to demo improved shld/shld girdle/cervical strength all planes by at least 1/2 degree.  -     STG 4 Progress  Not Met  -     STG 5  Pt to improve LEFS by 9 points  -     STG 5 Progress  Progressing  -        Long Term Goals    LTG Date to Achieve  01/21/20  -     LTG 1  Pt to improve NDI to less than 10% disability  -     LTG 1 Progress  Not Met  -     LTG 2  Pt to report 80% improved Sx/function  -     LTG 2 Progress  Not Met  -     LTG 3  Pt to be independent c progressive HEP for proprioceptive enhancement, posture, strength, stability, tissue length:tension normalization  -     LTG 3 Progress  Not Met  -     LTG 4  Pt to improve LEFS to 64/80 or better   -CHELSEA     LTG 4 Progress  New  -CHELSEA        Time Calculation    PT Goal Re-Cert Due Date  01/20/20  -CHELSEA       User Key  (r) = Recorded By, (t) = Taken By, (c) = Cosigned By    Initials Name Provider Type    Kristin Garcia PTA Physical Therapy Assistant                         Time Calculation:   Start Time: 1340  Stop Time: 1455  Time Calculation (min): 75 min  Total Timed Code Minutes- PT: 60 minute(s)  Therapy Charges for Today     Code Description Service Date Service Provider Modifiers Qty    83236296573 HC PT ELECTRICAL STIM UNATTENDED 1/15/2020 Kristin Lamas PTA  1    03187221048 HC PT THER SUPP EA 15 MIN 1/15/2020 Kristin Lamas PTA GP 1    76829463843 HC PT TRACTION CERVICAL 1/15/2020 Kristin Lamas PTA GP 1    35936005604 HC PT THER PROC EA 15 MIN 1/15/2020 Kristin Lamas PTA GP 2                    Kristin Lamas PTA  1/15/2020

## 2020-01-20 ENCOUNTER — HOSPITAL ENCOUNTER (OUTPATIENT)
Dept: PHYSICAL THERAPY | Facility: HOSPITAL | Age: 65
Setting detail: THERAPIES SERIES
Discharge: HOME | End: 2020-01-20

## 2020-01-20 DIAGNOSIS — M54.2 NECK PAIN, CHRONIC: Primary | ICD-10-CM

## 2020-01-20 DIAGNOSIS — G89.29 NECK PAIN, CHRONIC: Primary | ICD-10-CM

## 2020-01-20 PROCEDURE — 97140 MANUAL THERAPY 1/> REGIONS: CPT

## 2020-01-20 PROCEDURE — 97530 THERAPEUTIC ACTIVITIES: CPT

## 2020-01-20 NOTE — THERAPY TREATMENT NOTE
Outpatient Physical Therapy Ortho Treatment Note  [facility]     Patient Name: Otf Murray  : 1955  MRN: [id number]  Today's Date: 2020      Visit Date: 2020    Visit Dx:    ICD-10-CM ICD-9-CM   1. Neck pain, chronic M54.2 723.1    G89.29 338.29       Patient Active Problem List   Diagnosis   • ADHD, predominantly inattentive type        Past Medical History:   Diagnosis Date   • Acute gastritis without bleeding    • Alcohol abuse counseling and surveillance of alcoholic    • Allergic rhinitis    • Anxiety state    • Attention deficit disorder of childhood with hyperactivity    • Attention deficit hyperactivity disorder    • Attention deficit hyperactivity disorder, predominantly hyperactive impulsive type    • Attention deficit hyperactivity disorder, predominantly inattentive type    • Backache    • Body mass index (bmi) 31.0-31.9, adult     Body mass index (BMI) 31.0-31.9, adult - BMI 33.5      • Burn of lower leg    • Candidiasis of skin    • Cardiac murmur, unspecified    • Decreased testosterone level    • Depressive disorder     Depressive disorder - acute on chronic      • Dysgraphia    • Edema    • Elbow joint pain    • Encounter for general adult medical examination with abnormal findings    • Encounter for screening for malignant neoplasm of colon    • Essential hypertension    • Essential tremor    • Ex-smoker    • Fatigue    • Gastro-esophageal reflux disease with esophagitis    • Gastroesophageal reflux disease    • Hyperlipidemia    • Hyperlipoproteinemia    • Impotence of organic origin    • Insomnia    • Intermittent palpitations    • Knee pain     Knee pain - patellofemoral      • Lateral epicondylitis    • Low back pain    • Male erectile disorder    • Male erectile dysfunction, unspecified    • Male hypogonadism    • Neck pain     Neck pain aggravated by recumbency      • Obese     Obese - BMI 33.0      • On long term drug therapy    • Osteoarthrosis      Osteoarthrosis, unspecified whether generalized or localized, involving lower leg      • Other obesity    • Other specified diseases of anus and rectum     Other specified diseases of anus and rectum - rectal pain after c-scope prep      • Overweight    • Pain in left knee    • Pain in right knee    • Shoulder pain, right    • Spasm of back muscles    • Tachycardia, unspecified    • Tremor, unspecified    • Tubular adenoma of colon    • Unspecified essential hypertension         Past Surgical History:   Procedure Laterality Date   • COLONOSCOPY  03/16/2016    One polyp in the sigmoid colon.Resected and retrieved.        • CYST REMOVAL  10/27/2009     Excision of sebaceous cyst of the forehead, glabellar region.   • ENDOSCOPY  03/16/2016    Mildly severe esophagitis.Gastritis.Normal examined duodenum.Several biopsies obtained in the lower third of the esophagus.    • ENDOSCOPY AND COLONOSCOPY  04/12/2006     Normal colonoscopic examination    • INCISION AND DRAINAGE ABSCESS  10/24/2012   • INJECTION OF MEDICATION  04/21/2011    Depo Medrol (Methylprednisone) 80mg (1)        • INJECTION OF MEDICATION  02/01/2016    Kenalog (3)        • INJECTION OF MEDICATION  02/19/2016    Toradol (3)    • KNEE ARTHROSCOPY  04/02/2009     Medial meniscus tear of left knee   • SCROTUM EXPLORATION  07/02/2002     Excision of epidermal inclusion cyst. base of left scrotum   • SKIN BIOPSY  04/23/2012   • SKIN TAG REMOVAL  04/25/2002    Skin tag, left scrotum        PT Ortho     Row Name 01/20/20 1300       Subjective Comments    Subjective Comments  Pt notes that he's been doing well overall, but pulled somthing in his R arm/neck over the weekend and has some troubles with turning his head while driving again.  Pt reports the mechanical traction feels good, but not sure if he's noticing any lasting benefit from it.  Pt noted that he saw a chiro and massage prior to PT today due to the strain/pulling in his shld from the weekend.  -LUCIEN     "   Precautions and Contraindications    Precautions/Limitations  no known precautions/limitations  -       Subjective Pain    Post-Treatment Pain Level  1  -    Subjective Pain Comment  Pt reported significant improvement in comfort & \"looseness\" post session compared to pre & contralateral side.  -       Posture/Observations    Posture/Observations Comments  Noted R 1st rib elevated slightly c mild hypomobility compared to L; R pec minor demo's mod flexibility limitation & mild-mod TTP.  -      User Key  (r) = Recorded By, (t) = Taken By, (c) = Cosigned By    Initials Name Provider Type     Santosh Bledsoe, PT Physical Therapist                      PT Assessment/Plan     Row Name 01/20/20 1500          PT Assessment    Functional Limitations  Decreased safety during functional activities;Limitation in home management;Limitations in community activities;Limitations in functional capacity and performance;Performance in leisure activities;Performance in self-care ADL;Performance in work activities  -     Impairments  Impaired flexibility;Impaired muscle endurance;Joint integrity;Joint mobility;Muscle strength;Pain;Poor body mechanics;Posture;Range of motion;Sensation  -     Assessment Comments  Pt is progressing well overall.  Pt noting non-significant benefits from mechanical traction, however grossly notes that his foot is feeling better and attributes to consistent ex on stationary bike each week.  Pt also notes that his neck is gradually improving, but still stiff and some intermittent UE Sx yet.  -     Rehab Potential  Good  -     Patient/caregiver participated in establishment of treatment plan and goals  Yes  -     Patient would benefit from skilled therapy intervention  Yes  -        PT Plan    PT Frequency  2x/week  -     Predicted Duration of Therapy Intervention (Therapy Eval)  6-8 wks  -     PT Plan Comments  Continue POC and progress as able.  Recert next visit.    -     " "  User Key  (r) = Recorded By, (t) = Taken By, (c) = Cosigned By    Initials Name Provider Type     Santosh Bledsoe, PT Physical Therapist            OP Exercises     Row Name 01/20/20 1300             Subjective Comments    Subjective Comments  Pt notes that he's been doing well overall, but pulled somthing in his R arm/neck over the weekend and has some troubles with turning his head while driving again.  Pt reports the mechanical traction feels good, but not sure if he's noticing any lasting benefit from it.  Pt noted that he saw a chiro and massage prior to PT today due to the strain/pulling in his shld from the weekend.  -         Subjective Pain    Able to rate subjective pain?  yes  -      Pre-Treatment Pain Level  3  -      Post-Treatment Pain Level  1  -      Subjective Pain Comment  Pt reported significant improvement in comfort & \"looseness\" post session compared to pre & contralateral side.  -         Exercise 1    Exercise Name 1  Pro ll LE/UE strength  -      Cueing 1  Verbal  -      Time 1  15' LEs (pre session), 10' (5'/5') UEs  -      Additional Comments  fwd/reverse UEs; L5.5  -         Exercise 2    Exercise Name 2  R dynamic UT/LS stretch c R hand behind back, R shld min-mod retraction/depression, & gentle AROM cerv rotation to ceiling & axilla.  -      Cueing 2  Demo;Verbal;Tactile  -      Sets 2  1  -AH      Reps 2  5  -AH      Time 2  3-5\" gentle stretches  -      Additional Comments  HEP  -         Exercise 3    Exercise Name 3  Tennis ball self STM/MFR R shld girdle  -      Cueing 3  Verbal;Demo  -      Time 3  5' instruction  -         Exercise 4    Exercise Name 4  B UE ER/ABD combo movement (UE clamshells c B scap depression focus)  -      Cueing 4  Verbal;Tactile;Demo  -      Sets 4  1  -AH      Reps 4  5  -AH      Time 4  5\"  -      Additional Comments  HEP  -         Exercise 5    Exercise Name 5  R scap retract/depress unilateral c external " "rotation  -      Cueing 5  Verbal;Demo  -      Sets 5  1  -      Reps 5  5  -      Time 5  5\"  -      Additional Comments  HEP  -         Exercise 6    Exercise Name 6  Bent at tabletop: BEHIND BACK SHLD IR THORACIC [id number] R UE/side, see paper sheet in chart.  -      Cueing 6  Verbal;Demo  -      Sets 6  1  -      Reps 6  5  -      Additional Comments  HEP.  Pt noted some N&T R UE post, but self resolved post ~15\" rest.  -        User Key  (r) = Recorded By, (t) = Taken By, (c) = Cosigned By    Initials Name Provider Type    Santosh Yoo, PT Physical Therapist                      Manual Rx (last 36 hours)      Manual Treatments     Row Name 01/20/20 1500             Manual Rx 1    Manual Rx 1 Location  R 1st rib/anterior shld girdle (pec minor)  -      Manual Rx 1 Type  Connective tissue mobilization c superficial-deep tissue progressions c septal work R pec minor. Used A/PROM drivers of deep breathing & R shld IR/ER for tissue movement facilitation.  -      Manual Rx 1 Grade  2-3  -      Manual Rx 1 Duration  23'  -        User Key  (r) = Recorded By, (t) = Taken By, (c) = Cosigned By    Initials Name Provider Type     Santosh Bledsoe, PT Physical Therapist          PT OP Goals     Row Name 01/20/20 1500          PT Short Term Goals    STG Date to Achieve  12/31/19  -     STG 1  Pt to improve NDI by 10%  -     STG 1 Progress  Not Met  -     STG 2  Pt to be independent c initial HEP  -     STG 2 Progress  Not Met  -     STG 3  Pt to report/demo 50% improved Sx/function  -     STG 3 Progress  Not Met  -     STG 4  Pt to demo improved shld/shld girdle/cervical strength all planes by at least 1/2 degree.  -     STG 4 Progress  Not Met  -     STG 5  Pt to improve LEFS by 9 points  -     STG 5 Progress  Progressing  -        Long Term Goals    LTG Date to Achieve  01/21/20  -     LTG 1  Pt to improve NDI to less than 10% disability  -     " LTG 1 Progress  Not Met  -     LTG 2  Pt to report 80% improved Sx/function  -     LTG 2 Progress  Not Met  -     LTG 3  Pt to be independent c progressive HEP for proprioceptive enhancement, posture, strength, stability, tissue length:tension normalization  -     LTG 3 Progress  Not Met  -     LTG 4  Pt to improve LEFS to 64/80 or better  -     LTG 4 Progress  New  -       User Key  (r) = Recorded By, (t) = Taken By, (c) = Cosigned By    Initials Name Provider Type     Santosh Bledsoe, PT Physical Therapist          Therapy Education  Education Details: HEP progressed, posture/biomechanics reviewed  Given: HEP, Symptoms/condition management, Pain management, Posture/body mechanics  Program: Reinforced, Progressed  How Provided: Verbal, Demonstration, Written  Provided to: Patient  Level of Understanding: Teach back education performed, Verbalized, Demonstrated              Time Calculation:   Start Time: 1345  Stop Time: 1430  Time Calculation (min): 45 min  Total Timed Code Minutes- PT: 45 minute(s)  Therapy Charges for Today     Code Description Service Date Service Provider Modifiers Qty    59177258682  PT MANUAL THERAPY EA 15 MIN 1/20/2020 Santosh Bledsoe, PT GP 2    [id number]  PT THERAPEUTIC ACT EA 15 MIN 1/20/2020 Santosh Bledsoe, PT GP 1                    Santosh Bledsoe PT  1/20/2020

## 2020-01-22 ENCOUNTER — HOSPITAL ENCOUNTER (OUTPATIENT)
Dept: PHYSICAL THERAPY | Facility: HOSPITAL | Age: 65
Setting detail: THERAPIES SERIES
Discharge: HOME | End: 2020-01-22

## 2020-01-22 DIAGNOSIS — M79.672 FOOT PAIN, LEFT: ICD-10-CM

## 2020-01-22 DIAGNOSIS — G89.29 NECK PAIN, CHRONIC: Primary | ICD-10-CM

## 2020-01-22 DIAGNOSIS — M54.2 NECK PAIN, CHRONIC: Primary | ICD-10-CM

## 2020-01-22 PROCEDURE — 97140 MANUAL THERAPY 1/> REGIONS: CPT

## 2020-01-22 PROCEDURE — 97110 THERAPEUTIC EXERCISES: CPT

## 2020-01-22 NOTE — THERAPY TREATMENT NOTE
"    Outpatient Physical Therapy Ortho Treatment Note  [facility]     Patient Name: Otf Murray  : 1955  MRN: [id number]  Today's Date: 2020      Visit Date: 2020     Subjective Improvement: \"some\"  Attendance:   (;  for )  Next MD Visit : PRN  Recert Date:  2020        Therapy Diagnosis:  Cervical Pain; L Ankle/Foot pain    Visit Dx:    ICD-10-CM ICD-9-CM   1. Neck pain, chronic M54.2 723.1    G89.29 338.29   2. Foot pain, left M79.672 729.5       Patient Active Problem List   Diagnosis   • ADHD, predominantly inattentive type        Past Medical History:   Diagnosis Date   • Acute gastritis without bleeding    • Alcohol abuse counseling and surveillance of alcoholic    • Allergic rhinitis    • Anxiety state    • Attention deficit disorder of childhood with hyperactivity    • Attention deficit hyperactivity disorder    • Attention deficit hyperactivity disorder, predominantly hyperactive impulsive type    • Attention deficit hyperactivity disorder, predominantly inattentive type    • Backache    • Body mass index (bmi) 31.0-31.9, adult     Body mass index (BMI) 31.0-31.9, adult - BMI 33.5      • Burn of lower leg    • Candidiasis of skin    • Cardiac murmur, unspecified    • Decreased testosterone level    • Depressive disorder     Depressive disorder - acute on chronic      • Dysgraphia    • Edema    • Elbow joint pain    • Encounter for general adult medical examination with abnormal findings    • Encounter for screening for malignant neoplasm of colon    • Essential hypertension    • Essential tremor    • Ex-smoker    • Fatigue    • Gastro-esophageal reflux disease with esophagitis    • Gastroesophageal reflux disease    • Hyperlipidemia    • Hyperlipoproteinemia    • Impotence of organic origin    • Insomnia    • Intermittent palpitations    • Knee pain     Knee pain - patellofemoral      • Lateral epicondylitis    • Low back pain    • Male erectile disorder  "   • Male erectile dysfunction, unspecified    • Male hypogonadism    • Neck pain     Neck pain aggravated by recumbency      • Obese     Obese - BMI 33.0      • On long term drug therapy    • Osteoarthrosis     Osteoarthrosis, unspecified whether generalized or localized, involving lower leg      • Other obesity    • Other specified diseases of anus and rectum     Other specified diseases of anus and rectum - rectal pain after c-scope prep      • Overweight    • Pain in left knee    • Pain in right knee    • Shoulder pain, right    • Spasm of back muscles    • Tachycardia, unspecified    • Tremor, unspecified    • Tubular adenoma of colon    • Unspecified essential hypertension         Past Surgical History:   Procedure Laterality Date   • COLONOSCOPY  03/16/2016    One polyp in the sigmoid colon.Resected and retrieved.        • CYST REMOVAL  10/27/2009     Excision of sebaceous cyst of the forehead, glabellar region.   • ENDOSCOPY  03/16/2016    Mildly severe esophagitis.Gastritis.Normal examined duodenum.Several biopsies obtained in the lower third of the esophagus.    • ENDOSCOPY AND COLONOSCOPY  04/12/2006     Normal colonoscopic examination    • INCISION AND DRAINAGE ABSCESS  10/24/2012   • INJECTION OF MEDICATION  04/21/2011    Depo Medrol (Methylprednisone) 80mg (1)        • INJECTION OF MEDICATION  02/01/2016    Kenalog (3)        • INJECTION OF MEDICATION  02/19/2016    Toradol (3)    • KNEE ARTHROSCOPY  04/02/2009     Medial meniscus tear of left knee   • SCROTUM EXPLORATION  07/02/2002     Excision of epidermal inclusion cyst. base of left scrotum   • SKIN BIOPSY  04/23/2012   • SKIN TAG REMOVAL  04/25/2002    Skin tag, left scrotum        PT Ortho     Row Name 01/22/20 1300       Subjective Comments    Subjective Comments  Shoulder was still hurting again yesterday but  feeling better today   -       Precautions and Contraindications    Precautions/Limitations  no known precautions/limitations  -     "   Subjective Pain    Pre-Treatment Pain Level  2  -KH    Post-Treatment Pain Level  1  -       Posture/Observations    Posture/Observations Comments  Trigger noted in the L upper trap   -      User Key  (r) = Recorded By, (t) = Taken By, (c) = Cosigned By    Initials Name Provider Type    Kristin Garcia PTA Physical Therapy Assistant                      PT Assessment/Plan     Row Name 01/22/20 1300          PT Assessment    Assessment Comments  decreased trigger in L UT after manual; complained of L foot needing heat so MHP heated at end of treatment.   -        PT Plan    PT Frequency  2x/week  -CHELSEA     Predicted Duration of Therapy Intervention (Therapy Eval)  6-8 weeks  -     PT Plan Comments  Recert scheduled for next week; will continue as advised  -       User Key  (r) = Recorded By, (t) = Taken By, (c) = Cosigned By    Initials Name Provider Type    Kristin Garcia PTA Physical Therapy Assistant          Modalities     Row Name 01/22/20 1300             Moist Heat    MH Applied  Yes  -      Location  C-spine and left foot  -      Rx Minutes  15 mins  -      MH S/P Rx  Yes  -        User Key  (r) = Recorded By, (t) = Taken By, (c) = Cosigned By    Initials Name Provider Type    Kristin Garcia PTA Physical Therapy Assistant        OP Exercises     Row Name 01/22/20 1300             Subjective Comments    Subjective Comments  Shoulder was still hurting again yesterday but  feeling better today   -         Subjective Pain    Able to rate subjective pain?  yes  -KH      Pre-Treatment Pain Level  2  -KH      Post-Treatment Pain Level  1  -         Exercise 1    Exercise Name 1  Pro ll LE/UE strength  -      Cueing 1  Verbal  -KH      Time 1  15' LEs (pre session), 10' (5'/5') UEs  -      Additional Comments  level 4;   -KH         Exercise 2    Exercise Name 2  doorway stretch  -      Cueing 2  Verbal  -KH      Sets 2  3  -KH      Time 2  30\"  -KH         Exercise 3    Exercise Name 3 " " Thread the needle stretch at wall   -      Cueing 3  Verbal;Demo  -      Sets 3  5  -KH      Time 3  20\"  -KH         Exercise 4    Exercise Name 4  B UE ER/ABD combo movement (UE clamshells c B scap depression focus)  -      Cueing 4  Verbal;Tactile;Demo  -KH      Sets 4  2  -KH      Reps 4  10  -KH      Time 4  5\"  -         Exercise 5    Exercise Name 5  Manual: STM to B Upper trap; PA glides to thoracic spine   -      Cueing 5  Tactile  -      Time 5  10'  -KH         Exercise 6    Exercise Name 6  supine pec stretch  -      Cueing 6  Verbal  -      Time 6  3'  -KH        User Key  (r) = Recorded By, (t) = Taken By, (c) = Cosigned By    Initials Name Provider Type    Kristin Garcia PTA Physical Therapy Assistant                       PT OP Goals     Row Name 01/22/20 1300          PT Short Term Goals    STG Date to Achieve  12/31/19  -     STG 1  Pt to improve NDI by 10%  -     STG 1 Progress  Not Met  -     STG 2  Pt to be independent c initial HEP  -     STG 2 Progress  Not Met  -     STG 3  Pt to report/demo 50% improved Sx/function  -     STG 3 Progress  Not Met  -     STG 4  Pt to demo improved shld/shld girdle/cervical strength all planes by at least 1/2 degree.  -     STG 4 Progress  Not Met  -     STG 5  Pt to improve LEFS by 9 points  -     STG 5 Progress  Progressing  -        Long Term Goals    LTG Date to Achieve  01/21/20  -     LTG 1  Pt to improve NDI to less than 10% disability  -     LTG 1 Progress  Not Met  -     LTG 2  Pt to report 80% improved Sx/function  -     LTG 2 Progress  Not Met  -     LTG 3  Pt to be independent c progressive HEP for proprioceptive enhancement, posture, strength, stability, tissue length:tension normalization  -     LTG 3 Progress  Not Met  -     LTG 4  Pt to improve LEFS to 64/80 or better  -     LTG 4 Progress  New  -        Time Calculation    PT Goal Re-Cert Due Date  01/20/20  -       User Key  (r) = " Recorded By, (t) = Taken By, (c) = Cosigned By    Initials Name Provider Type     Kristin Lamas PTA Physical Therapy Assistant                         Time Calculation:   Start Time: 1330  Stop Time: 1430  Time Calculation (min): 60 min  Total Timed Code Minutes- PT: 45 minute(s)  Therapy Charges for Today     Code Description Service Date Service Provider Modifiers Qty    09967633402 HC PT THER SUPP EA 15 MIN 1/22/2020 Kristin Lamas PTA GP 1    52011527229 HC PT MANUAL THERAPY EA 15 MIN 1/22/2020 Kristin Lamas PTA GP 1    58659799678 HC PT THER PROC EA 15 MIN 1/22/2020 Kristin Lamas PTA GP 2                    Kristin Lamas PTA  1/22/2020

## 2020-01-27 ENCOUNTER — HOSPITAL ENCOUNTER (OUTPATIENT)
Dept: PHYSICAL THERAPY | Facility: HOSPITAL | Age: 65
Setting detail: THERAPIES SERIES
Discharge: HOME | End: 2020-01-27

## 2020-01-27 DIAGNOSIS — M54.2 NECK PAIN, CHRONIC: Primary | ICD-10-CM

## 2020-01-27 DIAGNOSIS — G89.29 NECK PAIN, CHRONIC: Primary | ICD-10-CM

## 2020-01-27 DIAGNOSIS — M79.672 FOOT PAIN, LEFT: ICD-10-CM

## 2020-01-27 PROCEDURE — 97140 MANUAL THERAPY 1/> REGIONS: CPT

## 2020-01-27 PROCEDURE — 97110 THERAPEUTIC EXERCISES: CPT

## 2020-01-27 PROCEDURE — G0283 ELEC STIM OTHER THAN WOUND: HCPCS

## 2020-01-27 NOTE — THERAPY TREATMENT NOTE
"    Outpatient Physical Therapy Ortho Treatment Note  [facility]     Patient Name: Otf Murray  : 1955  MRN: [id number]  Today's Date: 2020      Visit Date: 2020     Subjective Improvement: \"some\"  Attendance:   (;  for )  Next MD Visit : PRN  Recert Date:  2020        Therapy Diagnosis:  Cervical Pain; L Ankle/Foot pain    Visit Dx:    ICD-10-CM ICD-9-CM   1. Neck pain, chronic M54.2 723.1    G89.29 338.29   2. Foot pain, left M79.672 729.5       Patient Active Problem List   Diagnosis   • ADHD, predominantly inattentive type        Past Medical History:   Diagnosis Date   • Acute gastritis without bleeding    • Alcohol abuse counseling and surveillance of alcoholic    • Allergic rhinitis    • Anxiety state    • Attention deficit disorder of childhood with hyperactivity    • Attention deficit hyperactivity disorder    • Attention deficit hyperactivity disorder, predominantly hyperactive impulsive type    • Attention deficit hyperactivity disorder, predominantly inattentive type    • Backache    • Body mass index (bmi) 31.0-31.9, adult     Body mass index (BMI) 31.0-31.9, adult - BMI 33.5      • Burn of lower leg    • Candidiasis of skin    • Cardiac murmur, unspecified    • Decreased testosterone level    • Depressive disorder     Depressive disorder - acute on chronic      • Dysgraphia    • Edema    • Elbow joint pain    • Encounter for general adult medical examination with abnormal findings    • Encounter for screening for malignant neoplasm of colon    • Essential hypertension    • Essential tremor    • Ex-smoker    • Fatigue    • Gastro-esophageal reflux disease with esophagitis    • Gastroesophageal reflux disease    • Hyperlipidemia    • Hyperlipoproteinemia    • Impotence of organic origin    • Insomnia    • Intermittent palpitations    • Knee pain     Knee pain - patellofemoral      • Lateral epicondylitis    • Low back pain    • Male erectile disorder  "   • Male erectile dysfunction, unspecified    • Male hypogonadism    • Neck pain     Neck pain aggravated by recumbency      • Obese     Obese - BMI 33.0      • On long term drug therapy    • Osteoarthrosis     Osteoarthrosis, unspecified whether generalized or localized, involving lower leg      • Other obesity    • Other specified diseases of anus and rectum     Other specified diseases of anus and rectum - rectal pain after c-scope prep      • Overweight    • Pain in left knee    • Pain in right knee    • Shoulder pain, right    • Spasm of back muscles    • Tachycardia, unspecified    • Tremor, unspecified    • Tubular adenoma of colon    • Unspecified essential hypertension         Past Surgical History:   Procedure Laterality Date   • COLONOSCOPY  03/16/2016    One polyp in the sigmoid colon.Resected and retrieved.        • CYST REMOVAL  10/27/2009     Excision of sebaceous cyst of the forehead, glabellar region.   • ENDOSCOPY  03/16/2016    Mildly severe esophagitis.Gastritis.Normal examined duodenum.Several biopsies obtained in the lower third of the esophagus.    • ENDOSCOPY AND COLONOSCOPY  04/12/2006     Normal colonoscopic examination    • INCISION AND DRAINAGE ABSCESS  10/24/2012   • INJECTION OF MEDICATION  04/21/2011    Depo Medrol (Methylprednisone) 80mg (1)        • INJECTION OF MEDICATION  02/01/2016    Kenalog (3)        • INJECTION OF MEDICATION  02/19/2016    Toradol (3)    • KNEE ARTHROSCOPY  04/02/2009     Medial meniscus tear of left knee   • SCROTUM EXPLORATION  07/02/2002     Excision of epidermal inclusion cyst. base of left scrotum   • SKIN BIOPSY  04/23/2012   • SKIN TAG REMOVAL  04/25/2002    Skin tag, left scrotum        PT Ortho     Row Name 01/27/20 1300       Precautions and Contraindications    Precautions/Limitations  no known precautions/limitations  -KH       Subjective Pain    Pre-Treatment Pain Level  3  -KH      User Key  (r) = Recorded By, (t) = Taken By, (c) = Cosigned By     Initials Name Provider Type    Kristin Garcia PTA Physical Therapy Assistant                      PT Assessment/Plan     Row Name 01/27/20 1300          PT Assessment    Assessment Comments  Numbness in the R finger tips with rotation and extension movements of the c-spine; flexion seems to relieve these symptoms. improved motion post treatment and alignment corrected with ME; discussed with patient the minimal improvements with PT and to consult MD about further testing as needed;  Pt verbalized understanding.   -        PT Plan    PT Frequency  2x/week  -     Predicted Duration of Therapy Intervention (Therapy Eval)  6-8 weeks  -     PT Plan Comments  Recheck next visit; Minimal improvement with PT at this time; Possible hold and referral back to MD with possible neuro consult. Sees Chiropractor tomorrow   -       User Key  (r) = Recorded By, (t) = Taken By, (c) = Cosigned By    Initials Name Provider Type    Kristin Garcia PTA Physical Therapy Assistant          Modalities     Row Name 01/27/20 1300             Moist Heat    MH Applied  Yes  -      Location  R upper trap/scapular region  -      Rx Minutes  15 mins  -      MH S/P Rx  Yes  -         ELECTRICAL STIMULATION    Attended/Unattended  Unattended  -      Stimulation Type  IFC  -      Location/Electrode Placement/Other  R upper trap/ scapular region with MHO  -       PT E-Stim Unattended (Manual) Minutes  15  -        User Key  (r) = Recorded By, (t) = Taken By, (c) = Cosigned By    Initials Name Provider Type    Kristin Garcia PTA Physical Therapy Assistant        OP Exercises     Row Name 01/27/20 1300             Subjective Comments    Subjective Comments  Everything he is doing is making R arm go numb; went to [employer] show this weekend and walked around and left arch started to hurt really bad; Saturday night he had to take a pain pill. Hasn't had to do that in a long time.   -CHELSEA         Subjective Pain    Able to rate  "subjective pain?  yes  -      Pre-Treatment Pain Level  3  -         Exercise 1    Exercise Name 1  Pro ll LE/UE strength  -      Cueing 1  Verbal  -KH      Time 1  15' LEs (pre session), 10' (5'/5') UEs  -      Additional Comments  level 4;   -         Exercise 2    Exercise Name 2  doorway stretch  -      Cueing 2  Verbal  -KH      Sets 2  3  -KH      Time 2  30\"  -KH         Exercise 3    Exercise Name 3  incline stretch  -KH      Cueing 3  Verbal  -KH      Sets 3  3  -KH      Time 3  30\"  -KH         Exercise 4    Exercise Name 4  Tband Clocks  -      Cueing 4  Verbal  -KH      Sets 4  2  -KH      Reps 4  10  -KH      Additional Comments  red  -         Exercise 5    Exercise Name 5  Manual: STM to R upper trap, cervical distraction and ME to Rotation.   -      Time 5  10'  -        User Key  (r) = Recorded By, (t) = Taken By, (c) = Cosigned By    Initials Name Provider Type    Kristin Garcia PTA Physical Therapy Assistant                       PT OP Goals     Row Name 01/27/20 1300          PT Short Term Goals    STG Date to Achieve  12/31/19  -     STG 1  Pt to improve NDI by 10%  -     STG 1 Progress  Not Met  -     STG 2  Pt to be independent c initial HEP  -     STG 2 Progress  Not Met  -     STG 3  Pt to report/demo 50% improved Sx/function  -     STG 3 Progress  Not Met  -     STG 4  Pt to demo improved shld/shld girdle/cervical strength all planes by at least 1/2 degree.  -     STG 4 Progress  Not Met  -     STG 5  Pt to improve LEFS by 9 points  -     STG 5 Progress  Progressing  -        Long Term Goals    LTG Date to Achieve  01/21/20  -     LTG 1  Pt to improve NDI to less than 10% disability  -     LTG 1 Progress  Not Met  -     LTG 2  Pt to report 80% improved Sx/function  -     LTG 2 Progress  Not Met  -     LTG 3  Pt to be independent c progressive HEP for proprioceptive enhancement, posture, strength, stability, tissue length:tension " normalization  -     LTG 3 Progress  Not Met  -     LTG 4  Pt to improve LEFS to 64/80 or better  -CHELSEA     LTG 4 Progress  New  -        Time Calculation    PT Goal Re-Cert Due Date  01/20/20  -       User Key  (r) = Recorded By, (t) = Taken By, (c) = Cosigned By    Initials Name Provider Type    Kristin Garcia PTA Physical Therapy Assistant                         Time Calculation:   Start Time: 1340  Stop Time: 1433  Time Calculation (min): 53 min  Total Timed Code Minutes- PT: 38 minute(s)  Therapy Charges for Today     Code Description Service Date Service Provider Modifiers Qty    76154779366 HC PT THER SUPP EA 15 MIN 1/27/2020 Kristin Lamas, NANCY GP 1    27850557854 HC PT ELECTRICAL STIM UNATTENDED 1/27/2020 Kristin Lamas PTA  1    71282067923 HC PT MANUAL THERAPY EA 15 MIN 1/27/2020 Kristin Lamas, NANCY GP 1    68304054829 HC PT THER PROC EA 15 MIN 1/27/2020 Kristin Lamas PTA GP 2                    Kristin Lamas PTA  1/27/2020

## 2020-02-03 ENCOUNTER — HOSPITAL ENCOUNTER (OUTPATIENT)
Dept: PHYSICAL THERAPY | Facility: HOSPITAL | Age: 65
Setting detail: THERAPIES SERIES
Discharge: HOME | End: 2020-02-03

## 2020-02-03 DIAGNOSIS — M54.2 NECK PAIN, CHRONIC: Primary | ICD-10-CM

## 2020-02-03 DIAGNOSIS — G89.29 NECK PAIN, CHRONIC: Primary | ICD-10-CM

## 2020-02-03 PROCEDURE — 97140 MANUAL THERAPY 1/> REGIONS: CPT | Performed by: PHYSICAL THERAPIST

## 2020-02-03 PROCEDURE — 97110 THERAPEUTIC EXERCISES: CPT | Performed by: PHYSICAL THERAPIST

## 2020-02-03 NOTE — THERAPY DISCHARGE NOTE
Outpatient Physical Therapy Ortho Progress Note/Discharge Summary  [facility]     Patient Name: Otf Murray  : 1955  MRN: [id number]  Today's Date: 2/3/2020      Visit Date: 2020  Subjective Improvement: not much  Attendance:   (;  for )  Next MD Visit : PRN  Recert Date:          Therapy Diagnosis:  Cervical Pain; L Ankle/Foot pain       Visit Dx:    ICD-10-CM ICD-9-CM   1. Neck pain, chronic M54.2 723.1    G89.29 338.29       Patient Active Problem List   Diagnosis   • ADHD, predominantly inattentive type        Past Medical History:   Diagnosis Date   • Acute gastritis without bleeding    • Alcohol abuse counseling and surveillance of alcoholic    • Allergic rhinitis    • Anxiety state    • Attention deficit disorder of childhood with hyperactivity    • Attention deficit hyperactivity disorder    • Attention deficit hyperactivity disorder, predominantly hyperactive impulsive type    • Attention deficit hyperactivity disorder, predominantly inattentive type    • Backache    • Body mass index (bmi) 31.0-31.9, adult     Body mass index (BMI) 31.0-31.9, adult - BMI 33.5      • Burn of lower leg    • Candidiasis of skin    • Cardiac murmur, unspecified    • Decreased testosterone level    • Depressive disorder     Depressive disorder - acute on chronic      • Dysgraphia    • Edema    • Elbow joint pain    • Encounter for general adult medical examination with abnormal findings    • Encounter for screening for malignant neoplasm of colon    • Essential hypertension    • Essential tremor    • Ex-smoker    • Fatigue    • Gastro-esophageal reflux disease with esophagitis    • Gastroesophageal reflux disease    • Hyperlipidemia    • Hyperlipoproteinemia    • Impotence of organic origin    • Insomnia    • Intermittent palpitations    • Knee pain     Knee pain - patellofemoral      • Lateral epicondylitis    • Low back pain    • Male erectile disorder    • Male erectile  dysfunction, unspecified    • Male hypogonadism    • Neck pain     Neck pain aggravated by recumbency      • Obese     Obese - BMI 33.0      • On long term drug therapy    • Osteoarthrosis     Osteoarthrosis, unspecified whether generalized or localized, involving lower leg      • Other obesity    • Other specified diseases of anus and rectum     Other specified diseases of anus and rectum - rectal pain after c-scope prep      • Overweight    • Pain in left knee    • Pain in right knee    • Shoulder pain, right    • Spasm of back muscles    • Tachycardia, unspecified    • Tremor, unspecified    • Tubular adenoma of colon    • Unspecified essential hypertension         Past Surgical History:   Procedure Laterality Date   • COLONOSCOPY  03/16/2016    One polyp in the sigmoid colon.Resected and retrieved.        • CYST REMOVAL  10/27/2009     Excision of sebaceous cyst of the forehead, glabellar region.   • ENDOSCOPY  03/16/2016    Mildly severe esophagitis.Gastritis.Normal examined duodenum.Several biopsies obtained in the lower third of the esophagus.    • ENDOSCOPY AND COLONOSCOPY  04/12/2006     Normal colonoscopic examination    • INCISION AND DRAINAGE ABSCESS  10/24/2012   • INJECTION OF MEDICATION  04/21/2011    Depo Medrol (Methylprednisone) 80mg (1)        • INJECTION OF MEDICATION  02/01/2016    Kenalog (3)        • INJECTION OF MEDICATION  02/19/2016    Toradol (3)    • KNEE ARTHROSCOPY  04/02/2009     Medial meniscus tear of left knee   • SCROTUM EXPLORATION  07/02/2002     Excision of epidermal inclusion cyst. base of left scrotum   • SKIN BIOPSY  04/23/2012   • SKIN TAG REMOVAL  04/25/2002    Skin tag, left scrotum        PT Ortho     Row Name 02/03/20 1100       Subjective Comments    Subjective Comments  Patient c/p persistent right UE pain and N/T to middle 3 digits with cervical extension and right rotation and sidebending. Symptoms relieved with flexion and left sidebending and rotation.   -SW        Subjective Pain    Able to rate subjective pain?  yes  -    Pre-Treatment Pain Level  3  -       Special Tests/Palpation    Special Tests/Palpation  -- -spurling bilaterally  -       General ROM    GENERAL ROM COMMENTS  Cervical flexion 100%, extension 20%, bilateral rotation 50%.   -       MMT (Manual Muscle Testing)    General MMT Comments  cervical and bilateral UE myotomes 5/5  -      User Key  (r) = Recorded By, (t) = Taken By, (c) = Cosigned By    Initials Name Provider Type    Edita Matthews Physical Therapist                      PT Assessment/Plan     Row Name 02/03/20 1100          PT Assessment    Functional Limitations  Decreased safety during functional activities;Limitation in home management;Limitations in community activities;Limitations in functional capacity and performance;Performance in leisure activities;Performance in self-care ADL;Performance in work activities  -     Impairments  Impaired flexibility;Impaired muscle endurance;Joint integrity;Joint mobility;Muscle strength;Pain;Poor body mechanics;Posture;Range of motion;Sensation  -     Assessment Comments  Patient exhibits marked limitation in cervical ROM with capsular end feel. He exhibit no improvement in subjective symptoms and scored worse on NDI today. Further PT will doubtfully be beneficial and discharge with fu to physician was recommended to patient. Patient agreed with this plan.   -        PT Plan    PT Plan Comments  DC to independent HE with FU of phsyician.   -       User Key  (r) = Recorded By, (t) = Taken By, (c) = Cosigned By    Initials Name Provider Type    Edita Matthews Physical Therapist              OP Exercises     Row Name 02/03/20 1100             Subjective Comments    Subjective Comments  Patient c/p persistent right UE pain and N/T to middle 3 digits with cervical extension and right rotation and sidebending. Symptoms relieved with flexion and left sidebending and rotation.   -          "Subjective Pain    Able to rate subjective pain?  yes  -SW      Pre-Treatment Pain Level  3  -SW         Exercise 1    Exercise Name 1  Pro ll LE/UE strength  -SW      Time 1  12' LEs (pre session), 10' (5'/5') UEs  -SW      Additional Comments  L4  -SW         Exercise 2    Exercise Name 2  see manual  -SW         Exercise 3    Exercise Name 3  chin tuck head lift   -SW      Reps 3  10\"x10  -SW        User Key  (r) = Recorded By, (t) = Taken By, (c) = Cosigned By    Initials Name Provider Type    Edita Matthews Physical Therapist                        Manual Rx (last 36 hours)      Manual Treatments     Row Name 02/03/20 1100             Manual Rx 1    Manual Rx 1 Location  cervical region  -SW      Manual Rx 1 Type  PROM/mobs/SO release  -SW      Manual Rx 1 Grade  2-3  -SW      Manual Rx 1 Duration  25'  -SW        User Key  (r) = Recorded By, (t) = Taken By, (c) = Cosigned By    Initials Name Provider Type    Edita Matthews Physical Therapist                   Outcome Measure Options: Neck Disability Index (NDI)  Neck Disability Index  Section 1 - Pain Intensity: The pain is very mild at the moment.  Section 2 - Personal Care: I can look after myself normally, but it causes extra pain.  Section 3 - Lifting: I can lift heavy weights, but it gives me extra pain.  Section 4 - Work: I can do most of my usual work, but no more  Section 5 - Headaches: I have no headaches at all.  Section 6 - Concentration: I can concentrate fully without difficulty.  Section 7 - Sleeping: My sleep is slightly disturbed for less than 1 hour.  Section 8 - Driving: I can drive as long as I want with slight neck pain.  Section 9 - Reading: I can read as much as I want with no neck pain.  Section 10 - Recreation: I have some neck pain with a few recreational activities.  Neck Disability Index Score: 9      Time Calculation:   Start Time: 1103  Stop Time: 1141  Time Calculation (min): 38 min  Therapy Charges for Today     Code " Description Service Date Service Provider Modifiers Qty    72333652556 HC PT MANUAL THERAPY EA 15 MIN 2/3/2020 Edita Campos GP 2    44593572711 HC PT THER PROC EA 15 MIN 2/3/2020 Edita Campos GP 1          PT G-Codes  Outcome Measure Options: Neck Disability Index (NDI)  Neck Disability Index Score: 9     OP PT Discharge Summary  Date of Discharge: 02/03/20  Reason for Discharge: Lack of progress  Outcomes Achieved: Unable to make functional progress toward goals at this time  Discharge Destination: Home with home program      Edita Campos  2/3/2020

## 2020-02-25 ENCOUNTER — APPOINTMENT (OUTPATIENT)
Dept: MRI IMAGING | Facility: HOSPITAL | Age: 65
End: 2020-02-25

## 2020-03-02 ENCOUNTER — TELEPHONE (OUTPATIENT)
Dept: GENERAL RADIOLOGY | Facility: HOSPITAL | Age: 65
End: 2020-03-02

## 2020-03-02 NOTE — TELEPHONE ENCOUNTER
Called doctors office to let them know the patient was a no show for mri cervical spine wo contrast appointment on 02/25/20 at 10:45am spoke to cheryl

## 2020-03-03 ENCOUNTER — HOSPITAL ENCOUNTER (OUTPATIENT)
Dept: MRI IMAGING | Facility: HOSPITAL | Age: 65
Discharge: HOME | End: 2020-03-03
Admitting: FAMILY MEDICINE

## 2020-03-03 DIAGNOSIS — M50.30 DEGENERATION OF CERVICAL INTERVERTEBRAL DISC: ICD-10-CM

## 2020-03-03 PROCEDURE — 72141 MRI NECK SPINE W/O DYE: CPT

## 2021-01-26 ENCOUNTER — IMMUNIZATION (OUTPATIENT)
Dept: VACCINE CLINIC | Facility: HOSPITAL | Age: 66
End: 2021-01-26

## 2021-01-26 PROCEDURE — 91300 HC SARSCOV02 VAC 30MCG/0.3ML IM: CPT | Performed by: THORACIC SURGERY (CARDIOTHORACIC VASCULAR SURGERY)

## 2021-01-26 PROCEDURE — 0001A: CPT | Performed by: THORACIC SURGERY (CARDIOTHORACIC VASCULAR SURGERY)

## 2021-02-16 ENCOUNTER — IMMUNIZATION (OUTPATIENT)
Dept: VACCINE CLINIC | Facility: HOSPITAL | Age: 66
End: 2021-02-16

## 2021-02-16 PROCEDURE — 0002A: CPT | Performed by: THORACIC SURGERY (CARDIOTHORACIC VASCULAR SURGERY)

## 2021-02-16 PROCEDURE — 91300 HC SARSCOV02 VAC 30MCG/0.3ML IM: CPT | Performed by: THORACIC SURGERY (CARDIOTHORACIC VASCULAR SURGERY)

## 2021-03-03 ENCOUNTER — OFFICE VISIT (OUTPATIENT)
Dept: OTOLARYNGOLOGY | Facility: CLINIC | Age: 66
End: 2021-03-03

## 2021-03-03 VITALS — OXYGEN SATURATION: 99 % | WEIGHT: 307.6 LBS | BODY MASS INDEX: 36.32 KG/M2 | HEIGHT: 77 IN

## 2021-03-03 DIAGNOSIS — J32.9 CHRONIC SINUSITIS, UNSPECIFIED LOCATION: Primary | ICD-10-CM

## 2021-03-03 DIAGNOSIS — J34.2 NASAL SEPTAL DEFORMITY: ICD-10-CM

## 2021-03-03 PROCEDURE — 99204 OFFICE O/P NEW MOD 45 MIN: CPT | Performed by: OTOLARYNGOLOGY

## 2021-03-03 PROCEDURE — 31231 NASAL ENDOSCOPY DX: CPT | Performed by: OTOLARYNGOLOGY

## 2021-03-03 RX ORDER — PRIMIDONE 50 MG/1
50 TABLET ORAL
COMMUNITY
Start: 2020-12-20

## 2021-03-03 RX ORDER — NABUMETONE 750 MG/1
TABLET, FILM COATED ORAL
COMMUNITY
Start: 2020-11-09 | End: 2021-05-20

## 2021-03-03 RX ORDER — SIMVASTATIN 20 MG
20 TABLET ORAL
COMMUNITY

## 2021-03-03 RX ORDER — SILDENAFIL 50 MG/1
50 TABLET, FILM COATED ORAL
COMMUNITY
End: 2023-03-02

## 2021-03-03 RX ORDER — LISDEXAMFETAMINE DIMESYLATE 50 MG
CAPSULE ORAL
COMMUNITY
Start: 2020-12-17 | End: 2023-02-23

## 2021-03-03 RX ORDER — CEFUROXIME AXETIL 500 MG/1
500 TABLET ORAL 2 TIMES DAILY
Qty: 42 TABLET | Refills: 0 | Status: SHIPPED | OUTPATIENT
Start: 2021-03-03 | End: 2021-04-28

## 2021-03-07 NOTE — PROGRESS NOTES
Subjective   Otf Murray is a 65 y.o. male.       History of Present Illness   Patient reports longstanding nasal congestion.  States this is worse in the winter.  Feels pressure and congestion in his face as well as postnasal drainage.  Has trouble sleeping due to this.  Uses Flonase and alternates between Zyrtec and Claritin.  Also uses nasal saline lavage as needed and he has a device that produces warm steam that will sometimes be of benefit.  No previous nasal surgery.      The following portions of the patient's history were reviewed and updated as appropriate: allergies, current medications, past family history, past medical history, past social history, past surgical history and problem list.     reports that he has quit smoking. He does not have any smokeless tobacco history on file.   Patient is not a tobacco user and has not been counseled for use of tobacco products      Review of Systems        Objective   Physical Exam  Ears: External ears no deformity, canals no discharge, tympanic membranes intact clear and mobile bilaterally.  Nose: Boggy mucosa septum to the right  Oral cavity: Lips and gums without lesions.  Tongue and floor of mouth without lesions.  Parotid and submandibular ducts unobstructed.  No mucosal lesions on the buccal mucosa or vestibule of the mouth.  Pharynx: No erythema or exudate  Neck: No lymphadenopathy.  No thyromegaly.  Trachea and larynx midline.  No masses in the parotid or submandibular glands.  Nasal endoscopy is performed: Destin-Synephrine and Xylocaine are instilled the nares bilaterally.  0° scope is passed into each nostril.  The inferior, middle, and superior turbinates as well as nasal septum and nasopharynx are examined.  Pertinent findings include: Septal deformity to the left.  Occludes greater than 90% of the airway at the level of the inferior turbinate.  In the right middle meatal cleft there is marked edema and some mucoid secretions suggestive of chronic  sinusitis.  No polyps seen no mass in the nasopharynx        Assessment/Plan   Diagnoses and all orders for this visit:    1. Chronic sinusitis, unspecified location (Primary)    2. Nasal septal deformity    Other orders  -     cefuroxime (CEFTIN) 500 MG tablet; Take 1 tablet by mouth 2 (Two) Times a Day.  Dispense: 42 tablet; Refill: 0  -     guaiFENesin (Mucinex) 600 MG 12 hr tablet; Take 1 tablet by mouth 2 (Two) Times a Day.  Dispense: 60 tablet; Refill: 11    Plan: Treat with 3 weeks of Ceftin 500 mg twice daily.  Stop antihistamine as this is thickening his secretions and instead use Mucinex twice a day.  Continue to use nasal saline spray.  Reevaluate in a month.  Call sooner for problems.

## 2021-04-14 ENCOUNTER — OFFICE VISIT (OUTPATIENT)
Dept: OTOLARYNGOLOGY | Facility: CLINIC | Age: 66
End: 2021-04-14

## 2021-04-14 VITALS — HEIGHT: 77 IN | WEIGHT: 311.2 LBS | TEMPERATURE: 98.8 F | OXYGEN SATURATION: 96 % | BODY MASS INDEX: 36.75 KG/M2

## 2021-04-14 DIAGNOSIS — J32.9 CHRONIC SINUSITIS, UNSPECIFIED LOCATION: Primary | ICD-10-CM

## 2021-04-14 PROCEDURE — 99213 OFFICE O/P EST LOW 20 MIN: CPT | Performed by: OTOLARYNGOLOGY

## 2021-04-14 NOTE — PROGRESS NOTES
Subjective   Otf Murray is a 65 y.o. male.       History of Present Illness     Patient was seen previously with chronic nasal congestion copious postnasal drainage and intermittent rhinorrhea.  Endoscopic findings suggested chronic sinusitis along with a significant nasal septal deformity.  Completed a 3-week course of Ceftin and has been using Flonase regularly.  Says he does not feel like he is much better.  Still has a lot of trouble breathing through his nose and rather copious postnasal drainage    The following portions of the patient's history were reviewed and updated as appropriate: allergies, current medications, past family history, past medical history, past social history, past surgical history and problem list.     reports that he has quit smoking. He does not have any smokeless tobacco history on file.   Patient is not a tobacco user and has not been counseled for use of tobacco products      Review of Systems        Objective   Physical Exam  Nares: Boggy mucosa with septal deformity to the left.  No mass or polyps seen on anterior rhinoscopy.      Assessment/Plan   Diagnoses and all orders for this visit:    1. Chronic sinusitis, unspecified location (Primary)    2. Nasal septal deformity        Plan: With failure to respond to medical treatment I think a CT scan is indicated.  Depending on findings additional recommendations can be made.  He is to continue the Flonase in the meantime.

## 2021-04-28 ENCOUNTER — OFFICE VISIT (OUTPATIENT)
Dept: OTOLARYNGOLOGY | Facility: CLINIC | Age: 66
End: 2021-04-28

## 2021-04-28 ENCOUNTER — HOSPITAL ENCOUNTER (OUTPATIENT)
Dept: CT IMAGING | Facility: HOSPITAL | Age: 66
Discharge: HOME | End: 2021-04-28
Admitting: OTOLARYNGOLOGY

## 2021-04-28 VITALS — BODY MASS INDEX: 36.72 KG/M2 | HEIGHT: 77 IN | WEIGHT: 311 LBS | OXYGEN SATURATION: 96 %

## 2021-04-28 DIAGNOSIS — J30.9 ALLERGIC RHINITIS, UNSPECIFIED SEASONALITY, UNSPECIFIED TRIGGER: Primary | ICD-10-CM

## 2021-04-28 DIAGNOSIS — D16.4 OSTEOMA OF PARANASAL SINUS: ICD-10-CM

## 2021-04-28 DIAGNOSIS — J34.2 NASAL SEPTAL DEFORMITY: ICD-10-CM

## 2021-04-28 DIAGNOSIS — J32.9 CHRONIC SINUSITIS, UNSPECIFIED LOCATION: ICD-10-CM

## 2021-04-28 PROCEDURE — 99213 OFFICE O/P EST LOW 20 MIN: CPT | Performed by: OTOLARYNGOLOGY

## 2021-04-28 PROCEDURE — 70486 CT MAXILLOFACIAL W/O DYE: CPT

## 2021-04-28 NOTE — PROGRESS NOTES
Subjective   Otf Murray is a 65 y.o. male.       History of Present Illness   Was seen previously with recurring nasal congestion copious postnasal drainage and intermittent rhinorrhea.  Has a septal deformity.  Was treated for presumed chronic sinusitis with a 3-week course of antibiotics (Ceftin) along with nasal steroids (Flonase).  He did not feel like this made any significant difference in his symptoms and so a CT scan was ordered.  This is personally reviewed.  There is a large osteoma in the left frontal sinus which the patient now states he remembers he was told was there at least 20 years ago.  The other paranasal sinuses are clear.  Septal deformity is noted.  Patient states his nose feels pretty good today although he states he has been having postnasal drainage most of the time prior to today.      The following portions of the patient's history were reviewed and updated as appropriate: allergies, current medications, past family history, past medical history, past social history, past surgical history and problem list.     reports that he has quit smoking. He has never used smokeless tobacco.   Patient is not a tobacco user and has not been counseled for use of tobacco products      Review of Systems        Objective   Physical Exam  Ears: External ears no deformity, canals no discharge, tympanic membranes intact clear and mobile bilaterally.  Nose: Boggy mucosa septum to the left no mass or polyp  Oral cavity: No mass or lesions  Pharynx: No erythema or exudate  Neck: No adenopathy      Assessment/Plan   Diagnoses and all orders for this visit:    1. Allergic rhinitis, unspecified seasonality, unspecified trigger (Primary)    2. Nasal septal deformity    3. Osteoma of paranasal sinus      Plan: I suspect the patient's primary problem is allergic rhinitis as the cause of his persistent and bothersome postnasal drainage.  With no evidence of paranasal sinus infection I do not think surgery on  the sinuses is indicated.  I did tell him that I thought a nasal septoplasty would improve his nasal airflow but would not likely change his postnasal drip as this is likely due to mucosal disease.  With this being his primary complaint I think an allergy evaluation is the most appropriate next intervention.  Patient prefers [facility] and so a consultation be requested from Dr. Rodriguez.  I will see the patient back in 6 months or sooner for problems.

## 2021-05-20 ENCOUNTER — OFFICE VISIT (OUTPATIENT)
Dept: GASTROENTEROLOGY | Facility: CLINIC | Age: 66
End: 2021-05-20

## 2021-05-20 VITALS
SYSTOLIC BLOOD PRESSURE: 136 MMHG | HEIGHT: 77 IN | DIASTOLIC BLOOD PRESSURE: 78 MMHG | BODY MASS INDEX: 36.39 KG/M2 | WEIGHT: 308.2 LBS | HEART RATE: 101 BPM

## 2021-05-20 DIAGNOSIS — Z86.010 ENCOUNTER FOR COLONOSCOPY DUE TO HISTORY OF COLONIC POLYP: Primary | ICD-10-CM

## 2021-05-20 DIAGNOSIS — K59.04 CHRONIC IDIOPATHIC CONSTIPATION: ICD-10-CM

## 2021-05-20 DIAGNOSIS — Z12.11 ENCOUNTER FOR COLONOSCOPY DUE TO HISTORY OF COLONIC POLYP: Primary | ICD-10-CM

## 2021-05-20 PROBLEM — Z83.3 FAMILY HISTORY OF TYPE 2 DIABETES MELLITUS IN MOTHER: Status: ACTIVE | Noted: 2017-04-20

## 2021-05-20 PROBLEM — G89.29 CHRONIC LEFT-SIDED LOW BACK PAIN WITHOUT SCIATICA: Status: ACTIVE | Noted: 2017-10-23

## 2021-05-20 PROBLEM — R73.09 ABNORMAL GLUCOSE: Status: ACTIVE | Noted: 2017-04-20

## 2021-05-20 PROBLEM — M54.50 CHRONIC LEFT-SIDED LOW BACK PAIN WITHOUT SCIATICA: Status: ACTIVE | Noted: 2017-10-23

## 2021-05-20 PROBLEM — E29.1 MALE HYPOGONADISM: Status: ACTIVE | Noted: 2017-04-20

## 2021-05-20 PROBLEM — Z96.652 HISTORY OF TOTAL LEFT KNEE REPLACEMENT: Status: ACTIVE | Noted: 2017-08-15

## 2021-05-20 PROBLEM — J30.1 SEASONAL ALLERGIC RHINITIS DUE TO POLLEN: Status: ACTIVE | Noted: 2017-04-20

## 2021-05-20 PROBLEM — F33.41 RECURRENT MAJOR DEPRESSIVE DISORDER, IN PARTIAL REMISSION: Status: ACTIVE | Noted: 2017-04-20

## 2021-05-20 PROBLEM — Z87.891 HISTORY OF TOBACCO ABUSE: Status: ACTIVE | Noted: 2017-04-20

## 2021-05-20 PROBLEM — E66.09 NON MORBID OBESITY DUE TO EXCESS CALORIES: Status: ACTIVE | Noted: 2017-05-23

## 2021-05-20 PROBLEM — M17.0 PRIMARY OSTEOARTHRITIS OF BOTH KNEES: Status: ACTIVE | Noted: 2017-05-23

## 2021-05-20 PROBLEM — K21.9 GASTROESOPHAGEAL REFLUX DISEASE WITHOUT ESOPHAGITIS: Status: ACTIVE | Noted: 2017-04-20

## 2021-05-20 PROBLEM — R53.82 CHRONIC FATIGUE: Status: ACTIVE | Noted: 2017-04-20

## 2021-05-20 PROCEDURE — 99203 OFFICE O/P NEW LOW 30 MIN: CPT | Performed by: NURSE PRACTITIONER

## 2021-05-20 RX ORDER — DOCUSATE SODIUM 100 MG/1
100 CAPSULE, LIQUID FILLED ORAL 2 TIMES DAILY
COMMUNITY
End: 2021-06-21

## 2021-05-20 RX ORDER — MULTIPLE VITAMINS W/ MINERALS TAB 9MG-400MCG
1 TAB ORAL DAILY
COMMUNITY
End: 2023-02-23

## 2021-05-20 RX ORDER — SODIUM, POTASSIUM,MAG SULFATES 17.5-3.13G
1 SOLUTION, RECONSTITUTED, ORAL ORAL EVERY 12 HOURS
Qty: 177 ML | Refills: 0 | Status: SHIPPED | OUTPATIENT
Start: 2021-05-20 | End: 2021-06-23 | Stop reason: HOSPADM

## 2021-05-20 RX ORDER — MONTELUKAST SODIUM 10 MG/1
TABLET ORAL
COMMUNITY
Start: 2021-05-06 | End: 2021-06-21

## 2021-05-20 RX ORDER — DEXTROSE AND SODIUM CHLORIDE 5; .45 G/100ML; G/100ML
30 INJECTION, SOLUTION INTRAVENOUS CONTINUOUS PRN
Status: CANCELLED | OUTPATIENT
Start: 2021-06-23

## 2021-05-20 NOTE — PATIENT INSTRUCTIONS
Colorectal Cancer Screening    Colorectal cancer screening is a group of tests that are used to check for colorectal cancer before symptoms develop. Colorectal refers to the colon and rectum. The colon and rectum are located at the end of the digestive tract and carry bowel movements out of the body.  Who should have screening?  All adults starting at age 50 until age 75 should have screening. Your health care provider may recommend screening at age 45. You will have tests every 1-10 years, depending on your results and the type of screening test.  You may have screening tests starting at an earlier age, or more frequently than other people, if you have any of the following risk factors:  · A personal or family history of colorectal cancer or abnormal growths (polyps).  · Inflammatory bowel disease, such as ulcerative colitis or Crohn's disease.  · A history of having radiation treatment to the abdomen or pelvic area for cancer.  · Colorectal cancer symptoms, such as changes in bowel habits or blood in your stool.  · A type of colon cancer syndrome that is passed from parent to child (hereditary), such as:  ? Osei syndrome.  ? Familial adenomatous polyposis.  ? Turcot syndrome.  ? Peutz-Jeghers syndrome.  Screening recommendations for adults who are 75-85 years old vary depending on health.  How is screening done?  There are several types of colorectal screening tests. You may have one or more of the following:  · Guaiac-based fecal occult blood testing. For this test, a stool (feces) sample is checked for hidden (occult) blood, which could be a sign of colorectal cancer.  · Fecal immunochemical test (FIT). For this test, a stool sample is checked for blood, which could be a sign of colorectal cancer.  · Stool DNA test. For this test, a stool sample is checked for blood and changes in DNA that could lead to colorectal cancer.  · Sigmoidoscopy. During this test, a thin, flexible tube with a camera on the end  (sigmoidoscope) is used to examine the rectum and the lower colon.  · Colonoscopy. During this test, a long, flexible tube with a camera on the end (colonoscope) is used to examine the entire colon and rectum. With a colonoscopy, it is possible to take a sample of tissue (biopsy) and remove small polyps during the test.  · Virtual colonoscopy. Instead of a colonoscope, this type of colonoscopy uses X-rays (CT scan) and computers to produce images of the colon and rectum.  What are the benefits of screening?  Screening reduces your risk for colorectal cancer and can help identify cancer at an early stage, when the cancer can be removed or treated more easily. It is common for polyps to form in the lining of the colon, especially as you age. These polyps may be cancerous or become cancerous over time. Screening can identify these polyps.  What are the risks of screening?  Each screening test may have different risks.  · Stool sample tests have fewer risks than other types of screening tests. However, you may need more tests to confirm results from a stool sample test.  · Screening tests that involve X-rays expose you to low levels of radiation, which may slightly increase your cancer risk. The benefit of detecting cancer outweighs the slight increase in risk.  · Screening tests such as sigmoidoscopy and colonoscopy may place you at risk for bleeding, intestinal damage, infection, or a reaction to medicines given during the exam.  Talk with your health care provider to understand your risk for colorectal cancer and to make a screening plan that is right for you.  Questions to ask your health care provider  · When should I start colorectal cancer screening?  · What is my risk for colorectal cancer?  · How often do I need screening?  · Which screening tests do I need?  · How do I get my test results?  · What do my results mean?  Where to find more information  Learn more about colorectal cancer screening from:  · The  American Cancer Society: www.cancer.org  · The National Cancer [place]: www.cancer.gov  Summary  · Colorectal cancer screening is a group of tests used to check for colorectal cancer before symptoms develop.  · Screening reduces your risk for colorectal cancer and can help identify cancer at an early stage, when the cancer can be removed or treated more easily.  · All adults starting at age 50 until age 75 should have screening. Your health care provider may recommend screening at age 45.  · You may have screening tests starting at an earlier age, or more frequently than other people, if you have certain risk factors.  · Talk with your health care provider to understand your risk for colorectal cancer and to make a screening plan that is right for you.  This information is not intended to replace advice given to you by your health care provider. Make sure you discuss any questions you have with your health care provider.  Document Revised: 04/08/2020 Document Reviewed: 09/19/2018  Elsevier Patient Education © 2021 [employer]

## 2021-05-20 NOTE — PROGRESS NOTES
Chief Complaint   Patient presents with   • Colon Cancer Screening       Subjective    Otf Murray is a 66 y.o. male. he is here today for follow-up.    66-year-old male presents to discuss constipation and screening colonoscopy.  Reports he has been taking Colace mag citrate MiraLAX every few days to have a bowel movement and if he does not take anything he goes 1 to 2 weeks between bowel movements and has abdominal pain and bloating.  Reports is always been an issue and is not a change for him denies any visible blood within his stool.  Reports he has chronic reflux well controlled with Nexium daily.  Previous colonoscopy was completed March 2016 noted small polyp in the sigmoid colon which was resected and retrieved repeat recommended in 5 years for surveillance.      Constipation  This is a chronic problem. The current episode started more than 1 year ago. The problem has been waxing and waning since onset. His stool frequency is 1 time per week or less. The stool is described as formed. The patient is on a high fiber diet. He exercises regularly. There has been adequate water intake. Associated symptoms include back pain and bloating. Pertinent negatives include no abdominal pain, anorexia, diarrhea, difficulty urinating, fecal incontinence, fever, flatus, hematochezia, hemorrhoids, melena, nausea, rectal pain or vomiting. Risk factors include obesity. He has tried enemas, fiber, diet changes, laxatives and stool softeners for the symptoms. The treatment provided moderate relief.     Plan; schedule patient for screening colonoscopy, will start patient on Linzess for chronic constipation discussed importance of activity as tolerated increase water and fiber daily unless contraindicated.       The following portions of the patient's history were reviewed and updated as appropriate:   Past Medical History:   Diagnosis Date   • Acute gastritis without bleeding    • Alcohol abuse counseling and surveillance  of alcoholic    • Allergic rhinitis    • Anxiety state    • Attention deficit disorder of childhood with hyperactivity    • Attention deficit hyperactivity disorder    • Attention deficit hyperactivity disorder, predominantly hyperactive impulsive type    • Attention deficit hyperactivity disorder, predominantly inattentive type    • Backache    • Body mass index (bmi) 31.0-31.9, adult     Body mass index (BMI) 31.0-31.9, adult - BMI 33.5      • Burn of lower leg    • Candidiasis of skin    • Cardiac murmur, unspecified    • Decreased testosterone level    • Depressive disorder     Depressive disorder - acute on chronic      • Dysgraphia    • Edema    • Elbow joint pain    • Encounter for general adult medical examination with abnormal findings    • Encounter for screening for malignant neoplasm of colon    • Essential hypertension    • Essential tremor    • Ex-smoker    • Fatigue    • Gastro-esophageal reflux disease with esophagitis    • Gastroesophageal reflux disease    • Hyperlipidemia    • Hyperlipoproteinemia    • Impotence of organic origin    • Insomnia    • Intermittent palpitations    • Knee pain     Knee pain - patellofemoral      • Lateral epicondylitis    • Low back pain    • Male erectile disorder    • Male erectile dysfunction, unspecified    • Male hypogonadism    • Neck pain     Neck pain aggravated by recumbency      • Obese     Obese - BMI 33.0      • On long term drug therapy    • Osteoarthrosis     Osteoarthrosis, unspecified whether generalized or localized, involving lower leg      • Other obesity    • Other specified diseases of anus and rectum     Other specified diseases of anus and rectum - rectal pain after c-scope prep      • Overweight    • Pain in left knee    • Pain in right knee    • Shoulder pain, right    • Spasm of back muscles    • Tachycardia, unspecified    • Tremor, unspecified    • Tubular adenoma of colon    • Unspecified essential hypertension      Past Surgical History:    Procedure Laterality Date   • COLONOSCOPY  03/16/2016    One polyp in the sigmoid colon.Resected and retrieved.        • CYST REMOVAL  10/27/2009     Excision of sebaceous cyst of the forehead, glabellar region.   • ENDOSCOPY  03/16/2016    Mildly severe esophagitis.Gastritis.Normal examined duodenum.Several biopsies obtained in the lower third of the esophagus.    • ENDOSCOPY AND COLONOSCOPY  04/12/2006     Normal colonoscopic examination    • INCISION AND DRAINAGE ABSCESS  10/24/2012   • INJECTION OF MEDICATION  04/21/2011    Depo Medrol (Methylprednisone) 80mg (1)        • INJECTION OF MEDICATION  02/01/2016    Kenalog (3)        • INJECTION OF MEDICATION  02/19/2016    Toradol (3)    • KNEE ARTHROSCOPY  04/02/2009     Medial meniscus tear of left knee   • SCROTUM EXPLORATION  07/02/2002     Excision of epidermal inclusion cyst. base of left scrotum   • SKIN BIOPSY  04/23/2012   • SKIN TAG REMOVAL  04/25/2002    Skin tag, left scrotum      Family History   Problem Relation Age of Onset   • Diabetes Mother    • Kidney disease Mother    • Thyroid disease Mother    • Atrial fibrillation Mother    • Coronary artery disease Father    • Hypertension Father    • Hearing loss Father    • Cancer Other    • Coronary artery disease Other    • Diabetes Other    • Hearing loss Other    • Heart disease Other    • Hypertension Other    • Osteoarthritis Other    • Stroke Other    • Thyroid disease Other    • Pancreatitis Other        Prior to Admission medications    Medication Sig Start Date End Date Taking? Authorizing Provider   amLODIPine (NORVASC) 5 MG tablet Take 5 mg by mouth Daily.   Yes ProviderRj MD   Apple Cider Vinegar 300 MG tablet Take  by mouth.   Yes ProviderRj MD   buPROPion XL (WELLBUTRIN XL) 300 MG 24 hr tablet Take 1 tablet by mouth Daily. 3/24/17  Yes Ray Ureña MD   Calcium Polycarbophil (FIBER-CAPS PO) Take  by mouth.   Yes ProviderRj MD   docusate sodium (COLACE) 100  MG capsule Take 100 mg by mouth 2 (Two) Times a Day.   Yes Rj Issa MD   esomeprazole (nexIUM) 40 MG capsule TAKE 1 CAPSULE DAILY 3/24/17  Yes Ray Ureña MD   fluticasone (FLONASE) 50 MCG/ACT nasal spray USE 2 SPRAYS IN EACH NOSTRIL DAILY 3/24/17  Yes Ray Ureña MD   Glucosamine-Chondroit-Vit C-Mn (GLUCOSAMINE CHONDR 1500 COMPLX PO) Take 2 tablets by mouth Daily.   Yes Rj Issa MD   losartan-hydrochlorothiazide (HYZAAR) 100-12.5 MG per tablet Take 1 tablet by mouth Daily.   Yes Rj Issa MD   multivitamin with minerals tablet tablet Take 1 tablet by mouth Daily.   Yes jR Issa MD   Omega-3 Fatty Acids (FISH OIL BURP-LESS PO) Take  by mouth Daily.   Yes Rj Issa MD   primidone (MYSOLINE) 50 MG tablet 50 mg. Taking 100 mg per day 12/20/20  Yes Rj Issa MD   sildenafil (VIAGRA) 50 MG tablet Take 50 mg by mouth.   Yes Rj Issa MD   simvastatin (ZOCOR) 20 MG tablet Take 20 mg by mouth.   Yes Rj Issa MD   vitamin E 100 UNIT capsule Take 100 Units by mouth Daily.   Yes Rj Issa MD   Vyvanse 50 MG capsule  12/17/20  Yes Rj Issa MD   guaiFENesin (Mucinex) 600 MG 12 hr tablet Take 1 tablet by mouth 2 (Two) Times a Day. 3/3/21   Baljit Olson MD   montelukast (SINGULAIR) 10 MG tablet  5/6/21   Rj Issa MD   nabumetone (RELAFEN) 750 MG tablet  11/9/20 5/20/21  Rj Issa MD     Allergies   Allergen Reactions   • Lisinopril Cough     Social History     Socioeconomic History   • Marital status:      Spouse name: Not on file   • Number of children: Not on file   • Years of education: Not on file   • Highest education level: Not on file   Tobacco Use   • Smoking status: Former Smoker   • Smokeless tobacco: Never Used       Review of Systems  Review of Systems   Constitutional: Negative for activity change, appetite change, chills, diaphoresis, fatigue,  "fever and unexpected weight change.   Respiratory: Negative for cough and shortness of breath.    Gastrointestinal: Positive for bloating and constipation. Negative for abdominal distention, abdominal pain, anal bleeding, anorexia, blood in stool, diarrhea, flatus, hematochezia, hemorrhoids, melena, nausea, rectal pain and vomiting.   Genitourinary: Negative for difficulty urinating.   Musculoskeletal: Positive for back pain.        /78 (BP Location: Left arm)   Pulse 101   Ht 195.6 cm (77\")   Wt (!) 140 kg (308 lb 3.2 oz)   BMI 36.55 kg/m²     Objective    Physical Exam  Constitutional:       General: He is not in acute distress.     Appearance: Normal appearance. He is well-developed.   HENT:      Head: Normocephalic and atraumatic.   Neck:      Thyroid: No thyromegaly.   Pulmonary:      Effort: Pulmonary effort is normal.   Abdominal:      General: Bowel sounds are normal. There is no distension.      Palpations: Abdomen is soft. Abdomen is not rigid.      Tenderness: There is no abdominal tenderness. There is no guarding.      Hernia: No hernia is present.   Musculoskeletal:      Cervical back: Normal range of motion and neck supple.   Skin:     General: Skin is warm and dry.      Coloration: Skin is not pale.      Findings: No rash.   Neurological:      Mental Status: He is alert and oriented to person, place, and time.   Psychiatric:         Speech: Speech normal.         Behavior: Behavior is cooperative.       Hospital Outpatient Visit on 11/17/2016   Component Date Value Ref Range Status   • Free T4 11/17/2016 1.29  0.78 - 2.19 ng/dl Final     Assessment/Plan      1. Encounter for colonoscopy due to history of colonic polyp    2. Chronic idiopathic constipation    .       Orders placed during this encounter include:  Orders Placed This Encounter   Procedures   • Follow Anesthesia Guidelines / Standing Orders     Standing Status:   Future   • Obtain Informed Consent     Standing Status:   Future    "  Order Specific Question:   Informed Consent Given For     Answer:   COLONOSCOPY       COLONOSCOPY (N/A)    Review and/or summary of lab tests, radiology, procedures, medications. Review and summary of old records and obtaining of history. The risks and benefits of my recommendations, as well as other treatment options were discussed with the patient today. Questions were answered.    New Medications Ordered This Visit   Medications   • linaclotide (Linzess) 145 MCG capsule capsule     Sig: Take 1 capsule by mouth Every Morning Before Breakfast.     Dispense:  30 capsule     Refill:  5   • sodium-potassium-magnesium sulfates (Suprep Bowel Prep Kit) 17.5-3.13-1.6 GM/177ML solution oral solution     Sig: Take 1 bottle by mouth Every 12 (Twelve) Hours.     Dispense:  177 mL     Refill:  0       Follow-up: Return in about 4 weeks (around 6/17/2021) for Recheck, After test.          This document has been electronically signed by YUDY Park on May 20, 2021 11:40 CDT           I spent 22 minutes caring for Otf on this date of service. This time includes time spent by me in the following activities:preparing for the visit, reviewing tests, obtaining and/or reviewing a separately obtained history, performing a medically appropriate examination and/or evaluation , counseling and educating the patient/family/caregiver, ordering medications, tests, or procedures, referring and communicating with other health care professionals , documenting information in the medical record and care coordination    Results for orders placed or performed during the hospital encounter of 11/17/16   T4, Free    Specimen: Blood   Result Value Ref Range    Free T4 1.29 0.78 - 2.19 ng/dl   Results for orders placed or performed during the hospital encounter of 11/16/16   Hepatitis Panel, Acute    Specimen: Blood   Result Value Ref Range    Hepatitis B Surface Ag Negative Negative    Hep C Virus Ab Negative Negative    Hep A IgM Negative  Negative    Hep B Core IgM Negative Negative   Vitamin D 1,25 Dihydroxy    Specimen: Blood   Result Value Ref Range    1,25-Dihydroxy, Vitamin D 44.4 19.9 - 79.3 pg/mL   Testosterone, Free, Total    Specimen: Blood   Result Value Ref Range    Comment Comment     Testosterone, Total 329 (L) 348 - 1197 ng/dL    Testosterone, Free 5.5 (L) 6.6 - 18.1 pg/mL   Hemoglobin A1c    Specimen: Blood   Result Value Ref Range    Hemoglobin A1C 5.8 (H) 4.0 - 5.6 %TotHgb   Lipid Panel    Specimen: Blood   Result Value Ref Range    Total Cholesterol 205 (H) 0 - 199 mg/dl    Triglycerides 143 20 - 199 mg/dl    HDL Cholesterol 41 (L) 60 - 200 mg/dl    LDL Cholesterol  135 (H) 0 - 129 mg/dl   Results for orders placed or performed in visit on 11/10/16   TSH    Specimen: Blood   Result Value Ref Range    TSH 0.43 (L) 0.46 - 4.68 uIU/ml   Results for orders placed or performed in visit on 10/28/16    COLONOSCOPY   Result Value Ref Range     Colonoscopy        One polyp in the sigmoid colon.Resected and retrieved.    Results for orders placed or performed during the hospital encounter of 04/25/16   Pain Management Profile (13 Drugs) Urine    Specimen: Urine   Result Value Ref Range    Pain Management Drug Panel See Below     Codeine, Urine Not Detected     Morphine, Urine Not Detected     6-acetylmorphine Not Detected     Oxycodone Screen, Urine Not Detected     Noroxycodone Not Detected     Oxymorphone UR Not Detected     Noroxymorphone Not Detected     Hydrocodone UR Not Detected     Norhydrocodone Not Detected     Hydromorphone Not Detected     Buprenorphine, Urine Not Detected     Norbuprenorphine Not Detected     Fentanyl, Urine Not Detected     Norfentanyl Not Detected     Normeperidine, Urine Not Detected     Tapentadol Not Detected     Tapentadol-o-Sulf Not Detected     Methadone Screen, Urine Not Detected     Propoxyphene Screen Not Detected     Tramadol Screen, Urine Not Detected     Amphetamine, Urine Qual Present      Methamphetamine, Urine Not Detected     MDMA-Ecstasy Not Detected     MDA Not Detected     MDEA Not Detected     Ritalinic Acid, Urine Not Detected     Phentermine Urine Not Detected     Benzoylecgonine, Urine Not Detected     Alprazolam Urine, Conf Not Detected     alpha-Hydroxyalprazolam, Quant, Urine Not Detected     Clonazepam Not Detected     7-Aminoclonazepam Urine Not Detected     Diazepam Urine, Qualitative Not Detected     Nordiazepam, Urine Not Detected     Oxazepam, urine Not Detected     Temazepam, urine Not Detected     Lorazepam Not Detected     Midazolam, Urine Not Detected     Zolpidem(Ambien) Urine Not Detected     Barbiturates Screen, Urine Not Detected     Ethyl Glucuronide Present     Marijuana Metabolite Not Detected     Phencyclidine (PCP), Urine Not Detected     Carisoprodol, Serum Not Detected     Creatinine, Urine 40.2 20.0 - 400.0 mg/dL    Pain Management Drug Panel See Note    Results for orders placed or performed during the hospital encounter of 02/16/16   Testosterone, free, total    Specimen: Blood   Result Value Ref Range    Testosterone, Total 244 (L) 348 - 1197 ng/dL    Comment Comment     Testosterone, Free 2.9 (L) 6.6 - 18.1 pg/mL   Results for orders placed or performed during the hospital encounter of 01/19/16   Testosterone, free, total    Specimen: Blood   Result Value Ref Range    Testosterone, Total 339 (L) 348 - 1197 ng/dL    Comment Comment     Testosterone, Free 7.3 6.6 - 18.1 pg/mL   Results for orders placed or performed during the hospital encounter of 01/05/16   Urine drug screen    Specimen: Urine   Result Value Ref Range    Barbiturates Screen, Urine Negative Negative    Benzodiazepine Screen, Urine Negative Negative    Opiate Screen, Urine Negative Negative    THC Screen Interpretation Negative Negative    Amphet/Methamphet, Screen, Urine POSITIVE (H) Negative    Cocaine Screen, Urine Negative Negative    Oxycodone Screen, Urine Negative Negative    Methadone  Screen, Urine Negative Negative   Results for orders placed or performed during the hospital encounter of 11/06/15   CBC and Differential    Specimen: Blood   Result Value Ref Range    WBC 8.1 3.2 - 9.8 x1000/uL    RBC 5.09 4.37 - 5.74 marina/mm3    Hemoglobin 14.5 13.7 - 17.3 gm/dl    Hematocrit 44.2 39.0 - 49.0 %    MCV 86.8 80.0 - 98.0 fl    MCH 28.5 26.0 - 34.0 pg    MCHC 32.8 31.5 - 36.3 gm/dl    RDW 12.8 11.5 - 14.5 %    Platelets 171 150 - 450 x1000/mm3    MPV 10.6 8.0 - 12.0 fl    Neutrophil Rel % 47.4 37.0 - 80.0 %    Lymphocyte Rel % 36.5 10.0 - 50.0 %    Monocyte Rel % 13.6 (H) 0.0 - 12.0 %    Eosinophil Rel % 2.1 0.0 - 7.0 %    Basophil Rel % 0.2 0.0 - 2.0 %    Immature Granulocyte Rel % 0.20 0.00 - 0.50 %    Neutrophils Absolute 3.85 2.00 - 8.60 x1000/uL    Lymphocytes Absolute 2.97 0.60 - 4.20 x1000/uL    Monocytes Absolute 1.11 (H) 0.00 - 0.90 x1000/uL    Eosinophils Absolute 0.17 0.00 - 0.70 x1000/uL    Basophils Absolute 0.02 0.00 - 0.20 x1000/uL    Immature Granulocytes Absolute 0.020 0.005 - 0.022 x1000/uL   TSH    Specimen: Blood   Result Value Ref Range    TSH 0.61 0.46 - 4.68 uIU/ml     *Note: Due to a large number of results and/or encounters for the requested time period, some results have not been displayed. A complete set of results can be found in Results Review.

## 2021-06-21 RX ORDER — MELATONIN 5 MG
2 TABLET,CHEWABLE ORAL NIGHTLY
COMMUNITY
End: 2023-03-02

## 2021-06-21 RX ORDER — AZELASTINE 1 MG/ML
2 SPRAY, METERED NASAL 2 TIMES DAILY
COMMUNITY
End: 2023-02-23

## 2021-06-23 ENCOUNTER — ANESTHESIA (OUTPATIENT)
Dept: GASTROENTEROLOGY | Facility: HOSPITAL | Age: 66
End: 2021-06-23

## 2021-06-23 ENCOUNTER — ANESTHESIA EVENT (OUTPATIENT)
Dept: GASTROENTEROLOGY | Facility: HOSPITAL | Age: 66
End: 2021-06-23

## 2021-06-23 ENCOUNTER — HOSPITAL ENCOUNTER (OUTPATIENT)
Facility: HOSPITAL | Age: 66
Setting detail: HOSPITAL OUTPATIENT SURGERY
Discharge: HOME | End: 2021-06-23
Attending: INTERNAL MEDICINE | Admitting: INTERNAL MEDICINE

## 2021-06-23 VITALS
OXYGEN SATURATION: 95 % | RESPIRATION RATE: 18 BRPM | WEIGHT: 305.6 LBS | TEMPERATURE: 97.1 F | HEIGHT: 77 IN | DIASTOLIC BLOOD PRESSURE: 69 MMHG | SYSTOLIC BLOOD PRESSURE: 133 MMHG | HEART RATE: 71 BPM | BODY MASS INDEX: 36.08 KG/M2

## 2021-06-23 DIAGNOSIS — Z86.010 ENCOUNTER FOR COLONOSCOPY DUE TO HISTORY OF COLONIC POLYP: ICD-10-CM

## 2021-06-23 DIAGNOSIS — Z12.11 ENCOUNTER FOR COLONOSCOPY DUE TO HISTORY OF COLONIC POLYP: ICD-10-CM

## 2021-06-23 PROCEDURE — 45385 COLONOSCOPY W/LESION REMOVAL: CPT | Performed by: INTERNAL MEDICINE

## 2021-06-23 PROCEDURE — 25010000002 PROPOFOL 10 MG/ML EMULSION: Performed by: NURSE ANESTHETIST, CERTIFIED REGISTERED

## 2021-06-23 RX ORDER — LIDOCAINE HYDROCHLORIDE 20 MG/ML
INJECTION, SOLUTION INTRAVENOUS AS NEEDED
Status: DISCONTINUED | OUTPATIENT
Start: 2021-06-23 | End: 2021-06-23 | Stop reason: SURG

## 2021-06-23 RX ORDER — DEXTROSE AND SODIUM CHLORIDE 5; .45 G/100ML; G/100ML
30 INJECTION, SOLUTION INTRAVENOUS CONTINUOUS PRN
Status: DISCONTINUED | OUTPATIENT
Start: 2021-06-23 | End: 2021-06-23 | Stop reason: HOSPADM

## 2021-06-23 RX ORDER — PROPOFOL 10 MG/ML
VIAL (ML) INTRAVENOUS AS NEEDED
Status: DISCONTINUED | OUTPATIENT
Start: 2021-06-23 | End: 2021-06-23 | Stop reason: SURG

## 2021-06-23 RX ADMIN — PROPOFOL 50 MG: 10 INJECTION, EMULSION INTRAVENOUS at 14:14

## 2021-06-23 RX ADMIN — PROPOFOL 30 MG: 10 INJECTION, EMULSION INTRAVENOUS at 14:21

## 2021-06-23 RX ADMIN — LIDOCAINE HYDROCHLORIDE 100 MG: 20 INJECTION, SOLUTION INTRAVENOUS at 14:12

## 2021-06-23 RX ADMIN — PROPOFOL 150 MG: 10 INJECTION, EMULSION INTRAVENOUS at 14:12

## 2021-06-23 RX ADMIN — PROPOFOL 30 MG: 10 INJECTION, EMULSION INTRAVENOUS at 14:19

## 2021-06-23 RX ADMIN — DEXTROSE AND SODIUM CHLORIDE 30 ML/HR: 5; 450 INJECTION, SOLUTION INTRAVENOUS at 13:06

## 2021-06-23 RX ADMIN — PROPOFOL 30 MG: 10 INJECTION, EMULSION INTRAVENOUS at 14:17

## 2021-06-23 NOTE — ANESTHESIA POSTPROCEDURE EVALUATION
Patient: Otf Murray    Procedure Summary     Date: 06/23/21 Room / Location: [facility] ENDOSCOPY 1 / [facility]    Anesthesia Start: 1406 Anesthesia Stop: 1426    Procedure: COLONOSCOPY (N/A ) Diagnosis:       Encounter for colonoscopy due to history of colonic polyp      (Encounter for colonoscopy due to history of colonic polyp [Z12.11, Z86.010])    Surgeons: Brentno Ruggiero MD Provider: Anne Chapa CRNA    Anesthesia Type: MAC ASA Status: 3          Anesthesia Type: MAC    Vitals  No vitals data found for the desired time range.          Post Anesthesia Care and Evaluation    Patient location during evaluation: bedside  Patient participation: waiting for patient participation  Level of consciousness: sleepy but conscious  Pain score: 0  Pain management: adequate  Airway patency: patent  Anesthetic complications: No anesthetic complications  PONV Status: none  Cardiovascular status: acceptable  Respiratory status: acceptable  Hydration status: acceptable

## 2021-06-23 NOTE — H&P
No chief complaint on file.      Subjective    Otf Murray is a 66 y.o. male. he is here today for follow-up.    66-year-old male presents to discuss constipation and screening colonoscopy.  Reports he has been taking Colace mag citrate MiraLAX every few days to have a bowel movement and if he does not take anything he goes 1 to 2 weeks between bowel movements and has abdominal pain and bloating.  Reports is always been an issue and is not a change for him denies any visible blood within his stool.  Reports he has chronic reflux well controlled with Nexium daily.  Previous colonoscopy was completed March 2016 noted small polyp in the sigmoid colon which was resected and retrieved repeat recommended in 5 years for surveillance.      Constipation  This is a chronic problem. The current episode started more than 1 year ago. The problem has been waxing and waning since onset. His stool frequency is 1 time per week or less. The stool is described as formed. The patient is on a high fiber diet. He exercises regularly. There has been adequate water intake. Associated symptoms include back pain and bloating. Pertinent negatives include no abdominal pain, anorexia, diarrhea, difficulty urinating, fecal incontinence, fever, flatus, hematochezia, hemorrhoids, melena, nausea, rectal pain or vomiting. Risk factors include obesity. He has tried enemas, fiber, diet changes, laxatives and stool softeners for the symptoms. The treatment provided moderate relief.     Plan; schedule patient for screening colonoscopy, will start patient on Linzess for chronic constipation discussed importance of activity as tolerated increase water and fiber daily unless contraindicated.       The following portions of the patient's history were reviewed and updated as appropriate:   Past Medical History:   Diagnosis Date   • Acute gastritis without bleeding    • Alcohol abuse counseling and surveillance of alcoholic    • Allergic rhinitis    •  Anxiety state    • Attention deficit disorder of childhood with hyperactivity    • Attention deficit hyperactivity disorder    • Attention deficit hyperactivity disorder, predominantly hyperactive impulsive type    • Attention deficit hyperactivity disorder, predominantly inattentive type    • Backache    • Body mass index (bmi) 31.0-31.9, adult     Body mass index (BMI) 31.0-31.9, adult - BMI 33.5      • Burn of lower leg    • Candidiasis of skin    • Cardiac murmur, unspecified    • Decreased testosterone level    • Depressive disorder     Depressive disorder - acute on chronic      • Dysgraphia    • Edema    • Elbow joint pain    • Encounter for general adult medical examination with abnormal findings    • Encounter for screening for malignant neoplasm of colon    • Essential hypertension    • Essential tremor    • Ex-smoker    • Fatigue    • Gastro-esophageal reflux disease with esophagitis    • Gastroesophageal reflux disease    • Hyperlipidemia    • Hyperlipoproteinemia    • Impotence of organic origin    • Insomnia    • Intermittent palpitations    • Knee pain     Knee pain - patellofemoral      • Lateral epicondylitis    • Low back pain    • Male erectile disorder    • Male erectile dysfunction, unspecified    • Male hypogonadism    • Neck pain     Neck pain aggravated by recumbency      • Obese     Obese - BMI 33.0      • On long term drug therapy    • Osteoarthrosis     Osteoarthrosis, unspecified whether generalized or localized, involving lower leg      • Other obesity    • Other specified diseases of anus and rectum     Other specified diseases of anus and rectum - rectal pain after c-scope prep      • Overweight    • Pain in left knee    • Pain in right knee    • Shoulder pain, right    • Spasm of back muscles    • Tachycardia, unspecified    • Tremor, unspecified    • Tubular adenoma of colon    • Unspecified essential hypertension      Past Surgical History:   Procedure Laterality Date   •  APPENDECTOMY     • COLONOSCOPY  03/16/2016    One polyp in the sigmoid colon.Resected and retrieved.        • CYST REMOVAL  10/27/2009     Excision of sebaceous cyst of the forehead, glabellar region.   • ENDOSCOPY  03/16/2016    Mildly severe esophagitis.Gastritis.Normal examined duodenum.Several biopsies obtained in the lower third of the esophagus.    • ENDOSCOPY AND COLONOSCOPY  04/12/2006     Normal colonoscopic examination    • INCISION AND DRAINAGE ABSCESS  10/24/2012   • INJECTION OF MEDICATION  04/21/2011    Depo Medrol (Methylprednisone) 80mg (1)        • INJECTION OF MEDICATION  02/01/2016    Kenalog (3)        • INJECTION OF MEDICATION  02/19/2016    Toradol (3)    • JOINT REPLACEMENT Bilateral     Knee   • KNEE ARTHROSCOPY  04/02/2009     Medial meniscus tear of left knee   • SCROTUM EXPLORATION  07/02/2002     Excision of epidermal inclusion cyst. base of left scrotum   • SKIN BIOPSY  04/23/2012   • SKIN TAG REMOVAL  04/25/2002    Skin tag, left scrotum      Family History   Problem Relation Age of Onset   • Diabetes Mother    • Kidney disease Mother    • Thyroid disease Mother    • Atrial fibrillation Mother    • Coronary artery disease Father    • Hypertension Father    • Hearing loss Father    • Cancer Other    • Coronary artery disease Other    • Diabetes Other    • Hearing loss Other    • Heart disease Other    • Hypertension Other    • Osteoarthritis Other    • Stroke Other    • Thyroid disease Other    • Pancreatitis Other        Prior to Admission medications    Medication Sig Start Date End Date Taking? Authorizing Provider   amLODIPine (NORVASC) 5 MG tablet Take 5 mg by mouth Daily.   Yes Rj Issa MD   Apple Cider Vinegar 300 MG tablet Take  by mouth.   Yes Rj Issa MD   buPROPion XL (WELLBUTRIN XL) 300 MG 24 hr tablet Take 1 tablet by mouth Daily. 3/24/17  Yes Ray Ureña MD   Calcium Polycarbophil (FIBER-CAPS PO) Take  by mouth.   Yes Rj Issa MD    docusate sodium (COLACE) 100 MG capsule Take 100 mg by mouth 2 (Two) Times a Day.   Yes Rj Issa MD   esomeprazole (nexIUM) 40 MG capsule TAKE 1 CAPSULE DAILY 3/24/17  Yes Ray Ureña MD   fluticasone (FLONASE) 50 MCG/ACT nasal spray USE 2 SPRAYS IN EACH NOSTRIL DAILY 3/24/17  Yes Ray Ureña MD   Glucosamine-Chondroit-Vit C-Mn (GLUCOSAMINE CHONDR 1500 COMPLX PO) Take 2 tablets by mouth Daily.   Yes Rj Issa MD   losartan-hydrochlorothiazide (HYZAAR) 100-12.5 MG per tablet Take 1 tablet by mouth Daily.   Yes Rj Issa MD   multivitamin with minerals tablet tablet Take 1 tablet by mouth Daily.   Yes Rj Issa MD   Omega-3 Fatty Acids (FISH OIL BURP-LESS PO) Take  by mouth Daily.   Yes Rj Issa MD   primidone (MYSOLINE) 50 MG tablet 50 mg. Taking 100 mg per day 20  Yes Rj Issa MD   sildenafil (VIAGRA) 50 MG tablet Take 50 mg by mouth.   Yes Rj Issa MD   simvastatin (ZOCOR) 20 MG tablet Take 20 mg by mouth.   Yes Rj Issa MD   vitamin E 100 UNIT capsule Take 100 Units by mouth Daily.   Yes Rj Issa MD   Vyvanse 50 MG capsule  20  Yes Rj Issa MD   guaiFENesin (Mucinex) 600 MG 12 hr tablet Take 1 tablet by mouth 2 (Two) Times a Day. 3/3/21   Baljit Olson MD   montelukast (SINGULAIR) 10 MG tablet  21   Rj Issa MD   nabumetone (RELAFEN) 750 MG tablet  20  Rj Issa MD     Allergies   Allergen Reactions   • Lisinopril Cough     Social History     Socioeconomic History   • Marital status:      Spouse name: Not on file   • Number of children: Not on file   • Years of education: Not on file   • Highest education level: Not on file   Tobacco Use   • Smoking status: Former Smoker     Quit date:      Years since quittin.4   • Smokeless tobacco: Never Used   Vaping Use   • Vaping Use: Never used   Substance and  "Sexual Activity   • Alcohol use: Yes     Alcohol/week: 3.0 standard drinks     Types: 3 Glasses of wine per week     Comment: daily    • Drug use: Never       Review of Systems  Review of Systems   Constitutional: Negative for activity change, appetite change, chills, diaphoresis, fatigue, fever and unexpected weight change.   Respiratory: Negative for cough and shortness of breath.    Gastrointestinal: Positive for bloating and constipation. Negative for abdominal distention, abdominal pain, anal bleeding, anorexia, blood in stool, diarrhea, flatus, hematochezia, hemorrhoids, melena, nausea, rectal pain and vomiting.   Genitourinary: Negative for difficulty urinating.   Musculoskeletal: Positive for back pain.        /84 (BP Location: Left arm, Patient Position: Sitting)   Pulse 67   Temp 98.3 °F (36.8 °C) (Temporal)   Resp 18   Ht 195.6 cm (77\")   Wt (!) 139 kg (305 lb 9.6 oz)   SpO2 96%   BMI 36.24 kg/m²     Objective    Physical Exam  Constitutional:       General: He is not in acute distress.     Appearance: Normal appearance. He is well-developed.   HENT:      Head: Normocephalic and atraumatic.   Neck:      Thyroid: No thyromegaly.   Pulmonary:      Effort: Pulmonary effort is normal.   Abdominal:      General: Bowel sounds are normal. There is no distension.      Palpations: Abdomen is soft. Abdomen is not rigid.      Tenderness: There is no abdominal tenderness. There is no guarding.      Hernia: No hernia is present.   Musculoskeletal:      Cervical back: Normal range of motion and neck supple.   Skin:     General: Skin is warm and dry.      Coloration: Skin is not pale.      Findings: No rash.   Neurological:      Mental Status: He is alert and oriented to person, place, and time.   Psychiatric:         Speech: Speech normal.         Behavior: Behavior is cooperative.       Hospital Outpatient Visit on 11/17/2016   Component Date Value Ref Range Status   • Free T4 11/17/2016 1.29  0.78 - 2.19 " ng/dl Final     Assessment/Plan      1. Encounter for colonoscopy due to history of colonic polyp    .       Orders placed during this encounter include:  Orders Placed This Encounter   Procedures   • Obtain Informed Consent     Standing Status:   Standing     Number of Occurrences:   1     Order Specific Question:   Informed Consent Given For     Answer:   Colonoscopy   • POC Glucose Once     Prior to Procedure on ALL Diabetic Patients     Standing Status:   Standing     Number of Occurrences:   1   • Insert Peripheral IV     Standing Status:   Standing     Number of Occurrences:   1       COLONOSCOPY (N/A)    Review and/or summary of lab tests, radiology, procedures, medications. Review and summary of old records and obtaining of history. The risks and benefits of my recommendations, as well as other treatment options were discussed with the patient today. Questions were answered.    New Medications Ordered This Visit   Medications   • dextrose 5 % and sodium chloride 0.45 % infusion       Follow-up: No follow-ups on file.          This document has been electronically signed by Brenton Ruggiero MD on June 23, 2021 13:40 CDT           I spent 22 minutes caring for Otf on this date of service. This time includes time spent by me in the following activities:preparing for the visit, reviewing tests, obtaining and/or reviewing a separately obtained history, performing a medically appropriate examination and/or evaluation , counseling and educating the patient/family/caregiver, ordering medications, tests, or procedures, referring and communicating with other health care professionals , documenting information in the medical record and care coordination    Results for orders placed or performed during the hospital encounter of 11/17/16   T4, Free    Specimen: Blood   Result Value Ref Range    Free T4 1.29 0.78 - 2.19 ng/dl   Results for orders placed or performed during the hospital encounter of 11/16/16   Hepatitis  Panel, Acute    Specimen: Blood   Result Value Ref Range    Hepatitis B Surface Ag Negative Negative    Hep C Virus Ab Negative Negative    Hep A IgM Negative Negative    Hep B Core IgM Negative Negative   Vitamin D 1,25 Dihydroxy    Specimen: Blood   Result Value Ref Range    1,25-Dihydroxy, Vitamin D 44.4 19.9 - 79.3 pg/mL   Testosterone, Free, Total    Specimen: Blood   Result Value Ref Range    Comment Comment     Testosterone, Total 329 (L) 348 - 1197 ng/dL    Testosterone, Free 5.5 (L) 6.6 - 18.1 pg/mL   Hemoglobin A1c    Specimen: Blood   Result Value Ref Range    Hemoglobin A1C 5.8 (H) 4.0 - 5.6 %TotHgb   Lipid Panel    Specimen: Blood   Result Value Ref Range    Total Cholesterol 205 (H) 0 - 199 mg/dl    Triglycerides 143 20 - 199 mg/dl    HDL Cholesterol 41 (L) 60 - 200 mg/dl    LDL Cholesterol  135 (H) 0 - 129 mg/dl   Results for orders placed or performed in visit on 11/10/16   TSH    Specimen: Blood   Result Value Ref Range    TSH 0.43 (L) 0.46 - 4.68 uIU/ml   Results for orders placed or performed in visit on 10/28/16    COLONOSCOPY   Result Value Ref Range     Colonoscopy        One polyp in the sigmoid colon.Resected and retrieved.    Results for orders placed or performed during the hospital encounter of 04/25/16   Pain Management Profile (13 Drugs) Urine    Specimen: Urine   Result Value Ref Range    Pain Management Drug Panel See Below     Codeine, Urine Not Detected     Morphine, Urine Not Detected     6-acetylmorphine Not Detected     Oxycodone Screen, Urine Not Detected     Noroxycodone Not Detected     Oxymorphone UR Not Detected     Noroxymorphone Not Detected     Hydrocodone UR Not Detected     Norhydrocodone Not Detected     Hydromorphone Not Detected     Buprenorphine, Urine Not Detected     Norbuprenorphine Not Detected     Fentanyl, Urine Not Detected     Norfentanyl Not Detected     Normeperidine, Urine Not Detected     Tapentadol Not Detected     Tapentadol-o-Sulf Not Detected      Methadone Screen, Urine Not Detected     Propoxyphene Screen Not Detected     Tramadol Screen, Urine Not Detected     Amphetamine, Urine Qual Present     Methamphetamine, Urine Not Detected     MDMA-Ecstasy Not Detected     MDA Not Detected     MDEA Not Detected     Ritalinic Acid, Urine Not Detected     Phentermine Urine Not Detected     Benzoylecgonine, Urine Not Detected     Alprazolam Urine, Conf Not Detected     alpha-Hydroxyalprazolam, Quant, Urine Not Detected     Clonazepam Not Detected     7-Aminoclonazepam Urine Not Detected     Diazepam Urine, Qualitative Not Detected     Nordiazepam, Urine Not Detected     Oxazepam, urine Not Detected     Temazepam, urine Not Detected     Lorazepam Not Detected     Midazolam, Urine Not Detected     Zolpidem(Ambien) Urine Not Detected     Barbiturates Screen, Urine Not Detected     Ethyl Glucuronide Present     Marijuana Metabolite Not Detected     Phencyclidine (PCP), Urine Not Detected     Carisoprodol, Serum Not Detected     Creatinine, Urine 40.2 20.0 - 400.0 mg/dL    Pain Management Drug Panel See Note    Results for orders placed or performed during the hospital encounter of 02/16/16   Testosterone, free, total    Specimen: Blood   Result Value Ref Range    Testosterone, Total 244 (L) 348 - 1197 ng/dL    Comment Comment     Testosterone, Free 2.9 (L) 6.6 - 18.1 pg/mL   Results for orders placed or performed during the hospital encounter of 01/19/16   Testosterone, free, total    Specimen: Blood   Result Value Ref Range    Testosterone, Total 339 (L) 348 - 1197 ng/dL    Comment Comment     Testosterone, Free 7.3 6.6 - 18.1 pg/mL   Results for orders placed or performed during the hospital encounter of 01/05/16   Urine drug screen    Specimen: Urine   Result Value Ref Range    Barbiturates Screen, Urine Negative Negative    Benzodiazepine Screen, Urine Negative Negative    Opiate Screen, Urine Negative Negative    THC Screen Interpretation Negative Negative     Amphet/Methamphet, Screen, Urine POSITIVE (H) Negative    Cocaine Screen, Urine Negative Negative    Oxycodone Screen, Urine Negative Negative    Methadone Screen, Urine Negative Negative   Results for orders placed or performed during the hospital encounter of 11/06/15   CBC and Differential    Specimen: Blood   Result Value Ref Range    WBC 8.1 3.2 - 9.8 x1000/uL    RBC 5.09 4.37 - 5.74 marina/mm3    Hemoglobin 14.5 13.7 - 17.3 gm/dl    Hematocrit 44.2 39.0 - 49.0 %    MCV 86.8 80.0 - 98.0 fl    MCH 28.5 26.0 - 34.0 pg    MCHC 32.8 31.5 - 36.3 gm/dl    RDW 12.8 11.5 - 14.5 %    Platelets 171 150 - 450 x1000/mm3    MPV 10.6 8.0 - 12.0 fl    Neutrophil Rel % 47.4 37.0 - 80.0 %    Lymphocyte Rel % 36.5 10.0 - 50.0 %    Monocyte Rel % 13.6 (H) 0.0 - 12.0 %    Eosinophil Rel % 2.1 0.0 - 7.0 %    Basophil Rel % 0.2 0.0 - 2.0 %    Immature Granulocyte Rel % 0.20 0.00 - 0.50 %    Neutrophils Absolute 3.85 2.00 - 8.60 x1000/uL    Lymphocytes Absolute 2.97 0.60 - 4.20 x1000/uL    Monocytes Absolute 1.11 (H) 0.00 - 0.90 x1000/uL    Eosinophils Absolute 0.17 0.00 - 0.70 x1000/uL    Basophils Absolute 0.02 0.00 - 0.20 x1000/uL    Immature Granulocytes Absolute 0.020 0.005 - 0.022 x1000/uL   TSH    Specimen: Blood   Result Value Ref Range    TSH 0.61 0.46 - 4.68 uIU/ml     *Note: Due to a large number of results and/or encounters for the requested time period, some results have not been displayed. A complete set of results can be found in Results Review.

## 2021-06-23 NOTE — ANESTHESIA PREPROCEDURE EVALUATION
Anesthesia Evaluation     Patient summary reviewed and Nursing notes reviewed   NPO Solid Status: > 8 hours  NPO Liquid Status: > 8 hours           Airway   Mallampati: II  TM distance: >3 FB  Neck ROM: full  No difficulty expected  Dental - normal exam     Pulmonary - negative pulmonary ROS and normal exam   Cardiovascular - normal exam    (+) hypertension well controlled 2 medications or greater, hyperlipidemia,   (-) valvular problems/murmurs      Neuro/Psych  (+) tremors, psychiatric history Anxiety and Depression,     GI/Hepatic/Renal/Endo    (+) obesity,  GERD well controlled,      Musculoskeletal     (+) neck pain,   Abdominal  - normal exam   Substance History - negative use     OB/GYN negative ob/gyn ROS         Other   arthritis,                    Anesthesia Plan    ASA 3     MAC     intravenous induction     Anesthetic plan, all risks, benefits, and alternatives have been provided, discussed and informed consent has been obtained with: patient.

## 2021-06-28 LAB
LAB AP CASE REPORT: NORMAL
PATH REPORT.FINAL DX SPEC: NORMAL

## 2021-06-29 ENCOUNTER — OFFICE VISIT (OUTPATIENT)
Dept: PODIATRY | Facility: CLINIC | Age: 66
End: 2021-06-29

## 2021-06-29 VITALS
OXYGEN SATURATION: 98 % | DIASTOLIC BLOOD PRESSURE: 68 MMHG | SYSTOLIC BLOOD PRESSURE: 136 MMHG | HEART RATE: 85 BPM | BODY MASS INDEX: 36.63 KG/M2 | WEIGHT: 310.2 LBS | HEIGHT: 77 IN

## 2021-06-29 DIAGNOSIS — M79.672 LEFT FOOT PAIN: Primary | ICD-10-CM

## 2021-06-29 DIAGNOSIS — M19.072 PRIMARY OSTEOARTHRITIS OF LEFT FOOT: ICD-10-CM

## 2021-06-29 PROCEDURE — 20600 DRAIN/INJ JOINT/BURSA W/O US: CPT | Performed by: PODIATRIST

## 2021-06-29 PROCEDURE — 99203 OFFICE O/P NEW LOW 30 MIN: CPT | Performed by: PODIATRIST

## 2021-06-29 RX ORDER — DEXAMETHASONE SODIUM PHOSPHATE 10 MG/ML
5 INJECTION INTRAMUSCULAR; INTRAVENOUS ONCE
Status: COMPLETED | OUTPATIENT
Start: 2021-06-29 | End: 2021-06-29

## 2021-06-29 RX ORDER — TRIAMCINOLONE ACETONIDE 40 MG/ML
5 INJECTION, SUSPENSION INTRA-ARTICULAR; INTRAMUSCULAR ONCE
Status: COMPLETED | OUTPATIENT
Start: 2021-06-29 | End: 2021-06-29

## 2021-06-29 RX ORDER — PIMECROLIMUS 10 MG/G
CREAM TOPICAL
COMMUNITY
Start: 2021-05-21 | End: 2023-02-23

## 2021-06-29 RX ORDER — BUPIVACAINE HYDROCHLORIDE 5 MG/ML
5 INJECTION, SOLUTION PERINEURAL ONCE
Status: COMPLETED | OUTPATIENT
Start: 2021-06-29 | End: 2021-06-29

## 2021-06-29 RX ORDER — AZELASTINE HCL 205.5 UG/1
SPRAY NASAL
COMMUNITY
Start: 2021-06-12 | End: 2023-03-03 | Stop reason: SDUPTHER

## 2021-06-29 RX ORDER — TACROLIMUS 1 MG/G
OINTMENT TOPICAL
COMMUNITY
Start: 2021-05-24 | End: 2023-03-02

## 2021-06-29 RX ORDER — NABUMETONE 750 MG/1
750 TABLET, FILM COATED ORAL 2 TIMES DAILY
Qty: 60 TABLET | Refills: 3 | Status: SHIPPED | OUTPATIENT
Start: 2021-06-29 | End: 2023-02-23

## 2021-06-29 RX ADMIN — TRIAMCINOLONE ACETONIDE 5.2 MG: 40 INJECTION, SUSPENSION INTRA-ARTICULAR; INTRAMUSCULAR at 15:27

## 2021-06-29 RX ADMIN — BUPIVACAINE HYDROCHLORIDE 1 ML: 5 INJECTION, SOLUTION PERINEURAL at 15:27

## 2021-06-29 RX ADMIN — DEXAMETHASONE SODIUM PHOSPHATE 5 MG: 10 INJECTION INTRAMUSCULAR; INTRAVENOUS at 15:28

## 2021-06-29 NOTE — PROGRESS NOTES
Otf Murray  1955  66 y.o. male     Left foot pain and edema for approximately a month or longer    06/29/2021    Chief Complaint   Patient presents with   • Left Foot - Edema, Pain       History of Present Illness    Otf Murray is a 66 y.o.male who presents to clinic today with chief complaint of left foot pain and swelling.  There are no associated injuries.  Pain is progressively gotten worse over the last month.  Pain is aggravated with activity.      Past Medical History:   Diagnosis Date   • Acute gastritis without bleeding    • Alcohol abuse counseling and surveillance of alcoholic    • Allergic rhinitis    • Anxiety state    • Attention deficit disorder of childhood with hyperactivity    • Attention deficit hyperactivity disorder    • Attention deficit hyperactivity disorder, predominantly hyperactive impulsive type    • Attention deficit hyperactivity disorder, predominantly inattentive type    • Backache    • Body mass index (bmi) 31.0-31.9, adult     Body mass index (BMI) 31.0-31.9, adult - BMI 33.5      • Burn of lower leg    • Candidiasis of skin    • Cardiac murmur, unspecified    • Decreased testosterone level    • Depressive disorder     Depressive disorder - acute on chronic      • Dysgraphia    • Edema    • Elbow joint pain    • Encounter for general adult medical examination with abnormal findings    • Encounter for screening for malignant neoplasm of colon    • Essential hypertension    • Essential tremor    • Ex-smoker    • Fatigue    • Gastro-esophageal reflux disease with esophagitis    • Gastroesophageal reflux disease    • Hyperlipidemia    • Hyperlipoproteinemia    • Impotence of organic origin    • Insomnia    • Intermittent palpitations    • Knee pain     Knee pain - patellofemoral      • Lateral epicondylitis    • Low back pain    • Male erectile disorder    • Male erectile dysfunction, unspecified    • Male hypogonadism    • Neck pain     Neck pain aggravated by  recumbency      • Obese     Obese - BMI 33.0      • On long term drug therapy    • Osteoarthrosis     Osteoarthrosis, unspecified whether generalized or localized, involving lower leg      • Other obesity    • Other specified diseases of anus and rectum     Other specified diseases of anus and rectum - rectal pain after c-scope prep      • Overweight    • Pain in left knee    • Pain in right knee    • Plantar fasciitis    • Shoulder pain, right    • Spasm of back muscles    • Tachycardia, unspecified    • Tremor, unspecified    • Tubular adenoma of colon    • Unspecified essential hypertension    • Verruca          Past Surgical History:   Procedure Laterality Date   • APPENDECTOMY     • COLONOSCOPY  03/16/2016    One polyp in the sigmoid colon.Resected and retrieved.        • COLONOSCOPY N/A 6/23/2021    Procedure: COLONOSCOPY;  Surgeon: Brenton Ruggiero MD;  Location: [facility] ENDOSCOPY;  Service: Gastroenterology;  Laterality: N/A;   • CYST REMOVAL  10/27/2009     Excision of sebaceous cyst of the forehead, glabellar region.   • ENDOSCOPY  03/16/2016    Mildly severe esophagitis.Gastritis.Normal examined duodenum.Several biopsies obtained in the lower third of the esophagus.    • ENDOSCOPY AND COLONOSCOPY  04/12/2006     Normal colonoscopic examination    • INCISION AND DRAINAGE ABSCESS  10/24/2012   • INJECTION OF MEDICATION  04/21/2011    Depo Medrol (Methylprednisone) 80mg (1)        • INJECTION OF MEDICATION  02/01/2016    Kenalog (3)        • INJECTION OF MEDICATION  02/19/2016    Toradol (3)    • JOINT REPLACEMENT Bilateral     Knee   • KNEE ARTHROSCOPY  04/02/2009     Medial meniscus tear of left knee   • SCROTUM EXPLORATION  07/02/2002     Excision of epidermal inclusion cyst. base of left scrotum   • SKIN BIOPSY  04/23/2012   • SKIN TAG REMOVAL  04/25/2002    Skin tag, left scrotum          Family History   Problem Relation Age of Onset   • Diabetes Mother    • Kidney disease Mother    • Thyroid disease  Mother    • Atrial fibrillation Mother    • Coronary artery disease Father    • Hypertension Father    • Hearing loss Father    • Cancer Other    • Coronary artery disease Other    • Diabetes Other    • Hearing loss Other    • Heart disease Other    • Hypertension Other    • Osteoarthritis Other    • Stroke Other    • Thyroid disease Other    • Pancreatitis Other        Allergies   Allergen Reactions   • Lisinopril Cough       Social History     Socioeconomic History   • Marital status:      Spouse name: Not on file   • Number of children: Not on file   • Years of education: Not on file   • Highest education level: Not on file   Tobacco Use   • Smoking status: Former Smoker     Quit date:      Years since quittin.4   • Smokeless tobacco: Never Used   Vaping Use   • Vaping Use: Never used   Substance and Sexual Activity   • Alcohol use: Yes     Alcohol/week: 3.0 standard drinks     Types: 3 Glasses of wine per week     Comment: daily    • Drug use: Never   • Sexual activity: Defer         Current Outpatient Medications   Medication Sig Dispense Refill   • amLODIPine (NORVASC) 5 MG tablet Take 5 mg by mouth Daily.     • azelastine (ASTELIN) 0.1 % nasal spray 2 sprays into the nostril(s) as directed by provider 2 (Two) Times a Day. Use in each nostril as directed     • buPROPion XL (WELLBUTRIN XL) 300 MG 24 hr tablet Take 1 tablet by mouth Daily. 30 tablet 2   • Calcium Polycarbophil (FIBER-CAPS PO) Take  by mouth.     • esomeprazole (nexIUM) 40 MG capsule TAKE 1 CAPSULE DAILY 90 capsule 1   • Glucosamine-Chondroit-Vit C-Mn (GLUCOSAMINE CHONDR 1500 COMPLX PO) Take 2 tablets by mouth Daily.     • losartan-hydrochlorothiazide (HYZAAR) 100-12.5 MG per tablet Take 1 tablet by mouth Daily.     • Melatonin 5 MG chewable tablet Chew 2 tablets Every Night.     • multivitamin with minerals tablet tablet Take 1 tablet by mouth Daily.     • Omega-3 Fatty Acids (FISH OIL BURP-LESS PO) Take  by mouth Daily.     •  "primidone (MYSOLINE) 50 MG tablet 50 mg. Taking 100 mg per day     • sildenafil (VIAGRA) 50 MG tablet Take 50 mg by mouth.     • simvastatin (ZOCOR) 20 MG tablet Take 20 mg by mouth.     • vitamin E 100 UNIT capsule Take 100 Units by mouth Daily.     • Vyvanse 50 MG capsule      • azelastine (ASTEPRO) 0.15 % solution nasal spray      • nabumetone (Relafen) 750 MG tablet Take 1 tablet by mouth 2 (Two) Times a Day. 60 tablet 3   • pimecrolimus (ELIDEL) 1 % cream      • tacrolimus (PROTOPIC) 0.1 % ointment        No current facility-administered medications for this visit.       Review of Systems   Constitutional: Negative.    HENT: Negative.    Eyes: Negative.    Respiratory: Negative.    Cardiovascular: Negative.    Gastrointestinal: Negative.    Endocrine: Negative.    Genitourinary: Negative.    Musculoskeletal:        Left foot pain     Skin: Negative.    Psychiatric/Behavioral: Negative.          OBJECTIVE    /68   Pulse 85   Ht 195.6 cm (77\")   Wt (!) 141 kg (310 lb 3.2 oz)   SpO2 98%   BMI 36.78 kg/m²     Physical Exam  Vitals reviewed.   Constitutional:       General: He is not in acute distress.     Appearance: He is well-developed.   HENT:      Head: Normocephalic and atraumatic.      Nose: Nose normal.   Eyes:      Conjunctiva/sclera: Conjunctivae normal.      Pupils: Pupils are equal, round, and reactive to light.   Pulmonary:      Effort: Pulmonary effort is normal. No respiratory distress.      Breath sounds: No wheezing.   Musculoskeletal:         General: Swelling and tenderness present. No deformity. Normal range of motion.   Skin:     General: Skin is warm and dry.      Capillary Refill: Capillary refill takes less than 2 seconds.   Neurological:      Mental Status: He is alert and oriented to person, place, and time.   Psychiatric:         Behavior: Behavior normal.         Thought Content: Thought content normal.          Left Lower Extremity Exam:     Cardiovascular:    DP/PT pulses " palpable    Edema dorsal left foot  Musculoskeletal:  Muscle strength is 5/5 for all muscle groups tested    ROM of the 1st MTP is WNL    ROM of the TMTJ is decreased with pain  ROM of the MTJ is WNL    ROM of the STJ is decreased  ROM of the ankle joint is  WNL    Pain on palpation to dorsal midfoot  Dermatological:   No open wounds  Neurological:   Sensation intact to light touch       Foot/Ankle Exam        Small Joint Arthrocentesis: L intertarsal  Consent given by: patient  Site marked: site marked  Timeout: Immediately prior to procedure a time out was called to verify the correct patient, procedure, equipment, support staff and site/side marked as required   Supporting Documentation  Indications: pain   Procedure Details  Location: foot - L intertarsal  Needle size: 27 G  Approach: dorsal  Patient tolerance: patient tolerated the procedure well with no immediate complications              ASSESSMENT AND PLAN    Diagnoses and all orders for this visit:    1. Left foot pain (Primary)  -     XR Foot 3+ View Left  -     bupivacaine (MARCAINE) 0.5 % injection 1 mL  -     dexamethasone (DECADRON) injection 5 mg  -     triamcinolone acetonide (KENALOG-40) injection 5.2 mg    2. Primary osteoarthritis of left foot  -     XR Foot 3+ View Left  -     bupivacaine (MARCAINE) 0.5 % injection 1 mL  -     dexamethasone (DECADRON) injection 5 mg  -     triamcinolone acetonide (KENALOG-40) injection 5.2 mg  -     Small Joint Arthrocentesis: L intertarsal    Other orders  -     nabumetone (Relafen) 750 MG tablet; Take 1 tablet by mouth 2 (Two) Times a Day.  Dispense: 60 tablet; Refill: 3        - Comprehensive foot and ankle exam performed.   -Radiographs taken and reviewed.  Significant degeneration noted to the midfoot.  -Treatment of midfoot arthritis discussed in detail.  Conservative and surgical treatment options discussed.  Midfoot injection.  See procedure note above.  Rx for Relafen.  Recommended good supportive  shoes.  Recommendation given for OTC arch support.  - All questions were answered to the patients satisfaction.  - RTC as needed            This document has been electronically signed by Robert Escudero DPM on June 29, 2021 15:47 CDT     6/29/2021  15:47 CDT

## 2021-06-30 ENCOUNTER — OFFICE VISIT (OUTPATIENT)
Dept: GASTROENTEROLOGY | Facility: CLINIC | Age: 66
End: 2021-06-30

## 2021-06-30 VITALS
BODY MASS INDEX: 36.91 KG/M2 | WEIGHT: 312.6 LBS | SYSTOLIC BLOOD PRESSURE: 137 MMHG | HEIGHT: 77 IN | DIASTOLIC BLOOD PRESSURE: 86 MMHG | HEART RATE: 91 BPM

## 2021-06-30 DIAGNOSIS — D12.5 ADENOMATOUS POLYP OF SIGMOID COLON: Primary | ICD-10-CM

## 2021-06-30 PROCEDURE — 99212 OFFICE O/P EST SF 10 MIN: CPT | Performed by: NURSE PRACTITIONER

## 2021-06-30 NOTE — PATIENT INSTRUCTIONS
Colon Polyps    Polyps are tissue growths inside the body. Polyps can grow in many places, including the large intestine (colon). A polyp may be a round bump or a mushroom-shaped growth. You could have one polyp or several.  Most colon polyps are noncancerous (benign). However, some colon polyps can become cancerous over time. Finding and removing the polyps early can help prevent this.  What are the causes?  The exact cause of colon polyps is not known.  What increases the risk?  You are more likely to develop this condition if you:  · Have a family history of colon cancer or colon polyps.  · Are older than 50 or older than 45 if you are .  · Have inflammatory bowel disease, such as ulcerative colitis or Crohn's disease.  · Have certain hereditary conditions, such as:  ? Familial adenomatous polyposis.  ? Osei syndrome.  ? Turcot syndrome.  ? Peutz-Jeghers syndrome.  · Are overweight.  · Smoke cigarettes.  · Do not get enough exercise.  · Drink too much alcohol.  · Eat a diet that is high in fat and red meat and low in fiber.  · Had childhood cancer that was treated with abdominal radiation.  What are the signs or symptoms?  Most polyps do not cause symptoms.  If you have symptoms, they may include:  · Blood coming from your rectum when having a bowel movement.  · Blood in your stool. The stool may look dark red or black.  · Abdominal pain.  · A change in bowel habits, such as constipation or diarrhea.  How is this diagnosed?  This condition is diagnosed with a colonoscopy. This is a procedure in which a lighted, flexible scope is inserted into the anus and then passed into the colon to examine the area. Polyps are sometimes found when a colonoscopy is done as part of routine cancer screening tests.  How is this treated?  Treatment for this condition involves removing any polyps that are found. Most polyps can be removed during a colonoscopy. Those polyps will then be tested for cancer. Additional  treatment may be needed depending on the results of testing.  Follow these instructions at home:  Lifestyle  · Maintain a healthy weight, or lose weight if recommended by your health care provider.  · Exercise every day or as told by your health care provider.  · Do not use any products that contain nicotine or tobacco, such as cigarettes and e-cigarettes. If you need help quitting, ask your health care provider.  · If you drink alcohol, limit how much you have:  ? 0-1 drink a day for women.  ? 0-2 drinks a day for men.  · Be aware of how much alcohol is in your drink. In the [place] one drink equals one 12 oz bottle of beer (355 mL), one 5 oz glass of wine (148 mL), or one 1½ oz shot of hard liquor (44 mL).  Eating and drinking    · Eat foods that are high in fiber, such as fruits, vegetables, and whole grains.  · Eat foods that are high in calcium and vitamin D, such as milk, cheese, yogurt, eggs, liver, fish, and broccoli.  · Limit foods that are high in fat, such as fried foods and desserts.  · Limit the amount of red meat and processed meat you eat, such as hot dogs, sausage, atkins, and lunch meats.  General instructions  · Keep all follow-up visits as told by your health care provider. This is important.  ? This includes having regularly scheduled colonoscopies.  ? Talk to your health care provider about when you need a colonoscopy.  Contact a health care provider if:  · You have new or worsening bleeding during a bowel movement.  · You have new or increased blood in your stool.  · You have a change in bowel habits.  · You lose weight for no known reason.  Summary  · Polyps are tissue growths inside the body. Polyps can grow in many places, including the colon.  · Most colon polyps are noncancerous (benign), but some can become cancerous over time.  · This condition is diagnosed with a colonoscopy.  · Treatment for this condition involves removing any polyps that are found. Most polyps can be removed during a  colonoscopy.  This information is not intended to replace advice given to you by your health care provider. Make sure you discuss any questions you have with your health care provider.  Document Revised: 04/04/2019 Document Reviewed: 04/04/2019  [employer]vier Patient Education © 2021 [employer]

## 2021-06-30 NOTE — PROGRESS NOTES
Chief Complaint   Patient presents with   • Colon Polyps       Subjective    Otf Murray is a 66 y.o. male. he is here today for follow-up.    History of Present Illness  56-year-old male presents to discuss colonoscopy results.  Denies any abdominal pain nausea vomiting or changes in his bowel habits.  Denies any melena or hematochezia reports some issues with constipation has been adding MiraLAX when he can remember which helped symptoms.  Colonoscopy was completed 6/23/2021 noted 2 small polyps of the sigmoid colon which were removed.  Polyps found to be tubular adenoma repeat recommended in 3 years for surveillance.       The following portions of the patient's history were reviewed and updated as appropriate:   Past Medical History:   Diagnosis Date   • Acute gastritis without bleeding    • Alcohol abuse counseling and surveillance of alcoholic    • Allergic rhinitis    • Anxiety state    • Attention deficit disorder of childhood with hyperactivity    • Attention deficit hyperactivity disorder    • Attention deficit hyperactivity disorder, predominantly hyperactive impulsive type    • Attention deficit hyperactivity disorder, predominantly inattentive type    • Backache    • Body mass index (bmi) 31.0-31.9, adult     Body mass index (BMI) 31.0-31.9, adult - BMI 33.5      • Burn of lower leg    • Candidiasis of skin    • Cardiac murmur, unspecified    • Decreased testosterone level    • Depressive disorder     Depressive disorder - acute on chronic      • Dysgraphia    • Edema    • Elbow joint pain    • Encounter for general adult medical examination with abnormal findings    • Encounter for screening for malignant neoplasm of colon    • Essential hypertension    • Essential tremor    • Ex-smoker    • Fatigue    • Gastro-esophageal reflux disease with esophagitis    • Gastroesophageal reflux disease    • Hyperlipidemia    • Hyperlipoproteinemia    • Impotence of organic origin    • Insomnia    •  Intermittent palpitations    • Knee pain     Knee pain - patellofemoral      • Lateral epicondylitis    • Low back pain    • Male erectile disorder    • Male erectile dysfunction, unspecified    • Male hypogonadism    • Neck pain     Neck pain aggravated by recumbency      • Obese     Obese - BMI 33.0      • On long term drug therapy    • Osteoarthrosis     Osteoarthrosis, unspecified whether generalized or localized, involving lower leg      • Other obesity    • Other specified diseases of anus and rectum     Other specified diseases of anus and rectum - rectal pain after c-scope prep      • Overweight    • Pain in left knee    • Pain in right knee    • Plantar fasciitis    • Shoulder pain, right    • Spasm of back muscles    • Tachycardia, unspecified    • Tremor, unspecified    • Tubular adenoma of colon    • Unspecified essential hypertension    • Verruca      Past Surgical History:   Procedure Laterality Date   • APPENDECTOMY     • COLONOSCOPY  03/16/2016    One polyp in the sigmoid colon.Resected and retrieved.        • COLONOSCOPY N/A 6/23/2021    Procedure: COLONOSCOPY;  Surgeon: Brenton Ruggiero MD;  Location: [facility] ENDOSCOPY;  Service: Gastroenterology;  Laterality: N/A;   • CYST REMOVAL  10/27/2009     Excision of sebaceous cyst of the forehead, glabellar region.   • ENDOSCOPY  03/16/2016    Mildly severe esophagitis.Gastritis.Normal examined duodenum.Several biopsies obtained in the lower third of the esophagus.    • ENDOSCOPY AND COLONOSCOPY  04/12/2006     Normal colonoscopic examination    • INCISION AND DRAINAGE ABSCESS  10/24/2012   • INJECTION OF MEDICATION  04/21/2011    Depo Medrol (Methylprednisone) 80mg (1)        • INJECTION OF MEDICATION  02/01/2016    Kenalog (3)        • INJECTION OF MEDICATION  02/19/2016    Toradol (3)    • JOINT REPLACEMENT Bilateral     Knee   • KNEE ARTHROSCOPY  04/02/2009     Medial meniscus tear of left knee   • SCROTUM EXPLORATION  07/02/2002     Excision of  epidermal inclusion cyst. base of left scrotum   • SKIN BIOPSY  04/23/2012   • SKIN TAG REMOVAL  04/25/2002    Skin tag, left scrotum      Family History   Problem Relation Age of Onset   • Diabetes Mother    • Kidney disease Mother    • Thyroid disease Mother    • Atrial fibrillation Mother    • Coronary artery disease Father    • Hypertension Father    • Hearing loss Father    • Cancer Other    • Coronary artery disease Other    • Diabetes Other    • Hearing loss Other    • Heart disease Other    • Hypertension Other    • Osteoarthritis Other    • Stroke Other    • Thyroid disease Other    • Pancreatitis Other        Prior to Admission medications    Medication Sig Start Date End Date Taking? Authorizing Provider   amLODIPine (NORVASC) 5 MG tablet Take 5 mg by mouth Daily.   Yes Rj Issa MD   azelastine (ASTELIN) 0.1 % nasal spray 2 sprays into the nostril(s) as directed by provider 2 (Two) Times a Day. Use in each nostril as directed   Yes Rj Issa MD   azelastine (ASTEPRO) 0.15 % solution nasal spray  6/12/21  Yes Rj Issa MD   buPROPion XL (WELLBUTRIN XL) 300 MG 24 hr tablet Take 1 tablet by mouth Daily. 3/24/17  Yes Ray Ureña MD   Calcium Polycarbophil (FIBER-CAPS PO) Take  by mouth.   Yes Rj Issa MD   esomeprazole (nexIUM) 40 MG capsule TAKE 1 CAPSULE DAILY 3/24/17  Yes Ray Ureña MD   Glucosamine-Chondroit-Vit C-Mn (GLUCOSAMINE CHONDR 1500 COMPLX PO) Take 2 tablets by mouth Daily.   Yes Rj Issa MD   losartan-hydrochlorothiazide (HYZAAR) 100-12.5 MG per tablet Take 1 tablet by mouth Daily.   Yes Rj Issa MD   Melatonin 5 MG chewable tablet Chew 2 tablets Every Night.   Yes ProviderRj MD   multivitamin with minerals tablet tablet Take 1 tablet by mouth Daily.   Yes Rj Issa MD   nabumetone (Relafen) 750 MG tablet Take 1 tablet by mouth 2 (Two) Times a Day. 6/29/21  Yes Robert Escudero DPM   Omega-3  "Fatty Acids (FISH OIL BURP-LESS PO) Take  by mouth Daily.   Yes ProviderRj MD   pimecrolimus (ELIDEL) 1 % cream  21  Yes ProviderRj MD   primidone (MYSOLINE) 50 MG tablet 50 mg. Taking 100 mg per day 20  Yes ProviderRj MD   sildenafil (VIAGRA) 50 MG tablet Take 50 mg by mouth.   Yes ProviderRj MD   simvastatin (ZOCOR) 20 MG tablet Take 20 mg by mouth.   Yes ProviderRj MD   tacrolimus (PROTOPIC) 0.1 % ointment  21  Yes ProviderRj MD   vitamin E 100 UNIT capsule Take 100 Units by mouth Daily.   Yes Rj Issa MD   Vyvanse 50 MG capsule  20  Yes ProviderRj MD     Allergies   Allergen Reactions   • Lisinopril Cough     Social History     Socioeconomic History   • Marital status:      Spouse name: Not on file   • Number of children: Not on file   • Years of education: Not on file   • Highest education level: Not on file   Tobacco Use   • Smoking status: Former Smoker     Quit date:      Years since quittin.4   • Smokeless tobacco: Never Used   Vaping Use   • Vaping Use: Never used   Substance and Sexual Activity   • Alcohol use: Yes     Alcohol/week: 3.0 standard drinks     Types: 3 Glasses of wine per week     Comment: daily    • Drug use: Never   • Sexual activity: Defer       Review of Systems  Review of Systems   Constitutional: Negative for activity change, appetite change, chills, diaphoresis, fatigue, fever and unexpected weight change.   Respiratory: Negative for cough and shortness of breath.    Gastrointestinal: Negative for abdominal distention, abdominal pain, anal bleeding, blood in stool, constipation, diarrhea, nausea, rectal pain and vomiting.        /86 (BP Location: Left arm)   Pulse 91   Ht 195.6 cm (77\")   Wt (!) 142 kg (312 lb 9.6 oz)   BMI 37.07 kg/m²     Objective    Physical Exam  Constitutional:       General: He is not in acute distress.     Appearance: Normal " appearance. He is well-developed.   HENT:      Head: Normocephalic and atraumatic.   Cardiovascular:      Rate and Rhythm: Normal rate and regular rhythm.      Heart sounds: Normal heart sounds.   Abdominal:      General: Bowel sounds are normal. There is no distension.      Palpations: Abdomen is soft. Abdomen is not rigid.      Tenderness: There is no abdominal tenderness.      Hernia: No hernia is present.   Musculoskeletal:      Cervical back: Normal range of motion and neck supple.   Skin:     General: Skin is warm and dry.      Coloration: Skin is not pale.      Findings: No rash.   Neurological:      Mental Status: He is alert and oriented to person, place, and time.   Psychiatric:         Speech: Speech normal.         Behavior: Behavior is cooperative.       Admission on 06/23/2021, Discharged on 06/23/2021   Component Date Value Ref Range Status   • Case Report 06/23/2021    Final                    Value:Surgical Pathology Report                         Case: [id number]                                  Authorizing Provider:  Brenton Ruggiero MD        Collected:           06/23/2021 02:25 PM          Ordering Location:     [facility]             Received:            06/24/2021 06:36 AM                                 [place] [facility]                                                     Pathologist:           Jaden Duffy MD                                                           Specimen:    Large Intestine, Sigmoid Colon, polyps   JA                                               • Final Diagnosis 06/23/2021    Final                    Value:This result contains rich text formatting which cannot be displayed here.     Assessment/Plan      1. Adenomatous polyp of sigmoid colon    .       Orders placed during this encounter include:  No orders of the defined types were placed in this encounter.      * Surgery not found *    Review and/or summary of lab tests, radiology, procedures,  medications. Review and summary of old records and obtaining of history. The risks and benefits of my recommendations, as well as other treatment options were discussed with the patient today. Questions were answered.    No orders of the defined types were placed in this encounter.      Follow-up: Return in about 4 weeks (around 7/28/2021).          This document has been electronically signed by YUDY Park on June 30, 2021 09:15 CDT           I spent 8 minutes caring for Otf on this date of service. This time includes time spent by me in the following activities:preparing for the visit, reviewing tests, obtaining and/or reviewing a separately obtained history, performing a medically appropriate examination and/or evaluation , counseling and educating the patient/family/caregiver, ordering medications, tests, or procedures, referring and communicating with other health care professionals , documenting information in the medical record and care coordination    Results for orders placed or performed during the hospital encounter of 06/23/21   Tissue Pathology Exam    Specimen: Large Intestine, Sigmoid Colon; Polyp   Result Value Ref Range    Case Report       Surgical Pathology Report                         Case: [id number]                                  Authorizing Provider:  Brenton Ruggiero MD        Collected:           06/23/2021 02:25 PM          Ordering Location:     [facility]             Received:            06/24/2021 06:36 AM                                 [place] [facility]                                                     Pathologist:           Jaden Duffy MD                                                           Specimen:    Large Intestine, Sigmoid Colon, polyps   JA                                                Final Diagnosis       See Scanned Report       Results for orders placed or performed during the hospital encounter of 11/17/16   T4, Free    Specimen: Blood    Result Value Ref Range    Free T4 1.29 0.78 - 2.19 ng/dl   Results for orders placed or performed during the hospital encounter of 11/16/16   Hepatitis Panel, Acute    Specimen: Blood   Result Value Ref Range    Hepatitis B Surface Ag Negative Negative    Hep C Virus Ab Negative Negative    Hep A IgM Negative Negative    Hep B Core IgM Negative Negative   Vitamin D 1,25 Dihydroxy    Specimen: Blood   Result Value Ref Range    1,25-Dihydroxy, Vitamin D 44.4 19.9 - 79.3 pg/mL   Testosterone, Free, Total    Specimen: Blood   Result Value Ref Range    Comment Comment     Testosterone, Total 329 (L) 348 - 1197 ng/dL    Testosterone, Free 5.5 (L) 6.6 - 18.1 pg/mL   Hemoglobin A1c    Specimen: Blood   Result Value Ref Range    Hemoglobin A1C 5.8 (H) 4.0 - 5.6 %TotHgb   Lipid Panel    Specimen: Blood   Result Value Ref Range    Total Cholesterol 205 (H) 0 - 199 mg/dl    Triglycerides 143 20 - 199 mg/dl    HDL Cholesterol 41 (L) 60 - 200 mg/dl    LDL Cholesterol  135 (H) 0 - 129 mg/dl   Results for orders placed or performed in visit on 11/10/16   TSH    Specimen: Blood   Result Value Ref Range    TSH 0.43 (L) 0.46 - 4.68 uIU/ml   Results for orders placed or performed in visit on 10/28/16    COLONOSCOPY   Result Value Ref Range     Colonoscopy        One polyp in the sigmoid colon.Resected and retrieved.    Results for orders placed or performed during the hospital encounter of 04/25/16   Pain Management Profile (13 Drugs) Urine    Specimen: Urine   Result Value Ref Range    Pain Management Drug Panel See Below     Codeine, Urine Not Detected     Morphine, Urine Not Detected     6-acetylmorphine Not Detected     Oxycodone Screen, Urine Not Detected     Noroxycodone Not Detected     Oxymorphone UR Not Detected     Noroxymorphone Not Detected     Hydrocodone UR Not Detected     Norhydrocodone Not Detected     Hydromorphone Not Detected     Buprenorphine, Urine Not Detected     Norbuprenorphine Not Detected     Fentanyl,  Urine Not Detected     Norfentanyl Not Detected     Normeperidine, Urine Not Detected     Tapentadol Not Detected     Tapentadol-o-Sulf Not Detected     Methadone Screen, Urine Not Detected     Propoxyphene Screen Not Detected     Tramadol Screen, Urine Not Detected     Amphetamine, Urine Qual Present     Methamphetamine, Urine Not Detected     MDMA-Ecstasy Not Detected     MDA Not Detected     MDEA Not Detected     Ritalinic Acid, Urine Not Detected     Phentermine Urine Not Detected     Benzoylecgonine, Urine Not Detected     Alprazolam Urine, Conf Not Detected     alpha-Hydroxyalprazolam, Quant, Urine Not Detected     Clonazepam Not Detected     7-Aminoclonazepam Urine Not Detected     Diazepam Urine, Qualitative Not Detected     Nordiazepam, Urine Not Detected     Oxazepam, urine Not Detected     Temazepam, urine Not Detected     Lorazepam Not Detected     Midazolam, Urine Not Detected     Zolpidem(Ambien) Urine Not Detected     Barbiturates Screen, Urine Not Detected     Ethyl Glucuronide Present     Marijuana Metabolite Not Detected     Phencyclidine (PCP), Urine Not Detected     Carisoprodol, Serum Not Detected     Creatinine, Urine 40.2 20.0 - 400.0 mg/dL    Pain Management Drug Panel See Note    Results for orders placed or performed during the hospital encounter of 02/16/16   Testosterone, free, total    Specimen: Blood   Result Value Ref Range    Testosterone, Total 244 (L) 348 - 1197 ng/dL    Comment Comment     Testosterone, Free 2.9 (L) 6.6 - 18.1 pg/mL   Results for orders placed or performed during the hospital encounter of 01/19/16   Testosterone, free, total    Specimen: Blood   Result Value Ref Range    Testosterone, Total 339 (L) 348 - 1197 ng/dL    Comment Comment     Testosterone, Free 7.3 6.6 - 18.1 pg/mL   Results for orders placed or performed during the hospital encounter of 01/05/16   Urine drug screen    Specimen: Urine   Result Value Ref Range    Barbiturates Screen, Urine Negative  Negative    Benzodiazepine Screen, Urine Negative Negative    Opiate Screen, Urine Negative Negative    THC Screen Interpretation Negative Negative    Amphet/Methamphet, Screen, Urine POSITIVE (H) Negative    Cocaine Screen, Urine Negative Negative    Oxycodone Screen, Urine Negative Negative    Methadone Screen, Urine Negative Negative   Results for orders placed or performed during the hospital encounter of 11/06/15   CBC and Differential    Specimen: Blood   Result Value Ref Range    WBC 8.1 3.2 - 9.8 x1000/uL    RBC 5.09 4.37 - 5.74 marina/mm3    Hemoglobin 14.5 13.7 - 17.3 gm/dl    Hematocrit 44.2 39.0 - 49.0 %    MCV 86.8 80.0 - 98.0 fl    MCH 28.5 26.0 - 34.0 pg    MCHC 32.8 31.5 - 36.3 gm/dl    RDW 12.8 11.5 - 14.5 %    Platelets 171 150 - 450 x1000/mm3    MPV 10.6 8.0 - 12.0 fl    Neutrophil Rel % 47.4 37.0 - 80.0 %    Lymphocyte Rel % 36.5 10.0 - 50.0 %    Monocyte Rel % 13.6 (H) 0.0 - 12.0 %    Eosinophil Rel % 2.1 0.0 - 7.0 %    Basophil Rel % 0.2 0.0 - 2.0 %    Immature Granulocyte Rel % 0.20 0.00 - 0.50 %    Neutrophils Absolute 3.85 2.00 - 8.60 x1000/uL    Lymphocytes Absolute 2.97 0.60 - 4.20 x1000/uL    Monocytes Absolute 1.11 (H) 0.00 - 0.90 x1000/uL    Eosinophils Absolute 0.17 0.00 - 0.70 x1000/uL    Basophils Absolute 0.02 0.00 - 0.20 x1000/uL    Immature Granulocytes Absolute 0.020 0.005 - 0.022 x1000/uL     *Note: Due to a large number of results and/or encounters for the requested time period, some results have not been displayed. A complete set of results can be found in Results Review.

## 2022-01-13 ENCOUNTER — TRANSCRIBE ORDERS (OUTPATIENT)
Dept: PHYSICAL THERAPY | Facility: HOSPITAL | Age: 67
End: 2022-01-13

## 2022-01-13 DIAGNOSIS — M25.551 RIGHT HIP PAIN: Primary | ICD-10-CM

## 2022-01-13 DIAGNOSIS — M54.50 LOW BACK PAIN, UNSPECIFIED BACK PAIN LATERALITY, UNSPECIFIED CHRONICITY, UNSPECIFIED WHETHER SCIATICA PRESENT: ICD-10-CM

## 2022-01-18 ENCOUNTER — HOSPITAL ENCOUNTER (OUTPATIENT)
Dept: PHYSICAL THERAPY | Facility: HOSPITAL | Age: 67
Setting detail: THERAPIES SERIES
Discharge: HOME | End: 2022-01-18

## 2022-01-18 DIAGNOSIS — M25.551 RIGHT HIP PAIN: ICD-10-CM

## 2022-01-18 PROCEDURE — 97162 PT EVAL MOD COMPLEX 30 MIN: CPT | Performed by: PHYSICAL THERAPIST

## 2022-01-18 NOTE — THERAPY EVALUATION
Outpatient Physical Therapy Ortho Initial Evaluation  [facility]     Patient Name: Otf Murray  : 1955  MRN: [id number]  Today's Date: 2022      Visit Date: 2022    Attendance:  (authorization required)  Subjective Improvement: n/a  Next MD Appt: 22  Recert Date: 22    Therapy Diagnosis: R hip pain       Past Medical History:   Diagnosis Date   • Acute gastritis without bleeding    • Alcohol abuse counseling and surveillance of alcoholic    • Allergic rhinitis    • Anxiety state    • Attention deficit disorder of childhood with hyperactivity    • Attention deficit hyperactivity disorder    • Attention deficit hyperactivity disorder, predominantly hyperactive impulsive type    • Attention deficit hyperactivity disorder, predominantly inattentive type    • Backache    • Body mass index (bmi) 31.0-31.9, adult     Body mass index (BMI) 31.0-31.9, adult - BMI 33.5      • Burn of lower leg    • Candidiasis of skin    • Cardiac murmur, unspecified    • Decreased testosterone level    • Depressive disorder     Depressive disorder - acute on chronic      • Dysgraphia    • Edema    • Elbow joint pain    • Encounter for general adult medical examination with abnormal findings    • Encounter for screening for malignant neoplasm of colon    • Essential hypertension    • Essential tremor    • Ex-smoker    • Fatigue    • Gastro-esophageal reflux disease with esophagitis    • Gastroesophageal reflux disease    • Hyperlipidemia    • Hyperlipoproteinemia    • Impotence of organic origin    • Insomnia    • Intermittent palpitations    • Knee pain     Knee pain - patellofemoral      • Lateral epicondylitis    • Low back pain    • Male erectile disorder    • Male erectile dysfunction, unspecified    • Male hypogonadism    • Neck pain     Neck pain aggravated by recumbency      • Obese     Obese - BMI 33.0      • On long term drug therapy    • Osteoarthrosis     Osteoarthrosis, unspecified  whether generalized or localized, involving lower leg      • Other obesity    • Other specified diseases of anus and rectum     Other specified diseases of anus and rectum - rectal pain after c-scope prep      • Overweight    • Pain in left knee    • Pain in right knee    • Plantar fasciitis    • Shoulder pain, right    • Spasm of back muscles    • Tachycardia, unspecified    • Tremor, unspecified    • Tubular adenoma of colon    • Unspecified essential hypertension    • Verruca         Past Surgical History:   Procedure Laterality Date   • APPENDECTOMY     • COLONOSCOPY  03/16/2016    One polyp in the sigmoid colon.Resected and retrieved.        • COLONOSCOPY N/A 6/23/2021    Procedure: COLONOSCOPY;  Surgeon: Brenton Ruggiero MD;  Location: [facility] ENDOSCOPY;  Service: Gastroenterology;  Laterality: N/A;   • CYST REMOVAL  10/27/2009     Excision of sebaceous cyst of the forehead, glabellar region.   • ENDOSCOPY  03/16/2016    Mildly severe esophagitis.Gastritis.Normal examined duodenum.Several biopsies obtained in the lower third of the esophagus.    • ENDOSCOPY AND COLONOSCOPY  04/12/2006     Normal colonoscopic examination    • INCISION AND DRAINAGE ABSCESS  10/24/2012   • INJECTION OF MEDICATION  04/21/2011    Depo Medrol (Methylprednisone) 80mg (1)        • INJECTION OF MEDICATION  02/01/2016    Kenalog (3)        • INJECTION OF MEDICATION  02/19/2016    Toradol (3)    • JOINT REPLACEMENT Bilateral     Knee   • KNEE ARTHROSCOPY  04/02/2009     Medial meniscus tear of left knee   • SCROTUM EXPLORATION  07/02/2002     Excision of epidermal inclusion cyst. base of left scrotum   • SKIN BIOPSY  04/23/2012   • SKIN TAG REMOVAL  04/25/2002    Skin tag, left scrotum      Allergies   Allergen Reactions   • Lisinopril Cough       Visit Dx:     ICD-10-CM ICD-9-CM   1. Right hip pain  M25.551 719.45          Patient History     Row Name 01/18/22 0800 01/17/22 1123          History    Chief Complaint Difficulty Walking;  "Difficulty with daily activities; Pain  -SS Balance Problems; Difficulty Walking; Difficulty with daily activities; Fatigue/poor endurance; Joint stiffness; Muscle weakness; Pain; Tightness (P)    -patient     Type of Pain Hip pain  right  -SS Back pain; Foot pain; Hip pain; Neck pain; Shoulder pain (P)    -patient     Date Current Problem(s) Began --  \"before the end of the year\"  -SS 01/18/22 (P)    -patient     Brief Description of Current Complaint Patient reports onset on R hip pain \"before the end of the year.\" Unknown cause of onset. States that pain has been worse over the past 1-2 weeks. IM gluteal steroid injection and pain shot into hip not effective. Increased hip pain with weightbearing, walking. Difficulty putting R leg on L to don socks and shoes. Pain with stairs. Pinching pain in the posterior R hip. Heat helps only when hot. Laying in bed seems to help decrease the pain after an hour.  male with children. Lives in a single story house with 1 step to enter and \"a normal amount of steps\" down to basement. Next MD 2/8/22.  -SS Right Hip and lower back pain (P)    -patient     Patient/Caregiver Goals Relieve pain; Return to prior level of function; Improve mobility; Improve strength; Know what to do to help the symptoms  -SS Relieve pain; Return to prior level of function; Improve mobility; Improve strength; Know what to do to help the symptoms (P)    -patient     Hand Dominance -- right-handed (P)    -patient     Occupation/sports/leisure activities Retired. Hobbies: antique tractors, woodworking, hiking, Erector sets, model trains  - Daily exercise and weight loss (P)    -patient     Patient seeing anyone else for problem(s)? -- No (P)    -patient     What clinical tests have you had for this problem? X-ray  -SS X-ray; Blood Work (P)    -patient     Results of Clinical Tests arthritis  -SS --            Pain     Pain Location Hip  right  -SS --     Pain at Present 2  -SS --     Pain at Best " 1  over past 1 month  -SS --     Pain at Worst 8; 9  over past 1 month  -SS --     Pain Frequency Constant/continuous  -SS --     Pain Description Sharp  pinching, dull pain when sitting  -SS --     What Performance Factors Make the Current Problem(s) WORSE? see above  -SS --     What Performance Factors Make the Current Problem(s) BETTER? see above  -SS --     Is your sleep disturbed? Yes  -SS --     Is medication used to assist with sleep? Yes  occasional melatonin gummy  - --     Difficulties at work? n/a  -SS --     Difficulties with ADL's? pain  -SS --     Difficulties with recreational activities? pain  -SS --            Fall Risk Assessment    Any falls in the past year: No  -SS No (P)    -patient            Services    Prior Rehab/Home Health Experiences -- No (P)    -patient     Are you currently receiving Home Health services -- No (P)    -patient     Do you plan to receive Home Health services in the near future -- No (P)    -patient            Daily Activities    Primary Language English  - English (P)    -patient     How does patient learn best? -- Listening; Reading; Demonstration; Pictures/Video (P)    -patient            Safety    Are you being hurt, hit, or frightened by anyone at home or in your life? -- No (P)    -patient     Are you being neglected by a caregiver -- No (P)    -patient     Have you had any of the following issues with Depression; Anxiety  - Depression; Anxiety (P)    -patient           User Key  (r) = Recorded By, (t) = Taken By, (c) = Cosigned By    Initials Name Provider Type     Horacio Davis, PT DPT Physical Therapist    patient Otf Murray --                 PT Ortho     Row Name 01/18/22 0800       Subjective Comments    Subjective Comments see Therapy Patient History  -       Precautions and Contraindications    Precautions/Limitations no known precautions/limitations  -       Subjective Pain    Able to rate subjective pain? yes  -SS     Pre-Treatment Pain Level 2  -SS    Post-Treatment Pain Level --  4-5  -       Posture/Observations    Posture/Observations Comments Antalgic gait. Forward head posture with posterior pelvic tilt.  -       Lumbar/SI Special Tests    Lumbar/SI Special Tests Comments TTP R anterolateral and posterolateral hip, piriformis, glute medius/minimus, and gemelli. Hip scour test essentially negative. Very stiff with APARNA and FAIR testing. C/o anterior hip/thigh pain with APARNA test. Robby's test painful on R.  -SS       Right Lower Ext    Rt Hip ABduction AROM 20 deg; anterolateral hip pain  -SS    Rt Hip ADduction AROM 14 deg; anterolateral hip pain  -SS    Rt Hip Extension AROM 3 deg; lateral hip pain  -SS    Rt Hip Flexion AROM 103 deg; anterior hip pain  -SS       MMT (Manual Muscle Testing)    General MMT Comments MMT deferred  -          User Key  (r) = Recorded By, (t) = Taken By, (c) = Cosigned By    Initials Name Provider Type    SS Horacio Davis, PT DPT Physical Therapist                            Therapy Education  Education Details: sidelying hip abductor/external rotator stretch; therapy plan  Given: HEP, Other (comment)  Program: New  How Provided: Verbal (tactile)  Provided to: Patient  Level of Understanding: Verbalized, Demonstrated      PT OP Goals     Row Name 01/18/22 0800          PT Short Term Goals    STG Date to Achieve 02/08/22  -     STG 1 Note a >/= 50% subjective improvement.  -     STG 2 LEFS score to be >/= 35/80.  -     STG 3 Improve R hip active adduction to 20 deg or better.  -     STG 4 Improve R hip active abduction to 25 deg or better.  -            Long Term Goals    LTG Date to Achieve 03/01/22  -     LTG 1 Independent with HEP/self-management.  -     LTG 2 LEFS score to be >/= 50/80.  -     LTG 3 R hip AROM WFLs.  -     LTG 4 R hip strength 5/5 all planes.  -     LTG 5 Demonstrate ability to ascend and descend 1 flight of stairs reciprocally.  -      LTG 6 Minimal to no hip pain with standing and walking.  -     LTG 7 Resume PLOF.  -            Time Calculation    PT Goal Re-Cert Due Date 02/08/22  -           User Key  (r) = Recorded By, (t) = Taken By, (c) = Cosigned By    Initials Name Provider Type    Horacio Lerma, PT DPT Physical Therapist                 PT Assessment/Plan     Row Name 01/18/22 0800          PT Assessment    Functional Limitations Impaired gait; Limitations in community activities; Limitation in home management; Limitations in functional capacity and performance; Performance in leisure activities; Performance in self-care ADL  -SS     Impairments Gait; Joint mobility; Pain; Range of motion  -     Assessment Comments Patient has an approximate 1 month duration of R hip pain that has worsened over past 2 weeks. Patient guarded with examination. Irritation of hip flexors and external rotators present on evaluation.  -SS     Rehab Potential Good  -SS     Patient/caregiver participated in establishment of treatment plan and goals Yes  -            PT Plan    PT Frequency 2x/week  1 land, 1 aquatic  -     Predicted Duration of Therapy Intervention (PT) 6 weeks with further to be determined  -     Planned CPT's? PT EVAL MOD COMPLEXITY: 75152; PT THER PROC EA 15 MIN: 66868; PT THER ACT EA 15 MIN: 12819; PT MANUAL THERAPY EA 15 MIN: 57208; PT AQUATIC THERAPY EA 15 MIN: 53166; PT ELECTRICAL STIM UNATTEND: ; PT HOT OR COLD PACK TREAT MCARE; PT THER SUPP EA 15 MIN  -     PT Plan Comments ROM, stretching, strengthening, MFR/manual release, hip joint mobilization, dry needling, aquatic therex, heat/ice with or without IFC estim as needed for pain  -           User Key  (r) = Recorded By, (t) = Taken By, (c) = Cosigned By    Initials Name Provider Type    Horacio Lerma, PT DPT Physical Therapist                                    Outcome Measure Options: Lower Extremity Functional Scale (LEFS)  Lower  Extremity Functional Index  Any of your usual work, housework or school activities: Extreme difficulty or unable to perform activity  Your usual hobbies, recreational or sporting activities: Extreme difficulty or unable to perform activity  Getting into or out of the bath: Moderate difficulty  Walking between rooms: Moderate difficulty  Putting on your shoes or socks: Extreme difficulty or unable to perform activity  Squatting: Extreme difficulty or unable to perform activity  Lifting an object, like a bag of groceries from the floor: Moderate difficulty  Performing light activities around your home: Moderate difficulty  Performing heavy activities around your home: Extreme difficulty or unable to perform activity  Getting into or out of a car: Moderate difficulty  Walking 2 blocks: Extreme difficulty or unable to perform activity  Walking a mile: Extreme difficulty or unable to perform activity  Going up or down 10 stairs (about 1 flight of stairs): Moderate difficulty  Standing for 1 hour: Extreme difficulty or unable to perform activity  Sitting for 1 hour: No difficulty  Running on even ground: Extreme difficulty or unable to perform activity  Running on uneven ground: Extreme difficulty or unable to perform activity  Making sharp turns while running fast: Extreme difficulty or unable to perform activity  Hopping: Extreme difficulty or unable to perform activity  Rolling over in bed: Moderate difficulty  Total: 18      Time Calculation:     Start Time: 0806  Stop Time: 0844  Time Calculation (min): 38 min     Therapy Charges for Today     Code Description Service Date Service Provider Modifiers Qty    [id number] HC PT EVAL MOD COMPLEXITY 3 1/18/2022 Horacio Davis, PT DPT GP 1                   Horacio Davis, PT, DPT, CHT  1/18/2022

## 2022-08-03 ENCOUNTER — TRANSCRIBE ORDERS (OUTPATIENT)
Dept: PHYSICAL THERAPY | Facility: HOSPITAL | Age: 67
End: 2022-08-03

## 2022-08-03 DIAGNOSIS — Z96.641 STATUS POST HIP REPLACEMENT, RIGHT: Primary | ICD-10-CM

## 2022-08-04 ENCOUNTER — HOSPITAL ENCOUNTER (OUTPATIENT)
Dept: PHYSICAL THERAPY | Facility: HOSPITAL | Age: 67
Setting detail: THERAPIES SERIES
Discharge: HOME | End: 2022-08-04

## 2022-08-04 DIAGNOSIS — Z96.641 STATUS POST HIP REPLACEMENT, RIGHT: Primary | ICD-10-CM

## 2022-08-04 PROCEDURE — 97161 PT EVAL LOW COMPLEX 20 MIN: CPT | Performed by: PHYSICAL THERAPIST

## 2022-08-04 NOTE — THERAPY EVALUATION
Outpatient Physical Therapy Ortho Initial Evaluation  [facility]     Patient Name: Otf Murray  : 1955  MRN: [id number]  Today's Date: 2022      Visit Date: 2022   Visit number:     % Improvement:   loreto MEDINA appointment:   therese  Recert date:  22    Visit Diagnosis:  Right posterior GERARDO 22          Patient Active Problem List   Diagnosis   • ADHD, predominantly inattentive type   • Abnormal glucose   • Cervical spondylosis without myelopathy   • Chronic fatigue   • Chronic left-sided low back pain without sciatica   • Displacement of cervical intervertebral disc   • Family history of type 2 diabetes mellitus in mother   • Gastroesophageal reflux disease without esophagitis   • History of tobacco abuse   • History of total left knee replacement   • Male hypogonadism   • Non morbid obesity due to excess calories   • Primary osteoarthritis of both knees   • Recurrent major depressive disorder, in partial remission (HCC)   • Seasonal allergic rhinitis due to pollen   • Encounter for colonoscopy due to history of colonic polyp        Past Medical History:   Diagnosis Date   • Acute gastritis without bleeding    • Alcohol abuse counseling and surveillance of alcoholic    • Allergic rhinitis    • Anxiety state    • Attention deficit disorder of childhood with hyperactivity    • Attention deficit hyperactivity disorder    • Attention deficit hyperactivity disorder, predominantly hyperactive impulsive type    • Attention deficit hyperactivity disorder, predominantly inattentive type    • Backache    • Body mass index (bmi) 31.0-31.9, adult     Body mass index (BMI) 31.0-31.9, adult - BMI 33.5      • Burn of lower leg    • Candidiasis of skin    • Cardiac murmur, unspecified    • Decreased testosterone level    • Depressive disorder     Depressive disorder - acute on chronic      • Dysgraphia    • Edema    • Elbow joint pain    • Encounter for general adult medical examination with  abnormal findings    • Encounter for screening for malignant neoplasm of colon    • Essential hypertension    • Essential tremor    • Ex-smoker    • Fatigue    • Gastro-esophageal reflux disease with esophagitis    • Gastroesophageal reflux disease    • Hyperlipidemia    • Hyperlipoproteinemia    • Impotence of organic origin    • Insomnia    • Intermittent palpitations    • Knee pain     Knee pain - patellofemoral      • Lateral epicondylitis    • Low back pain    • Male erectile disorder    • Male erectile dysfunction, unspecified    • Male hypogonadism    • Neck pain     Neck pain aggravated by recumbency      • Obese     Obese - BMI 33.0      • On long term drug therapy    • Osteoarthrosis     Osteoarthrosis, unspecified whether generalized or localized, involving lower leg      • Other obesity    • Other specified diseases of anus and rectum     Other specified diseases of anus and rectum - rectal pain after c-scope prep      • Overweight    • Pain in left knee    • Pain in right knee    • Plantar fasciitis    • Shoulder pain, right    • Spasm of back muscles    • Tachycardia, unspecified    • Tremor, unspecified    • Tubular adenoma of colon    • Unspecified essential hypertension    • Verruca         Past Surgical History:   Procedure Laterality Date   • APPENDECTOMY     • COLONOSCOPY  03/16/2016    One polyp in the sigmoid colon.Resected and retrieved.        • COLONOSCOPY N/A 6/23/2021    Procedure: COLONOSCOPY;  Surgeon: Brenton Ruggiero MD;  Location: [facility] ENDOSCOPY;  Service: Gastroenterology;  Laterality: N/A;   • CYST REMOVAL  10/27/2009     Excision of sebaceous cyst of the forehead, glabellar region.   • ENDOSCOPY  03/16/2016    Mildly severe esophagitis.Gastritis.Normal examined duodenum.Several biopsies obtained in the lower third of the esophagus.    • ENDOSCOPY AND COLONOSCOPY  04/12/2006     Normal colonoscopic examination    • INCISION AND DRAINAGE ABSCESS  10/24/2012   • INJECTION OF  MEDICATION  04/21/2011    Depo Medrol (Methylprednisone) 80mg (1)        • INJECTION OF MEDICATION  02/01/2016    Kenalog (3)        • INJECTION OF MEDICATION  02/19/2016    Toradol (3)    • JOINT REPLACEMENT Bilateral     Knee   • KNEE ARTHROSCOPY  04/02/2009     Medial meniscus tear of left knee   • SCROTUM EXPLORATION  07/02/2002     Excision of epidermal inclusion cyst. base of left scrotum   • SKIN BIOPSY  04/23/2012   • SKIN TAG REMOVAL  04/25/2002    Skin tag, left scrotum        Visit Dx:     ICD-10-CM ICD-9-CM   1. Status post hip replacement, right  Z96.641 V43.64          Patient History     Row Name 08/04/22 1600             History    Brief Description of Current Complaint Patient reports onset of right hip pain in first week of January 2022. He retired and was trying to get in shape at this time and his hip started hurting. He tried several things this past year. He was ambulating with crutches before surgery and was unable to put any weight on right LE. He underwent right GERARDO on 7-18-22. He had staples out on 8-2-22. Patient has 1 step into home and only stairs to basement inside home. He does not access basement. He has a tub shower combo and is doing ok with this. He lives with his wife, April. He has custody of adopted daughter who is 25 and his 3 yo great grandson. He tweaked right anterior hip a few days ago during toileting. He has had pain when attempting to lift right leg since that time. He is using walker at home and quad cane when going out. He reports he is WBAT per his physician.  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 Yes  -SW      Is medication used to assist with sleep? Yes  -SW      Difficulties with ADL's? yes  -SW      Difficulties with recreational activities? yes  -SW               Fall Risk Assessment    Any falls in the past year: No  -SW              Daily Activities    Primary Language English  -SW            User Key  (r) = Recorded By, (t) = Taken By, (c) = Cosigned By    Initials Name Provider Type    Edita Matthews Physical Therapist                 PT Ortho     Row Name 08/04/22 1600       Subjective Comments    Subjective Comments See pt hx  -SW       Precautions and Contraindications    Precautions posterior hip precautions  -SW       Subjective Pain    Able to rate subjective pain? yes  -SW    Pre-Treatment Pain Level 0  -SW       Posture/Observations    Posture/Observations Comments incision healing well, ambulating with quad cane with good gait mechanics  -SW       General ROM    RT Lower Ext Rt Hip Flexion;Rt Hip External Rotation;Rt Hip Internal Rotation;Rt Hip ABduction  -SW       Right Lower Ext    Rt Hip ABduction AROM 45  -SW    Rt Hip ABduction PROM 45  -SW    Rt Hip Flexion AROM 90  -SW    Rt Hip Flexion PROM 90  -SW    Rt Hip External Rotation PROM 30@90  -SW    Rt Hip Internal Rotation PROM 10@90  -SW       MMT (Manual Muscle Testing)    Rt Lower Ext Rt Hip Flexion;Rt Knee Extension;Rt Knee Flexion;Rt Ankle Plantarflexion;Rt Ankle Dorsiflexion  -SW       MMT Right Lower Ext    Rt Hip Flexion MMT, Gross Movement (2+/5) poor plus  -SW    Rt Knee Extension MMT, Gross Movement (5/5) normal  -SW    Rt Knee Flexion MMT, Gross Movement (5/5) normal  -SW    Rt Ankle Plantarflexion MMT, Gross Movement (4/5) good  -SW    Rt Ankle Dorsiflexion MMT, Gross Movement (5/5) normal  -SW          User Key  (r) = Recorded By, (t) = Taken By, (c) = Cosigned By    Initials Name Provider Type    Edita Matthews Physical Therapist                            Therapy Education  Education Details: LTR,bridge,hr and tr in standing, marching in standing left only for now, hip abd and flex/ext in standing, hip flexor stretch  Given: HEP, Symptoms/condition management      PT OP Goals      Row Name 08/04/22 1600          PT Short Term Goals    STG Date to Achieve 08/25/22  -     STG 1 independent and compliant with HEP  -SW     STG 2 flex right hip to 90 degrees in standing without pain  -SW     STG 3 able to lift right hip on bed or mat without pain  -            Long Term Goals    LTG Date to Achieve 09/15/22  -     LTG 1 LEFS 50  -SW     LTG 2 left hip flex/ext/abd/er/ir at least 4/5  -     LTG 3 ambluate community distances without AD with good gait mechanics  -            Time Calculation    PT Goal Re-Cert Due Date 08/25/22  -           User Key  (r) = Recorded By, (t) = Taken By, (c) = Cosigned By    Initials Name Provider Type    Edita Matthews Physical Therapist                 PT Assessment/Plan     Row Name 08/04/22 1600          PT Assessment    Functional Limitations Decreased safety during functional activities;Impaired gait;Impaired locomotion;Limitation in home management;Limitations in community activities;Limitations in functional capacity and performance;Performance in leisure activities;Performance in self-care ADL  -     Impairments Balance;Gait;Impaired muscle endurance;Impaired muscle length;Muscle strength;Pain;Range of motion  -     Assessment Comments 67 yom presents  17 days s/p posterior right GERARDO with reduced right  hip ROM, right LE strength, reduced balance, and altered gait. One steri-strip  replaced as it was coming off. No signs of infection at incision. Quad cane raised 3 levels which improved gait mechanics. Patient advised he could go without assitive device at home and use quad cane when going out for longer trips. He was given an HEP to be eprformed 2x/day on days he is not attending therapy.  -SW     Rehab Potential Good  -SW     Patient/caregiver participated in establishment of treatment plan and goals Yes  -SW     Patient would benefit from skilled therapy intervention Yes  -SW            PT Plan    PT Frequency 2x/week  -SW     Predicted  "Duration of Therapy Intervention (PT) 3-4 weeks  -SW     Planned CPT's? PT EVAL LOW COMPLEXITY: 02276;PT RE-EVAL: 95219;PT THER PROC EA 15 MIN: 14666;PT THER ACT EA 15 MIN: 45385;PT MANUAL THERAPY EA 15 MIN: 09104;PT NEUROMUSC RE-EDUCATION EA 15 MIN: 56149;PT GAIT TRAINING EA 15 MIN: 11791;PT SELF CARE/HOME MGMT/TRAIN EA 15: 85464;PT HOT OR COLD PACK TREAT MCARE  -SW     Physical Therapy Interventions (Optional Details) balance training;home exercise program;manual therapy techniques;modalities;neuromuscular re-education;patient/family education;ROM (Range of Motion);stair training;strengthening;stretching  -SW     PT Plan Comments Focus on good gait mechanics without AD and strengthening right LE.  -SW           User Key  (r) = Recorded By, (t) = Taken By, (c) = Cosigned By    Initials Name Provider Type    Edita Matthews Physical Therapist                   OP Exercises     Row Name 08/04/22 1600             Subjective Comments    Subjective Comments See pt hx  -SW              Subjective Pain    Able to rate subjective pain? yes  -SW      Pre-Treatment Pain Level 0  -SW              Exercise 1    Exercise Name 1 ltr  -SW      Reps 1 10  -SW              Exercise 2    Exercise Name 2 bridge  -SW      Reps 2 10  -SW              Exercise 3    Exercise Name 3 hr and tr in standing  -SW      Reps 3 10 ea  -SW              Exercise 4    Exercise Name 4 marching in standing  -SW      Reps 4 10  -SW      Additional Comments left only, right painful  -SW              Exercise 5    Exercise Name 5 hip flex/ext standing  -SW      Reps 5 10  -SW              Exercise 6    Exercise Name 6 hip abd standing  -SW      Reps 6 10  -SW              Exercise 7    Exercise Name 7 hip flexor stretch  -SW      Reps 7 30\"x3  -SW            User Key  (r) = Recorded By, (t) = Taken By, (c) = Cosigned By    Initials Name Provider Type    Edita Matthews Physical Therapist                              Outcome Measure Options: Lower " Extremity Functional Scale (LEFS)  Lower Extremity Functional Index  Any of your usual work, housework or school activities: Extreme difficulty or unable to perform activity  Your usual hobbies, recreational or sporting activities: Extreme difficulty or unable to perform activity  Getting into or out of the bath: Moderate difficulty  Walking between rooms: No difficulty  Putting on your shoes or socks: Extreme difficulty or unable to perform activity  Squatting: Moderate difficulty  Lifting an object, like a bag of groceries from the floor: Moderate difficulty  Performing light activities around your home: Moderate difficulty  Performing heavy activities around your home: Extreme difficulty or unable to perform activity  Getting into or out of a car: Moderate difficulty  Walking 2 blocks: Extreme difficulty or unable to perform activity  Walking a mile: Extreme difficulty or unable to perform activity  Going up or down 10 stairs (about 1 flight of stairs): Moderate difficulty  Standing for 1 hour: Extreme difficulty or unable to perform activity  Sitting for 1 hour: No difficulty  Running on even ground: Extreme difficulty or unable to perform activity  Running on uneven ground: Extreme difficulty or unable to perform activity  Making sharp turns while running fast: Extreme difficulty or unable to perform activity  Hopping: Extreme difficulty or unable to perform activity  Rolling over in bed: No difficulty  Total: 24      Time Calculation:     Start Time: 1645  Stop Time: 1730  Time Calculation (min): 45 min     Therapy Charges for Today     Code Description Service Date Service Provider Modifiers Qty    52984760363 HC PT EVAL LOW COMPLEXITY 3 8/4/2022 Edita Campos GP 1          PT G-Codes  Outcome Measure Options: Lower Extremity Functional Scale (LEFS)  Total: 24         Edita Campos  8/4/2022

## 2022-08-08 ENCOUNTER — HOSPITAL ENCOUNTER (OUTPATIENT)
Dept: PHYSICAL THERAPY | Facility: HOSPITAL | Age: 67
Setting detail: THERAPIES SERIES
Discharge: HOME | End: 2022-08-08

## 2022-08-08 DIAGNOSIS — M25.551 RIGHT HIP PAIN: ICD-10-CM

## 2022-08-08 DIAGNOSIS — Z96.641 STATUS POST HIP REPLACEMENT, RIGHT: Primary | ICD-10-CM

## 2022-08-08 PROCEDURE — 97110 THERAPEUTIC EXERCISES: CPT | Performed by: PHYSICAL THERAPIST

## 2022-08-08 NOTE — THERAPY TREATMENT NOTE
Outpatient Physical Therapy Ortho Treatment Note  [facility]     Patient Name: Otf Murray  : 1955  MRN: [id number]  Today's Date: 2022      Visit Date: 2022  Visit number:   2/2  % Improvement:   loreto MEDINA appointment:   therese  Recert date:  22     Visit Diagnosis:  Right posterior GERARDO 22  Visit Dx:    ICD-10-CM ICD-9-CM   1. Status post hip replacement, right  Z96.641 V43.64   2. Right hip pain  M25.551 719.45       Patient Active Problem List   Diagnosis   • ADHD, predominantly inattentive type   • Abnormal glucose   • Cervical spondylosis without myelopathy   • Chronic fatigue   • Chronic left-sided low back pain without sciatica   • Displacement of cervical intervertebral disc   • Family history of type 2 diabetes mellitus in mother   • Gastroesophageal reflux disease without esophagitis   • History of tobacco abuse   • History of total left knee replacement   • Male hypogonadism   • Non morbid obesity due to excess calories   • Primary osteoarthritis of both knees   • Recurrent major depressive disorder, in partial remission (HCC)   • Seasonal allergic rhinitis due to pollen   • Encounter for colonoscopy due to history of colonic polyp        Past Medical History:   Diagnosis Date   • Acute gastritis without bleeding    • Alcohol abuse counseling and surveillance of alcoholic    • Allergic rhinitis    • Anxiety state    • Attention deficit disorder of childhood with hyperactivity    • Attention deficit hyperactivity disorder    • Attention deficit hyperactivity disorder, predominantly hyperactive impulsive type    • Attention deficit hyperactivity disorder, predominantly inattentive type    • Backache    • Body mass index (bmi) 31.0-31.9, adult     Body mass index (BMI) 31.0-31.9, adult - BMI 33.5      • Burn of lower leg    • Candidiasis of skin    • Cardiac murmur, unspecified    • Decreased testosterone level    • Depressive disorder     Depressive disorder - acute on  chronic      • Dysgraphia    • Edema    • Elbow joint pain    • Encounter for general adult medical examination with abnormal findings    • Encounter for screening for malignant neoplasm of colon    • Essential hypertension    • Essential tremor    • Ex-smoker    • Fatigue    • Gastro-esophageal reflux disease with esophagitis    • Gastroesophageal reflux disease    • Hyperlipidemia    • Hyperlipoproteinemia    • Impotence of organic origin    • Insomnia    • Intermittent palpitations    • Knee pain     Knee pain - patellofemoral      • Lateral epicondylitis    • Low back pain    • Male erectile disorder    • Male erectile dysfunction, unspecified    • Male hypogonadism    • Neck pain     Neck pain aggravated by recumbency      • Obese     Obese - BMI 33.0      • On long term drug therapy    • Osteoarthrosis     Osteoarthrosis, unspecified whether generalized or localized, involving lower leg      • Other obesity    • Other specified diseases of anus and rectum     Other specified diseases of anus and rectum - rectal pain after c-scope prep      • Overweight    • Pain in left knee    • Pain in right knee    • Plantar fasciitis    • Shoulder pain, right    • Spasm of back muscles    • Tachycardia, unspecified    • Tremor, unspecified    • Tubular adenoma of colon    • Unspecified essential hypertension    • Verruca         Past Surgical History:   Procedure Laterality Date   • APPENDECTOMY     • COLONOSCOPY  03/16/2016    One polyp in the sigmoid colon.Resected and retrieved.        • COLONOSCOPY N/A 6/23/2021    Procedure: COLONOSCOPY;  Surgeon: Brenton Ruggiero MD;  Location: [facility] ENDOSCOPY;  Service: Gastroenterology;  Laterality: N/A;   • CYST REMOVAL  10/27/2009     Excision of sebaceous cyst of the forehead, glabellar region.   • ENDOSCOPY  03/16/2016    Mildly severe esophagitis.Gastritis.Normal examined duodenum.Several biopsies obtained in the lower third of the esophagus.    • ENDOSCOPY AND COLONOSCOPY   04/12/2006     Normal colonoscopic examination    • INCISION AND DRAINAGE ABSCESS  10/24/2012   • INJECTION OF MEDICATION  04/21/2011    Depo Medrol (Methylprednisone) 80mg (1)        • INJECTION OF MEDICATION  02/01/2016    Kenalog (3)        • INJECTION OF MEDICATION  02/19/2016    Toradol (3)    • JOINT REPLACEMENT Bilateral     Knee   • KNEE ARTHROSCOPY  04/02/2009     Medial meniscus tear of left knee   • SCROTUM EXPLORATION  07/02/2002     Excision of epidermal inclusion cyst. base of left scrotum   • SKIN BIOPSY  04/23/2012   • SKIN TAG REMOVAL  04/25/2002    Skin tag, left scrotum         PT Ortho     Row Name 08/08/22 1275       Precautions and Contraindications    Precautions posterior hip precautions  -BS       Subjective Pain    Able to rate subjective pain? yes  -BS    Pre-Treatment Pain Level 0  -BS    Post-Treatment Pain Level 0  -BS       MMT Right Lower Ext    Rt Hip Flexion MMT, Gross Movement (4/5) good  -BS          User Key  (r) = Recorded By, (t) = Taken By, (c) = Cosigned By    Initials Name Provider Type    Caleb Oh, PT Physical Therapist                             PT Assessment/Plan     Row Name 08/08/22 9175          PT Assessment    Assessment Comments Excellent tolerance to hip strengthening. Progressing toward all goals. Improved L hip flex strength (4/5) since the PT consult last week.  -BS     Rehab Potential Good  -BS     Patient/caregiver participated in establishment of treatment plan and goals Yes  -BS     Patient would benefit from skilled therapy intervention Yes  -BS            PT Plan    PT Frequency 2x/week  -BS     Predicted Duration of Therapy Intervention (PT) 3-4 weeks  -BS     PT Plan Comments Advance L hip abd/flex strength as able.  -BS           User Key  (r) = Recorded By, (t) = Taken By, (c) = Cosigned By    Initials Name Provider Type    Caleb Oh, PT Physical Therapist                   OP Exercises     Row Name 08/08/22 8493              "Subjective Comments    Subjective Comments Patient reports his hip feels great, just stiff that's all.  -BS              Subjective Pain    Able to rate subjective pain? yes  -BS      Pre-Treatment Pain Level 0  -BS      Post-Treatment Pain Level 0  -BS              Exercise 1    Exercise Name 1 Pro II, L3  -BS      Time 1 10'  -BS              Exercise 2    Exercise Name 2 bridge  -BS      Reps 2 10  -BS              Exercise 3    Exercise Name 3 forward lunge S, right hip flex S  -BS      Sets 3 1  -BS      Reps 3 10  -BS      Time 3 5\" hold  -BS              Exercise 4    Exercise Name 4 MS  -BS      Sets 4 1  -BS      Reps 4 15  -BS              Exercise 5    Exercise Name 5 3 way hip-flex/abd/ext  -BS      Sets 5 1  -BS      Reps 5 10 ea  -BS              Exercise 6    Exercise Name 6 SL resisted clamshells with red TB  -BS      Sets 6 1  -BS      Reps 6 15  -BS              Exercise 7    Exercise Name 7 SL clamshells  -BS      Sets 7 1  -BS      Reps 7 15  -BS      Additional Comments L only  -BS              Exercise 8    Exercise Name 8 HL alt leg lifts  -BS      Sets 8 1  -BS      Reps 8 10 ea  -BS            User Key  (r) = Recorded By, (t) = Taken By, (c) = Cosigned By    Initials Name Provider Type    BS Caleb Goldman, PT Physical Therapist                              PT OP Goals     Row Name 08/08/22 1303          PT Short Term Goals    STG Date to Achieve 08/25/22  -BS     STG 1 independent and compliant with HEP  -BS     STG 1 Progress Ongoing  -BS     STG 2 flex right hip to 90 degrees in standing without pain  -BS     STG 2 Progress Ongoing  -BS     STG 3 able to lift right hip on bed or mat without pain  -BS     STG 3 Progress Ongoing  -BS            Long Term Goals    LTG Date to Achieve 09/15/22  -BS     LTG 1 LEFS 50  -BS     LTG 1 Progress Ongoing  -BS     LTG 2 left hip flex/ext/abd/er/ir at least 4/5  -BS     LTG 2 Progress Ongoing  -BS     LTG 3 ambluate community distances without AD with " good gait mechanics  -BS     LTG 3 Progress Ongoing  -BS            Time Calculation    PT Goal Re-Cert Due Date 08/25/22  -BS           User Key  (r) = Recorded By, (t) = Taken By, (c) = Cosigned By    Initials Name Provider Type    Caleb Oh, PT Physical Therapist                               Time Calculation:   Start Time: 1303  Stop Time: 1352  Time Calculation (min): 49 min  Total Timed Code Minutes- PT: 49 minute(s)  Therapy Charges for Today     Code Description Service Date Service Provider Modifiers Qty    40372346392 HC PT THER PROC EA 15 MIN 8/8/2022 Caleb Goldman, PT GP 3                    Caleb Goldman, PT  8/8/2022

## 2022-08-12 ENCOUNTER — HOSPITAL ENCOUNTER (OUTPATIENT)
Dept: PHYSICAL THERAPY | Facility: HOSPITAL | Age: 67
Setting detail: THERAPIES SERIES
End: 2022-08-12

## 2022-08-15 ENCOUNTER — HOSPITAL ENCOUNTER (OUTPATIENT)
Dept: PHYSICAL THERAPY | Facility: HOSPITAL | Age: 67
Setting detail: THERAPIES SERIES
Discharge: HOME | End: 2022-08-15

## 2022-08-15 DIAGNOSIS — Z96.641 STATUS POST HIP REPLACEMENT, RIGHT: Primary | ICD-10-CM

## 2022-08-15 DIAGNOSIS — M25.551 RIGHT HIP PAIN: ICD-10-CM

## 2022-08-15 PROCEDURE — 97530 THERAPEUTIC ACTIVITIES: CPT

## 2022-08-15 PROCEDURE — 97110 THERAPEUTIC EXERCISES: CPT

## 2022-08-15 NOTE — THERAPY TREATMENT NOTE
"    Outpatient Physical Therapy Ortho Treatment Note  [facility]     Patient Name: Otf Murray  : 1955  MRN: [id number]  Today's Date: 8/15/2022      Visit Date: 08/15/2022     Subjective Improvement: \"some\"  Attendance:  3/4 (Eval + 6 until 22)  Next MD Visit : 22  Recert Date:  22      Therapy Diagnosis:  Right posterior GERARDO 22        Visit Dx:    ICD-10-CM ICD-9-CM   1. Status post hip replacement, right  Z96.641 V43.64   2. Right hip pain  M25.551 719.45       Patient Active Problem List   Diagnosis   • ADHD, predominantly inattentive type   • Abnormal glucose   • Cervical spondylosis without myelopathy   • Chronic fatigue   • Chronic left-sided low back pain without sciatica   • Displacement of cervical intervertebral disc   • Family history of type 2 diabetes mellitus in mother   • Gastroesophageal reflux disease without esophagitis   • History of tobacco abuse   • History of total left knee replacement   • Male hypogonadism   • Non morbid obesity due to excess calories   • Primary osteoarthritis of both knees   • Recurrent major depressive disorder, in partial remission (HCC)   • Seasonal allergic rhinitis due to pollen   • Encounter for colonoscopy due to history of colonic polyp        Past Medical History:   Diagnosis Date   • Acute gastritis without bleeding    • Alcohol abuse counseling and surveillance of alcoholic    • Allergic rhinitis    • Anxiety state    • Attention deficit disorder of childhood with hyperactivity    • Attention deficit hyperactivity disorder    • Attention deficit hyperactivity disorder, predominantly hyperactive impulsive type    • Attention deficit hyperactivity disorder, predominantly inattentive type    • Backache    • Body mass index (bmi) 31.0-31.9, adult     Body mass index (BMI) 31.0-31.9, adult - BMI 33.5      • Burn of lower leg    • Candidiasis of skin    • Cardiac murmur, unspecified    • Decreased testosterone level    • " Depressive disorder     Depressive disorder - acute on chronic      • Dysgraphia    • Edema    • Elbow joint pain    • Encounter for general adult medical examination with abnormal findings    • Encounter for screening for malignant neoplasm of colon    • Essential hypertension    • Essential tremor    • Ex-smoker    • Fatigue    • Gastro-esophageal reflux disease with esophagitis    • Gastroesophageal reflux disease    • Hyperlipidemia    • Hyperlipoproteinemia    • Impotence of organic origin    • Insomnia    • Intermittent palpitations    • Knee pain     Knee pain - patellofemoral      • Lateral epicondylitis    • Low back pain    • Male erectile disorder    • Male erectile dysfunction, unspecified    • Male hypogonadism    • Neck pain     Neck pain aggravated by recumbency      • Obese     Obese - BMI 33.0      • On long term drug therapy    • Osteoarthrosis     Osteoarthrosis, unspecified whether generalized or localized, involving lower leg      • Other obesity    • Other specified diseases of anus and rectum     Other specified diseases of anus and rectum - rectal pain after c-scope prep      • Overweight    • Pain in left knee    • Pain in right knee    • Plantar fasciitis    • Shoulder pain, right    • Spasm of back muscles    • Tachycardia, unspecified    • Tremor, unspecified    • Tubular adenoma of colon    • Unspecified essential hypertension    • Verruca         Past Surgical History:   Procedure Laterality Date   • APPENDECTOMY     • COLONOSCOPY  03/16/2016    One polyp in the sigmoid colon.Resected and retrieved.        • COLONOSCOPY N/A 6/23/2021    Procedure: COLONOSCOPY;  Surgeon: Brenton Ruggiero MD;  Location: [facility] ENDOSCOPY;  Service: Gastroenterology;  Laterality: N/A;   • CYST REMOVAL  10/27/2009     Excision of sebaceous cyst of the forehead, glabellar region.   • ENDOSCOPY  03/16/2016    Mildly severe esophagitis.Gastritis.Normal examined duodenum.Several biopsies obtained in the lower  third of the esophagus.    • ENDOSCOPY AND COLONOSCOPY  04/12/2006     Normal colonoscopic examination    • INCISION AND DRAINAGE ABSCESS  10/24/2012   • INJECTION OF MEDICATION  04/21/2011    Depo Medrol (Methylprednisone) 80mg (1)        • INJECTION OF MEDICATION  02/01/2016    Kenalog (3)        • INJECTION OF MEDICATION  02/19/2016    Toradol (3)    • JOINT REPLACEMENT Bilateral     Knee   • KNEE ARTHROSCOPY  04/02/2009     Medial meniscus tear of left knee   • SCROTUM EXPLORATION  07/02/2002     Excision of epidermal inclusion cyst. base of left scrotum   • SKIN BIOPSY  04/23/2012   • SKIN TAG REMOVAL  04/25/2002    Skin tag, left scrotum         PT Ortho     Row Name 08/15/22 1200       Subjective Comments    Subjective Comments Patient reports that he has really aggrevated his low back and the muscles in his hip.  -       Precautions and Contraindications    Precautions posterior hip precautions  -       Subjective Pain    Post-Treatment Pain Level 0  -       Posture/Observations    Posture/Observations Comments ambulating without AD; good stride, non antalgic; incision well healed;  -          User Key  (r) = Recorded By, (t) = Taken By, (c) = Cosigned By    Initials Name Provider Type    Kristin Garcia PTA Physical Therapist Assistant                             PT Assessment/Plan     Row Name 08/15/22 1200          PT Assessment    Functional Limitations Decreased safety during functional activities;Impaired gait;Impaired locomotion;Limitation in home management;Limitations in community activities;Limitations in functional capacity and performance;Performance in leisure activities;Performance in self-care ADL  -     Impairments Balance;Gait;Impaired muscle endurance;Impaired muscle length;Muscle strength;Pain;Range of motion  -     Assessment Comments progressing well with CKC; gives good effort; still has some antalgia with first few steps but otherwise no issues; added hip flexor and ITB  "stretch to HEP  -KH     Rehab Potential Good  -KH     Patient/caregiver participated in establishment of treatment plan and goals Yes  -KH     Patient would benefit from skilled therapy intervention Yes  -KH            PT Plan    PT Frequency 2x/week  -KH     Predicted Duration of Therapy Intervention (PT) 3-4 weeks  -KH     PT Plan Comments  machine next for flexion, abdution  -KH           User Key  (r) = Recorded By, (t) = Taken By, (c) = Cosigned By    Initials Name Provider Type    Kristin Garcia PTA Physical Therapist Assistant                 Modalities     Row Name 08/15/22 1200             Ice    Ice Applied Yes  -KH      Location R anterior hip  -KH      Ice S/P Rx Yes  15 mins  -KH            User Key  (r) = Recorded By, (t) = Taken By, (c) = Cosigned By    Initials Name Provider Type    Kristin Garcia PTA Physical Therapist Assistant               OP Exercises     Row Name 08/15/22 1200             Subjective Comments    Subjective Comments Patient reports that he has really aggrevated his low back and the muscles in his hip.  -KH              Subjective Pain    Able to rate subjective pain? yes  -KH      Pre-Treatment Pain Level 0  -KH      Post-Treatment Pain Level 0  -KH              Exercise 1    Exercise Name 1 pro ll LE strength  -KH      Time 1 10 mins  -KH      Additional Comments level 3; seat 15  -KH              Exercise 2    Exercise Name 2 step ups fwd  -KH      Sets 2 2  -KH      Reps 2 10  -KH      Additional Comments 6\" step  -KH              Exercise 3    Exercise Name 3 standing ITB R only  -KH      Sets 3 3  -KH      Time 3 30\" holds  -KH              Exercise 4    Exercise Name 4 standing hip flexor stretch  -KH      Sets 4 3  -KH      Time 4 30\" holds  -KH              Exercise 5    Exercise Name 5 lateral step ups  -KH      Sets 5 2  -KH      Reps 5 10  -KH      Additional Comments 6\" step  -KH              Exercise 6    Exercise Name 6 lateral stepping T-Band at thighs  -KH   " "   Reps 6 10 ft x10  -KH      Additional Comments red T-Band  -KH              Exercise 7    Exercise Name 7 backwards walking w/ T-Band at thighs  -KH      Reps 7 [id number]  -KH      Additional Comments red T-Band  -KH              Exercise 8    Exercise Name 8 Bridges  -KH      Sets 8 3  -KH      Reps 8 10  -KH              Exercise 9    Exercise Name 9 Hooklying alt hip flexion  -KH      Sets 9 2  -KH      Reps 9 10  -KH              Exercise 10    Exercise Name 10 LTR  -KH      Sets 10 1  -KH      Reps 10 10  -KH      Time 10 5\"holds  -KH              Exercise 11    Exercise Name 11 SLS w/ finger hold as needed  -KH      Sets 11 5  -KH      Time 11 15\" holds  -KH              Exercise 12    Exercise Name 12 cybex leg press DL  -KH      Reps 12 20  -KH      Additional Comments 90# sled 5; seat 1  -KH            User Key  (r) = Recorded By, (t) = Taken By, (c) = Cosigned By    Initials Name Provider Type    Kristin Garcia PTA Physical Therapist Assistant                              PT OP Goals     Row Name 08/15/22 1200          PT Short Term Goals    STG Date to Achieve 08/25/22  -KH     STG 1 independent and compliant with HEP  -KH     STG 1 Progress Ongoing  -KH     STG 2 flex right hip to 90 degrees in standing without pain  -KH     STG 2 Progress Met   -KH     STG 3 able to lift right hip on bed or mat without pain  -KH     STG 3 Progress Met   -KH            Long Term Goals    LTG Date to Achieve 09/15/22  -KH     LTG 1 LEFS 50  -KH     LTG 1 Progress Ongoing  -KH     LTG 2 left hip flex/ext/abd/er/ir at least 4/5  -KH     LTG 2 Progress Ongoing  -KH     LTG 3 ambluate community distances without AD with good gait mechanics  -KH     LTG 3 Progress Ongoing  -KH            Time Calculation    PT Goal Re-Cert Due Date 08/25/22  -           User Key  (r) = Recorded By, (t) = Taken By, (c) = Cosigned By    Initials Name Provider Type    Kristin Garcia PTA Physical Therapist Assistant                Therapy " Education  Education Details: Hip flexor stretch, ITB stretch  Given: HEP  Program: New  How Provided: Verbal, Demonstration  Provided to: Patient  Level of Understanding: Verbalized, Teach back education performed, Demonstrated              Time Calculation:   Start Time: 1255  Stop Time: 1357  Time Calculation (min): 62 min  Therapy Charges for Today     Code Description Service Date Service Provider Modifiers Qty    54107420831 HC PT THER SUPP EA 15 MIN 8/15/2022 Kristin Lamas PTA GP 1    23958723145 HC PT THER PROC EA 15 MIN 8/15/2022 Kristin Lamas PTA GP, CQ 2    [id number]  PT THERAPEUTIC ACT EA 15 MIN 8/15/2022 Kristin Lamas PTA GP, CQ 1                    Kristin Lamas PTA  8/15/2022

## 2022-08-18 ENCOUNTER — HOSPITAL ENCOUNTER (OUTPATIENT)
Dept: PHYSICAL THERAPY | Facility: HOSPITAL | Age: 67
Setting detail: THERAPIES SERIES
Discharge: HOME | End: 2022-08-18

## 2022-08-18 DIAGNOSIS — M25.551 RIGHT HIP PAIN: Primary | ICD-10-CM

## 2022-08-18 PROCEDURE — 97110 THERAPEUTIC EXERCISES: CPT

## 2022-08-18 NOTE — THERAPY TREATMENT NOTE
Outpatient Physical Therapy Ortho Treatment Note  [facility]     Patient Name: Otf Murray  : 1955  MRN: [id number]  Today's Date: 2022      Visit Date: 2022  Subjective Improvement: some  MD visit: 2022  Visit Number: 45  Total Approved: eval +6 visits until 2022  Recert Date: 2022  Visit Dx:    ICD-10-CM ICD-9-CM   1. Right hip pain  M25.551 719.45       Patient Active Problem List   Diagnosis   • ADHD, predominantly inattentive type   • Abnormal glucose   • Cervical spondylosis without myelopathy   • Chronic fatigue   • Chronic left-sided low back pain without sciatica   • Displacement of cervical intervertebral disc   • Family history of type 2 diabetes mellitus in mother   • Gastroesophageal reflux disease without esophagitis   • History of tobacco abuse   • History of total left knee replacement   • Male hypogonadism   • Non morbid obesity due to excess calories   • Primary osteoarthritis of both knees   • Recurrent major depressive disorder, in partial remission (HCC)   • Seasonal allergic rhinitis due to pollen   • Encounter for colonoscopy due to history of colonic polyp        Past Medical History:   Diagnosis Date   • Acute gastritis without bleeding    • Alcohol abuse counseling and surveillance of alcoholic    • Allergic rhinitis    • Anxiety state    • Attention deficit disorder of childhood with hyperactivity    • Attention deficit hyperactivity disorder    • Attention deficit hyperactivity disorder, predominantly hyperactive impulsive type    • Attention deficit hyperactivity disorder, predominantly inattentive type    • Backache    • Body mass index (bmi) 31.0-31.9, adult     Body mass index (BMI) 31.0-31.9, adult - BMI 33.5      • Burn of lower leg    • Candidiasis of skin    • Cardiac murmur, unspecified    • Decreased testosterone level    • Depressive disorder     Depressive disorder - acute on chronic      • Dysgraphia    • Edema    • Elbow  joint pain    • Encounter for general adult medical examination with abnormal findings    • Encounter for screening for malignant neoplasm of colon    • Essential hypertension    • Essential tremor    • Ex-smoker    • Fatigue    • Gastro-esophageal reflux disease with esophagitis    • Gastroesophageal reflux disease    • Hyperlipidemia    • Hyperlipoproteinemia    • Impotence of organic origin    • Insomnia    • Intermittent palpitations    • Knee pain     Knee pain - patellofemoral      • Lateral epicondylitis    • Low back pain    • Male erectile disorder    • Male erectile dysfunction, unspecified    • Male hypogonadism    • Neck pain     Neck pain aggravated by recumbency      • Obese     Obese - BMI 33.0      • On long term drug therapy    • Osteoarthrosis     Osteoarthrosis, unspecified whether generalized or localized, involving lower leg      • Other obesity    • Other specified diseases of anus and rectum     Other specified diseases of anus and rectum - rectal pain after c-scope prep      • Overweight    • Pain in left knee    • Pain in right knee    • Plantar fasciitis    • Shoulder pain, right    • Spasm of back muscles    • Tachycardia, unspecified    • Tremor, unspecified    • Tubular adenoma of colon    • Unspecified essential hypertension    • Verruca         Past Surgical History:   Procedure Laterality Date   • APPENDECTOMY     • COLONOSCOPY  03/16/2016    One polyp in the sigmoid colon.Resected and retrieved.        • COLONOSCOPY N/A 6/23/2021    Procedure: COLONOSCOPY;  Surgeon: Brenton Ruggiero MD;  Location: [facility] ENDOSCOPY;  Service: Gastroenterology;  Laterality: N/A;   • CYST REMOVAL  10/27/2009     Excision of sebaceous cyst of the forehead, glabellar region.   • ENDOSCOPY  03/16/2016    Mildly severe esophagitis.Gastritis.Normal examined duodenum.Several biopsies obtained in the lower third of the esophagus.    • ENDOSCOPY AND COLONOSCOPY  04/12/2006     Normal colonoscopic examination     • INCISION AND DRAINAGE ABSCESS  10/24/2012   • INJECTION OF MEDICATION  04/21/2011    Depo Medrol (Methylprednisone) 80mg (1)        • INJECTION OF MEDICATION  02/01/2016    Kenalog (3)        • INJECTION OF MEDICATION  02/19/2016    Toradol (3)    • JOINT REPLACEMENT Bilateral     Knee   • KNEE ARTHROSCOPY  04/02/2009     Medial meniscus tear of left knee   • SCROTUM EXPLORATION  07/02/2002     Excision of epidermal inclusion cyst. base of left scrotum   • SKIN BIOPSY  04/23/2012   • SKIN TAG REMOVAL  04/25/2002    Skin tag, left scrotum         PT Ortho     Row Name 08/18/22 0900       Subjective Comments    Subjective Comments pt reports hurt himself doing his ex and picking up grandson .  -TL       Precautions and Contraindications    Precautions posterior hip precautions  -TL       Subjective Pain    Able to rate subjective pain? yes  -TL          User Key  (r) = Recorded By, (t) = Taken By, (c) = Cosigned By    Initials Name Provider Type    Janeen Grant PTA Physical Therapist Assistant                             PT Assessment/Plan     Row Name 08/18/22 0900          PT Assessment    Assessment Comments Pt had increase pain today. pt reported pull a muscle in his back on the right side. Pt pain at a 4/10. Pt has not been having pain. pt reports that he wanted to try multi hip machine today. pt tolerated weight but decrease to 1 plate. Pt did well with ramp walking fwd/back/lat. pt tolerate HELIO for stretch this date. Pt's pain decrease by 3 points.  -TL            PT Plan    PT Frequency 2x/week  -TL     Predicted Duration of Therapy Intervention (PT) 3-4 weeks  -TL     PT Plan Comments continue strengthening and unmet goals next visit.  -TL           User Key  (r) = Recorded By, (t) = Taken By, (c) = Cosigned By    Initials Name Provider Type    Janeen Grant PTA Physical Therapist Assistant                 Modalities     Row Name 08/18/22 0900             Subjective Pain    Pre-Treatment  Pain Level 4  pain in right side of back  -TL      Post-Treatment Pain Level 1  -TL              Ice    Patient reports will apply ice at home to involved area Yes  ice to go  -TL            User Key  (r) = Recorded By, (t) = Taken By, (c) = Cosigned By    Initials Name Provider Type    TL Janeen Wakefield PTA Physical Therapist Assistant               OP Exercises     Row Name 08/18/22 0900             Subjective Comments    Subjective Comments pt reports hurt himself doing his ex and picking up grandson .  -TL              Subjective Pain    Able to rate subjective pain? yes  -TL      Pre-Treatment Pain Level 4  pain in right side of back  -TL      Post-Treatment Pain Level 1  -TL              Exercise 1    Exercise Name 1 pro ll LE strength  -TL      Time 1 10 mins  -TL              Exercise 2    Exercise Name 2 multi-hip flex/abd  -TL      Sets 2 2  -TL      Reps 2 10  -TL      Additional Comments 1 plate except first set of 10 hip abd 2 plates then decrease to 1 plate.  -TL              Exercise 3    Exercise Name 3 step up fwd/lat 6 '  -TL      Reps 3 20 each directions  -TL              Exercise 4    Exercise Name 4 ms  -TL      Sets 4 2  -TL      Reps 4 10  -TL              Exercise 5    Exercise Name 5 ramp walking fwd/ back/lat  -TL      Time 5 4 laps each directions  -TL              Exercise 6    Exercise Name 6 SLS right leg  -TL      Sets 6 3  -TL      Time 6 3-12.23 sec hold  -TL              Exercise 7    Exercise Name 7 553 ex  -TL      Sets 7 1  -TL      Time 7 5 sec/5 reps each direction  -TL              Exercise 8    Exercise Name 8 Bridges with hip add using ball  -TL      Sets 8 3  -TL      Reps 8 10  -TL      Time 8 5 sec hold  -TL              Exercise 9    Exercise Name 9 LTR  -TL      Sets 9 1  -TL      Reps 9 10  -TL      Time 9 5 sec hold  -TL              Exercise 10    Exercise Name 10 SLR right leg  -TL      Sets 10 1  -TL      Reps 10 10  -TL              Exercise 11    Exercise  Name 11 SL SLR hip abd  -TL      Sets 11 1  -TL      Reps 11 10  -TL      Time 11 5 sec hold  -TL              Exercise 12    Exercise Name 12 prone on elbow  -TL      Time 12 2 mins  -TL              Exercise 13    Exercise Name 13 seated QL S  -TL            User Key  (r) = Recorded By, (t) = Taken By, (c) = Cosigned By    Initials Name Provider Type    TL Janeen Wakefield PTA Physical Therapist Assistant                              PT OP Goals     Row Name 08/18/22 0900          PT Short Term Goals    STG Date to Achieve 08/25/22  -TL     STG 1 independent and compliant with HEP  -TL     STG 1 Progress Ongoing  -TL     STG 2 flex right hip to 90 degrees in standing without pain  -TL     STG 2 Progress Met  -TL     STG 3 able to lift right hip on bed or mat without pain  -TL     STG 3 Progress Met  -TL            Long Term Goals    LTG Date to Achieve 09/15/22  -TL     LTG 1 LEFS 50  -TL     LTG 1 Progress Ongoing  -TL     LTG 2 left hip flex/ext/abd/er/ir at least 4/5  -TL     LTG 2 Progress Ongoing  -TL     LTG 3 ambluate community distances without AD with good gait mechanics  -TL     LTG 3 Progress Ongoing  -TL            Time Calculation    PT Goal Re-Cert Due Date 08/25/22  -TL           User Key  (r) = Recorded By, (t) = Taken By, (c) = Cosigned By    Initials Name Provider Type    TL Janeen Wakefield PTA Physical Therapist Assistant                               Time Calculation:   Start Time: 0928  Stop Time: 1014  Time Calculation (min): 46 min  Therapy Charges for Today     Code Description Service Date Service Provider Modifiers Qty    73648616107 HC PT THER PROC EA 15 MIN 8/18/2022 Janeen Wakefield PTA GP, CQ 3                    Janeen Wakefield PTA  8/18/2022

## 2022-08-19 ENCOUNTER — APPOINTMENT (OUTPATIENT)
Dept: PHYSICAL THERAPY | Facility: HOSPITAL | Age: 67
End: 2022-08-19

## 2022-08-22 ENCOUNTER — HOSPITAL ENCOUNTER (OUTPATIENT)
Dept: PHYSICAL THERAPY | Facility: HOSPITAL | Age: 67
Setting detail: THERAPIES SERIES
Discharge: HOME | End: 2022-08-22

## 2022-08-22 DIAGNOSIS — Z96.641 STATUS POST HIP REPLACEMENT, RIGHT: ICD-10-CM

## 2022-08-22 DIAGNOSIS — M25.551 RIGHT HIP PAIN: Primary | ICD-10-CM

## 2022-08-22 PROCEDURE — 97110 THERAPEUTIC EXERCISES: CPT

## 2022-08-22 NOTE — THERAPY TREATMENT NOTE
"    Outpatient Physical Therapy Ortho Treatment Note  [facility]     Patient Name: Otf Murray  : 1955  MRN: [id number]  Today's Date: 2022      Visit Date: 2022     Subjective Improvement: \"some\"  Attendance:   (Eval + 6 until 22)  Next MD Visit : 22  Recert Date:  22        Therapy Diagnosis:  Right posterior GERARDO 22       Visit Dx:    ICD-10-CM ICD-9-CM   1. Right hip pain  M25.551 719.45   2. Status post hip replacement, right  Z96.641 V43.64       Patient Active Problem List   Diagnosis   • ADHD, predominantly inattentive type   • Abnormal glucose   • Cervical spondylosis without myelopathy   • Chronic fatigue   • Chronic left-sided low back pain without sciatica   • Displacement of cervical intervertebral disc   • Family history of type 2 diabetes mellitus in mother   • Gastroesophageal reflux disease without esophagitis   • History of tobacco abuse   • History of total left knee replacement   • Male hypogonadism   • Non morbid obesity due to excess calories   • Primary osteoarthritis of both knees   • Recurrent major depressive disorder, in partial remission (HCC)   • Seasonal allergic rhinitis due to pollen   • Encounter for colonoscopy due to history of colonic polyp        Past Medical History:   Diagnosis Date   • Acute gastritis without bleeding    • Alcohol abuse counseling and surveillance of alcoholic    • Allergic rhinitis    • Anxiety state    • Attention deficit disorder of childhood with hyperactivity    • Attention deficit hyperactivity disorder    • Attention deficit hyperactivity disorder, predominantly hyperactive impulsive type    • Attention deficit hyperactivity disorder, predominantly inattentive type    • Backache    • Body mass index (bmi) 31.0-31.9, adult     Body mass index (BMI) 31.0-31.9, adult - BMI 33.5      • Burn of lower leg    • Candidiasis of skin    • Cardiac murmur, unspecified    • Decreased testosterone level    • " Depressive disorder     Depressive disorder - acute on chronic      • Dysgraphia    • Edema    • Elbow joint pain    • Encounter for general adult medical examination with abnormal findings    • Encounter for screening for malignant neoplasm of colon    • Essential hypertension    • Essential tremor    • Ex-smoker    • Fatigue    • Gastro-esophageal reflux disease with esophagitis    • Gastroesophageal reflux disease    • Hyperlipidemia    • Hyperlipoproteinemia    • Impotence of organic origin    • Insomnia    • Intermittent palpitations    • Knee pain     Knee pain - patellofemoral      • Lateral epicondylitis    • Low back pain    • Male erectile disorder    • Male erectile dysfunction, unspecified    • Male hypogonadism    • Neck pain     Neck pain aggravated by recumbency      • Obese     Obese - BMI 33.0      • On long term drug therapy    • Osteoarthrosis     Osteoarthrosis, unspecified whether generalized or localized, involving lower leg      • Other obesity    • Other specified diseases of anus and rectum     Other specified diseases of anus and rectum - rectal pain after c-scope prep      • Overweight    • Pain in left knee    • Pain in right knee    • Plantar fasciitis    • Shoulder pain, right    • Spasm of back muscles    • Tachycardia, unspecified    • Tremor, unspecified    • Tubular adenoma of colon    • Unspecified essential hypertension    • Verruca         Past Surgical History:   Procedure Laterality Date   • APPENDECTOMY     • COLONOSCOPY  03/16/2016    One polyp in the sigmoid colon.Resected and retrieved.        • COLONOSCOPY N/A 6/23/2021    Procedure: COLONOSCOPY;  Surgeon: Brenton Ruggiero MD;  Location: [facility] ENDOSCOPY;  Service: Gastroenterology;  Laterality: N/A;   • CYST REMOVAL  10/27/2009     Excision of sebaceous cyst of the forehead, glabellar region.   • ENDOSCOPY  03/16/2016    Mildly severe esophagitis.Gastritis.Normal examined duodenum.Several biopsies obtained in the lower  third of the esophagus.    • ENDOSCOPY AND COLONOSCOPY  04/12/2006     Normal colonoscopic examination    • INCISION AND DRAINAGE ABSCESS  10/24/2012   • INJECTION OF MEDICATION  04/21/2011    Depo Medrol (Methylprednisone) 80mg (1)        • INJECTION OF MEDICATION  02/01/2016    Kenalog (3)        • INJECTION OF MEDICATION  02/19/2016    Toradol (3)    • JOINT REPLACEMENT Bilateral     Knee   • KNEE ARTHROSCOPY  04/02/2009     Medial meniscus tear of left knee   • SCROTUM EXPLORATION  07/02/2002     Excision of epidermal inclusion cyst. base of left scrotum   • SKIN BIOPSY  04/23/2012   • SKIN TAG REMOVAL  04/25/2002    Skin tag, left scrotum         PT Ortho     Row Name 08/22/22 1500       Precautions and Contraindications    Precautions posterior hip precautions  -       Posture/Observations    Posture/Observations Comments ambulating without AD; good stride, non antalgic; incision well healed; Tenderness R QL  -          User Key  (r) = Recorded By, (t) = Taken By, (c) = Cosigned By    Initials Name Provider Type     Kristin Lamas PTA Physical Therapist Assistant                             PT Assessment/Plan     Row Name 08/22/22 1500          PT Assessment    Functional Limitations Decreased safety during functional activities;Impaired gait;Impaired locomotion;Limitation in home management;Limitations in community activities;Limitations in functional capacity and performance;Performance in leisure activities;Performance in self-care ADL  -     Impairments Balance;Gait;Impaired muscle endurance;Impaired muscle length;Muscle strength;Pain;Range of motion  -     Assessment Comments pain with supine to sit in the R QL area of the hip; otherwsie doing well with strengthening of the R hip;  -     Rehab Potential Good  -     Patient/caregiver participated in establishment of treatment plan and goals Yes  -     Patient would benefit from skilled therapy intervention Yes  -            PT Plan    PT  "Frequency 2x/week  -KH     Predicted Duration of Therapy Intervention (PT) 3-4 weeks  -KH     PT Plan Comments 2 more approvewd visits; Continue to progress strength and ROM of the right hip as able. monitor R posterior hip pain just superior to the PSIS  -KH           User Key  (r) = Recorded By, (t) = Taken By, (c) = Cosigned By    Initials Name Provider Type    Kristin Garcia PTA Physical Therapist Assistant                 Modalities     Row Name 08/22/22 1500             Moist Heat    MH Applied Yes  -KH      Location R posterior hip/LB  -KH      MH S/P Rx Yes  15 mins  -KH            User Key  (r) = Recorded By, (t) = Taken By, (c) = Cosigned By    Initials Name Provider Type    Kristin Garcia PTA Physical Therapist Assistant               OP Exercises     Row Name 08/22/22 1500             Subjective Comments    Subjective Comments Continues to have pain in the top of the hip just above the hip bone and feels like it is really deep; Had a massage today and still hurting  -KH              Subjective Pain    Able to rate subjective pain? yes  -KH      Pre-Treatment Pain Level 0  -KH      Post-Treatment Pain Level 0  -KH              Exercise 1    Exercise Name 1 pro ll LE strength  -KH      Time 1 10 mins  -KH      Additional Comments level 4; seat 15  -KH              Exercise 2    Exercise Name 2 QL stretch  -KH      Sets 2 3  -KH      Time 2 30\" holds  -KH              Exercise 3    Exercise Name 3 Trigger Release to R QL  -KH      Time 3 5 mins  -KH              Exercise 4    Exercise Name 4 L SL stretch over bolster  -KH      Time 4 3 mins'  -KH              Exercise 5    Exercise Name 5 DKTC w/ feet on ball  -KH      Sets 5 1  -KH      Reps 5 10  -KH      Time 5 10\" holds  -KH              Exercise 6    Exercise Name 6 LTR with feet over ball  -KH      Sets 6 1  -KH      Reps 6 10  -KH      Time 6 10\" holds  -KH              Exercise 7    Exercise Name 7 MH Hip Extension R only  -KH      Sets 7 3  -KH   "    Reps 7 10  -KH      Additional Comments 2 plates  -KH              Exercise 8    Exercise Name 8 MH Hip Abduction  -KH      Sets 8 3  -KH      Reps 8 10  -KH      Additional Comments 1 plate  -KH              Exercise 9    Exercise Name 9 MH Hip Flexion  -KH      Sets 9 3  -KH      Reps 9 10  -KH      Additional Comments 2 plate  -KH              Exercise 10    Exercise Name 10 educated in log rolling  -            User Key  (r) = Recorded By, (t) = Taken By, (c) = Cosigned By    Initials Name Provider Type    Kristin Garcia PTA Physical Therapist Assistant                              PT OP Goals     Row Name 08/22/22 1500          PT Short Term Goals    STG Date to Achieve 08/25/22  -     STG 1 independent and compliant with HEP  -     STG 1 Progress Ongoing  -     STG 2 flex right hip to 90 degrees in standing without pain  -     STG 2 Progress Met  -KH     STG 3 able to lift right hip on bed or mat without pain  -     STG 3 Progress Met  -            Long Term Goals    LTG Date to Achieve 09/15/22  -     LTG 1 LEFS 50  -KH     LTG 1 Progress Ongoing  -     LTG 2 left hip flex/ext/abd/er/ir at least 4/5  -KH     LTG 2 Progress Ongoing  -KH     LTG 3 ambluate community distances without AD with good gait mechanics  -     LTG 3 Progress Ongoing  -            Time Calculation    PT Goal Re-Cert Due Date 08/25/22  -           User Key  (r) = Recorded By, (t) = Taken By, (c) = Cosigned By    Initials Name Provider Type    Kristin Garcia PTA Physical Therapist Assistant                               Time Calculation:   Start Time: 1512  Stop Time: 1610  Time Calculation (min): 58 min  Therapy Charges for Today     Code Description Service Date Service Provider Modifiers Qty    71837888438 HC PT THER SUPP EA 15 MIN 8/22/2022 Kristin Lamas PTA GP 1    36269283352 HC PT THER PROC EA 15 MIN 8/22/2022 Kristin Lamas PTA GP, CQ 3                    Kristin Lamas PTA  8/22/2022

## 2022-08-25 ENCOUNTER — HOSPITAL ENCOUNTER (OUTPATIENT)
Dept: PHYSICAL THERAPY | Facility: HOSPITAL | Age: 67
Setting detail: THERAPIES SERIES
Discharge: HOME | End: 2022-08-25

## 2022-08-25 DIAGNOSIS — M25.551 RIGHT HIP PAIN: Primary | ICD-10-CM

## 2022-08-25 DIAGNOSIS — Z96.641 STATUS POST HIP REPLACEMENT, RIGHT: ICD-10-CM

## 2022-08-25 PROCEDURE — 97110 THERAPEUTIC EXERCISES: CPT

## 2022-08-25 NOTE — THERAPY TREATMENT NOTE
Outpatient Physical Therapy Ortho Treatment Note  [facility]     Patient Name: Otf Murray  : 1955  MRN: [id number]  Today's Date: 2022      Visit Date: 2022     Subjective Improvement some  Visits 6/7  Visits approved eval +6 until 2022  RTMD 2022  Recert Date 2022    S/P R Posterior GERARDO on 2022  Post op 5 weeks 3 days    Visit Dx:    ICD-10-CM ICD-9-CM   1. Right hip pain  M25.551 719.45   2. Status post hip replacement, right  Z96.641 V43.64       Patient Active Problem List   Diagnosis   • ADHD, predominantly inattentive type   • Abnormal glucose   • Cervical spondylosis without myelopathy   • Chronic fatigue   • Chronic left-sided low back pain without sciatica   • Displacement of cervical intervertebral disc   • Family history of type 2 diabetes mellitus in mother   • Gastroesophageal reflux disease without esophagitis   • History of tobacco abuse   • History of total left knee replacement   • Male hypogonadism   • Non morbid obesity due to excess calories   • Primary osteoarthritis of both knees   • Recurrent major depressive disorder, in partial remission (HCC)   • Seasonal allergic rhinitis due to pollen   • Encounter for colonoscopy due to history of colonic polyp        Past Medical History:   Diagnosis Date   • Acute gastritis without bleeding    • Alcohol abuse counseling and surveillance of alcoholic    • Allergic rhinitis    • Anxiety state    • Attention deficit disorder of childhood with hyperactivity    • Attention deficit hyperactivity disorder    • Attention deficit hyperactivity disorder, predominantly hyperactive impulsive type    • Attention deficit hyperactivity disorder, predominantly inattentive type    • Backache    • Body mass index (bmi) 31.0-31.9, adult     Body mass index (BMI) 31.0-31.9, adult - BMI 33.5      • Burn of lower leg    • Candidiasis of skin    • Cardiac murmur, unspecified    • Decreased testosterone level    •  Depressive disorder     Depressive disorder - acute on chronic      • Dysgraphia    • Edema    • Elbow joint pain    • Encounter for general adult medical examination with abnormal findings    • Encounter for screening for malignant neoplasm of colon    • Essential hypertension    • Essential tremor    • Ex-smoker    • Fatigue    • Gastro-esophageal reflux disease with esophagitis    • Gastroesophageal reflux disease    • Hyperlipidemia    • Hyperlipoproteinemia    • Impotence of organic origin    • Insomnia    • Intermittent palpitations    • Knee pain     Knee pain - patellofemoral      • Lateral epicondylitis    • Low back pain    • Male erectile disorder    • Male erectile dysfunction, unspecified    • Male hypogonadism    • Neck pain     Neck pain aggravated by recumbency      • Obese     Obese - BMI 33.0      • On long term drug therapy    • Osteoarthrosis     Osteoarthrosis, unspecified whether generalized or localized, involving lower leg      • Other obesity    • Other specified diseases of anus and rectum     Other specified diseases of anus and rectum - rectal pain after c-scope prep      • Overweight    • Pain in left knee    • Pain in right knee    • Plantar fasciitis    • Shoulder pain, right    • Spasm of back muscles    • Tachycardia, unspecified    • Tremor, unspecified    • Tubular adenoma of colon    • Unspecified essential hypertension    • Verruca         Past Surgical History:   Procedure Laterality Date   • APPENDECTOMY     • COLONOSCOPY  03/16/2016    One polyp in the sigmoid colon.Resected and retrieved.        • COLONOSCOPY N/A 6/23/2021    Procedure: COLONOSCOPY;  Surgeon: Brenton Ruggiero MD;  Location: [facility] ENDOSCOPY;  Service: Gastroenterology;  Laterality: N/A;   • CYST REMOVAL  10/27/2009     Excision of sebaceous cyst of the forehead, glabellar region.   • ENDOSCOPY  03/16/2016    Mildly severe esophagitis.Gastritis.Normal examined duodenum.Several biopsies obtained in the lower  third of the esophagus.    • ENDOSCOPY AND COLONOSCOPY  04/12/2006     Normal colonoscopic examination    • INCISION AND DRAINAGE ABSCESS  10/24/2012   • INJECTION OF MEDICATION  04/21/2011    Depo Medrol (Methylprednisone) 80mg (1)        • INJECTION OF MEDICATION  02/01/2016    Kenalog (3)        • INJECTION OF MEDICATION  02/19/2016    Toradol (3)    • JOINT REPLACEMENT Bilateral     Knee   • KNEE ARTHROSCOPY  04/02/2009     Medial meniscus tear of left knee   • SCROTUM EXPLORATION  07/02/2002     Excision of epidermal inclusion cyst. base of left scrotum   • SKIN BIOPSY  04/23/2012   • SKIN TAG REMOVAL  04/25/2002    Skin tag, left scrotum         PT Ortho     Row Name 08/25/22 1400       Subjective Comments    Subjective Comments Patient states that he was very sore after last visit.  At times, he does have some low back pain and right knee.  -CP       Precautions and Contraindications    Precautions posterior hip precaution  -CP    Contraindications S/P R [facility] 7-  -CP       Subjective Pain    Able to rate subjective pain? yes  -CP          User Key  (r) = Recorded By, (t) = Taken By, (c) = Cosigned By    Initials Name Provider Type    CP Amy Santoyo, PTA Physical Therapist Assistant                             PT Assessment/Plan     Row Name 08/25/22 1520          PT Assessment    Assessment Comments Patient presents with right ant rotation which was corrected using ME.  He is reporting right knee pain as well.  TTP to right distal ITB with MFR  -CP            PT Plan    PT Frequency 2x/week  -CP     Predicted Duration of Therapy Intervention (PT) 3-4 weeks  -CP     PT Plan Comments Cont with POC.  Request additional visit next vsit  -CP           User Key  (r) = Recorded By, (t) = Taken By, (c) = Cosigned By    Initials Name Provider Type    Amy Fajardo, PTA Physical Therapist Assistant                 Modalities     Row Name 08/25/22 1400             Moist Heat    MH Applied Yes  -CP    "   Location right distal IFB  -CP      PT Moist Heat Minutes 15  -CP      MH S/P Rx Yes  -CP            User Key  (r) = Recorded By, (t) = Taken By, (c) = Cosigned By    Initials Name Provider Type    Amy Fajardo, PTA Physical Therapist Assistant               OP Exercises     Row Name 08/25/22 1532 08/25/22 1400          Subjective Comments    Subjective Comments -- Patient states that he was very sore after last visit.  At times, he does have some low back pain and right knee.  -CP            Subjective Pain    Able to rate subjective pain? -- yes  -CP     Pre-Treatment Pain Level -- 4  -CP     Post-Treatment Pain Level -- 0  -CP            Total Minutes    42468 - PT Therapeutic Exercise Minutes 40  -CP --            Exercise 1    Exercise Name 1 -- Pro Ii level 3  -CP     Time 1 -- 10  -CP            Exercise 2    Exercise Name 2 -- incline stretch  -CP     Cueing 2 -- Verbal;Demo  -CP     Sets 2 -- 3  -CP     Time 2 -- 30\" holds  -CP            Exercise 3    Exercise Name 3 -- standing HS stretch  -CP     Cueing 3 -- Verbal;Demo  -CP     Sets 3 -- 3  -CP     Time 3 -- 30\"  -CP            Exercise 4    Exercise Name 4 -- step up 6\"  -CP     Cueing 4 -- Verbal  -CP     Sets 4 -- 2  -CP     Reps 4 -- 10  -CP            Exercise 5    Exercise Name 5 -- lat step up  -CP     Cueing 5 -- Verbal  -CP     Sets 5 -- 2  -CP     Reps 5 -- 10  -CP            Exercise 6    Exercise Name 6 -- ramp walk  -CP     Cueing 6 -- Verbal;Demo  -CP     Reps 6 -- 5  -CP     Time 6 -- forward  -CP            Exercise 7    Exercise Name 7 -- bridging with hip AD squeezes  -CP     Cueing 7 -- Verbal;Tactile  -CP     Sets 7 -- 2  -CP     Reps 7 -- 10  -CP            Exercise 8    Exercise Name 8 -- SI alighment check  -CP            Exercise 9    Exercise Name 9 -- see manual  -CP            Exercise 10    Exercise Name 10 -- clamshells  -CP     Cueing 10 -- Verbal;Tactile  -CP     Reps 10 -- 20  -CP           User Key  (r) = " Recorded By, (t) = Taken By, (c) = Cosigned By    Initials Name Provider Type    CP Amy Santoyo PTA Physical Therapist Assistant                         Manual Rx (last 36 hours)     Manual Treatments     Row Name 08/25/22 1500             Manual Rx 1    Manual Rx 1 Location R SI  -CP      Manual Rx 1 Type ME to correct R ant rotation  -CP              Manual Rx 2    Manual Rx 2 Location right distal ITB  -CP      Manual Rx 2 Type MFR/TrP  -CP      Manual Rx 2 Duration 5  -CP            User Key  (r) = Recorded By, (t) = Taken By, (c) = Cosigned By    Initials Name Provider Type    CP Amy Santoyo PTA Physical Therapist Assistant                 PT OP Goals     Row Name 08/25/22 1500          PT Short Term Goals    STG Date to Achieve 08/25/22  -CP     STG 1 independent and compliant with HEP  -CP     STG 1 Progress Ongoing  -CP     STG 2 flex right hip to 90 degrees in standing without pain  -CP     STG 2 Progress Met  -CP     STG 3 able to lift right hip on bed or mat without pain  -CP     STG 3 Progress Met  -CP            Long Term Goals    LTG Date to Achieve 09/15/22  -CP     LTG 1 LEFS 50  -CP     LTG 1 Progress Ongoing  -CP     LTG 2 left hip flex/ext/abd/er/ir at least 4/5  -CP     LTG 2 Progress Ongoing  -CP     LTG 3 ambluate community distances without AD with good gait mechanics  -CP     LTG 3 Progress Ongoing  -CP            Time Calculation    PT Goal Re-Cert Due Date 08/25/22  -CP           User Key  (r) = Recorded By, (t) = Taken By, (c) = Cosigned By    Initials Name Provider Type    CP Amy Santoyo PTA Physical Therapist Assistant                               Time Calculation:   Start Time: 1430  Stop Time: 1530  Time Calculation (min): 60 min  Total Timed Code Minutes- PT: 40 minute(s)  Timed Charges  22088 - PT Therapeutic Exercise Minutes: 40  Untimed Charges  PT Moist Heat Minutes: 15  Total Minutes  Timed Charges Total Minutes: 40  Untimed Charges Total Minutes: 15   Total  Minutes: 40  Therapy Charges for Today     Code Description Service Date Service Provider Modifiers Qty    70802519857 HC PT THER SUPP EA 15 MIN 8/25/2022 Amy Santoyo, NANCY GP 1    34281922386 HC PT THER PROC EA 15 MIN 8/25/2022 Amy Santoyo PTA GP, CQ 3                    Amy Santoyo PTA  8/25/2022

## 2022-08-29 ENCOUNTER — HOSPITAL ENCOUNTER (OUTPATIENT)
Dept: PHYSICAL THERAPY | Facility: HOSPITAL | Age: 67
Setting detail: THERAPIES SERIES
Discharge: HOME | End: 2022-08-29

## 2022-08-29 ENCOUNTER — OFFICE VISIT (OUTPATIENT)
Dept: PODIATRY | Facility: CLINIC | Age: 67
End: 2022-08-29

## 2022-08-29 VITALS — OXYGEN SATURATION: 98 % | HEART RATE: 81 BPM | HEIGHT: 77 IN | BODY MASS INDEX: 36.84 KG/M2 | WEIGHT: 312 LBS

## 2022-08-29 DIAGNOSIS — M19.072 PRIMARY OSTEOARTHRITIS OF BOTH FEET: ICD-10-CM

## 2022-08-29 DIAGNOSIS — M19.071 PRIMARY OSTEOARTHRITIS OF BOTH FEET: ICD-10-CM

## 2022-08-29 DIAGNOSIS — M79.671 BILATERAL FOOT PAIN: Primary | ICD-10-CM

## 2022-08-29 DIAGNOSIS — M79.672 BILATERAL FOOT PAIN: Primary | ICD-10-CM

## 2022-08-29 DIAGNOSIS — Z96.641 STATUS POST HIP REPLACEMENT, RIGHT: ICD-10-CM

## 2022-08-29 DIAGNOSIS — M25.551 RIGHT HIP PAIN: Primary | ICD-10-CM

## 2022-08-29 PROCEDURE — 97112 NEUROMUSCULAR REEDUCATION: CPT | Performed by: PHYSICAL THERAPIST

## 2022-08-29 PROCEDURE — 99214 OFFICE O/P EST MOD 30 MIN: CPT | Performed by: PODIATRIST

## 2022-08-29 PROCEDURE — 97110 THERAPEUTIC EXERCISES: CPT | Performed by: PHYSICAL THERAPIST

## 2022-08-29 RX ORDER — MELOXICAM 15 MG/1
15 TABLET ORAL DAILY
Qty: 30 TABLET | Refills: 4 | Status: SHIPPED | OUTPATIENT
Start: 2022-08-29 | End: 2023-03-02

## 2022-08-29 NOTE — THERAPY PROGRESS REPORT/RE-CERT
Outpatient Physical Therapy Ortho Progress Note  [facility]     Patient Name: Otf Murray  : 1955  MRN: [id number]  Today's Date: 2022      Visit Date: 2022  Subjective Improvement 50-60%  Visits 7/8  Visits approved eval +6 until 2022  RTMD 2022  Recert Date 2022     S/P R Posterior GERARDO on 2022  Post op 6 weeks 0 days    Visit Dx:    ICD-10-CM ICD-9-CM   1. Right hip pain  M25.551 719.45   2. Status post hip replacement, right  Z96.641 V43.64       Patient Active Problem List   Diagnosis   • ADHD, predominantly inattentive type   • Abnormal glucose   • Cervical spondylosis without myelopathy   • Chronic fatigue   • Chronic left-sided low back pain without sciatica   • Displacement of cervical intervertebral disc   • Family history of type 2 diabetes mellitus in mother   • Gastroesophageal reflux disease without esophagitis   • History of tobacco abuse   • History of total left knee replacement   • Male hypogonadism   • Non morbid obesity due to excess calories   • Primary osteoarthritis of both knees   • Recurrent major depressive disorder, in partial remission (HCC)   • Seasonal allergic rhinitis due to pollen   • Encounter for colonoscopy due to history of colonic polyp        Past Medical History:   Diagnosis Date   • Acute gastritis without bleeding    • Alcohol abuse counseling and surveillance of alcoholic    • Allergic rhinitis    • Anxiety state    • Attention deficit disorder of childhood with hyperactivity    • Attention deficit hyperactivity disorder    • Attention deficit hyperactivity disorder, predominantly hyperactive impulsive type    • Attention deficit hyperactivity disorder, predominantly inattentive type    • Backache    • Body mass index (bmi) 31.0-31.9, adult     Body mass index (BMI) 31.0-31.9, adult - BMI 33.5      • Burn of lower leg    • Candidiasis of skin    • Cardiac murmur, unspecified    • Decreased testosterone level    •  Depressive disorder     Depressive disorder - acute on chronic      • Dysgraphia    • Edema    • Elbow joint pain    • Encounter for general adult medical examination with abnormal findings    • Encounter for screening for malignant neoplasm of colon    • Essential hypertension    • Essential tremor    • Ex-smoker    • Fatigue    • Gastro-esophageal reflux disease with esophagitis    • Gastroesophageal reflux disease    • Hyperlipidemia    • Hyperlipoproteinemia    • Impotence of organic origin    • Insomnia    • Intermittent palpitations    • Knee pain     Knee pain - patellofemoral      • Lateral epicondylitis    • Low back pain    • Male erectile disorder    • Male erectile dysfunction, unspecified    • Male hypogonadism    • Neck pain     Neck pain aggravated by recumbency      • Obese     Obese - BMI 33.0      • On long term drug therapy    • Osteoarthrosis     Osteoarthrosis, unspecified whether generalized or localized, involving lower leg      • Other obesity    • Other specified diseases of anus and rectum     Other specified diseases of anus and rectum - rectal pain after c-scope prep      • Overweight    • Pain in left knee    • Pain in right knee    • Plantar fasciitis    • Shoulder pain, right    • Spasm of back muscles    • Tachycardia, unspecified    • Tremor, unspecified    • Tubular adenoma of colon    • Unspecified essential hypertension    • Verruca         Past Surgical History:   Procedure Laterality Date   • APPENDECTOMY     • COLONOSCOPY  03/16/2016    One polyp in the sigmoid colon.Resected and retrieved.        • COLONOSCOPY N/A 6/23/2021    Procedure: COLONOSCOPY;  Surgeon: Brenton Ruggiero MD;  Location: [facility] ENDOSCOPY;  Service: Gastroenterology;  Laterality: N/A;   • CYST REMOVAL  10/27/2009     Excision of sebaceous cyst of the forehead, glabellar region.   • ENDOSCOPY  03/16/2016    Mildly severe esophagitis.Gastritis.Normal examined duodenum.Several biopsies obtained in the lower  third of the esophagus.    • ENDOSCOPY AND COLONOSCOPY  04/12/2006     Normal colonoscopic examination    • INCISION AND DRAINAGE ABSCESS  10/24/2012   • INJECTION OF MEDICATION  04/21/2011    Depo Medrol (Methylprednisone) 80mg (1)        • INJECTION OF MEDICATION  02/01/2016    Kenalog (3)        • INJECTION OF MEDICATION  02/19/2016    Toradol (3)    • JOINT REPLACEMENT Bilateral     Knee   • KNEE ARTHROSCOPY  04/02/2009     Medial meniscus tear of left knee   • SCROTUM EXPLORATION  07/02/2002     Excision of epidermal inclusion cyst. base of left scrotum   • SKIN BIOPSY  04/23/2012   • SKIN TAG REMOVAL  04/25/2002    Skin tag, left scrotum         PT Ortho     Row Name 08/29/22 1100       Subjective Comments    Subjective Comments 50-60% improvement.  -BS       Precautions and Contraindications    Precautions posterior hip precaution  -BS    Contraindications S/P R GERARDO 7-  -BS       Subjective Pain    Able to rate subjective pain? yes  -BS    Pre-Treatment Pain Level 3  -BS    Post-Treatment Pain Level 2  -BS       MMT (Manual Muscle Testing)    General MMT Comments R hip ext 4+/5 IR 4+/5 ER 5/5 abd 4/5  -BS       MMT Right Lower Ext    Rt Hip Flexion MMT, Gross Movement (4+/5) good plus  -BS    Rt Lower Extremity Comments  R hip abduction 4/5  -BS       Balance Skills Training    SLS 30 sec on R  -BS          User Key  (r) = Recorded By, (t) = Taken By, (c) = Cosigned By    Initials Name Provider Type    Caleb Oh, PT Physical Therapist                             PT Assessment/Plan     Row Name 08/29/22 1100          PT Assessment    Assessment Comments Progressing toward goals, still limited by R hip abduction and flexion weakness affecting the patient's function. Would benefit from additional PT to maximize functional potential.  -BS     Rehab Potential Good  -BS     Patient/caregiver participated in establishment of treatment plan and goals Yes  -BS            PT Plan    PT Frequency 2x/week   "-BS     Predicted Duration of Therapy Intervention (PT) 3-4 more sessions  -BS     PT Plan Comments advance hip/knee strengthening. Awaiting authorization for additional PT sessions.  -BS           User Key  (r) = Recorded By, (t) = Taken By, (c) = Cosigned By    Initials Name Provider Type    Caleb Oh, PT Physical Therapist                 Modalities     Row Name 08/29/22 1100             Ice    Ice Applied Yes  -BS      Location R anterior hip  -BS      PT Ice Rx Minutes 10  -BS      Ice S/P Rx Yes  -BS            User Key  (r) = Recorded By, (t) = Taken By, (c) = Cosigned By    Initials Name Provider Type    Caleb Oh, PT Physical Therapist               OP Exercises     Row Name 08/29/22 1100             Subjective Comments    Subjective Comments 50-60% improvement.  -BS              Subjective Pain    Able to rate subjective pain? yes  -BS      Pre-Treatment Pain Level 3  -BS      Post-Treatment Pain Level 2  -BS              Exercise 1    Exercise Name 1 Pro Ii level 3  -BS      Time 1 10  -BS              Exercise 2    Exercise Name 2 SL R quad/hip flex S  -BS      Sets 2 1  -BS      Reps 2 3  -BS      Time 2 30\" HOLD  -BS              Exercise 3    Exercise Name 3 SLS R  -BS      Time 3 30 sec  -BS      Additional Comments 1 trial  -BS              Exercise 4    Exercise Name 4 seated resisted R hip flex +3 lb aw  -BS      Sets 4 2  -BS      Reps 4 20  -BS      Additional Comments R only  -BS              Exercise 5    Exercise Name 5 SL R hip abduction AROM  -BS      Sets 5 1  -BS      Reps 5 10  -BS              Exercise 6    Exercise Name 6 SL resisted clamshells w/ blue TB  -BS      Sets 6 1  -BS      Reps 6 10  -BS              Exercise 7    Exercise Name 7 standing R hamstrings S  -BS      Sets 7 1  -BS      Reps 7 3  -BS      Time 7 30\" hold  -BS              Exercise 8    Exercise Name 8 see modalities  -BS              Exercise 9    Exercise Name 9 MMT reassessment  -BS            " User Key  (r) = Recorded By, (t) = Taken By, (c) = Cosigned By    Initials Name Provider Type    Caleb Oh, PT Physical Therapist                              PT OP Goals     Row Name 08/29/22 1100          PT Short Term Goals    STG Date to Achieve 09/12/22  -BS     STG 1 independent and compliant with HEP  -BS     STG 1 Progress Ongoing  -BS     STG 2 flex right hip to 90 degrees in standing without pain  -BS     STG 2 Progress Met  -BS     STG 3 able to lift right hip on bed or mat without pain  -BS     STG 3 Progress Met  -BS            Long Term Goals    LTG Date to Achieve 09/19/22  -BS     LTG 1 LEFS 50  -BS     LTG 1 Progress Ongoing;Progressing  -BS     LTG 2 right hip flex/ext/abd/er/ir at least 4/5  -BS     LTG 2 Progress Met  -BS     LTG 3 ambluate community distances without AD with good gait mechanics  -BS     LTG 3 Progress Ongoing  -BS     LTG 4 Improve R hip flex MMT to 5/5  -BS     LTG 4 Progress New  -BS     LTG 5 Able to perform 10 consecutive sidelying right hip abduction AROM with minimal to no R hip pain.  -BS     LTG 5 Progress New  -BS            Time Calculation    PT Goal Re-Cert Due Date 09/19/22  -BS           User Key  (r) = Recorded By, (t) = Taken By, (c) = Cosigned By    Initials Name Provider Type    Caleb Oh, PT Physical Therapist                     Outcome Measure Options: Lower Extremity Functional Scale (LEFS)  Lower Extremity Functional Index  Any of your usual work, housework or school activities: Moderate difficulty  Your usual hobbies, recreational or sporting activities: Quite a bit of difficulty  Getting into or out of the bath: No difficulty  Walking between rooms: A little bit of difficulty  Putting on your shoes or socks: Extreme difficulty or unable to perform activity  Squatting: A little bit of difficulty  Lifting an object, like a bag of groceries from the floor: Moderate difficulty  Performing light activities around your home: A little bit of  difficulty  Performing heavy activities around your home: Extreme difficulty or unable to perform activity  Getting into or out of a car: A little bit of difficulty  Walking 2 blocks: Quite a bit of difficulty  Walking a mile: Moderate difficulty  Going up or down 10 stairs (about 1 flight of stairs): Moderate difficulty  Standing for 1 hour: Extreme difficulty or unable to perform activity  Sitting for 1 hour: No difficulty  Running on even ground: Extreme difficulty or unable to perform activity  Running on uneven ground: Extreme difficulty or unable to perform activity  Making sharp turns while running fast: Extreme difficulty or unable to perform activity  Hopping: Extreme difficulty or unable to perform activity  Rolling over in bed: A little bit of difficulty  Total: 33      Time Calculation:   Start Time: 1100  Stop Time: 1200  Time Calculation (min): 60 min  PT Non-Billable Time (min): 10 min  Total Timed Code Minutes- PT: 50 minute(s)  Untimed Charges  PT Ice Rx Minutes: 10  Total Minutes  Untimed Charges Total Minutes: 10   Total Minutes: 10  Therapy Charges for Today     Code Description Service Date Service Provider Modifiers Qty    05913293836 HC PT THER PROC EA 15 MIN 8/29/2022 Caleb Goldman, PT GP 2    18875689462 HC PT NEUROMUSC RE EDUCATION EA 15 MIN 8/29/2022 Caleb Goldman, PT GP 1    44011002197 HC PT THER SUPP EA 15 MIN 8/29/2022 Caleb Goldman, PT GP 1          PT G-Jr  Outcome Measure Options: Lower Extremity Functional Scale (LEFS)  Total: 33         Caleb Goldman PT  8/29/2022

## 2022-09-06 ENCOUNTER — HOSPITAL ENCOUNTER (OUTPATIENT)
Dept: PHYSICAL THERAPY | Facility: HOSPITAL | Age: 67
Setting detail: THERAPIES SERIES
Discharge: HOME | End: 2022-09-06

## 2022-09-06 DIAGNOSIS — Z96.641 STATUS POST HIP REPLACEMENT, RIGHT: ICD-10-CM

## 2022-09-06 DIAGNOSIS — M25.551 RIGHT HIP PAIN: Primary | ICD-10-CM

## 2022-09-06 PROCEDURE — 97110 THERAPEUTIC EXERCISES: CPT

## 2022-09-06 PROCEDURE — 97530 THERAPEUTIC ACTIVITIES: CPT

## 2022-09-06 NOTE — THERAPY TREATMENT NOTE
Outpatient Physical Therapy Ortho Treatment Note  [facility]     Patient Name: Otf Murray  : 1955  MRN: [id number]  Today's Date: 2022      Visit Date: 2022     Subjective Improvement: 75%  Attendance:   (1 of  until 10/15)  Next MD Visit : 2023  Recert Date:  22      Therapy Diagnosis:  S/P R Posterior GERARDO on 2022        Visit Dx:    ICD-10-CM ICD-9-CM   1. Right hip pain  M25.551 719.45   2. Status post hip replacement, right  Z96.641 V43.64       Patient Active Problem List   Diagnosis   • ADHD, predominantly inattentive type   • Abnormal glucose   • Cervical spondylosis without myelopathy   • Chronic fatigue   • Chronic left-sided low back pain without sciatica   • Displacement of cervical intervertebral disc   • Family history of type 2 diabetes mellitus in mother   • Gastroesophageal reflux disease without esophagitis   • History of tobacco abuse   • History of total left knee replacement   • Male hypogonadism   • Non morbid obesity due to excess calories   • Primary osteoarthritis of both knees   • Recurrent major depressive disorder, in partial remission (HCC)   • Seasonal allergic rhinitis due to pollen   • Encounter for colonoscopy due to history of colonic polyp        Past Medical History:   Diagnosis Date   • Acute gastritis without bleeding    • Alcohol abuse counseling and surveillance of alcoholic    • Allergic rhinitis    • Anxiety state    • Attention deficit disorder of childhood with hyperactivity    • Attention deficit hyperactivity disorder    • Attention deficit hyperactivity disorder, predominantly hyperactive impulsive type    • Attention deficit hyperactivity disorder, predominantly inattentive type    • Backache    • Body mass index (bmi) 31.0-31.9, adult     Body mass index (BMI) 31.0-31.9, adult - BMI 33.5      • Burn of lower leg    • Candidiasis of skin    • Cardiac murmur, unspecified    • Decreased testosterone level    •  Depressive disorder     Depressive disorder - acute on chronic      • Dysgraphia    • Edema    • Elbow joint pain    • Encounter for general adult medical examination with abnormal findings    • Encounter for screening for malignant neoplasm of colon    • Essential hypertension    • Essential tremor    • Ex-smoker    • Fatigue    • Gastro-esophageal reflux disease with esophagitis    • Gastroesophageal reflux disease    • Hyperlipidemia    • Hyperlipoproteinemia    • Impotence of organic origin    • Insomnia    • Intermittent palpitations    • Knee pain     Knee pain - patellofemoral      • Lateral epicondylitis    • Low back pain    • Male erectile disorder    • Male erectile dysfunction, unspecified    • Male hypogonadism    • Neck pain     Neck pain aggravated by recumbency      • Obese     Obese - BMI 33.0      • On long term drug therapy    • Osteoarthrosis     Osteoarthrosis, unspecified whether generalized or localized, involving lower leg      • Other obesity    • Other specified diseases of anus and rectum     Other specified diseases of anus and rectum - rectal pain after c-scope prep      • Overweight    • Pain in left knee    • Pain in right knee    • Plantar fasciitis    • Shoulder pain, right    • Spasm of back muscles    • Tachycardia, unspecified    • Tremor, unspecified    • Tubular adenoma of colon    • Unspecified essential hypertension    • Verruca         Past Surgical History:   Procedure Laterality Date   • APPENDECTOMY     • COLONOSCOPY  03/16/2016    One polyp in the sigmoid colon.Resected and retrieved.        • COLONOSCOPY N/A 6/23/2021    Procedure: COLONOSCOPY;  Surgeon: Brenton Ruggiero MD;  Location: [facility] ENDOSCOPY;  Service: Gastroenterology;  Laterality: N/A;   • CYST REMOVAL  10/27/2009     Excision of sebaceous cyst of the forehead, glabellar region.   • ENDOSCOPY  03/16/2016    Mildly severe esophagitis.Gastritis.Normal examined duodenum.Several biopsies obtained in the lower  third of the esophagus.    • ENDOSCOPY AND COLONOSCOPY  04/12/2006     Normal colonoscopic examination    • INCISION AND DRAINAGE ABSCESS  10/24/2012   • INJECTION OF MEDICATION  04/21/2011    Depo Medrol (Methylprednisone) 80mg (1)        • INJECTION OF MEDICATION  02/01/2016    Kenalog (3)        • INJECTION OF MEDICATION  02/19/2016    Toradol (3)    • JOINT REPLACEMENT Bilateral     Knee   • KNEE ARTHROSCOPY  04/02/2009     Medial meniscus tear of left knee   • SCROTUM EXPLORATION  07/02/2002     Excision of epidermal inclusion cyst. base of left scrotum   • SKIN BIOPSY  04/23/2012   • SKIN TAG REMOVAL  04/25/2002    Skin tag, left scrotum         PT Ortho     Row Name 09/06/22 0800       Precautions and Contraindications    Precautions posterior hip precaution  -    Contraindications S/P R [facility] 7-  -       Posture/Observations    Posture/Observations Comments ambulating without AD; good stride, non antalgic; incision well healed;  -          User Key  (r) = Recorded By, (t) = Taken By, (c) = Cosigned By    Initials Name Provider Type    Kristin Garcia PTA Physical Therapist Assistant                             PT Assessment/Plan     Row Name 09/06/22 0800          PT Assessment    Functional Limitations Decreased safety during functional activities;Impaired gait;Impaired locomotion;Limitation in home management;Limitations in community activities;Limitations in functional capacity and performance;Performance in leisure activities;Performance in self-care ADL  -     Impairments Balance;Gait;Impaired muscle endurance;Impaired muscle length;Muscle strength;Pain;Range of motion  -     Assessment Comments progressed with strengthening and endurance of the R LE and tolerated well; No increaased pain or fatigue noted;  -     Rehab Potential Good  -     Patient/caregiver participated in establishment of treatment plan and goals Yes  -     Patient would benefit from skilled therapy intervention  "Yes  -KH            PT Plan    PT Frequency 2x/week  -KH     Predicted Duration of Therapy Intervention (PT) 3-4 more session  -KH     PT Plan Comments 3 more sessions approved and then d/c to independent program and HEP;  -KH           User Key  (r) = Recorded By, (t) = Taken By, (c) = Cosigned By    Initials Name Provider Type    Kristin Garcia PTA Physical Therapist Assistant                   OP Exercises     Row Name 09/06/22 0800             Subjective Comments    Subjective Comments patient reports that he is about to go on his trip; Rode his tractor yesterday and did well; reports that that he is doing better; feels that he is getting stronger;  -KH              Subjective Pain    Able to rate subjective pain? yes  -KH      Pre-Treatment Pain Level 1  -KH      Post-Treatment Pain Level 0  -KH              Exercise 1    Exercise Name 1 pro ll LE strength  -KH      Time 1 10 mins  -KH      Additional Comments level 3; seat 15  -KH              Exercise 2    Exercise Name 2 standing hamstring stretch  -KH      Sets 2 3  -KH      Time 2 30\" holds  -KH              Exercise 3    Exercise Name 3 standing hip flexor stretch  -KH      Sets 3 3  -KH      Time 3 30\" holds  -KH              Exercise 4    Exercise Name 4 Ramp Fwd-->Fwd  -KH      Time 4 5 trips  -KH      Additional Comments carrying a 8# plyoball  -KH              Exercise 5    Exercise Name 5 Ramp Bkd-->Fwd  -KH      Reps 5 5 trips  -KH      Additional Comments carrying a 8# plyoball  -KH              Exercise 6    Exercise Name 6 Ramp lateral R/L lead  -KH      Reps 6 5 trips each  -KH              Exercise 7    Exercise Name 7 Cybex Leg Press DL/SL  -KH      Sets 7 3  -KH      Reps 7 10 each  -KH      Additional Comments 130#/100#  -KH              Exercise 8    Exercise Name 8 Cybex Hip Abd/Add  -KH      Sets 8 3  -KH      Reps 8 10 each  -KH      Additional Comments 70#  -KH            User Key  (r) = Recorded By, (t) = Taken By, (c) = Cosigned By "    Initials Name Provider Type    Kristin Garcia PTA Physical Therapist Assistant                              PT OP Goals     Row Name 09/06/22 0800          PT Short Term Goals    STG Date to Achieve 09/12/22  -     STG 1 independent and compliant with HEP  -     STG 1 Progress Ongoing  -     STG 2 flex right hip to 90 degrees in standing without pain  -     STG 2 Progress Met  -     STG 3 able to lift right hip on bed or mat without pain  -     STG 3 Progress Met  -            Long Term Goals    LTG Date to Achieve 09/19/22  -     LTG 1 LEFS 50  -     LTG 1 Progress Ongoing;Progressing  -     LTG 2 right hip flex/ext/abd/er/ir at least 4/5  -     LTG 2 Progress Met  -     LTG 3 ambluate community distances without AD with good gait mechanics  -     LTG 3 Progress Ongoing  -     LTG 4 Improve R hip flex MMT to 5/5  -     LTG 4 Progress New  -     LTG 5 Able to perform 10 consecutive sidelying right hip abduction AROM with minimal to no R hip pain.  -     LTG 5 Progress New  -            Time Calculation    PT Goal Re-Cert Due Date 09/19/22  -           User Key  (r) = Recorded By, (t) = Taken By, (c) = Cosigned By    Initials Name Provider Type    Kristin Garcia PTA Physical Therapist Assistant                               Time Calculation:   Start Time: 0800  Stop Time: 0845  Time Calculation (min): 45 min  Therapy Charges for Today     Code Description Service Date Service Provider Modifiers Qty    76870416249  PT THERAPEUTIC ACT EA 15 MIN 9/6/2022 Kristin Lamas PTA GP, CQ 1    81681512096 HC PT THER PROC EA 15 MIN 9/6/2022 Kristin Lamas PTA GP, CQ 2                    Kristin Lamas PTA  9/6/2022

## 2022-09-13 ENCOUNTER — HOSPITAL ENCOUNTER (OUTPATIENT)
Dept: PHYSICAL THERAPY | Facility: HOSPITAL | Age: 67
Setting detail: THERAPIES SERIES
Discharge: HOME | End: 2022-09-13

## 2022-09-13 DIAGNOSIS — M25.551 RIGHT HIP PAIN: Primary | ICD-10-CM

## 2022-09-13 DIAGNOSIS — Z96.641 STATUS POST HIP REPLACEMENT, RIGHT: ICD-10-CM

## 2022-09-13 PROCEDURE — 97110 THERAPEUTIC EXERCISES: CPT

## 2022-09-13 PROCEDURE — 97530 THERAPEUTIC ACTIVITIES: CPT

## 2022-09-13 NOTE — THERAPY TREATMENT NOTE
Outpatient Physical Therapy Ortho Treatment Note  [facility]     Patient Name: Otf Murray  : 1955  MRN: [id number]  Today's Date: 2022      Visit Date: 2022     Subjective Improvement: 80-90%  Attendance:  9/10 (2 of 4 until 10/15)  Next MD Visit : 2023  Recert Date:  22        Therapy Diagnosis:  S/P R Posterior GERARDO on 2022    Visit Dx:    ICD-10-CM ICD-9-CM   1. Right hip pain  M25.551 719.45   2. Status post hip replacement, right  Z96.641 V43.64       Patient Active Problem List   Diagnosis   • ADHD, predominantly inattentive type   • Abnormal glucose   • Cervical spondylosis without myelopathy   • Chronic fatigue   • Chronic left-sided low back pain without sciatica   • Displacement of cervical intervertebral disc   • Family history of type 2 diabetes mellitus in mother   • Gastroesophageal reflux disease without esophagitis   • History of tobacco abuse   • History of total left knee replacement   • Male hypogonadism   • Non morbid obesity due to excess calories   • Primary osteoarthritis of both knees   • Recurrent major depressive disorder, in partial remission (HCC)   • Seasonal allergic rhinitis due to pollen   • Encounter for colonoscopy due to history of colonic polyp        Past Medical History:   Diagnosis Date   • Acute gastritis without bleeding    • Alcohol abuse counseling and surveillance of alcoholic    • Allergic rhinitis    • Anxiety state    • Attention deficit disorder of childhood with hyperactivity    • Attention deficit hyperactivity disorder    • Attention deficit hyperactivity disorder, predominantly hyperactive impulsive type    • Attention deficit hyperactivity disorder, predominantly inattentive type    • Backache    • Body mass index (bmi) 31.0-31.9, adult     Body mass index (BMI) 31.0-31.9, adult - BMI 33.5      • Burn of lower leg    • Candidiasis of skin    • Cardiac murmur, unspecified    • Decreased testosterone level    •  Depressive disorder     Depressive disorder - acute on chronic      • Dysgraphia    • Edema    • Elbow joint pain    • Encounter for general adult medical examination with abnormal findings    • Encounter for screening for malignant neoplasm of colon    • Essential hypertension    • Essential tremor    • Ex-smoker    • Fatigue    • Gastro-esophageal reflux disease with esophagitis    • Gastroesophageal reflux disease    • Hyperlipidemia    • Hyperlipoproteinemia    • Impotence of organic origin    • Insomnia    • Intermittent palpitations    • Knee pain     Knee pain - patellofemoral      • Lateral epicondylitis    • Low back pain    • Male erectile disorder    • Male erectile dysfunction, unspecified    • Male hypogonadism    • Neck pain     Neck pain aggravated by recumbency      • Obese     Obese - BMI 33.0      • On long term drug therapy    • Osteoarthrosis     Osteoarthrosis, unspecified whether generalized or localized, involving lower leg      • Other obesity    • Other specified diseases of anus and rectum     Other specified diseases of anus and rectum - rectal pain after c-scope prep      • Overweight    • Pain in left knee    • Pain in right knee    • Plantar fasciitis    • Shoulder pain, right    • Spasm of back muscles    • Tachycardia, unspecified    • Tremor, unspecified    • Tubular adenoma of colon    • Unspecified essential hypertension    • Verruca         Past Surgical History:   Procedure Laterality Date   • APPENDECTOMY     • COLONOSCOPY  03/16/2016    One polyp in the sigmoid colon.Resected and retrieved.        • COLONOSCOPY N/A 6/23/2021    Procedure: COLONOSCOPY;  Surgeon: Brenton Ruggiero MD;  Location: [facility] ENDOSCOPY;  Service: Gastroenterology;  Laterality: N/A;   • CYST REMOVAL  10/27/2009     Excision of sebaceous cyst of the forehead, glabellar region.   • ENDOSCOPY  03/16/2016    Mildly severe esophagitis.Gastritis.Normal examined duodenum.Several biopsies obtained in the lower  third of the esophagus.    • ENDOSCOPY AND COLONOSCOPY  04/12/2006     Normal colonoscopic examination    • INCISION AND DRAINAGE ABSCESS  10/24/2012   • INJECTION OF MEDICATION  04/21/2011    Depo Medrol (Methylprednisone) 80mg (1)        • INJECTION OF MEDICATION  02/01/2016    Kenalog (3)        • INJECTION OF MEDICATION  02/19/2016    Toradol (3)    • JOINT REPLACEMENT Bilateral     Knee   • KNEE ARTHROSCOPY  04/02/2009     Medial meniscus tear of left knee   • SCROTUM EXPLORATION  07/02/2002     Excision of epidermal inclusion cyst. base of left scrotum   • SKIN BIOPSY  04/23/2012   • SKIN TAG REMOVAL  04/25/2002    Skin tag, left scrotum         PT Ortho     Row Name 09/13/22 0900       Precautions and Contraindications    Precautions posterior hip precaution  -    Contraindications S/P R [facility] 7-  -       Subjective Pain    Pre-Treatment Pain Level 1  -          User Key  (r) = Recorded By, (t) = Taken By, (c) = Cosigned By    Initials Name Provider Type    Kristin Garcia PTA Physical Therapist Assistant                             PT Assessment/Plan     Row Name 09/13/22 0900          PT Assessment    Functional Limitations Decreased safety during functional activities;Impaired gait;Impaired locomotion;Limitation in home management;Limitations in community activities;Limitations in functional capacity and performance;Performance in leisure activities;Performance in self-care ADL  -     Impairments Balance;Gait;Impaired muscle endurance;Impaired muscle length;Muscle strength;Pain;Range of motion  -     Assessment Comments able to increase weight with vcybex equipment and overall making progress towards unmet goals;  -KH     Rehab Potential Good  -     Patient/caregiver participated in establishment of treatment plan and goals Yes  -     Patient would benefit from skilled therapy intervention Yes  -KH            PT Plan    PT Frequency 2x/week  -     Predicted Duration of Therapy  "Intervention (PT) 3-4 more session  -KH     PT Plan Comments Continue with two more sessions then d/c to independent program and HEP.  -KH           User Key  (r) = Recorded By, (t) = Taken By, (c) = Cosigned By    Initials Name Provider Type    Kristin Garcia PTA Physical Therapist Assistant                   OP Exercises     Row Name 09/13/22 0900             Subjective Comments    Subjective Comments Patient reports that he got back late last night from his trip; But reports that he did well; reports that he was even able to get down on the ground and change a tire; Reports that 80-90% better;  -KH              Subjective Pain    Able to rate subjective pain? yes  -KH      Pre-Treatment Pain Level 1  -KH      Post-Treatment Pain Level 0  -KH              Exercise 1    Exercise Name 1 pro ll LE strength  -KH      Time 1 10 mins  -KH      Additional Comments level 4; seat 15  -KH              Exercise 2    Exercise Name 2 MH Hip extension R only  -KH      Sets 2 2  -KH      Reps 2 15  -KH      Additional Comments 3 plates  -KH              Exercise 3    Exercise Name 3 MH hip flexion R only  -KH      Sets 3 1/15 & 1/8  -KH      Additional Comments 3 plates  -KH              Exercise 4    Exercise Name 4 MH Hip Abduction  -KH      Sets 4 2  -KH      Reps 4 15 R only  -KH      Additional Comments 2 plates  -KH              Exercise 5    Exercise Name 5 standing hip flexor stretch R  -KH      Sets 5 3  -KH      Time 5 30\" holds  -KH              Exercise 6    Exercise Name 6 cybex leg press DL  -KH      Sets 6 3  -KH      Reps 6 10  -KH      Additional Comments 150#  -KH              Exercise 7    Exercise Name 7 cybex leg press SL  -KH      Sets 7 2  -KH      Reps 7 10  -KH      Additional Comments 100#  -KH              Exercise 8    Exercise Name 8 Ramp Fwd-->Fwd  -KH      Reps 8 5 trips  -KH              Exercise 9    Exercise Name 9 Ramp Bkd-->Fwd  -KH      Reps 9 5 trips  -KH              Exercise 10    " "Exercise Name 10 Ramp R/L lead lateral  -      Reps 10 5 trips each  -              Exercise 11    Exercise Name 11 standing hamstring stretch B  -      Sets 11 3  -KH      Time 11 30\" holds  -            User Key  (r) = Recorded By, (t) = Taken By, (c) = Cosigned By    Initials Name Provider Type    Kristin Garcia PTA Physical Therapist Assistant                              PT OP Goals     Row Name 09/13/22 0900          PT Short Term Goals    STG Date to Achieve 09/12/22  -     STG 1 independent and compliant with HEP  -     STG 1 Progress Met   -     STG 2 flex right hip to 90 degrees in standing without pain  -     STG 2 Progress Met   -     STG 3 able to lift right hip on bed or mat without pain  -     STG 3 Progress Met   -            Long Term Goals    LTG Date to Achieve 09/19/22  -     LTG 1 LEFS 50  -     LTG 1 Progress Ongoing;Progressing  -     LTG 2 right hip flex/ext/abd/er/ir at least 4/5  -KH     LTG 2 Progress Met   -     LTG 3 ambluate community distances without AD with good gait mechanics  -     LTG 3 Progress Met   -     LTG 4 Improve R hip flex MMT to 5/5  -KH     LTG 4 Progress Progressing  -     LTG 5 Able to perform 10 consecutive sidelying right hip abduction AROM with minimal to no R hip pain.  -     LTG 5 Progress Progressing  -            Time Calculation    PT Goal Re-Cert Due Date 09/19/22  -           User Key  (r) = Recorded By, (t) = Taken By, (c) = Cosigned By    Initials Name Provider Type    Kristin Garcia PTA Physical Therapist Assistant                               Time Calculation:   Start Time: 0934  Stop Time: 1014  Time Calculation (min): 40 min  Therapy Charges for Today     Code Description Service Date Service Provider Modifiers Qty    58832619162 HC PT THER PROC EA 15 MIN 9/13/2022 Kristin Lamas PTA GP, CQ 2    46105434039 HC PT THERAPEUTIC ACT EA 15 MIN 9/13/2022 Kristin Lamas PTA GP, CQ 1                    Kristin Lamas, " PTA  9/13/2022

## 2022-09-15 ENCOUNTER — HOSPITAL ENCOUNTER (OUTPATIENT)
Dept: PHYSICAL THERAPY | Facility: HOSPITAL | Age: 67
Setting detail: THERAPIES SERIES
Discharge: HOME | End: 2022-09-15

## 2022-09-15 DIAGNOSIS — M25.551 RIGHT HIP PAIN: Primary | ICD-10-CM

## 2022-09-15 DIAGNOSIS — Z96.641 STATUS POST HIP REPLACEMENT, RIGHT: ICD-10-CM

## 2022-09-15 PROCEDURE — 97110 THERAPEUTIC EXERCISES: CPT

## 2022-09-15 PROCEDURE — 97530 THERAPEUTIC ACTIVITIES: CPT

## 2022-09-15 NOTE — THERAPY TREATMENT NOTE
Outpatient Physical Therapy Ortho Treatment Note  [facility]     Patient Name: Otf Murray  : 1955  MRN: [id number]  Today's Date: 9/15/2022      Visit Date: 09/15/2022     Subjective Improvement: 80-90%  Attendance:  10/11 (3 of 4 until 10/15/22)  Next MD Visit : 2023  Recert Date:  22        Therapy Diagnosis:  S/P R Posterior GERARDO on 2022    Visit Dx:    ICD-10-CM ICD-9-CM   1. Right hip pain  M25.551 719.45   2. Status post hip replacement, right  Z96.641 V43.64       Patient Active Problem List   Diagnosis   • ADHD, predominantly inattentive type   • Abnormal glucose   • Cervical spondylosis without myelopathy   • Chronic fatigue   • Chronic left-sided low back pain without sciatica   • Displacement of cervical intervertebral disc   • Family history of type 2 diabetes mellitus in mother   • Gastroesophageal reflux disease without esophagitis   • History of tobacco abuse   • History of total left knee replacement   • Male hypogonadism   • Non morbid obesity due to excess calories   • Primary osteoarthritis of both knees   • Recurrent major depressive disorder, in partial remission (HCC)   • Seasonal allergic rhinitis due to pollen   • Encounter for colonoscopy due to history of colonic polyp        Past Medical History:   Diagnosis Date   • Acute gastritis without bleeding    • Alcohol abuse counseling and surveillance of alcoholic    • Allergic rhinitis    • Anxiety state    • Attention deficit disorder of childhood with hyperactivity    • Attention deficit hyperactivity disorder    • Attention deficit hyperactivity disorder, predominantly hyperactive impulsive type    • Attention deficit hyperactivity disorder, predominantly inattentive type    • Backache    • Body mass index (bmi) 31.0-31.9, adult     Body mass index (BMI) 31.0-31.9, adult - BMI 33.5      • Burn of lower leg    • Candidiasis of skin    • Cardiac murmur, unspecified    • Decreased testosterone level    •  Depressive disorder     Depressive disorder - acute on chronic      • Dysgraphia    • Edema    • Elbow joint pain    • Encounter for general adult medical examination with abnormal findings    • Encounter for screening for malignant neoplasm of colon    • Essential hypertension    • Essential tremor    • Ex-smoker    • Fatigue    • Gastro-esophageal reflux disease with esophagitis    • Gastroesophageal reflux disease    • Hyperlipidemia    • Hyperlipoproteinemia    • Impotence of organic origin    • Insomnia    • Intermittent palpitations    • Knee pain     Knee pain - patellofemoral      • Lateral epicondylitis    • Low back pain    • Male erectile disorder    • Male erectile dysfunction, unspecified    • Male hypogonadism    • Neck pain     Neck pain aggravated by recumbency      • Obese     Obese - BMI 33.0      • On long term drug therapy    • Osteoarthrosis     Osteoarthrosis, unspecified whether generalized or localized, involving lower leg      • Other obesity    • Other specified diseases of anus and rectum     Other specified diseases of anus and rectum - rectal pain after c-scope prep      • Overweight    • Pain in left knee    • Pain in right knee    • Plantar fasciitis    • Shoulder pain, right    • Spasm of back muscles    • Tachycardia, unspecified    • Tremor, unspecified    • Tubular adenoma of colon    • Unspecified essential hypertension    • Verruca         Past Surgical History:   Procedure Laterality Date   • APPENDECTOMY     • COLONOSCOPY  03/16/2016    One polyp in the sigmoid colon.Resected and retrieved.        • COLONOSCOPY N/A 6/23/2021    Procedure: COLONOSCOPY;  Surgeon: Brenton Ruggiero MD;  Location: [facility] ENDOSCOPY;  Service: Gastroenterology;  Laterality: N/A;   • CYST REMOVAL  10/27/2009     Excision of sebaceous cyst of the forehead, glabellar region.   • ENDOSCOPY  03/16/2016    Mildly severe esophagitis.Gastritis.Normal examined duodenum.Several biopsies obtained in the lower  third of the esophagus.    • ENDOSCOPY AND COLONOSCOPY  04/12/2006     Normal colonoscopic examination    • INCISION AND DRAINAGE ABSCESS  10/24/2012   • INJECTION OF MEDICATION  04/21/2011    Depo Medrol (Methylprednisone) 80mg (1)        • INJECTION OF MEDICATION  02/01/2016    Kenalog (3)        • INJECTION OF MEDICATION  02/19/2016    Toradol (3)    • JOINT REPLACEMENT Bilateral     Knee   • KNEE ARTHROSCOPY  04/02/2009     Medial meniscus tear of left knee   • SCROTUM EXPLORATION  07/02/2002     Excision of epidermal inclusion cyst. base of left scrotum   • SKIN BIOPSY  04/23/2012   • SKIN TAG REMOVAL  04/25/2002    Skin tag, left scrotum         PT Ortho     Row Name 09/15/22 0900       Precautions and Contraindications    Precautions posterior hip precaution  -    Contraindications S/P R GERARDO 7-  -       MMT Right Lower Ext    Rt Hip Flexion MMT, Gross Movement (4+/5) good plus  -          User Key  (r) = Recorded By, (t) = Taken By, (c) = Cosigned By    Initials Name Provider Type    Kristin Garcia PTA Physical Therapist Assistant                             PT Assessment/Plan     Row Name 09/15/22 0900          PT Assessment    Functional Limitations Decreased safety during functional activities;Impaired gait;Impaired locomotion;Limitation in home management;Limitations in community activities;Limitations in functional capacity and performance;Performance in leisure activities;Performance in self-care ADL  -     Impairments Balance;Gait;Impaired muscle endurance;Impaired muscle length;Muscle strength;Pain;Range of motion  -     Assessment Comments continues to have weakness in the R hip flexor but is improving; no issues noted with treatment;  -     Rehab Potential Good  -     Patient/caregiver participated in establishment of treatment plan and goals Yes  -     Patient would benefit from skilled therapy intervention Yes  -KH            PT Plan    PT Frequency 2x/week  -      "Predicted Duration of Therapy Intervention (PT) 3-4 more session  -KH     PT Plan Comments One more visit then d/c to independent program and HEP; Pt is do being back to [facility] for fitness membership  -           User Key  (r) = Recorded By, (t) = Taken By, (c) = Cosigned By    Initials Name Provider Type    Kristin Garcia PTA Physical Therapist Assistant                   OP Exercises     Row Name 09/15/22 0900             Subjective Comments    Subjective Comments Wearing tennis shoes today for his feet;  -KH              Subjective Pain    Able to rate subjective pain? yes  -KH      Pre-Treatment Pain Level 1  -KH      Post-Treatment Pain Level 0  -KH              Exercise 1    Exercise Name 1 Treadmill 0% grade  -KH      Time 1 10 mins  -KH      Additional Comments 2.0 mph  -KH              Exercise 2    Exercise Name 2 standing hamstring stretch  -KH      Sets 2 3  -KH      Time 2 30\" holds  -KH              Exercise 3    Exercise Name 3 reciperocal stairs w/ one HR  -KH      Sets 3 5 stairs x10  -KH              Exercise 4    Exercise Name 4 Airex SLS  -KH      Sets 4 5  -KH      Time 4 30\" holds  -KH              Exercise 5    Exercise Name 5 Cybex Hip abduction  -KH      Sets 5 3  -KH      Reps 5 10  -KH      Additional Comments 50#  -KH              Exercise 6    Exercise Name 6 Cybex Hip Adduction  -KH      Sets 6 3  -KH      Reps 6 10  -KH      Additional Comments 50#  -KH              Exercise 7    Exercise Name 7 Cybex Leg Press DL/SL  -KH      Sets 7 3  -KH      Reps 7 10 each  -KH      Additional Comments 150#/100#  -KH              Exercise 8    Exercise Name 8 Sit to stands from high low table.  -KH      Sets 8 2  -KH      Reps 8 15  -KH      Additional Comments no use of UE  -KH              Exercise 9    Exercise Name 9 cybex ham curl  -KH      Sets 9 3  -KH      Reps 9 10  -KH      Additional Comments 50#  -KH            User Key  (r) = Recorded By, (t) = Taken By, (c) = Cosigned By    Initials " Name Provider Type    Kristin Garcia PTA Physical Therapist Assistant                              PT OP Goals     Row Name 09/15/22 0900          PT Short Term Goals    STG Date to Achieve 09/12/22  -     STG 1 independent and compliant with HEP  -     STG 1 Progress Met   -     STG 2 flex right hip to 90 degrees in standing without pain  -     STG 2 Progress Met   -     STG 3 able to lift right hip on bed or mat without pain  -     STG 3 Progress Met   -            Long Term Goals    LTG Date to Achieve 09/19/22  -     LTG 1 LEFS 50  -     LTG 1 Progress Ongoing;Progressing  -     LTG 2 right hip flex/ext/abd/er/ir at least 4/5  -KH     LTG 2 Progress Met   -     LTG 3 ambluate community distances without AD with good gait mechanics  -     LTG 3 Progress Met   -     LTG 4 Improve R hip flex MMT to 5/5  -KH     LTG 4 Progress Progressing  -     LTG 4 Progress Comments 4+/5  -KH     LTG 5 Able to perform 10 consecutive sidelying right hip abduction AROM with minimal to no R hip pain.  -     LTG 5 Progress Met   -            Time Calculation    PT Goal Re-Cert Due Date 09/19/22  -           User Key  (r) = Recorded By, (t) = Taken By, (c) = Cosigned By    Initials Name Provider Type    Kristin Garcia PTA Physical Therapist Assistant                               Time Calculation:   Start Time: 0933  Stop Time: 1020  Time Calculation (min): 47 min  Therapy Charges for Today     Code Description Service Date Service Provider Modifiers Qty    [id number]  PT THER PROC EA 15 MIN 9/15/2022 Kristin Lamas PTA GP, CQ 2    [id number]  PT THERAPEUTIC ACT EA 15 MIN 9/15/2022 Kristin Lamas PTA GP, CQ 1                    Kristin Lamas PTA  9/15/2022

## 2022-09-20 ENCOUNTER — HOSPITAL ENCOUNTER (OUTPATIENT)
Dept: PHYSICAL THERAPY | Facility: HOSPITAL | Age: 67
Setting detail: THERAPIES SERIES
Discharge: HOME | End: 2022-09-20

## 2022-09-20 DIAGNOSIS — Z96.641 STATUS POST HIP REPLACEMENT, RIGHT: ICD-10-CM

## 2022-09-20 DIAGNOSIS — M25.551 RIGHT HIP PAIN: Primary | ICD-10-CM

## 2022-09-20 PROCEDURE — 97110 THERAPEUTIC EXERCISES: CPT | Performed by: PHYSICAL THERAPIST

## 2022-09-20 PROCEDURE — 97530 THERAPEUTIC ACTIVITIES: CPT | Performed by: PHYSICAL THERAPIST

## 2022-09-20 NOTE — THERAPY DISCHARGE NOTE
Outpatient Physical Therapy Ortho Progress Note/Discharge Summary  [facility]     Patient Name: Otf Murray  : 1955  MRN: [id number]  Today's Date: 2022      Visit Date: 2022  Subjective Improvement: 98%  Attendance:   (3 of 4 until 10/15/22)  Next MD Visit : 2022  Recert Date:  10/11/22        Therapy Diagnosis:  S/P R Posterior GERARDO on 2022    ICD-10-CM ICD-9-CM   1. Right hip pain  M25.551 719.45   2. Status post hip replacement, right  Z96.641 V43.64       Patient Active Problem List   Diagnosis   • ADHD, predominantly inattentive type   • Abnormal glucose   • Cervical spondylosis without myelopathy   • Chronic fatigue   • Chronic left-sided low back pain without sciatica   • Displacement of cervical intervertebral disc   • Family history of type 2 diabetes mellitus in mother   • Gastroesophageal reflux disease without esophagitis   • History of tobacco abuse   • History of total left knee replacement   • Male hypogonadism   • Non morbid obesity due to excess calories   • Primary osteoarthritis of both knees   • Recurrent major depressive disorder, in partial remission (HCC)   • Seasonal allergic rhinitis due to pollen   • Encounter for colonoscopy due to history of colonic polyp        Past Medical History:   Diagnosis Date   • Acute gastritis without bleeding    • Alcohol abuse counseling and surveillance of alcoholic    • Allergic rhinitis    • Anxiety state    • Attention deficit disorder of childhood with hyperactivity    • Attention deficit hyperactivity disorder    • Attention deficit hyperactivity disorder, predominantly hyperactive impulsive type    • Attention deficit hyperactivity disorder, predominantly inattentive type    • Backache    • Body mass index (bmi) 31.0-31.9, adult     Body mass index (BMI) 31.0-31.9, adult - BMI 33.5      • Burn of lower leg    • Candidiasis of skin    • Cardiac murmur, unspecified    • Decreased testosterone level    •  Depressive disorder     Depressive disorder - acute on chronic      • Dysgraphia    • Edema    • Elbow joint pain    • Encounter for general adult medical examination with abnormal findings    • Encounter for screening for malignant neoplasm of colon    • Essential hypertension    • Essential tremor    • Ex-smoker    • Fatigue    • Gastro-esophageal reflux disease with esophagitis    • Gastroesophageal reflux disease    • Hyperlipidemia    • Hyperlipoproteinemia    • Impotence of organic origin    • Insomnia    • Intermittent palpitations    • Knee pain     Knee pain - patellofemoral      • Lateral epicondylitis    • Low back pain    • Male erectile disorder    • Male erectile dysfunction, unspecified    • Male hypogonadism    • Neck pain     Neck pain aggravated by recumbency      • Obese     Obese - BMI 33.0      • On long term drug therapy    • Osteoarthrosis     Osteoarthrosis, unspecified whether generalized or localized, involving lower leg      • Other obesity    • Other specified diseases of anus and rectum     Other specified diseases of anus and rectum - rectal pain after c-scope prep      • Overweight    • Pain in left knee    • Pain in right knee    • Plantar fasciitis    • Shoulder pain, right    • Spasm of back muscles    • Tachycardia, unspecified    • Tremor, unspecified    • Tubular adenoma of colon    • Unspecified essential hypertension    • Verruca         Past Surgical History:   Procedure Laterality Date   • APPENDECTOMY     • COLONOSCOPY  03/16/2016    One polyp in the sigmoid colon.Resected and retrieved.        • COLONOSCOPY N/A 6/23/2021    Procedure: COLONOSCOPY;  Surgeon: Brenton Ruggiero MD;  Location: [facility] ENDOSCOPY;  Service: Gastroenterology;  Laterality: N/A;   • CYST REMOVAL  10/27/2009     Excision of sebaceous cyst of the forehead, glabellar region.   • ENDOSCOPY  03/16/2016    Mildly severe esophagitis.Gastritis.Normal examined duodenum.Several biopsies obtained in the lower  third of the esophagus.    • ENDOSCOPY AND COLONOSCOPY  04/12/2006     Normal colonoscopic examination    • INCISION AND DRAINAGE ABSCESS  10/24/2012   • INJECTION OF MEDICATION  04/21/2011    Depo Medrol (Methylprednisone) 80mg (1)        • INJECTION OF MEDICATION  02/01/2016    Kenalog (3)        • INJECTION OF MEDICATION  02/19/2016    Toradol (3)    • JOINT REPLACEMENT Bilateral     Knee   • KNEE ARTHROSCOPY  04/02/2009     Medial meniscus tear of left knee   • SCROTUM EXPLORATION  07/02/2002     Excision of epidermal inclusion cyst. base of left scrotum   • SKIN BIOPSY  04/23/2012   • SKIN TAG REMOVAL  04/25/2002    Skin tag, left scrotum         PT Ortho     Row Name 09/20/22 0900       Precautions and Contraindications    Precautions posterior hip precaution  -    Contraindications S/P R GERARDO 7-  -       Subjective Pain    Able to rate subjective pain? yes  -SW       MMT Right Lower Ext    Rt Hip Flexion MMT, Gross Movement (5/5) normal  -          User Key  (r) = Recorded By, (t) = Taken By, (c) = Cosigned By    Initials Name Provider Type    Edita Matthews Physical Therapist                             PT Assessment/Plan     Row Name 09/20/22 0900          PT Assessment    Assessment Comments Patient has met all goals except his functional score goal. He is ambulating with good gait mechanics without an assistive device and can reciprocate up and down stairs. He was given gentle stretches for hip flexion to facilate tying shoe and was also given sit to stand to perform at home. He is planning to join [employer] and perform at least the arthritic aquatic classes there.  -            PT Plan    PT Plan Comments DC to independent HEP.  -           User Key  (r) = Recorded By, (t) = Taken By, (c) = Cosigned By    Initials Name Provider Type    Edita Matthews Physical Therapist                     OP Exercises     Row Name 09/20/22 0900             Subjective Comments    Subjective Comments Patient  "reports 98% in symptoms. He went to a tractor show  rcently which was over 700 miles. He had minimal to no problem with hip. He still has marked difficulty tying right shoe.  -SW              Subjective Pain    Able to rate subjective pain? yes  -SW      Pre-Treatment Pain Level 0  -SW      Post-Treatment Pain Level 0  -SW              Exercise 1    Exercise Name 1 Treadmill 0% grade  -SW      Time 1 10 mins  -SW              Exercise 2    Exercise Name 2 stair navigation  -SW      Sets 2 --  -SW      Time 2 --  -SW              Exercise 3    Exercise Name 3 sls  -SW      Sets 3 --  -SW      Reps 3 60\" ea side  -SW      Additional Comments max hold able 15\"  -SW              Exercise 4    Exercise Name 4 tandem stance  -SW      Sets 4 --  -SW      Reps 4 30\" ea foot in front  -SW      Time 4 --  -SW              Exercise 5    Exercise Name 5 pulses 4\"  -SW      Sets 5 --  -SW      Reps 5 20  -SW              Exercise 6    Exercise Name 6 step downs 4\"  -SW      Sets 6 --  -SW      Reps 6 20  -SW              Exercise 7    Exercise Name 7 hip  flexon stretching in hands knees  -SW      Sets 7 --  -SW      Reps 7 30\"x3  -SW              Exercise 8    Exercise Name 8 Sit to stands from high low table.  -SW      Sets 8 --  -SW      Reps 8 10  -SW              Exercise 9    Exercise Name 9 single knee to chest  -SW      Sets 9 --  -SW      Reps 9 30\"x3  -SW            User Key  (r) = Recorded By, (t) = Taken By, (c) = Cosigned By    Initials Name Provider Type    SW Edita Campos Physical Therapist                                PT OP Goals     Row Name 09/20/22 0900          PT Short Term Goals    STG Date to Achieve 09/12/22  -SW     STG 1 independent and compliant with HEP  -SW     STG 1 Progress Met  -     STG 2 flex right hip to 90 degrees in standing without pain  -     STG 2 Progress Met  -     STG 3 able to lift right hip on bed or mat without pain  -     STG 3 Progress Met  -            Long Term Goals "    LTG Date to Achieve 09/19/22  -     LTG 1 LEFS 50  -     LTG 1 Progress Ongoing;Progressing  -     LTG 2 right hip flex/ext/abd/er/ir at least 4/5  -     LTG 2 Progress Met  -     LTG 3 ambluate community distances without AD with good gait mechanics  -     LTG 3 Progress Met  -     LTG 4 Improve R hip flex MMT to 5/5  -     LTG 4 Progress Progressing;Met  -     LTG 5 Able to perform 10 consecutive sidelying right hip abduction AROM with minimal to no R hip pain.  -     LTG 5 Progress Met  -            Time Calculation    PT Goal Re-Cert Due Date 10/11/22  -           User Key  (r) = Recorded By, (t) = Taken By, (c) = Cosigned By    Initials Name Provider Type    Edita Matthews Physical Therapist                     Outcome Measure Options: 5x Sit to Stand  5 Times Sit to Stand  5 Times Sit to Stand (seconds): 11 seconds  Lower Extremity Functional Index  Any of your usual work, housework or school activities: A little bit of difficulty  Your usual hobbies, recreational or sporting activities: A little bit of difficulty  Getting into or out of the bath: Moderate difficulty  Walking between rooms: No difficulty  Putting on your shoes or socks: Moderate difficulty  Squatting: A little bit of difficulty  Lifting an object, like a bag of groceries from the floor: A little bit of difficulty  Performing light activities around your home: A little bit of difficulty  Performing heavy activities around your home: Moderate difficulty  Getting into or out of a car: A little bit of difficulty  Walking 2 blocks: No difficulty  Walking a mile: Quite a bit of difficulty  Going up or down 10 stairs (about 1 flight of stairs): A little bit of difficulty  Standing for 1 hour: Extreme difficulty or unable to perform activity  Sitting for 1 hour: No difficulty  Running on even ground: Extreme difficulty or unable to perform activity  Running on uneven ground: Extreme difficulty or unable to perform  activity  Making sharp turns while running fast: Extreme difficulty or unable to perform activity  Hopping: Extreme difficulty or unable to perform activity  Rolling over in bed: No difficulty  Total: 44      Time Calculation:   Start Time: 0933  Stop Time: 1017  Time Calculation (min): 44 min  Therapy Charges for Today     Code Description Service Date Service Provider Modifiers Qty    71500898745  PT THER PROC EA 15 MIN 9/20/2022 Edita Campos GP 2    [id number] HC PT THERAPEUTIC ACT EA 15 MIN 9/20/2022 Edita Campos GP 1          PT G-Codes  Outcome Measure Options: 5x Sit to Stand  Total: 44     OP PT Discharge Summary  Date of Discharge: 09/20/22  Reason for Discharge: Independent  Outcomes Achieved: Patient able to partially acheive established goals  Discharge Destination: Home with home program      Edita Campos  9/20/2022

## 2023-02-23 PROBLEM — Z83.3 FAMILY HISTORY OF TYPE 2 DIABETES MELLITUS IN MOTHER: Status: RESOLVED | Noted: 2017-04-20 | Resolved: 2023-02-23

## 2023-02-23 PROBLEM — Z12.11 ENCOUNTER FOR COLONOSCOPY DUE TO HISTORY OF COLONIC POLYP: Status: RESOLVED | Noted: 2021-05-20 | Resolved: 2023-02-23

## 2023-02-23 PROBLEM — E29.1 MALE HYPOGONADISM: Status: RESOLVED | Noted: 2017-04-20 | Resolved: 2023-02-23

## 2023-02-23 PROBLEM — E66.09 NON MORBID OBESITY DUE TO EXCESS CALORIES: Status: RESOLVED | Noted: 2017-05-23 | Resolved: 2023-02-23

## 2023-02-23 PROBLEM — R73.09 ABNORMAL GLUCOSE: Status: RESOLVED | Noted: 2017-04-20 | Resolved: 2023-02-23

## 2023-02-23 PROBLEM — Z86.010 ENCOUNTER FOR COLONOSCOPY DUE TO HISTORY OF COLONIC POLYP: Status: RESOLVED | Noted: 2021-05-20 | Resolved: 2023-02-23

## 2023-03-02 ENCOUNTER — OFFICE VISIT (OUTPATIENT)
Dept: FAMILY MEDICINE CLINIC | Facility: CLINIC | Age: 68
End: 2023-03-02
Payer: MEDICARE

## 2023-03-02 VITALS
BODY MASS INDEX: 36.91 KG/M2 | DIASTOLIC BLOOD PRESSURE: 78 MMHG | HEART RATE: 76 BPM | WEIGHT: 312.6 LBS | SYSTOLIC BLOOD PRESSURE: 122 MMHG | HEIGHT: 77 IN

## 2023-03-02 DIAGNOSIS — Z78.9 ALCOHOL USE: ICD-10-CM

## 2023-03-02 DIAGNOSIS — E66.01 MORBID (SEVERE) OBESITY DUE TO EXCESS CALORIES: Chronic | ICD-10-CM

## 2023-03-02 DIAGNOSIS — K21.9 GASTROESOPHAGEAL REFLUX DISEASE WITHOUT ESOPHAGITIS: Chronic | ICD-10-CM

## 2023-03-02 DIAGNOSIS — K59.00 CONSTIPATION, UNSPECIFIED CONSTIPATION TYPE: Primary | ICD-10-CM

## 2023-03-02 DIAGNOSIS — J34.2 DEVIATED NASAL SEPTUM: ICD-10-CM

## 2023-03-02 DIAGNOSIS — J30.1 SEASONAL ALLERGIC RHINITIS DUE TO POLLEN: Chronic | ICD-10-CM

## 2023-03-02 DIAGNOSIS — R09.82 POST-NASAL DRIP: ICD-10-CM

## 2023-03-02 PROBLEM — G89.29 CHRONIC LEFT-SIDED LOW BACK PAIN WITHOUT SCIATICA: Chronic | Status: ACTIVE | Noted: 2017-10-23

## 2023-03-02 PROBLEM — F33.41 RECURRENT MAJOR DEPRESSIVE DISORDER, IN PARTIAL REMISSION: Chronic | Status: ACTIVE | Noted: 2017-04-20

## 2023-03-02 PROBLEM — M17.0 PRIMARY OSTEOARTHRITIS OF BOTH KNEES: Chronic | Status: ACTIVE | Noted: 2017-05-23

## 2023-03-02 PROBLEM — E34.9 TESTOSTERONE INSUFFICIENCY: Status: ACTIVE | Noted: 2023-03-02

## 2023-03-02 PROBLEM — R53.82 CHRONIC FATIGUE: Chronic | Status: ACTIVE | Noted: 2017-04-20

## 2023-03-02 PROBLEM — E34.9 TESTOSTERONE INSUFFICIENCY: Chronic | Status: ACTIVE | Noted: 2023-03-02

## 2023-03-02 PROBLEM — Z87.891 HISTORY OF TOBACCO ABUSE: Chronic | Status: ACTIVE | Noted: 2017-04-20

## 2023-03-02 PROBLEM — M54.50 CHRONIC LEFT-SIDED LOW BACK PAIN WITHOUT SCIATICA: Chronic | Status: ACTIVE | Noted: 2017-10-23

## 2023-03-02 PROBLEM — Z96.652 HISTORY OF TOTAL LEFT KNEE REPLACEMENT: Chronic | Status: ACTIVE | Noted: 2017-08-15

## 2023-03-02 PROBLEM — Z86.010 HISTORY OF COLON POLYPS: Chronic | Status: ACTIVE | Noted: 2021-05-20

## 2023-03-02 PROBLEM — F90.0 ADHD, PREDOMINANTLY INATTENTIVE TYPE: Chronic | Status: ACTIVE | Noted: 2018-01-19

## 2023-03-02 PROCEDURE — 99203 OFFICE O/P NEW LOW 30 MIN: CPT | Performed by: FAMILY MEDICINE

## 2023-03-02 RX ORDER — ESOMEPRAZOLE MAGNESIUM 40 MG/1
40 CAPSULE, DELAYED RELEASE ORAL DAILY
Qty: 90 CAPSULE | Refills: 1 | Status: SHIPPED | OUTPATIENT
Start: 2023-03-02 | End: 2023-03-03 | Stop reason: SDUPTHER

## 2023-03-02 RX ORDER — TESTOSTERONE GEL, 1% 10 MG/G
50 GEL TRANSDERMAL DAILY
COMMUNITY
End: 2023-03-30

## 2023-03-02 RX ORDER — WHEAT DEXTRIN 3 G/3.8 G
POWDER (GRAM) ORAL
Qty: 500 G | Refills: 3 | Status: SHIPPED | OUTPATIENT
Start: 2023-03-02

## 2023-03-02 RX ORDER — POLYETHYLENE GLYCOL 3350 17 G/17G
17 POWDER, FOR SOLUTION ORAL DAILY
Qty: 100 EACH | Refills: 3 | Status: SHIPPED | OUTPATIENT
Start: 2023-03-02

## 2023-03-02 RX ORDER — IPRATROPIUM BROMIDE 42 UG/1
2 SPRAY, METERED NASAL 4 TIMES DAILY
COMMUNITY
End: 2023-03-03

## 2023-03-02 RX ORDER — FAMOTIDINE 40 MG/1
40 TABLET, FILM COATED ORAL DAILY
COMMUNITY
End: 2023-03-03

## 2023-03-02 RX ORDER — UBIDECARENONE 100 MG
100 CAPSULE ORAL DAILY
COMMUNITY

## 2023-03-02 NOTE — PROGRESS NOTES
Subjective   Otf Murray is a 67 y.o. male.  Just a requesting new primary care.  Unfortunate patient has many multiple medical issues is seen multiple medical providers in the past.  We have chosen the first 2 most important concerns today we will have to follow-up on others in the near future.  Fortunately we do have majority of records.  Chief complaint today is chronic drainage chronic postnasal drip.  Has had a CT scan done elsewhere which showed a deviated septum but no chronic sinusitis.  Has been using Astelin nose spray and and unfortuneately decongestion which we have told him to stop.  Is going probably need evaluation of his deviated septum.  Also complaining of intermittent constipation states about 3 weeks ago developed diarrhea then started with constipation and obstipation opted constipation again.  Had colonoscopy about a year and a half ago which showed a small polyp but otherwise negative.  Upon further questioning patient states has been drinking heavily of his diet and try to taper down over the past year.  Was drinking almost half a bottle or more wine a day.  States he has not had much drink in the past month.  Also multiple other medical issues listed in the diagnosis.  Has seen multiple other providers.  Have counseled to take some time to ferret out all of the other issues.  We will deal the to deal with these 2 most pressing issues today.    History of Present Illness   HPI    The following portions of the patient's history were reviewed and updated as appropriate: allergies, current medications, past family history, past medical history, past social history, past surgical history and problem list.    Review of Systems  Review of Systems   Constitutional: Negative for activity change, appetite change, fatigue and unexpected weight change.   HENT: Positive for postnasal drip, rhinorrhea and sinus pressure. Negative for trouble swallowing and voice change.    Eyes: Negative for  "redness and visual disturbance.   Respiratory: Negative for cough and wheezing.    Cardiovascular: Negative for chest pain and palpitations.   Gastrointestinal: Positive for constipation. Negative for abdominal pain, diarrhea, nausea and vomiting.   Genitourinary: Negative for urgency.   Musculoskeletal: Negative for joint swelling.   Neurological: Negative for syncope and headaches.   Hematological: Negative for adenopathy.   Psychiatric/Behavioral: Negative for sleep disturbance.       Objective   Physical Exam  Physical Exam  Constitutional:       Appearance: He is well-developed. He is obese.   HENT:      Head: Normocephalic.      Right Ear: External ear normal.      Nose: Nose normal.   Eyes:      Pupils: Pupils are equal, round, and reactive to light.   Neck:      Thyroid: No thyromegaly.   Cardiovascular:      Rate and Rhythm: Normal rate and regular rhythm.      Heart sounds: Normal heart sounds. No murmur heard.    No friction rub. No gallop.   Pulmonary:      Breath sounds: Normal breath sounds.   Abdominal:      General: There is no distension.      Palpations: Abdomen is soft. There is no mass.      Tenderness: There is no abdominal tenderness.   Musculoskeletal:         General: Normal range of motion.      Cervical back: Normal range of motion.   Skin:     General: Skin is warm and dry.   Neurological:      Mental Status: He is alert and oriented to person, place, and time.      Deep Tendon Reflexes: Reflexes are normal and symmetric.   Psychiatric:         Attention and Perception: Attention normal.         Mood and Affect: Affect is flat.         Speech: Speech is tangential.         Behavior: Behavior is cooperative.           Visit Vitals  /78   Pulse 76   Ht 195.6 cm (77\")   Wt (!) 142 kg (312 lb 9.6 oz)   BMI 37.07 kg/m²     Body mass index is 37.07 kg/m².  /78   Pulse 76   Ht 195.6 cm (77\")   Wt (!) 142 kg (312 lb 9.6 oz)   BMI 37.07 kg/m²       Assessment/Plan   Diagnoses and " all orders for this visit:    1. Constipation, unspecified constipation type (Primary)  -     polyethylene glycol (MIRALAX) 17 g packet; Take 17 g by mouth Daily. With 12 oz water daily  Dispense: 100 each; Refill: 3  -     Wheat Dextrin (Benefiber) powder; Use per direction daily for bowels till seen again  Dispense: 500 g; Refill: 3    2. Post-nasal drip  -     Ambulatory Referral to ENT (Otolaryngology)    3. Deviated nasal septum  -     Ambulatory Referral to ENT (Otolaryngology)    4. Alcohol use    5. Seasonal allergic rhinitis due to pollen  -     Ambulatory Referral to ENT (Otolaryngology)    6. Morbid (severe) obesity due to excess calories (HCC)    7. Gastroesophageal reflux disease without esophagitis  -     esomeprazole (nexIUM) 40 MG capsule; Take 1 capsule by mouth Daily.  Dispense: 90 capsule; Refill: 1    Counseled increasing hydration counseled diet and exercise.  Referral to ENT for his chronic sinus and deviated septum.  Counseled staying off of decongestants.  Counseled the use of a daily MiraLAX and Benefiber to help even out bowel motility.   on decreasing etoh for health reasons  Counseled on lifestyle measures.  Recheck again in 2 weeks to evaluate the above and then will start on general health issues including AAA screening lipid panels PSAs etc.

## 2023-03-03 DIAGNOSIS — K21.9 GASTROESOPHAGEAL REFLUX DISEASE WITHOUT ESOPHAGITIS: Chronic | ICD-10-CM

## 2023-03-03 RX ORDER — AZELASTINE HCL 205.5 UG/1
1 SPRAY NASAL 2 TIMES DAILY
Qty: 3 EACH | Refills: 3 | Status: SHIPPED | OUTPATIENT
Start: 2023-03-03

## 2023-03-03 RX ORDER — ESOMEPRAZOLE MAGNESIUM 40 MG/1
40 CAPSULE, DELAYED RELEASE ORAL DAILY
Qty: 90 CAPSULE | Refills: 1 | Status: SHIPPED | OUTPATIENT
Start: 2023-03-03

## 2023-03-16 ENCOUNTER — OFFICE VISIT (OUTPATIENT)
Dept: FAMILY MEDICINE CLINIC | Facility: CLINIC | Age: 68
End: 2023-03-16
Payer: MEDICARE

## 2023-03-16 ENCOUNTER — LAB (OUTPATIENT)
Dept: LAB | Facility: HOSPITAL | Age: 68
End: 2023-03-16
Payer: MEDICARE

## 2023-03-16 VITALS
SYSTOLIC BLOOD PRESSURE: 124 MMHG | BODY MASS INDEX: 36.45 KG/M2 | HEIGHT: 77 IN | WEIGHT: 308.7 LBS | DIASTOLIC BLOOD PRESSURE: 78 MMHG

## 2023-03-16 DIAGNOSIS — Z23 NEED FOR VACCINATION: ICD-10-CM

## 2023-03-16 DIAGNOSIS — G89.29 CHRONIC HIP PAIN, RIGHT: ICD-10-CM

## 2023-03-16 DIAGNOSIS — R53.82 CHRONIC FATIGUE: Chronic | ICD-10-CM

## 2023-03-16 DIAGNOSIS — E34.9 TESTOSTERONE INSUFFICIENCY: Chronic | ICD-10-CM

## 2023-03-16 DIAGNOSIS — M25.551 CHRONIC HIP PAIN, RIGHT: ICD-10-CM

## 2023-03-16 DIAGNOSIS — Z00.00 MEDICARE ANNUAL WELLNESS VISIT, INITIAL: ICD-10-CM

## 2023-03-16 DIAGNOSIS — I10 PRIMARY HYPERTENSION: Chronic | ICD-10-CM

## 2023-03-16 DIAGNOSIS — Z12.5 SCREENING PSA (PROSTATE SPECIFIC ANTIGEN): ICD-10-CM

## 2023-03-16 DIAGNOSIS — E78.49 OTHER HYPERLIPIDEMIA: Chronic | ICD-10-CM

## 2023-03-16 DIAGNOSIS — M50.90 CERVICAL DISC DISORDER: Primary | ICD-10-CM

## 2023-03-16 DIAGNOSIS — Z13.6 SCREENING FOR AAA (ABDOMINAL AORTIC ANEURYSM): ICD-10-CM

## 2023-03-16 DIAGNOSIS — Z78.9 MEDICALLY COMPLEX PATIENT: Chronic | ICD-10-CM

## 2023-03-16 PROCEDURE — 85027 COMPLETE CBC AUTOMATED: CPT | Performed by: FAMILY MEDICINE

## 2023-03-16 PROCEDURE — G0009 ADMIN PNEUMOCOCCAL VACCINE: HCPCS | Performed by: FAMILY MEDICINE

## 2023-03-16 PROCEDURE — 80053 COMPREHEN METABOLIC PANEL: CPT | Performed by: FAMILY MEDICINE

## 2023-03-16 PROCEDURE — 84403 ASSAY OF TOTAL TESTOSTERONE: CPT | Performed by: FAMILY MEDICINE

## 2023-03-16 PROCEDURE — G0103 PSA SCREENING: HCPCS | Performed by: FAMILY MEDICINE

## 2023-03-16 PROCEDURE — 90677 PCV20 VACCINE IM: CPT | Performed by: FAMILY MEDICINE

## 2023-03-16 PROCEDURE — 80061 LIPID PANEL: CPT | Performed by: FAMILY MEDICINE

## 2023-03-16 PROCEDURE — 3074F SYST BP LT 130 MM HG: CPT | Performed by: FAMILY MEDICINE

## 2023-03-16 PROCEDURE — 1170F FXNL STATUS ASSESSED: CPT | Performed by: FAMILY MEDICINE

## 2023-03-16 PROCEDURE — 96160 PT-FOCUSED HLTH RISK ASSMT: CPT | Performed by: FAMILY MEDICINE

## 2023-03-16 PROCEDURE — G0438 PPPS, INITIAL VISIT: HCPCS | Performed by: FAMILY MEDICINE

## 2023-03-16 PROCEDURE — 99214 OFFICE O/P EST MOD 30 MIN: CPT | Performed by: FAMILY MEDICINE

## 2023-03-16 PROCEDURE — 84443 ASSAY THYROID STIM HORMONE: CPT | Performed by: FAMILY MEDICINE

## 2023-03-16 PROCEDURE — 84439 ASSAY OF FREE THYROXINE: CPT | Performed by: FAMILY MEDICINE

## 2023-03-16 PROCEDURE — 36415 COLL VENOUS BLD VENIPUNCTURE: CPT | Performed by: FAMILY MEDICINE

## 2023-03-16 PROCEDURE — 3078F DIAST BP <80 MM HG: CPT | Performed by: FAMILY MEDICINE

## 2023-03-16 PROCEDURE — 84402 ASSAY OF FREE TESTOSTERONE: CPT | Performed by: FAMILY MEDICINE

## 2023-03-16 PROCEDURE — 83735 ASSAY OF MAGNESIUM: CPT | Performed by: FAMILY MEDICINE

## 2023-03-16 NOTE — PROGRESS NOTES
The ABCs of the Annual Wellness Visit  Initial Medicare Wellness Visit    Subjective     Otf Murray is a 67 y.o. male who presents for an Initial Medicare Wellness Visit.    The following portions of the patient's history were reviewed and   updated as appropriate: allergies, current medications, past family history, past medical history, past social history, past surgical history and problem list.     Compared to one year ago, the patient feels his physical   health is worse.    Compared to one year ago, the patient feels his mental   health is the same.    Recent Hospitalizations:  He was not admitted to the hospital during the last year.       Current Medical Providers:  Patient Care Team:  Idris Arcos MD as PCP - General (Family Medicine)    Outpatient Medications Prior to Visit   Medication Sig Dispense Refill   • amLODIPine (NORVASC) 5 MG tablet Take 1 tablet by mouth Daily. Taking 10 mg daily     • azelastine (ASTEPRO) 0.15 % solution nasal spray 1 spray into the nostril(s) as directed by provider 2 (Two) Times a Day. 3 each 3   • buPROPion XL (WELLBUTRIN XL) 300 MG 24 hr tablet Take 1 tablet by mouth Daily. (Patient taking differently: Take 1 tablet by mouth Daily. Takes 150 mg daily) 30 tablet 2   • coenzyme Q10 100 MG capsule Take 1 capsule by mouth Daily.     • diclofenac sodium (VOTAREN XR) 100 MG 24 hr tablet Take 1 tablet by mouth Daily.     • esomeprazole (nexIUM) 40 MG capsule Take 1 capsule by mouth Daily. 90 capsule 1   • Glucosamine-Chondroit-Vit C-Mn (GLUCOSAMINE CHONDR 1500 COMPLX PO) Take 2 tablets by mouth Daily.     • losartan-hydrochlorothiazide (HYZAAR) 100-12.5 MG per tablet Take 1 tablet by mouth Daily.     • polyethylene glycol (MIRALAX) 17 g packet Take 17 g by mouth Daily. With 12 oz water daily 100 each 3   • primidone (MYSOLINE) 50 MG tablet 1 tablet. Taking 100 mg per day     • simvastatin (ZOCOR) 20 MG tablet Take 1 tablet by mouth.     • testosterone (ANDROGEL) 50  "MG/5GM (1%) gel gel Place 50 mg on the skin as directed by provider Daily. (Patient not taking: Reported on 3/16/2023)     • Wheat Dextrin (Benefiber) powder Use per direction daily for bowels till seen again (Patient not taking: Reported on 3/16/2023) 500 g 3     No facility-administered medications prior to visit.       No opioid medication identified on active medication list. I have reviewed chart for other potential  high risk medication/s and harmful drug interactions in the elderly.          Aspirin is not on active medication list.  Aspirin use is not indicated based on review of current medical condition/s. Risk of harm outweighs potential benefits.  .    Patient Active Problem List   Diagnosis   • ADHD, predominantly inattentive type   • Cervical spondylosis without myelopathy   • Chronic fatigue   • Chronic left-sided low back pain without sciatica   • Gastroesophageal reflux disease without esophagitis   • History of tobacco abuse   • History of total left knee replacement   • Primary osteoarthritis of both knees   • Recurrent major depressive disorder, in partial remission (HCC)   • Seasonal allergic rhinitis due to pollen   • History of colon polyps   • Morbid (severe) obesity due to excess calories (HCC)   • Medically complex patient   • Testosterone insufficiency   • Primary hypertension   • Other hyperlipidemia     Advance Care Planning  Advance Directive is not on file.  ACP discussion was held with the patient during this visit. Patient does not have an advance directive, information provided.       Objective    Vitals:    03/16/23 1517   BP: 124/78   Weight: (!) 140 kg (308 lb 11.2 oz)   Height: 195.6 cm (77\")     Estimated body mass index is 36.61 kg/m² as calculated from the following:    Height as of this encounter: 195.6 cm (77\").    Weight as of this encounter: 140 kg (308 lb 11.2 oz).    Class 2 Severe Obesity (BMI >=35 and <=39.9). Obesity-related health conditions include the following: " hypertension and osteoarthritis. Obesity is unchanged. BMI is is above average; BMI management plan is completed. We discussed portion control and increasing exercise.      Does the patient have evidence of cognitive impairment?   No          HEALTH RISK ASSESSMENT    Smoking Status:  Social History     Tobacco Use   Smoking Status Former   • Types: Cigarettes   • Quit date: 2020   • Years since quitting: 3.2   Smokeless Tobacco Never     Alcohol Consumption:  Social History     Substance and Sexual Activity   Alcohol Use Yes   • Alcohol/week: 21.0 standard drinks   • Types: 21 Glasses of wine per week    Comment: daily      Fall Risk Screen:    STEADI Fall Risk Assessment was completed, and patient is at MODERATE risk for falls. Assessment completed on:3/16/2023    Depression Screen:   PHQ-2/PHQ-9 Depression Screening 3/16/2023   Little Interest or Pleasure in Doing Things 0-->not at all   Feeling Down, Depressed or Hopeless 1-->several days   PHQ-9: Brief Depression Severity Measure Score 1       Health Habits and Functional and Cognitive Screening:  Functional & Cognitive Status 3/16/2023   Do you have difficulty preparing food and eating? No   Do you have difficulty bathing yourself, getting dressed or grooming yourself? No   Do you have difficulty using the toilet? No   Do you have difficulty moving around from place to place? No   Do you have trouble with steps or getting out of a bed or a chair? No   Current Diet Well Balanced Diet   Dental Exam Up to date   Eye Exam Up to date   Exercise (times per week) Other   Current Exercises Include Other   Do you need help using the phone?  No   Are you deaf or do you have serious difficulty hearing?  No   Do you need help with transportation? No   Do you need help shopping? No   Do you need help preparing meals?  No   Do you need help with housework?  No   Do you need help with laundry? No   Do you need help taking your medications? No   Do you need help managing  money? No   Do you ever drive or ride in a car without wearing a seat belt? No   Have you felt unusual stress, anger or loneliness in the last month? (No Data)   Who do you live with? Spouse   Have you been bothered in the last four weeks by sexual problems? Yes   Do you have difficulty concentrating, remembering or making decisions? Yes       Age-appropriate Screening Schedule:  Refer to the list below for future screening recommendations based on patient's age, sex and/or medical conditions. Orders for these recommended tests are listed in the plan section. The patient has been provided with a written plan.    Health Maintenance   Topic Date Due   • ANNUAL WELLNESS VISIT  Never done   • LIPID PANEL  11/16/2017   • TDAP/TD VACCINES (3 - Td or Tdap) 06/10/2022   • COVID-19 Vaccine (5 - Booster for Pfizer series) 06/21/2022   • Pneumococcal Vaccine 65+ (2 - PCV) 11/11/2022   • AAA SCREEN (ONE-TIME)  Never done   • COLORECTAL CANCER SCREENING  06/23/2024   • HEPATITIS C SCREENING  Completed   • INFLUENZA VACCINE  Completed   • ZOSTER VACCINE  Discontinued          CMS Preventative Services Quick Reference  Risk Factors Identified During Encounter    Fall Risk-High or Moderate: Discussed Fall Prevention in the home  Immunizations Discussed/Encouraged: Influenza, Prevnar 20 (Pneumococcal 20-valent conjugate) and COVID19  Inactivity/Sedentary: Patient was advised to exercise at least 150 minutes a week per CDC recommendations.    The above risks/problems have been discussed with the patient.  Pertinent information has been shared with the patient in the After Visit Summary.  An After Visit Summary and PPPS were made available to the patient.  Diagnoses and all orders for this visit:    1. Cervical disc disorder (Primary)  -     Ambulatory Referral to Physical Therapy Evaluate and treat    2. Medically complex patient    3. Chronic fatigue  -     CBC (No Diff)  -     Comprehensive Metabolic Panel  -     T4, Free  -      TSH    4. Medicare annual wellness visit, initial    5. Screening for AAA (abdominal aortic aneurysm)  -     [place] aaa screen limited    6. Screening PSA (prostate specific antigen)  -     PSA Screen    7. Primary hypertension  -     Comprehensive Metabolic Panel  -     Magnesium    8. Other hyperlipidemia  -     Lipid Panel With LDL / HDL Ratio    9. Testosterone insufficiency  -     Testosterone (Free & Total), LC / MS    10. Need for vaccination  -     Pneumococcal Conjugate Vaccine 20-Valent All    11. Chronic hip pain, right  -     Ambulatory Referral to Physical Therapy Evaluate and treat      Follow Up:  Next Medicare Wellness visit to be scheduled in 1 year.                  Answers for HPI/ROS submitted by the patient on 3/15/2023  What is the primary reason for your visit?: Cough  Chronicity: chronic  Onset: more than 1 month ago  Progression since onset: gradually worsening  Frequency: constantly  Cough characteristics: non-productive  chest pain: No  chills: No  ear congestion: No  ear pain: No  fever: No  headaches: Yes  heartburn: No  hemoptysis: No  myalgias: No  nasal congestion: Yes  postnasal drip: Yes  rash: No  rhinorrhea: No  shortness of breath: No  sore throat: Yes  sweats: No  weight loss: No  wheezing: No  Risk factors for lung disease: animal exposure

## 2023-03-16 NOTE — PROGRESS NOTES
Subjective   Otf Murray is a 67 y.o. male.  Reevaluation from previous visit.  Constipation much improved with MiraLAX and Benefiber.  Has appointment to see ENT next week for his chronic allergic rhinitis and sinusitis.  Has developed some pain and decreased hip flexor motion on the right is status post replacement but never had proper rehab still having a lot of pain and stiffness as well as cervical disc pain requesting PT evaluation of same.  Is also time for all lab work AAA screen and final pneumonia vaccine.  Spent some time today discussing future plans including weight loss diet and exercise visiting all subspecialist as outlined previously.    History of Present Illness   HPI    The following portions of the patient's history were reviewed and updated as appropriate: allergies, current medications, past family history, past medical history, past social history, past surgical history and problem list.    Review of Systems  Review of Systems   Constitutional: Negative for activity change, appetite change, chills, fatigue, fever and unexpected weight change.   HENT: Positive for postnasal drip and sore throat. Negative for ear pain, rhinorrhea, trouble swallowing and voice change.    Eyes: Negative for redness and visual disturbance.   Respiratory: Positive for cough. Negative for shortness of breath and wheezing.    Cardiovascular: Negative for chest pain and palpitations.   Gastrointestinal: Negative for abdominal pain, constipation, diarrhea, nausea and vomiting.   Genitourinary: Negative for urgency.   Musculoskeletal: Positive for arthralgias. Negative for joint swelling and myalgias.   Skin: Negative for rash.   Neurological: Positive for headaches. Negative for syncope.   Hematological: Negative for adenopathy.   Psychiatric/Behavioral: Negative for sleep disturbance.       Objective   Physical Exam  Physical Exam  Constitutional:       Appearance: He is well-developed.   HENT:      Head:  "Normocephalic.   Eyes:      Pupils: Pupils are equal, round, and reactive to light.   Neck:      Thyroid: No thyromegaly.   Cardiovascular:      Rate and Rhythm: Normal rate and regular rhythm.      Heart sounds: Normal heart sounds. No murmur heard.    No friction rub. No gallop.   Pulmonary:      Breath sounds: Normal breath sounds.   Abdominal:      General: There is no distension.      Palpations: Abdomen is soft. There is no mass.      Tenderness: There is no abdominal tenderness.   Musculoskeletal:         General: Normal range of motion.      Cervical back: Normal range of motion.      Comments: Mild antalgic gait mild stiffness rotator on the right.  Get up and go 3 to 5 seconds.   Skin:     General: Skin is warm and dry.   Neurological:      Mental Status: He is alert and oriented to person, place, and time.      Deep Tendon Reflexes: Reflexes are normal and symmetric.   Psychiatric:         Attention and Perception: Attention normal.         Mood and Affect: Mood normal.         Speech: Speech normal.         Behavior: Behavior normal.         Thought Content: Thought content normal.         Cognition and Memory: Cognition normal.           Visit Vitals  /78   Ht 195.6 cm (77\")   Wt (!) 140 kg (308 lb 11.2 oz)   BMI 36.61 kg/m²     Body mass index is 36.61 kg/m².  /78   Ht 195.6 cm (77\")   Wt (!) 140 kg (308 lb 11.2 oz)   BMI 36.61 kg/m²       Assessment/Plan   Diagnoses and all orders for this visit:    1. Cervical disc disorder (Primary)  -     Ambulatory Referral to Physical Therapy Evaluate and treat    2. Medically complex patient    3. Chronic fatigue  -     CBC (No Diff)  -     Comprehensive Metabolic Panel  -     T4, Free  -     TSH    4. Medicare annual wellness visit, initial    5. Screening for AAA (abdominal aortic aneurysm)  -     [place] a[place] screen [place]    6. Screening PSA (prostate specific antigen)  -     PSA Screen    7. Primary hypertension  -     Comprehensive Metabolic " Panel  -     Magnesium    8. Other hyperlipidemia  -     Lipid Panel With LDL / HDL Ratio    9. Testosterone insufficiency  -     Testosterone (Free & Total), LC / MS    10. Need for vaccination  -     Pneumococcal Conjugate Vaccine 20-Valent All    11. Chronic hip pain, right  -     Ambulatory Referral to Physical Therapy Evaluate and treat     on wt loss measures and programs  Referred to physical therapy continue to keep ENT consultation.  Is already seen multiple subspecialist in the last year or 2.  Counseled mainly on lifestyle measures continue current medicines recheck 6 months on blood pressure depending on lab work today.

## 2023-03-17 LAB
ALBUMIN SERPL-MCNC: 4 G/DL (ref 3.5–5.2)
ALBUMIN/GLOB SERPL: 1.3 G/DL
ALP SERPL-CCNC: 118 U/L (ref 39–117)
ALT SERPL W P-5'-P-CCNC: 32 U/L (ref 1–41)
ANION GAP SERPL CALCULATED.3IONS-SCNC: 8.5 MMOL/L (ref 5–15)
AST SERPL-CCNC: 29 U/L (ref 1–40)
BILIRUB SERPL-MCNC: 0.4 MG/DL (ref 0–1.2)
BUN SERPL-MCNC: 15 MG/DL (ref 8–23)
BUN/CREAT SERPL: 14.7 (ref 7–25)
CALCIUM SPEC-SCNC: 9.2 MG/DL (ref 8.6–10.5)
CHLORIDE SERPL-SCNC: 103 MMOL/L (ref 98–107)
CHOLEST SERPL-MCNC: 166 MG/DL (ref 0–200)
CO2 SERPL-SCNC: 25.5 MMOL/L (ref 22–29)
CREAT SERPL-MCNC: 1.02 MG/DL (ref 0.76–1.27)
DEPRECATED RDW RBC AUTO: 39.8 FL (ref 37–54)
EGFRCR SERPLBLD CKD-EPI 2021: 80.6 ML/MIN/1.73
ERYTHROCYTE [DISTWIDTH] IN BLOOD BY AUTOMATED COUNT: 12.7 % (ref 12.3–15.4)
GLOBULIN UR ELPH-MCNC: 3 GM/DL
GLUCOSE SERPL-MCNC: 123 MG/DL (ref 65–99)
HCT VFR BLD AUTO: 41.7 % (ref 37.5–51)
HDLC SERPL-MCNC: 44 MG/DL (ref 40–60)
HGB BLD-MCNC: 13.8 G/DL (ref 13–17.7)
LDLC SERPL CALC-MCNC: 98 MG/DL (ref 0–100)
LDLC/HDLC SERPL: 2.15 {RATIO}
MAGNESIUM SERPL-MCNC: 2.4 MG/DL (ref 1.6–2.4)
MCH RBC QN AUTO: 28.8 PG (ref 26.6–33)
MCHC RBC AUTO-ENTMCNC: 33.1 G/DL (ref 31.5–35.7)
MCV RBC AUTO: 86.9 FL (ref 79–97)
PLATELET # BLD AUTO: 207 10*3/MM3 (ref 140–450)
PMV BLD AUTO: 11.5 FL (ref 6–12)
POTASSIUM SERPL-SCNC: 4 MMOL/L (ref 3.5–5.2)
PROT SERPL-MCNC: 7 G/DL (ref 6–8.5)
PSA SERPL-MCNC: 0.69 NG/ML (ref 0–4)
RBC # BLD AUTO: 4.8 10*6/MM3 (ref 4.14–5.8)
SODIUM SERPL-SCNC: 137 MMOL/L (ref 136–145)
T4 FREE SERPL-MCNC: 1.17 NG/DL (ref 0.93–1.7)
TRIGL SERPL-MCNC: 137 MG/DL (ref 0–150)
TSH SERPL DL<=0.05 MIU/L-ACNC: 0.48 UIU/ML (ref 0.27–4.2)
VLDLC SERPL-MCNC: 24 MG/DL (ref 5–40)
WBC NRBC COR # BLD: 7.4 10*3/MM3 (ref 3.4–10.8)

## 2023-03-22 ENCOUNTER — OFFICE VISIT (OUTPATIENT)
Dept: OTOLARYNGOLOGY | Facility: CLINIC | Age: 68
End: 2023-03-22
Payer: MEDICARE

## 2023-03-22 ENCOUNTER — HOSPITAL ENCOUNTER (OUTPATIENT)
Dept: PHYSICAL THERAPY | Facility: HOSPITAL | Age: 68
Setting detail: THERAPIES SERIES
Discharge: HOME | End: 2023-03-22
Payer: MEDICARE

## 2023-03-22 VITALS — OXYGEN SATURATION: 98 % | WEIGHT: 206.6 LBS | BODY MASS INDEX: 24.39 KG/M2 | HEIGHT: 77 IN | HEART RATE: 114 BPM

## 2023-03-22 DIAGNOSIS — J34.3 HYPERTROPHY OF BOTH INFERIOR NASAL TURBINATES: ICD-10-CM

## 2023-03-22 DIAGNOSIS — J30.0 VASOMOTOR RHINITIS: ICD-10-CM

## 2023-03-22 DIAGNOSIS — J30.89 ALLERGIC RHINITIS DUE TO OTHER ALLERGIC TRIGGER, UNSPECIFIED SEASONALITY: Primary | ICD-10-CM

## 2023-03-22 DIAGNOSIS — M25.551 RIGHT HIP PAIN: Primary | ICD-10-CM

## 2023-03-22 PROCEDURE — 1160F RVW MEDS BY RX/DR IN RCRD: CPT | Performed by: OTOLARYNGOLOGY

## 2023-03-22 PROCEDURE — 1159F MED LIST DOCD IN RCRD: CPT | Performed by: OTOLARYNGOLOGY

## 2023-03-22 PROCEDURE — 99214 OFFICE O/P EST MOD 30 MIN: CPT | Performed by: OTOLARYNGOLOGY

## 2023-03-22 PROCEDURE — 97162 PT EVAL MOD COMPLEX 30 MIN: CPT | Performed by: PHYSICAL THERAPIST

## 2023-03-22 NOTE — PROGRESS NOTES
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    -I recommend increasing budesonide irrigations to twice daily, this should help control his inflammatory symptoms as he does appear to have significant edema today in addition to clear watery rhinorrhea  -I recommend continuing ipratropium bromide nasal spray, please call if you need a refill, this is meant to help with the clear watery drainage more so than any congestion or sneezing  -I recommend keeping the Astelin on hand, this can be used as needed to help with breakthrough sneezing or itching  -Prescription was sent for additional budesonide, as he is running low  -Based on his exam today and his CT scans previously I do not recommend any intervention surgically regarding his sinuses or his septum, his septal spur is not obstructive and there is no evidence of chronic sinusitis  -Follow-up in 2 months to recheck symptoms, we discussed that there are some procedures to help with his turbinate hypertrophy versus continued medication management    History of present illness:    Mr. Murray is a 67-year-old male presenting today for evaluation of allergic rhinitis and nasal septal deviation.  He has previously been seen by Dr. Olson a couple years ago, and most recently last few months by Dr. Rossi in [place].  He has clear watery rhinorrhea diagnosed previously as vasomotor rhinitis that has been improved significantly with the use of ipratropium bromide nasal spray over the last 1 year.  He notes persistent and consistent nasal congestion with sneezing episodes over the last 2 months or so, these seem to be worse when he is inside versus outside, he has reportedly had cutaneous allergy testing that was negative.  He has been doing once daily budesonide irrigations, as needed  "Astelin, and ipratropium bromide 5 times daily.  He notes postnasal drainage and phlegm that has improved since he stopped drinking, he notably was previously diagnosed with LPRD and started on famotidine, he is no longer taking famotidine but also is no longer having multiple margaritas daily.  He denies any change in smell or taste, foul smell or foul taste, any prior sinus or nasal surgery.  No other acute ENT concerns today.    Vital Signs   Vitals:    03/22/23 1310   Pulse: 114   SpO2: 98%     Physical Exam:  General: NAD, awake and alert  Head: normocephalic, atraumatic  Eyes: EOMI, sclerae white, conjunctivae pink  Ears: pinnae intact without masses or lesions   Right: TM intact without injection or effusion   Left: TM intact without injection or effusion  Nose: external straight without deformity   Mucosa red and irritated, edematous. No polyps seen. No purulence.  Thickened secretions present bilaterally   Septum: Grossly midline with a mid septal buckle causing a left septal spur that is not obstructive   Turbinates: Moderate bilateral inferior turbinate hypertrophy  OC/OP: mucosa moist and pink, no masses or lesions, tongue is midline and mobile. Tonsils 1+ without exudate. Uvula single and elevates symmetrically.  Mallampati 3 Espinoza class III  Neck: supple, no masses or lesions. Thyroid without palpable masses.  Neuro: CN II - XII grossly intact, no focal deficits    Results Review:  Most recent primary care physician note from 3-23 demonstrates multiple chronic complaints for routine follow-up to establish care with associated complaint of chronic sinusitis, reportedly a CT scan done elsewhere demonstrated no evidence of sinusitis, patient at that time on Astelin and Afrin chronically exam at that time demonstrated \"nose normal\" referral to ENT was placed.  CT maxillofacial from 4/14/2021 demonstrated a large left frontal sinus osteoma measuring 3.7 x 2.1 cm with mild mucosal thickening scattered " throughout the paranasal sinuses of the ethmoids bilaterally without air-fluid levels to suggest acute sinusitis, no osteitis involving the paranasal sinuses.  Mild right septal deviation with spur.  Mastoids clear.  Most recent ENT visit with Dr. Rossi 1/31/2023 reviewed today and demonstrates at that time assessment of LPRD on famotidine 40 mg daily, vasomotor rhinitis on ipratropium bromide spray daily, frontal sinus osteoma, CT scan ordered, and allergic rhinitis, recommendations to continue budesonide irrigations and Astelin.  That visit also notes negative allergy testing several years prior.    Review of Systems:  Positive ROS items: Congestion, postnasal drip, hearing loss, sneezing, sore throat, trouble swallowing, voice change, cough, wheezing, shortness of breath, nausea, arthritis, and dizziness.  Otherwise, a 14 system ROS is negative except as pertinent positives are mentioned above.    Histories:  Allergies   Allergen Reactions   • Ceftriaxone Cough   • Lisinopril Cough       Prior to Admission medications    Medication Sig Start Date End Date Taking? Authorizing Provider   amLODIPine (NORVASC) 5 MG tablet Take 1 tablet by mouth Daily. Taking 10 mg daily    ProviderRj MD   azelastine (ASTEPRO) 0.15 % solution nasal spray 1 spray into the nostril(s) as directed by provider 2 (Two) Times a Day. 3/3/23   Idris Arcos MD   buPROPion XL (WELLBUTRIN XL) 300 MG 24 hr tablet Take 1 tablet by mouth Daily.  Patient taking differently: Take 1 tablet by mouth Daily. Takes 150 mg daily 3/24/17   Ray Ureña MD   coenzyme Q10 100 MG capsule Take 1 capsule by mouth Daily.    ProviderRj MD   diclofenac sodium (VOTAREN XR) 100 MG 24 hr tablet Take 1 tablet by mouth Daily.    Rj Issa MD   esomeprazole (nexIUM) 40 MG capsule Take 1 capsule by mouth Daily. 3/3/23   Idris Arcos MD   Glucosamine-Chondroit-Vit C-Mn (GLUCOSAMINE CHONDR 1500 COMPLX PO) Take 2 tablets by mouth  Daily.    Rj Issa MD   losartan-hydrochlorothiazide (HYZAAR) 100-12.5 MG per tablet Take 1 tablet by mouth Daily.    Rj Issa MD   polyethylene glycol (MIRALAX) 17 g packet Take 17 g by mouth Daily. With 12 oz water daily 3/2/23   Idris Arcos MD   primidone (MYSOLINE) 50 MG tablet 1 tablet. Taking 100 mg per day 12/20/20   Rj Issa MD   simvastatin (ZOCOR) 20 MG tablet Take 1 tablet by mouth.    Rj Issa MD   testosterone (ANDROGEL) 50 MG/5GM (1%) gel gel Place 50 mg on the skin as directed by provider Daily.  Patient not taking: Reported on 3/16/2023    Rj Issa MD   Wheat Dextrin (Benefiber) powder Use per direction daily for bowels till seen again  Patient not taking: Reported on 3/16/2023 3/2/23   Idris Arcos MD       Past Medical History:   Diagnosis Date   • Acute gastritis without bleeding    • Alcohol abuse counseling and surveillance of alcoholic    • Allergic rhinitis    • Anxiety state    • Attention deficit disorder of childhood with hyperactivity    • Attention deficit hyperactivity disorder    • Attention deficit hyperactivity disorder, predominantly hyperactive impulsive type    • Attention deficit hyperactivity disorder, predominantly inattentive type    • Backache    • Body mass index (bmi) 31.0-31.9, adult     Body mass index (BMI) 31.0-31.9, adult - BMI 33.5      • Burn of lower leg    • Candidiasis of skin    • Cardiac murmur, unspecified    • Decreased testosterone level    • Depressive disorder     Depressive disorder - acute on chronic      • Dysgraphia    • Edema    • Elbow joint pain    • Encounter for general adult medical examination with abnormal findings    • Encounter for screening for malignant neoplasm of colon    • Essential hypertension    • Essential tremor    • Ex-smoker    • Fatigue    • Gastro-esophageal reflux disease with esophagitis    • Gastroesophageal reflux disease    • Hyperlipidemia    •  Hyperlipoproteinemia    • Impotence of organic origin    • Insomnia    • Intermittent palpitations    • Knee pain     Knee pain - patellofemoral      • Lateral epicondylitis    • Low back pain    • Male erectile disorder    • Male erectile dysfunction, unspecified    • Male hypogonadism    • Neck pain     Neck pain aggravated by recumbency      • Obese     Obese - BMI 33.0      • On long term drug therapy    • Osteoarthrosis     Osteoarthrosis, unspecified whether generalized or localized, involving lower leg      • Other obesity    • Other specified diseases of anus and rectum     Other specified diseases of anus and rectum - rectal pain after c-scope prep      • Overweight    • Pain in left knee    • Pain in right knee    • Plantar fasciitis    • Shoulder pain, right    • Spasm of back muscles    • Tachycardia, unspecified    • Tremor, unspecified    • Tubular adenoma of colon    • Unspecified essential hypertension    • Verruca        Past Surgical History:   Procedure Laterality Date   • APPENDECTOMY     • COLONOSCOPY  03/16/2016    One polyp in the sigmoid colon.Resected and retrieved.        • COLONOSCOPY N/A 6/23/2021    Procedure: COLONOSCOPY;  Surgeon: Brenton Ruggiero MD;  Location: [facility] ENDOSCOPY;  Service: Gastroenterology;  Laterality: N/A;   • CYST REMOVAL  10/27/2009     Excision of sebaceous cyst of the forehead, glabellar region.   • ENDOSCOPY  03/16/2016    Mildly severe esophagitis.Gastritis.Normal examined duodenum.Several biopsies obtained in the lower third of the esophagus.    • ENDOSCOPY AND COLONOSCOPY  04/12/2006     Normal colonoscopic examination    • INCISION AND DRAINAGE ABSCESS  10/24/2012   • INJECTION OF MEDICATION  04/21/2011    Depo Medrol (Methylprednisone) 80mg (1)        • INJECTION OF MEDICATION  02/01/2016    Kenalog (3)        • INJECTION OF MEDICATION  02/19/2016    Toradol (3)    • JOINT REPLACEMENT Bilateral     Knee   • KNEE ARTHROSCOPY  04/02/2009     Medial meniscus  tear of left knee   • SCROTUM EXPLORATION  07/02/2002     Excision of epidermal inclusion cyst. base of left scrotum   • SKIN BIOPSY  04/23/2012   • SKIN TAG REMOVAL  04/25/2002    Skin tag, left scrotum        Social History     Socioeconomic History   • Marital status:    Tobacco Use   • Smoking status: Former     Types: Cigarettes     Quit date: 2020     Years since quitting: 3.2   • Smokeless tobacco: Never   Vaping Use   • Vaping Use: Never used   Substance and Sexual Activity   • Alcohol use: Yes     Alcohol/week: 21.0 standard drinks     Types: 21 Glasses of wine per week     Comment: daily    • Drug use: Never   • Sexual activity: Defer       Family History   Problem Relation Age of Onset   • Diabetes Mother    • Kidney disease Mother    • Thyroid disease Mother    • Atrial fibrillation Mother    • Coronary artery disease Father    • Hypertension Father    • Hearing loss Father    • Cancer Other    • Coronary artery disease Other    • Diabetes Other    • Hearing loss Other    • Heart disease Other    • Hypertension Other    • Osteoarthritis Other    • Stroke Other    • Thyroid disease Other    • Pancreatitis Other        Immunization status not specifically asked and therefore not specifically documented.    Voice dictation disclaimer:  Voice dictation was used in the creation of this note.  As such, there may be typos or inappropriate words throughout the document.  The document is proofread for typos and errors, however some may not be caught.      This document has been electronically signed by Lety Luna MD on March 22, 2023 12:25 CDT

## 2023-03-22 NOTE — THERAPY EVALUATION
Outpatient Physical Therapy Ortho Initial Evaluation  [facility]     Patient Name: Otf Murray  : 1955  MRN: [id number]  Today's Date: 3/22/2023      Visit Date: 2023  Attendance:   (TBD approved)  Subjective % Improvement: n/a  Recert Date: 23  MD appointment: prn    Therapy Diagnosis: hx of right GERARDO , decreased mobility    Patient Active Problem List   Diagnosis   • ADHD, predominantly inattentive type   • Cervical spondylosis without myelopathy   • Chronic fatigue   • Chronic left-sided low back pain without sciatica   • Gastroesophageal reflux disease without esophagitis   • History of tobacco abuse   • History of total left knee replacement   • Primary osteoarthritis of both knees   • Recurrent major depressive disorder, in partial remission (HCC)   • Seasonal allergic rhinitis due to pollen   • History of colon polyps   • Morbid (severe) obesity due to excess calories (HCC)   • Medically complex patient   • Testosterone insufficiency   • Primary hypertension   • Other hyperlipidemia        Past Medical History:   Diagnosis Date   • Acute gastritis without bleeding    • Alcohol abuse counseling and surveillance of alcoholic    • Allergic rhinitis    • Anxiety state    • Attention deficit disorder of childhood with hyperactivity    • Attention deficit hyperactivity disorder    • Attention deficit hyperactivity disorder, predominantly hyperactive impulsive type    • Attention deficit hyperactivity disorder, predominantly inattentive type    • Backache    • Body mass index (bmi) 31.0-31.9, adult     Body mass index (BMI) 31.0-31.9, adult - BMI 33.5      • Burn of lower leg    • Candidiasis of skin    • Cardiac murmur, unspecified    • Decreased testosterone level    • Depressive disorder     Depressive disorder - acute on chronic      • Dysgraphia    • Edema    • Elbow joint pain    • Encounter for general adult medical examination with abnormal findings    • Encounter for  screening for malignant neoplasm of colon    • Essential hypertension    • Essential tremor    • Ex-smoker    • Fatigue    • Gastro-esophageal reflux disease with esophagitis    • Gastroesophageal reflux disease    • Hyperlipidemia    • Hyperlipoproteinemia    • Impotence of organic origin    • Insomnia    • Intermittent palpitations    • Knee pain     Knee pain - patellofemoral      • Lateral epicondylitis    • Low back pain    • Male erectile disorder    • Male erectile dysfunction, unspecified    • Male hypogonadism    • Neck pain     Neck pain aggravated by recumbency      • Obese     Obese - BMI 33.0      • On long term drug therapy    • Osteoarthrosis     Osteoarthrosis, unspecified whether generalized or localized, involving lower leg      • Other obesity    • Other specified diseases of anus and rectum     Other specified diseases of anus and rectum - rectal pain after c-scope prep      • Overweight    • Pain in left knee    • Pain in right knee    • Plantar fasciitis    • Shoulder pain, right    • Spasm of back muscles    • Tachycardia, unspecified    • Tinnitus    • Tremor, unspecified    • Tubular adenoma of colon    • Unspecified essential hypertension    • Verruca         Past Surgical History:   Procedure Laterality Date   • APPENDECTOMY     • COLONOSCOPY  03/16/2016    One polyp in the sigmoid colon.Resected and retrieved.        • COLONOSCOPY N/A 6/23/2021    Procedure: COLONOSCOPY;  Surgeon: Brenton Ruggiero MD;  Location: [facility] ENDOSCOPY;  Service: Gastroenterology;  Laterality: N/A;   • CYST REMOVAL  10/27/2009     Excision of sebaceous cyst of the forehead, glabellar region.   • ENDOSCOPY  03/16/2016    Mildly severe esophagitis.Gastritis.Normal examined duodenum.Several biopsies obtained in the lower third of the esophagus.    • ENDOSCOPY AND COLONOSCOPY  04/12/2006     Normal colonoscopic examination    • INCISION AND DRAINAGE ABSCESS  10/24/2012   • INJECTION OF MEDICATION  04/21/2011    Depo  Medrol (Methylprednisone) 80mg (1)        • INJECTION OF MEDICATION  02/01/2016    Kenalog (3)        • INJECTION OF MEDICATION  02/19/2016    Toradol (3)    • JOINT REPLACEMENT Bilateral     Knee   • KNEE ARTHROSCOPY  04/02/2009     Medial meniscus tear of left knee   • SCROTUM EXPLORATION  07/02/2002     Excision of epidermal inclusion cyst. base of left scrotum   • SKIN BIOPSY  04/23/2012   • SKIN TAG REMOVAL  04/25/2002    Skin tag, left scrotum        Visit Dx:     ICD-10-CM ICD-9-CM   1. Right hip pain  M25.551 719.45          Patient History     Row Name 03/22/23 1425             History    Brief Description of Current Complaint Present today with reports of wanting to walk 5 miles a day. Reports sat alot while working and blames that for tightness in flexor. Reports tightness in hip and core weakness limits mobility. Has been released from hip surgeon. Had sx on hip July 18 2022/  -BB      Patient/Caregiver Goals Relieve pain;Return to prior level of function;Improve mobility;Improve strength;Know what to do to help the symptoms  -BB      Hand Dominance right-handed  -BB      Occupation/sports/leisure activities retired  -BB         Pain     Pain Location Hip  -BB      Pain at Present 0  -BB      Pain Description --  reports having intermittent neck pain  -BB      Is your sleep disturbed? No  -BB            User Key  (r) = Recorded By, (t) = Taken By, (c) = Cosigned By    Initials Name Provider Type    Rupinder Tiwari PT DPT Physical Therapist                 PT Ortho     Row Name 03/22/23 1425       Precautions and Contraindications    Precautions hx of GERARDO July 2022  -BB       Posture/Observations    Posture/Observations Comments decreased lumbar lordosis, slight trunk flexed gait pattern noted. No significant antalgics noted in gait, no AD.  -BB       Special Tests/Palpation    Special Tests/Palpation Hip  -BB       Hip/Thigh Palpation    Hip/Thigh Palpation? No Tenderness/Abnormality  denies  "significant tenderness  -BB       General ROM    GENERAL ROM COMMENTS IR: 60, ER: 30. hip flexion at 90, SL from 90/90: -20.Hip extension: 5 degrees avoiding compensation  -BB       MMT (Manual Muscle Testing)    General MMT Comments knee extension/flexion:WNL, hip flexion: 4+/5, hip ER/IR: 4-/5  hip flexion supine 4+/5  -BB       Sensation    Sensation WNL? WNL  -BB       Transfers    Comment, (Transfers) independent in all  -BB          User Key  (r) = Recorded By, (t) = Taken By, (c) = Cosigned By    Initials Name Provider Type    Rupinder Tiwari, PT DPT Physical Therapist                            Therapy Education  Education Details: sitting pelvic tiliting      PT OP Goals     Row Name 03/22/23 1422          PT Short Term Goals    STG Date to Achieve 04/12/23  -BB     STG 1 Hip ER: 40 degrees,  -BB     STG 2 Hip IR: 70 degrees  -BB     STG 3 complete 10 pelvic tilts with back flat on wall to show good pelvic mobility/control  -BB        Long Term Goals    LTG Date to Achieve 05/17/23  -BB     LTG 1 complete in clinic or verbal ability to complete 2 miles of walking  -BB     LTG 2 SLS BLE 3\" each leg  -BB        Time Calculation    PT Goal Re-Cert Due Date 04/12/23  -BB           User Key  (r) = Recorded By, (t) = Taken By, (c) = Cosigned By    Initials Name Provider Type    Rupinder Tiwari, PT DPT Physical Therapist                 PT Assessment/Plan     Row Name 03/22/23 1426          PT Assessment    Functional Limitations Decreased safety during functional activities;Impaired gait;Impaired locomotion;Limitation in home management;Limitations in community activities;Limitations in functional capacity and performance;Performance in leisure activities;Performance in self-care ADL  -BB     Impairments Balance;Gait;Impaired muscle endurance;Impaired muscle length;Muscle strength;Pain;Range of motion;Impaired flexibility  -BB     Assessment Comments Patient presents today with hx of a right GERARDO in past, " reports decreased overall mobility over past year limiting gait and wants to improve gait mobility and endurance and hip ROM. PAtient could benefit from PT to improve pelvic/hip mobility and stability, stretching, strength and balance.  -BB     Rehab Potential Good  -BB     Patient/caregiver participated in establishment of treatment plan and goals Yes  -BB     Patient would benefit from skilled therapy intervention Yes  -BB        PT Plan    PT Frequency 2x/week  -BB     Predicted Duration of Therapy Intervention (PT) 6-8 weeks  -BB     Planned CPT's? PT EVAL MOD COMPLELITY: 04923;PT THER SUPP EA 15 MIN;PT RE-EVAL: 99661;PT THER PROC EA 15 MIN: 20681;PT MANUAL THERAPY EA 15 MIN: 86054;PT THER ACT EA 15 MIN: 59174;PT NEUROMUSC RE-EDUCATION EA 15 MIN: 25631;PT GAIT TRAINING EA 15 MIN: 62241;PT SELF CARE/HOME MGMT/TRAIN EA 15: 97932;PT ELECTRICAL STIM UNATTEND:   -BB     PT Plan Comments aquatic and land treatment for ROM, strength, gait, balance, coordination, manual PRN, teach pelvic tilting, Trans AB (iliopsoas work), hip rotator strength, ice/heat with IFC PRN, and balance  -BB           User Key  (r) = Recorded By, (t) = Taken By, (c) = Cosigned By    Initials Name Provider Type    Rupinder Tiwari PT DPT Physical Therapist                   OP Exercises     Row Name 03/22/23 1425             Subjective Comments    Subjective Comments see pt hx  -BB         Subjective Pain    Able to rate subjective pain? yes  -BB      Pre-Treatment Pain Level 0  -BB      Post-Treatment Pain Level 0  -BB         Exercise 1    Exercise Name 1 eval/poc  -BB            User Key  (r) = Recorded By, (t) = Taken By, (c) = Cosigned By    Initials Name Provider Type    Rupinder Tiwari PT DPT Physical Therapist                                        Time Calculation:     Start Time: 1425  Stop Time: 1457  Time Calculation (min): 32 min     Therapy Charges for Today     Code Description Service Date Service Provider Modifiers Qty     [id number]  PT EVAL MOD COMPLEXITY 3 3/22/2023 Rupinder Link, PT DPT GP 1                    Rupinder Link, PT DPT  3/22/2023

## 2023-03-23 ENCOUNTER — HOSPITAL ENCOUNTER (OUTPATIENT)
Dept: ULTRASOUND IMAGING | Facility: HOSPITAL | Age: 68
Discharge: HOME | End: 2023-03-23
Admitting: FAMILY MEDICINE
Payer: MEDICARE

## 2023-03-23 PROCEDURE — 76706 US ABDL AORTA SCREEN AAA: CPT

## 2023-03-24 LAB
TESTOST FREE SERPL-MCNC: 2.4 PG/ML (ref 6.6–18.1)
TESTOST SERPL-MCNC: 202.8 NG/DL (ref 264–916)

## 2023-03-24 NOTE — PROGRESS NOTES
Serum testosterone level is low free testosterone at 2.4 normal should be above 6.  Question continuing testosterone injections are stopped.  Only treatment would be resumption of testosterone injections if wishes.  If so then will have to be seen again since schedule II drug

## 2023-03-27 ENCOUNTER — APPOINTMENT (OUTPATIENT)
Dept: PHYSICAL THERAPY | Facility: HOSPITAL | Age: 68
End: 2023-03-27
Payer: MEDICARE

## 2023-03-28 ENCOUNTER — HOSPITAL ENCOUNTER (OUTPATIENT)
Dept: PHYSICAL THERAPY | Facility: HOSPITAL | Age: 68
Setting detail: THERAPIES SERIES
Discharge: HOME | End: 2023-03-28
Payer: MEDICARE

## 2023-03-28 DIAGNOSIS — Z96.641 STATUS POST HIP REPLACEMENT, RIGHT: ICD-10-CM

## 2023-03-28 DIAGNOSIS — M25.551 RIGHT HIP PAIN: Primary | ICD-10-CM

## 2023-03-28 PROCEDURE — 97110 THERAPEUTIC EXERCISES: CPT

## 2023-03-28 NOTE — THERAPY TREATMENT NOTE
Outpatient Physical Therapy Ortho Treatment Note  [facility]     Patient Name: Otf Murray  : 1955  MRN: [id number]  Today's Date: 3/28/2023      Visit Date: 2023     Attendance:  2/2 (Eval + 10 until 23)  Subjective % Improvement: 0%  Recert Date: 23  MD appointment: prn     Therapy Diagnosis: hx of right GERARDO , decreased mobility    Visit Dx:    ICD-10-CM ICD-9-CM   1. Right hip pain  M25.551 719.45   2. Status post hip replacement, right  Z96.641 V43.64       Patient Active Problem List   Diagnosis   • ADHD, predominantly inattentive type   • Cervical spondylosis without myelopathy   • Chronic fatigue   • Chronic left-sided low back pain without sciatica   • Gastroesophageal reflux disease without esophagitis   • History of tobacco abuse   • History of total left knee replacement   • Primary osteoarthritis of both knees   • Recurrent major depressive disorder, in partial remission (HCC)   • Seasonal allergic rhinitis due to pollen   • History of colon polyps   • Morbid (severe) obesity due to excess calories ([facility])   • Medically complex patient   • Testosterone insufficiency   • Primary hypertension   • Other hyperlipidemia        Past Medical History:   Diagnosis Date   • Acute gastritis without bleeding    • Alcohol abuse counseling and surveillance of alcoholic    • Allergic rhinitis    • Anxiety state    • Attention deficit disorder of childhood with hyperactivity    • Attention deficit hyperactivity disorder    • Attention deficit hyperactivity disorder, predominantly hyperactive impulsive type    • Attention deficit hyperactivity disorder, predominantly inattentive type    • Backache    • Body mass index (bmi) 31.0-31.9, adult     Body mass index (BMI) 31.0-31.9, adult - BMI 33.5      • Burn of lower leg    • Candidiasis of skin    • Cardiac murmur, unspecified    • Decreased testosterone level    • Depressive disorder     Depressive disorder - acute on chronic       • Dysgraphia    • Edema    • Elbow joint pain    • Encounter for general adult medical examination with abnormal findings    • Encounter for screening for malignant neoplasm of colon    • Essential hypertension    • Essential tremor    • Ex-smoker    • Fatigue    • Gastro-esophageal reflux disease with esophagitis    • Gastroesophageal reflux disease    • Hyperlipidemia    • Hyperlipoproteinemia    • Impotence of organic origin    • Insomnia    • Intermittent palpitations    • Knee pain     Knee pain - patellofemoral      • Lateral epicondylitis    • Low back pain    • Male erectile disorder    • Male erectile dysfunction, unspecified    • Male hypogonadism    • Neck pain     Neck pain aggravated by recumbency      • Obese     Obese - BMI 33.0      • On long term drug therapy    • Osteoarthrosis     Osteoarthrosis, unspecified whether generalized or localized, involving lower leg      • Other obesity    • Other specified diseases of anus and rectum     Other specified diseases of anus and rectum - rectal pain after c-scope prep      • Overweight    • Pain in left knee    • Pain in right knee    • Plantar fasciitis    • Shoulder pain, right    • Spasm of back muscles    • Tachycardia, unspecified    • Tinnitus    • Tremor, unspecified    • Tubular adenoma of colon    • Unspecified essential hypertension    • Verruca         Past Surgical History:   Procedure Laterality Date   • APPENDECTOMY     • COLONOSCOPY  03/16/2016    One polyp in the sigmoid colon.Resected and retrieved.        • COLONOSCOPY N/A 6/23/2021    Procedure: COLONOSCOPY;  Surgeon: Brenton Ruggiero MD;  Location: [facility] ENDOSCOPY;  Service: Gastroenterology;  Laterality: N/A;   • CYST REMOVAL  10/27/2009     Excision of sebaceous cyst of the forehead, glabellar region.   • ENDOSCOPY  03/16/2016    Mildly severe esophagitis.Gastritis.Normal examined duodenum.Several biopsies obtained in the lower third of the esophagus.    • ENDOSCOPY AND COLONOSCOPY   04/12/2006     Normal colonoscopic examination    • INCISION AND DRAINAGE ABSCESS  10/24/2012   • INJECTION OF MEDICATION  04/21/2011    Depo Medrol (Methylprednisone) 80mg (1)        • INJECTION OF MEDICATION  02/01/2016    Kenalog (3)        • INJECTION OF MEDICATION  02/19/2016    Toradol (3)    • JOINT REPLACEMENT Bilateral     Knee   • KNEE ARTHROSCOPY  04/02/2009     Medial meniscus tear of left knee   • SCROTUM EXPLORATION  07/02/2002     Excision of epidermal inclusion cyst. base of left scrotum   • SKIN BIOPSY  04/23/2012   • SKIN TAG REMOVAL  04/25/2002    Skin tag, left scrotum         PT Ortho     Row Name 03/28/23 1300       Precautions and Contraindications    Precautions hx of GERARDO July 2022  -       Subjective Pain    Post-Treatment Pain Level 0  -       Posture/Observations    Posture/Observations Comments decreased lumbar lordosis, slight trunk flexed gait pattern noted. No significant antalgics noted in gait, no AD.  -          User Key  (r) = Recorded By, (t) = Taken By, (c) = Cosigned By    Initials Name Provider Type    Kristin Garcia PTA Physical Therapist Assistant                             PT Assessment/Plan     Row Name 03/28/23 1300          PT Assessment    Functional Limitations Decreased safety during functional activities;Impaired gait;Impaired locomotion;Limitation in home management;Limitations in community activities;Limitations in functional capacity and performance;Performance in leisure activities;Performance in self-care ADL  -     Impairments Balance;Gait;Impaired muscle endurance;Impaired muscle length;Muscle strength;Pain;Range of motion;Impaired flexibility  -     Assessment Comments initated HEP this date and did well with treatment; felt looser and good performance noted with all exercises; No goals met at this time;  -     Rehab Potential Good  -     Patient/caregiver participated in establishment of treatment plan and goals Yes  -     Patient would  "benefit from skilled therapy intervention Yes  -KH        PT Plan    PT Frequency 2x/week  -KH     Predicted Duration of Therapy Intervention (PT) 6-8 weeks  -KH     PT Plan Comments Aquatics next visit;  -KH           User Key  (r) = Recorded By, (t) = Taken By, (c) = Cosigned By    Initials Name Provider Type    Kristin Garcia PTA Physical Therapist Assistant                 Modalities     Row Name 03/28/23 1300             Subjective Pain    Pre-Treatment Pain Level 0  -KH            User Key  (r) = Recorded By, (t) = Taken By, (c) = Cosigned By    Initials Name Provider Type    Kristin Garcia PTA Physical Therapist Assistant               OP Exercises     Row Name 03/28/23 1300             Subjective Comments    Subjective Comments patient reports that he doesn't have any pain today just realy weak all over;  -KH         Subjective Pain    Able to rate subjective pain? yes  -KH      Pre-Treatment Pain Level 0  -KH      Post-Treatment Pain Level 0  -KH         Exercise 1    Exercise Name 1 Pro ll LE strength  -KH      Time 1 10 mins  -KH      Additional Comments level 3; seat 17  -KH         Exercise 2    Exercise Name 2 incline stretch  -KH      Cueing 2 Verbal  -KH      Sets 2 3  -KH      Time 2 30\" holds  -KH         Exercise 3    Exercise Name 3 standing hamstring stretch w/ ER & IR  -KH      Cueing 3 Verbal  -KH      Sets 3 3  -KH      Time 3 30\" holds  -KH      Additional Comments each R only  -KH         Exercise 4    Exercise Name 4 LTR  -KH      Cueing 4 Verbal  -KH      Sets 4 1  -KH      Reps 4 10  -KH      Time 4 10\" holds  -KH         Exercise 5    Exercise Name 5 hooklying alt LE hip flexion  -KH      Cueing 5 Verbal  -KH      Sets 5 2  -KH      Reps 5 10  -KH         Exercise 6    Exercise Name 6 clam shells B  -KH      Cueing 6 Verbal  -KH      Sets 6 2  -KH      Reps 6 10 each  -KH         Exercise 7    Exercise Name 7 Bridges  -KH      Cueing 7 Verbal  -KH      Sets 7 2  -KH      Reps 7 10  " "-KH         Exercise 8    Exercise Name 8 SKTC stretch B  -KH      Cueing 8 Verbal  -KH      Sets 8 3  -KH      Time 8 30\" holds  -KH         Exercise 9    Exercise Name 9 Piriformis stretch B supine  -KH      Cueing 9 Verbal  -KH      Sets 9 3  -KH      Time 9 30\" holds  -KH            User Key  (r) = Recorded By, (t) = Taken By, (c) = Cosigned By    Initials Name Provider Type    Kristin Garcia PTA Physical Therapist Assistant                              PT OP Goals     Row Name 03/28/23 1300          PT Short Term Goals    STG Date to Achieve 04/12/23  -     STG 1 Hip ER: 40 degrees,  -     STG 2 Hip IR: 70 degrees  -     STG 3 complete 10 pelvic tilts with back flat on wall to show good pelvic mobility/control  -        Long Term Goals    LTG Date to Achieve 05/17/23  -     LTG 1 complete in clinic or verbal ability to complete 2 miles of walking  -     LTG 2 SLS BLE 3\" each leg  -        Time Calculation    PT Goal Re-Cert Due Date 04/12/23  -           User Key  (r) = Recorded By, (t) = Taken By, (c) = Cosigned By    Initials Name Provider Type    Kristin Garcia PTA Physical Therapist Assistant                Therapy Education  Education Details: clam shells, reverse clam. bridges, SKTC, hamstring stretch, LTR, hooklying hip flexion  Given: HEP  Program: New  How Provided: Verbal, Demonstration, Written  Provided to: Patient  Level of Understanding: Teach back education performed, Verbalized, Demonstrated              Time Calculation:   Start Time: 1330  Stop Time: 1423  Time Calculation (min): 53 min  Therapy Charges for Today     Code Description Service Date Service Provider Modifiers Qty    [id number]  PT THER PROC EA 15 MIN 3/28/2023 Kristin Lamas PTA GP, CQ 4                    Kristin Lamas PTA  3/28/2023     "

## 2023-03-30 ENCOUNTER — OFFICE VISIT (OUTPATIENT)
Dept: FAMILY MEDICINE CLINIC | Facility: CLINIC | Age: 68
End: 2023-03-30
Payer: MEDICARE

## 2023-03-30 VITALS
WEIGHT: 307.7 LBS | DIASTOLIC BLOOD PRESSURE: 80 MMHG | SYSTOLIC BLOOD PRESSURE: 122 MMHG | HEIGHT: 77 IN | BODY MASS INDEX: 36.33 KG/M2

## 2023-03-30 DIAGNOSIS — E34.9 TESTOSTERONE INSUFFICIENCY: Primary | Chronic | ICD-10-CM

## 2023-03-30 DIAGNOSIS — R53.83 OTHER FATIGUE: ICD-10-CM

## 2023-03-30 PROCEDURE — 99213 OFFICE O/P EST LOW 20 MIN: CPT | Performed by: FAMILY MEDICINE

## 2023-03-30 PROCEDURE — 3074F SYST BP LT 130 MM HG: CPT | Performed by: FAMILY MEDICINE

## 2023-03-30 PROCEDURE — 3079F DIAST BP 80-89 MM HG: CPT | Performed by: FAMILY MEDICINE

## 2023-03-30 RX ORDER — TESTOSTERONE CYPIONATE 200 MG/ML
200 VIAL (ML) INTRAMUSCULAR
Qty: 5 ML | Refills: 0 | Status: SHIPPED | OUTPATIENT
Start: 2023-03-30

## 2023-03-30 NOTE — PROGRESS NOTES
Answers for HPI/ROS submitted by the patient on 3/30/2023  What is the primary reason for your visit?: Back Pain  Chronicity: new  Onset: in the past 7 days  Frequency: 2 to 4 times per day  Progression since onset: gradually worsening  Pain location: thoracic spine  Pain quality: aching  Radiates to: does not radiate  Pain - numeric: 3/10  Pain is: worse during the day  Aggravated by: bending  Stiffness is present: in the morning  abdominal pain: No  bladder incontinence: No  bowel incontinence: No  chest pain: No  dysuria: No  fever: No  headaches: No  leg pain: No  numbness: No  paresis: No  paresthesias: No  pelvic pain: Yes  perianal numbness: No  tingling: No  weakness: Yes  weight loss: No  Risk factors: history of steroid use, lack of exercise, obesity, poor posture    Subjective   Otf Murray is a 67 y.o. male.  Asked to return.  Lab work revealed very low testosterone level.  His had problems in the past chronic fatigue muscle weakness etc.  Never really had a full replacement trial.  Chart has been marked being on AndroGel but has never been on same.  Spent some time today discussing pros and cons of testosterone replacement including perhaps increase relief of fatigue symptoms possible muscle and bone enhancement from previous loss from chronic pain alcohol use etc.  Discussed also problems with liver function PSA issues etc.  He wishes an 8 to 10-week trial of standard replacement therapy.  We will recheck at the end of that time if not made appreciable symptom progress then will stop    History of Present Illness   HPI    The following portions of the patient's history were reviewed and updated as appropriate: allergies, current medications, past family history, past medical history, past social history, past surgical history and problem list.    Review of Systems  Review of Systems   Constitutional: Positive for fatigue. Negative for activity change, appetite change, fever and unexpected weight  change.   HENT: Negative for trouble swallowing and voice change.    Eyes: Negative for redness and visual disturbance.   Respiratory: Negative for cough and wheezing.    Cardiovascular: Negative for chest pain and palpitations.   Gastrointestinal: Negative for abdominal pain, constipation, diarrhea, nausea and vomiting.   Genitourinary: Negative for dysuria and urgency.   Musculoskeletal: Positive for back pain. Negative for joint swelling.   Neurological: Positive for weakness. Negative for syncope, numbness and headaches.   Hematological: Negative for adenopathy.   Psychiatric/Behavioral: Negative for sleep disturbance.       Objective   Physical Exam  Physical Exam  Constitutional:       Appearance: Normal appearance. He is obese.   Neurological:      Mental Status: He is alert.   Psychiatric:         Mood and Affect: Affect is blunt.         Speech: Speech is delayed.         Behavior: Behavior is slowed.       Results for orders placed or performed in visit on 03/16/23   CBC (No Diff)    Specimen: Blood   Result Value Ref Range    WBC 7.40 3.40 - 10.80 10*3/mm3    RBC 4.80 4.14 - 5.80 10*6/mm3    Hemoglobin 13.8 13.0 - 17.7 g/dL    Hematocrit 41.7 37.5 - 51.0 %    MCV 86.9 79.0 - 97.0 fL    MCH 28.8 26.6 - 33.0 pg    MCHC 33.1 31.5 - 35.7 g/dL    RDW 12.7 12.3 - 15.4 %    RDW-SD 39.8 37.0 - 54.0 fl    MPV 11.5 6.0 - 12.0 fL    Platelets 207 140 - 450 10*3/mm3   Comprehensive Metabolic Panel    Specimen: Blood   Result Value Ref Range    Glucose 123 (H) 65 - 99 mg/dL    BUN 15 8 - 23 mg/dL    Creatinine 1.02 0.76 - 1.27 mg/dL    Sodium 137 136 - 145 mmol/L    Potassium 4.0 3.5 - 5.2 mmol/L    Chloride 103 98 - 107 mmol/L    CO2 25.5 22.0 - 29.0 mmol/L    Calcium 9.2 8.6 - 10.5 mg/dL    Total Protein 7.0 6.0 - 8.5 g/dL    Albumin 4.0 3.5 - 5.2 g/dL    ALT (SGPT) 32 1 - 41 U/L    AST (SGOT) 29 1 - 40 U/L    Alkaline Phosphatase 118 (H) 39 - 117 U/L    Total Bilirubin 0.4 0.0 - 1.2 mg/dL    Globulin 3.0 gm/dL     "A/G Ratio 1.3 g/dL    BUN/Creatinine Ratio 14.7 7.0 - 25.0    Anion Gap 8.5 5.0 - 15.0 mmol/L    eGFR 80.6 >60.0 mL/min/1.73   Lipid Panel With LDL / HDL Ratio    Specimen: Blood   Result Value Ref Range    Total Cholesterol 166 0 - 200 mg/dL    Triglycerides 137 0 - 150 mg/dL    HDL Cholesterol 44 40 - 60 mg/dL    LDL Cholesterol  98 0 - 100 mg/dL    VLDL Cholesterol 24 5 - 40 mg/dL    LDL/HDL Ratio 2.15    Magnesium    Specimen: Blood   Result Value Ref Range    Magnesium 2.4 1.6 - 2.4 mg/dL   PSA Screen    Specimen: Blood   Result Value Ref Range    PSA 0.687 0.000 - 4.000 ng/mL   Testosterone (Free & Total), LC / MS    Specimen: Blood   Result Value Ref Range    Testosterone, Total 202.8 (L) 264.0 - 916.0 ng/dL    Testosterone, Free 2.4 (L) 6.6 - 18.1 pg/mL   T4, Free    Specimen: Blood   Result Value Ref Range    Free T4 1.17 0.93 - 1.70 ng/dL   TSH    Specimen: Blood   Result Value Ref Range    TSH 0.483 0.270 - 4.200 uIU/mL         Visit Vitals  /80   Ht 195.6 cm (77\")   Wt (!) 140 kg (307 lb 11.2 oz)   BMI 36.49 kg/m²     Body mass index is 36.49 kg/m².  /80   Ht 195.6 cm (77\")   Wt (!) 140 kg (307 lb 11.2 oz)   BMI 36.49 kg/m²       Assessment/Plan   Diagnoses and all orders for this visit:    1. Testosterone insufficiency (Primary)  -     Testosterone Cypionate 200 MG/ML solution; Inject 1 mL as directed Every 14 (Fourteen) Days.  Dispense: 5 mL; Refill: 0    2. Other fatigue  -     Testosterone Cypionate 200 MG/ML solution; Inject 1 mL as directed Every 14 (Fourteen) Days.  Dispense: 5 mL; Refill: 0    Plan as above  "

## 2023-03-31 ENCOUNTER — HOSPITAL ENCOUNTER (OUTPATIENT)
Dept: PHYSICAL THERAPY | Facility: HOSPITAL | Age: 68
Setting detail: THERAPIES SERIES
Discharge: HOME | End: 2023-03-31
Payer: MEDICARE

## 2023-03-31 ENCOUNTER — CLINICAL SUPPORT (OUTPATIENT)
Dept: FAMILY MEDICINE CLINIC | Facility: CLINIC | Age: 68
End: 2023-03-31
Payer: MEDICARE

## 2023-03-31 DIAGNOSIS — E34.9 TESTOSTERONE INSUFFICIENCY: Primary | Chronic | ICD-10-CM

## 2023-03-31 DIAGNOSIS — M25.551 RIGHT HIP PAIN: Primary | ICD-10-CM

## 2023-03-31 DIAGNOSIS — Z96.641 STATUS POST HIP REPLACEMENT, RIGHT: ICD-10-CM

## 2023-03-31 PROCEDURE — 97113 AQUATIC THERAPY/EXERCISES: CPT

## 2023-03-31 RX ORDER — TESTOSTERONE CYPIONATE 200 MG/ML
200 INJECTION, SOLUTION INTRAMUSCULAR
Status: SHIPPED | OUTPATIENT
Start: 2023-03-31

## 2023-03-31 RX ADMIN — TESTOSTERONE CYPIONATE 200 MG: 200 INJECTION, SOLUTION INTRAMUSCULAR at 11:12

## 2023-03-31 NOTE — THERAPY TREATMENT NOTE
Outpatient Physical Therapy Ortho Treatment Note  [facility]     Patient Name: Otf Murray  : 1955  MRN: [id number]  Today's Date: 3/31/2023      Visit Date: 2023     Attendance:  3/3 (Eval + 10 until 23)  Subjective % Improvement: 0%  Recert Date: 23  MD appointment: prn     Therapy Diagnosis: hx of right GERARDO , decreased mobility       Visit Dx:    ICD-10-CM ICD-9-CM   1. Right hip pain  M25.551 719.45   2. Status post hip replacement, right  Z96.641 V43.64       Patient Active Problem List   Diagnosis   • ADHD, predominantly inattentive type   • Cervical spondylosis without myelopathy   • Chronic fatigue   • Chronic left-sided low back pain without sciatica   • Gastroesophageal reflux disease without esophagitis   • History of tobacco abuse   • History of total left knee replacement   • Primary osteoarthritis of both knees   • Recurrent major depressive disorder, in partial remission (HCC)   • Seasonal allergic rhinitis due to pollen   • History of colon polyps   • Morbid (severe) obesity due to excess calories ([facility])   • Medically complex patient   • Testosterone insufficiency   • Primary hypertension   • Other hyperlipidemia        Past Medical History:   Diagnosis Date   • Acute gastritis without bleeding    • Alcohol abuse counseling and surveillance of alcoholic    • Allergic rhinitis    • Anxiety state    • Attention deficit disorder of childhood with hyperactivity    • Attention deficit hyperactivity disorder    • Attention deficit hyperactivity disorder, predominantly hyperactive impulsive type    • Attention deficit hyperactivity disorder, predominantly inattentive type    • Backache    • Body mass index (bmi) 31.0-31.9, adult     Body mass index (BMI) 31.0-31.9, adult - BMI 33.5      • Burn of lower leg    • Candidiasis of skin    • Cardiac murmur, unspecified    • Decreased testosterone level    • Depressive disorder     Depressive disorder - acute on chronic       • Dysgraphia    • Edema    • Elbow joint pain    • Encounter for general adult medical examination with abnormal findings    • Encounter for screening for malignant neoplasm of colon    • Essential hypertension    • Essential tremor    • Ex-smoker    • Fatigue    • Gastro-esophageal reflux disease with esophagitis    • Gastroesophageal reflux disease    • Hyperlipidemia    • Hyperlipoproteinemia    • Impotence of organic origin    • Insomnia    • Intermittent palpitations    • Knee pain     Knee pain - patellofemoral      • Lateral epicondylitis    • Low back pain    • Male erectile disorder    • Male erectile dysfunction, unspecified    • Male hypogonadism    • Neck pain     Neck pain aggravated by recumbency      • Obese     Obese - BMI 33.0      • On long term drug therapy    • Osteoarthrosis     Osteoarthrosis, unspecified whether generalized or localized, involving lower leg      • Other obesity    • Other specified diseases of anus and rectum     Other specified diseases of anus and rectum - rectal pain after c-scope prep      • Overweight    • Pain in left knee    • Pain in right knee    • Plantar fasciitis    • Shoulder pain, right    • Spasm of back muscles    • Tachycardia, unspecified    • Tinnitus    • Tremor, unspecified    • Tubular adenoma of colon    • Unspecified essential hypertension    • Verruca         Past Surgical History:   Procedure Laterality Date   • APPENDECTOMY     • COLONOSCOPY  03/16/2016    One polyp in the sigmoid colon.Resected and retrieved.        • COLONOSCOPY N/A 6/23/2021    Procedure: COLONOSCOPY;  Surgeon: Brenton Ruggiero MD;  Location: [facility] ENDOSCOPY;  Service: Gastroenterology;  Laterality: N/A;   • CYST REMOVAL  10/27/2009     Excision of sebaceous cyst of the forehead, glabellar region.   • ENDOSCOPY  03/16/2016    Mildly severe esophagitis.Gastritis.Normal examined duodenum.Several biopsies obtained in the lower third of the esophagus.    • ENDOSCOPY AND  COLONOSCOPY  04/12/2006     Normal colonoscopic examination    • INCISION AND DRAINAGE ABSCESS  10/24/2012   • INJECTION OF MEDICATION  04/21/2011    Depo Medrol (Methylprednisone) 80mg (1)        • INJECTION OF MEDICATION  02/01/2016    Kenalog (3)        • INJECTION OF MEDICATION  02/19/2016    Toradol (3)    • JOINT REPLACEMENT Bilateral     Knee   • KNEE ARTHROSCOPY  04/02/2009     Medial meniscus tear of left knee   • SCROTUM EXPLORATION  07/02/2002     Excision of epidermal inclusion cyst. base of left scrotum   • SKIN BIOPSY  04/23/2012   • SKIN TAG REMOVAL  04/25/2002    Skin tag, left scrotum         PT Ortho     Row Name 03/31/23 1200       Precautions and Contraindications    Precautions hx of GERARDO July 2022  -       Posture/Observations    Posture/Observations Comments no fear of water and agreeable to aquatic therapy;  -          User Key  (r) = Recorded By, (t) = Taken By, (c) = Cosigned By    Initials Name Provider Type     Kristin Lamas PTA Physical Therapist Assistant                             PT Assessment/Plan     Row Name 03/31/23 1200          PT Assessment    Functional Limitations Decreased safety during functional activities;Impaired gait;Impaired locomotion;Limitation in home management;Limitations in community activities;Limitations in functional capacity and performance;Performance in leisure activities;Performance in self-care ADL  -     Impairments Balance;Gait;Impaired muscle endurance;Impaired muscle length;Muscle strength;Pain;Range of motion;Impaired flexibility  -     Assessment Comments intiated aquatics this date and did well; no increased pain; good core stabilization noted;  -     Rehab Potential Good  -     Patient/caregiver participated in establishment of treatment plan and goals Yes  -     Patient would benefit from skilled therapy intervention Yes  -KH        PT Plan    PT Frequency 2x/week  -     Predicted Duration of Therapy Intervention (PT) 6-8 weeks   -KH     PT Plan Comments Continue to progress core stab and LE ROM/strength; add standing hamstring stretch to aquatics next  -KH           User Key  (r) = Recorded By, (t) = Taken By, (c) = Cosigned By    Initials Name Provider Type    Kristin Garcia PTA Physical Therapist Assistant                 Modalities     Row Name 03/31/23 1200             Subjective Comments    Subjective Comments pt reports that he got in his hot tub this  morning; able to do his exercises 1x/day and was sore after last treatment; Got first testosterone shot today;  -KH            User Key  (r) = Recorded By, (t) = Taken By, (c) = Cosigned By    Initials Name Provider Type    Kristin Garcia PTA Physical Therapist Assistant               OP Exercises     Row Name 03/31/23 1200             Subjective Comments    Subjective Comments pt reports that he got in his hot tub this  morning; able to do his exercises 1x/day and was sore after last treatment; Got first testosterone shot today;  -KH         Subjective Pain    Able to rate subjective pain? yes  -KH      Pre-Treatment Pain Level 5  -KH      Post-Treatment Pain Level 0  -KH         Aquatics    Aquatics performed? Yes  -         Exercise 1    Exercise Name 1 Aquatics: water walking  -KH      Time 1 5 mins  -KH      Additional Comments fwd;lat;bkd  -KH         Exercise 2    Exercise Name 2 Aquatics: core stability with UE 4 way with paddles  -      Cueing 2 Verbal  -KH      Sets 2 2  -KH      Reps 2 10 each (yellow paddles)  -KH      Additional Comments horiz ab/add; abd/add; flex/ext;punches  -         Exercise 3    Exercise Name 3 Aquatics; SLR 3 way B  -KH      Cueing 3 Verbal  -KH      Sets 3 2  -KH      Reps 3 10 each  -KH      Additional Comments flexion, abduction, extension  -KH         Exercise 4    Exercise Name 4 Aquatics: Mini squats  -KH      Cueing 4 Verbal  -KH      Sets 4 2  -KH      Reps 4 10  -KH         Exercise 5    Exercise Name 5 Aquatics: CR/TR  -KH       "Cueing 5 Verbal  -KH      Sets 5 2  -KH      Reps 5 10  -KH         Exercise 6    Exercise Name 6 Aquatics: deep end bicycle  -KH      Time 6 5 mins  -KH         Exercise 7    Exercise Name 7 Aquatics: deep end hang  -KH      Time 7 5 mins  -KH            User Key  (r) = Recorded By, (t) = Taken By, (c) = Cosigned By    Initials Name Provider Type    Kristin Garcia PTA Physical Therapist Assistant                              PT OP Goals     Row Name 03/31/23 1200          PT Short Term Goals    STG Date to Achieve 04/12/23  -     STG 1 Hip ER: 40 degrees,  -     STG 2 Hip IR: 70 degrees  -     STG 3 complete 10 pelvic tilts with back flat on wall to show good pelvic mobility/control  -        Long Term Goals    LTG Date to Achieve 05/17/23  -     LTG 1 complete in clinic or verbal ability to complete 2 miles of walking  -     LTG 2 SLS BLE 3\" each leg  -        Time Calculation    PT Goal Re-Cert Due Date 04/12/23  -           User Key  (r) = Recorded By, (t) = Taken By, (c) = Cosigned By    Initials Name Provider Type    Kristin Garcia PTA Physical Therapist Assistant                               Time Calculation:   Start Time: 1252  Stop Time: 1345  Time Calculation (min): 53 min  Therapy Charges for Today     Code Description Service Date Service Provider Modifiers Qty    05130013977 HC PT AQUATIC THERAPY EA 15 MIN 3/31/2023 Kristin Lamas PTA GP, CQ 4                    Kristin Lamas PTA  3/31/2023     "

## 2023-04-06 ENCOUNTER — OFFICE VISIT (OUTPATIENT)
Dept: OTOLARYNGOLOGY | Facility: CLINIC | Age: 68
End: 2023-04-06
Payer: MEDICARE

## 2023-04-06 VITALS — WEIGHT: 307 LBS | HEIGHT: 77 IN | OXYGEN SATURATION: 97 % | BODY MASS INDEX: 36.25 KG/M2 | HEART RATE: 112 BPM

## 2023-04-06 DIAGNOSIS — J34.3 HYPERTROPHY OF BOTH INFERIOR NASAL TURBINATES: ICD-10-CM

## 2023-04-06 DIAGNOSIS — J30.0 VASOMOTOR RHINITIS: Primary | ICD-10-CM

## 2023-04-06 DIAGNOSIS — J34.2 NASAL SEPTAL DEFORMITY: ICD-10-CM

## 2023-04-06 PROCEDURE — 1160F RVW MEDS BY RX/DR IN RCRD: CPT | Performed by: OTOLARYNGOLOGY

## 2023-04-06 PROCEDURE — 99214 OFFICE O/P EST MOD 30 MIN: CPT | Performed by: OTOLARYNGOLOGY

## 2023-04-06 PROCEDURE — 1159F MED LIST DOCD IN RCRD: CPT | Performed by: OTOLARYNGOLOGY

## 2023-04-06 RX ORDER — IPRATROPIUM BROMIDE 42 UG/1
2 SPRAY, METERED NASAL 3 TIMES DAILY
Qty: 15 ML | Refills: 5 | Status: SHIPPED | OUTPATIENT
Start: 2023-04-06

## 2023-04-06 NOTE — PROGRESS NOTES
Follow up Regarding: Allergic rhinitis, vasomotor rhinitis    Assessment and Plan:  67-year-old male with isolated nasal allergic rhinitis and vasomotor rhinitis, both improved today but not resolved    -Continue budesonide irrigations 2 times daily, call when you have about 2 weeks left so we can send refills for you  -Refill for ipratropium bromide for vasomotor rhinitis sent today  -Continue azelastine as needed for sneezing  -Follow-up in 3 months to recheck, sooner if issues    History of present illness/Interval history:    Mr. Murray is a 67-year-old male presenting today in follow-up regarding allergic rhinitis and vasomotor rhinitis.  He has been using his budesonide irrigations twice daily with ipratropium bromide every day and azelastine as needed for breakthrough sneezing.  He states that he has still had some issues with the vasomotor rhinitis, these are improved with the Atrovent nasal spray.  He states his allergy symptoms including sneezing, itchy watery eyes and significant nasal congestion are improved with the budesonide irrigations.  He has been using Astelin as needed for breakthrough sneezing, but has been rarely requiring this since he was last seen.  No other acute or new concerns today.    Physical Exam:  General: NAD, awake and alert  Head: normocephalic, atraumatic  Eyes: EOMI, sclerae white, conjunctivae pink  Ears: pinnae intact without masses or lesions              Right: TM intact without injection or effusion              Left: TM intact without injection or effusion  Nose: external straight without deformity              Mucosa pink and moist, mildly edematous, improved from prior. No polyps seen. No purulence.              Septum: Grossly midline with a mid septal buckle causing a left septal spur that is not obstructive              Turbinates:  Mild, improved bilateral inferior turbinate hypertrophy  OC/OP: mucosa moist and pink, no masses or lesions, tongue is midline and mobile.  Tonsils 1+ without exudate. Uvula single and elevates symmetrically.  Mallampati 3 Espinoza class III  Neuro: no focal deficits    Vital Signs   Vitals:    04/06/23 1432   Pulse: 112   SpO2: 97%     Results Review:  Previous clinic visit from 3/22/2023 reviewed today and demonstrates at that time allergic rhinitis with associated vasomotor rhinitis and a large left frontal osteoma without obstruction recommendations were to start twice daily budesonide irrigations and to continue ipratropium bromide nasal spray as well as Astelin on hand for breakthrough itching or sneezing.  Recommendations were to follow-up in 2 months to recheck his symptoms.  A 2-week appointment was apparently made.    Histories:  Allergies   Allergen Reactions   • Ceftriaxone Cough   • Lisinopril Cough       Prior to Admission medications    Medication Sig Start Date End Date Taking? Authorizing Provider   amLODIPine (NORVASC) 5 MG tablet Take 1 tablet by mouth Daily. Taking 10 mg daily    Rj Issa MD   azelastine (ASTEPRO) 0.15 % solution nasal spray 1 spray into the nostril(s) as directed by provider 2 (Two) Times a Day. 3/3/23   Idris Arcos MD   buPROPion XL (WELLBUTRIN XL) 300 MG 24 hr tablet Take 1 tablet by mouth Daily.  Patient taking differently: Take 1 tablet by mouth Daily. Takes 150 mg daily 3/24/17   Ray Ureña MD   coenzyme Q10 100 MG capsule Take 1 capsule by mouth Daily.    Rj Issa MD   diclofenac sodium (VOTAREN XR) 100 MG 24 hr tablet Take 1 tablet by mouth Daily.    Rj Issa MD   esomeprazole (nexIUM) 40 MG capsule Take 1 capsule by mouth Daily. 3/3/23   Idris Arcos MD   Glucosamine-Chondroit-Vit C-Mn (GLUCOSAMINE CHONDR 1500 COMPLX PO) Take 2 tablets by mouth Daily.    Rj Issa MD   losartan-hydrochlorothiazide (HYZAAR) 100-12.5 MG per tablet Take 1 tablet by mouth Daily.    Rj Issa MD   polyethylene glycol (MIRALAX) 17 g packet Take 17 g by mouth  Daily. With 12 oz water daily 3/2/23   Idris Arcos MD   primidone (MYSOLINE) 50 MG tablet 1 tablet. Taking 100 mg per day 12/20/20   Provider, MD Rj   simvastatin (ZOCOR) 20 MG tablet Take 1 tablet by mouth.    Provider, MD Rj   Testosterone Cypionate 200 MG/ML solution Inject 1 mL as directed Every 14 (Fourteen) Days. 3/30/23   Idris Arcos MD   Wheat Dextrin (Benefiber) powder Use per direction daily for bowels till seen again 3/2/23   Idris Arcos MD       Past Medical History:   Diagnosis Date   • Acute gastritis without bleeding    • Alcohol abuse counseling and surveillance of alcoholic    • Allergic rhinitis    • Anxiety state    • Attention deficit disorder of childhood with hyperactivity    • Attention deficit hyperactivity disorder    • Attention deficit hyperactivity disorder, predominantly hyperactive impulsive type    • Attention deficit hyperactivity disorder, predominantly inattentive type    • Backache    • Body mass index (bmi) 31.0-31.9, adult     Body mass index (BMI) 31.0-31.9, adult - BMI 33.5      • Burn of lower leg    • Candidiasis of skin    • Cardiac murmur, unspecified    • Decreased testosterone level    • Depressive disorder     Depressive disorder - acute on chronic      • Dysgraphia    • Edema    • Elbow joint pain    • Encounter for general adult medical examination with abnormal findings    • Encounter for screening for malignant neoplasm of colon    • Essential hypertension    • Essential tremor    • Ex-smoker    • Fatigue    • Gastro-esophageal reflux disease with esophagitis    • Gastroesophageal reflux disease    • Hyperlipidemia    • Hyperlipoproteinemia    • Impotence of organic origin    • Insomnia    • Intermittent palpitations    • Knee pain     Knee pain - patellofemoral      • Lateral epicondylitis    • Low back pain    • Male erectile disorder    • Male erectile dysfunction, unspecified    • Male hypogonadism    • Neck pain     Neck pain  aggravated by recumbency      • Obese     Obese - BMI 33.0      • On long term drug therapy    • Osteoarthrosis     Osteoarthrosis, unspecified whether generalized or localized, involving lower leg      • Other obesity    • Other specified diseases of anus and rectum     Other specified diseases of anus and rectum - rectal pain after c-scope prep      • Overweight    • Pain in left knee    • Pain in right knee    • Plantar fasciitis    • Shoulder pain, right    • Spasm of back muscles    • Tachycardia, unspecified    • Tinnitus    • Tremor, unspecified    • Tubular adenoma of colon    • Unspecified essential hypertension    • Verruca        Past Surgical History:   Procedure Laterality Date   • APPENDECTOMY     • COLONOSCOPY  03/16/2016    One polyp in the sigmoid colon.Resected and retrieved.        • COLONOSCOPY N/A 6/23/2021    Procedure: COLONOSCOPY;  Surgeon: Brenton Ruggiero MD;  Location: [facility] ENDOSCOPY;  Service: Gastroenterology;  Laterality: N/A;   • CYST REMOVAL  10/27/2009     Excision of sebaceous cyst of the forehead, glabellar region.   • ENDOSCOPY  03/16/2016    Mildly severe esophagitis.Gastritis.Normal examined duodenum.Several biopsies obtained in the lower third of the esophagus.    • ENDOSCOPY AND COLONOSCOPY  04/12/2006     Normal colonoscopic examination    • INCISION AND DRAINAGE ABSCESS  10/24/2012   • INJECTION OF MEDICATION  04/21/2011    Depo Medrol (Methylprednisone) 80mg (1)        • INJECTION OF MEDICATION  02/01/2016    Kenalog (3)        • INJECTION OF MEDICATION  02/19/2016    Toradol (3)    • JOINT REPLACEMENT Bilateral     Knee   • KNEE ARTHROSCOPY  04/02/2009     Medial meniscus tear of left knee   • SCROTUM EXPLORATION  07/02/2002     Excision of epidermal inclusion cyst. base of left scrotum   • SKIN BIOPSY  04/23/2012   • SKIN TAG REMOVAL  04/25/2002    Skin tag, left scrotum        Social History     Socioeconomic History   • Marital status:    Tobacco Use   •  Smoking status: Former     Packs/day: 0.00     Years: 0.00     Pack years: 0.00     Types: Cigarettes     Quit date: 1/1/2020     Years since quitting: 3.2   • Smokeless tobacco: Never   Vaping Use   • Vaping Use: Never used   Substance and Sexual Activity   • Alcohol use: Yes     Comment: daily    • Drug use: Never   • Sexual activity: Defer       Family History   Problem Relation Age of Onset   • Diabetes Mother    • Kidney disease Mother    • Thyroid disease Mother    • Atrial fibrillation Mother    • Coronary artery disease Father    • Hypertension Father    • Hearing loss Father    • Cancer Other    • Coronary artery disease Other    • Diabetes Other    • Hearing loss Other    • Heart disease Other    • Hypertension Other    • Osteoarthritis Other    • Stroke Other    • Thyroid disease Other    • Pancreatitis Other        10 point ROS asked and negative unless otherwise noted in HPI.    Immunization status not specifically asked and therefore not specifically documented.    Voice dictation disclaimer:  Voice dictation was used in the creation of this note.  As such, there may be typos or inappropriate words throughout the document.  The document is proofread for typos and errors, however some may not be caught.      This document has been electronically signed by Lety Luna MD on April 6, 2023 12:46 CDT

## 2023-04-07 ENCOUNTER — HOSPITAL ENCOUNTER (OUTPATIENT)
Dept: PHYSICAL THERAPY | Facility: HOSPITAL | Age: 68
Setting detail: THERAPIES SERIES
Discharge: HOME | End: 2023-04-07
Payer: MEDICARE

## 2023-04-07 DIAGNOSIS — Z96.641 STATUS POST HIP REPLACEMENT, RIGHT: ICD-10-CM

## 2023-04-07 DIAGNOSIS — M25.551 RIGHT HIP PAIN: Primary | ICD-10-CM

## 2023-04-07 PROCEDURE — 97113 AQUATIC THERAPY/EXERCISES: CPT

## 2023-04-07 NOTE — THERAPY TREATMENT NOTE
Outpatient Physical Therapy Ortho Treatment Note  [facility]     Patient Name: Otf Murray  : 1955  MRN: [id number]  Today's Date: 2023      Visit Date: 2023     Attendance:   (Eval + 10 until 23)  Subjective Improvement: 0%  Recert Date: 23  MD appointment: prn     Therapy Diagnosis: hx of right GERARDO , decreased mobility    Visit Dx:    ICD-10-CM ICD-9-CM   1. Right hip pain  M25.551 719.45   2. Status post hip replacement, right  Z96.641 V43.64       Patient Active Problem List   Diagnosis   • ADHD, predominantly inattentive type   • Cervical spondylosis without myelopathy   • Chronic fatigue   • Chronic left-sided low back pain without sciatica   • Gastroesophageal reflux disease without esophagitis   • History of tobacco abuse   • History of total left knee replacement   • Primary osteoarthritis of both knees   • Recurrent major depressive disorder, in partial remission (HCC)   • Seasonal allergic rhinitis due to pollen   • History of colon polyps   • Morbid (severe) obesity due to excess calories   • Medically complex patient   • Testosterone insufficiency   • Primary hypertension   • Other hyperlipidemia        Past Medical History:   Diagnosis Date   • Acute gastritis without bleeding    • Alcohol abuse counseling and surveillance of alcoholic    • Allergic rhinitis    • Anxiety state    • Attention deficit disorder of childhood with hyperactivity    • Attention deficit hyperactivity disorder    • Attention deficit hyperactivity disorder, predominantly hyperactive impulsive type    • Attention deficit hyperactivity disorder, predominantly inattentive type    • Backache    • Body mass index (bmi) 31.0-31.9, adult     Body mass index (BMI) 31.0-31.9, adult - BMI 33.5      • Burn of lower leg    • Candidiasis of skin    • Cardiac murmur, unspecified    • Decreased testosterone level    • Depressive disorder     Depressive disorder - acute on chronic      •  Dysgraphia    • Edema    • Elbow joint pain    • Encounter for general adult medical examination with abnormal findings    • Encounter for screening for malignant neoplasm of colon    • Essential hypertension    • Essential tremor    • Ex-smoker    • Fatigue    • Gastro-esophageal reflux disease with esophagitis    • Gastroesophageal reflux disease    • Hyperlipidemia    • Hyperlipoproteinemia    • Impotence of organic origin    • Insomnia    • Intermittent palpitations    • Knee pain     Knee pain - patellofemoral      • Lateral epicondylitis    • Low back pain    • Male erectile disorder    • Male erectile dysfunction, unspecified    • Male hypogonadism    • Neck pain     Neck pain aggravated by recumbency      • Obese     Obese - BMI 33.0      • On long term drug therapy    • Osteoarthrosis     Osteoarthrosis, unspecified whether generalized or localized, involving lower leg      • Other obesity    • Other specified diseases of anus and rectum     Other specified diseases of anus and rectum - rectal pain after c-scope prep      • Overweight    • Pain in left knee    • Pain in right knee    • Plantar fasciitis    • Shoulder pain, right    • Spasm of back muscles    • Tachycardia, unspecified    • Tinnitus    • Tremor, unspecified    • Tubular adenoma of colon    • Unspecified essential hypertension    • Verruca         Past Surgical History:   Procedure Laterality Date   • APPENDECTOMY     • COLONOSCOPY  03/16/2016    One polyp in the sigmoid colon.Resected and retrieved.        • COLONOSCOPY N/A 6/23/2021    Procedure: COLONOSCOPY;  Surgeon: Brenton Ruggiero MD;  Location: [facility] ENDOSCOPY;  Service: Gastroenterology;  Laterality: N/A;   • CYST REMOVAL  10/27/2009     Excision of sebaceous cyst of the forehead, glabellar region.   • ENDOSCOPY  03/16/2016    Mildly severe esophagitis.Gastritis.Normal examined duodenum.Several biopsies obtained in the lower third of the esophagus.    • ENDOSCOPY AND COLONOSCOPY   04/12/2006     Normal colonoscopic examination    • INCISION AND DRAINAGE ABSCESS  10/24/2012   • INJECTION OF MEDICATION  04/21/2011    Depo Medrol (Methylprednisone) 80mg (1)        • INJECTION OF MEDICATION  02/01/2016    Kenalog (3)        • INJECTION OF MEDICATION  02/19/2016    Toradol (3)    • JOINT REPLACEMENT Bilateral     Knee   • KNEE ARTHROSCOPY  04/02/2009     Medial meniscus tear of left knee   • SCROTUM EXPLORATION  07/02/2002     Excision of epidermal inclusion cyst. base of left scrotum   • SKIN BIOPSY  04/23/2012   • SKIN TAG REMOVAL  04/25/2002    Skin tag, left scrotum         PT Ortho     Row Name 04/07/23 1200       Precautions and Contraindications    Precautions hx of GERARDO July 2022  -       Posture/Observations    Posture/Observations Comments fwd head posture; no antalgia with gait  -          User Key  (r) = Recorded By, (t) = Taken By, (c) = Cosigned By    Initials Name Provider Type     Kristin Lamas PTA Physical Therapist Assistant                             PT Assessment/Plan     Row Name 04/07/23 1200          PT Assessment    Functional Limitations Decreased safety during functional activities;Impaired gait;Impaired locomotion;Limitation in home management;Limitations in community activities;Limitations in functional capacity and performance;Performance in leisure activities;Performance in self-care ADL  -     Impairments Balance;Gait;Impaired muscle endurance;Impaired muscle length;Muscle strength;Pain;Range of motion;Impaired flexibility  -     Assessment Comments contnues to respond well to aquatics; increased resistance with all LE exercises this date wihtout bryan  -KH     Rehab Potential Good  -     Patient/caregiver participated in establishment of treatment plan and goals Yes  -KH     Patient would benefit from skilled therapy intervention Yes  -KH        PT Plan    PT Frequency 2x/week  -     Predicted Duration of Therapy Intervention (PT) 6-8 weeks  -     PT  Plan Comments Continue to progress core stability and hip ROM as able  -KH           User Key  (r) = Recorded By, (t) = Taken By, (c) = Cosigned By    Initials Name Provider Type    Kristin Garcia PTA Physical Therapist Assistant                   OP Exercises     Row Name 04/07/23 1200             Subjective Comments    Subjective Comments patient reports that he forgot about his last appt; thought it was tuesday; states that he has been at the [facility] x2 hours already this morning prior to PT  -KH         Subjective Pain    Able to rate subjective pain? yes  -KH      Pre-Treatment Pain Level 3  -KH      Post-Treatment Pain Level 0  -KH         Aquatics    Aquatics performed? Yes  -KH         Exercise 1    Exercise Name 1 Aquatics: water walking  -KH      Time 1 5 mins  -KH      Additional Comments fwd;lat;bkd  -KH         Exercise 2    Exercise Name 2 Aquatics: core stability with UE 4 way with paddles  -KH      Cueing 2 Verbal  -KH      Sets 2 2  -KH      Reps 2 10 each (yellow paddles)  -KH      Additional Comments horiz ab/add; abd/add; flex/ext;punches  -KH         Exercise 3    Exercise Name 3 Aquatics; SLR 3 way B  used YRF for resistance  -KH      Cueing 3 Verbal  -KH      Sets 3 2  -KH      Reps 3 10 each  -KH      Additional Comments flexion, abduction, extension  -KH         Exercise 4    Exercise Name 4 Aquatics: Mini Squats-->CR with ABC board for resistance  -KH      Sets 4 2  -KH      Reps 4 10  -KH         Exercise 5    Exercise Name 5 Aquatics: deep end bicycle  -KH      Time 5 5 mins  -KH         Exercise 6    Exercise Name 6 Aquatics: deep end hang  -KH      Time 6 5 mins  -KH            User Key  (r) = Recorded By, (t) = Taken By, (c) = Cosigned By    Initials Name Provider Type    Kristin Garcia PTA Physical Therapist Assistant                              PT OP Goals     Row Name 04/07/23 1200          PT Short Term Goals    STG Date to Achieve 04/12/23  -KH     STG 1 Hip ER: 40 degrees,  -KH   "   STG 2 Hip IR: 70 degrees  -     STG 3 complete 10 pelvic tilts with back flat on wall to show good pelvic mobility/control  -        Long Term Goals    LTG Date to Achieve 05/17/23  -     LTG 1 complete in clinic or verbal ability to complete 2 miles of walking  -     LTG 2 SLS BLE 3\" each leg  -        Time Calculation    PT Goal Re-Cert Due Date 04/12/23  -           User Key  (r) = Recorded By, (t) = Taken By, (c) = Cosigned By    Initials Name Provider Type    Kristin Garcia PTA Physical Therapist Assistant                               Time Calculation:   Start Time: 1133  Stop Time: 1226  Time Calculation (min): 53 min  Therapy Charges for Today     Code Description Service Date Service Provider Modifiers Qty    94333087827 HC PT AQUATIC THERAPY EA 15 MIN 4/7/2023 Kristin Lamas PTA GP, CQ 4                    Kristin Lamas PTA  4/7/2023     "

## 2023-04-11 ENCOUNTER — HOSPITAL ENCOUNTER (OUTPATIENT)
Dept: PHYSICAL THERAPY | Facility: HOSPITAL | Age: 68
Setting detail: THERAPIES SERIES
Discharge: HOME | End: 2023-04-11
Payer: MEDICARE

## 2023-04-11 DIAGNOSIS — M25.551 RIGHT HIP PAIN: Primary | ICD-10-CM

## 2023-04-11 DIAGNOSIS — Z96.641 STATUS POST HIP REPLACEMENT, RIGHT: ICD-10-CM

## 2023-04-11 PROCEDURE — 97110 THERAPEUTIC EXERCISES: CPT

## 2023-04-11 NOTE — THERAPY TREATMENT NOTE
Outpatient Physical Therapy Ortho Treatment Note  [facility]     Patient Name: Otf Murray  : 1955  MRN: [id number]  Today's Date: 2023      Visit Date: 2023     Subjective Improvement not sure  Visits 5/6  Visits approved eval +10 until 2023  RTMD PRN  Recert Date 2023    Therapy Diagnosis: hx of right GERARDO , decreased mobility    Visit Dx:    ICD-10-CM ICD-9-CM   1. Right hip pain  M25.551 719.45   2. Status post hip replacement, right  Z96.641 V43.64       Patient Active Problem List   Diagnosis   • ADHD, predominantly inattentive type   • Cervical spondylosis without myelopathy   • Chronic fatigue   • Chronic left-sided low back pain without sciatica   • Gastroesophageal reflux disease without esophagitis   • History of tobacco abuse   • History of total left knee replacement   • Primary osteoarthritis of both knees   • Recurrent major depressive disorder, in partial remission (HCC)   • Seasonal allergic rhinitis due to pollen   • History of colon polyps   • Morbid (severe) obesity due to excess calories   • Medically complex patient   • Testosterone insufficiency   • Primary hypertension   • Other hyperlipidemia        Past Medical History:   Diagnosis Date   • Acute gastritis without bleeding    • Alcohol abuse counseling and surveillance of alcoholic    • Allergic rhinitis    • Anxiety state    • Attention deficit disorder of childhood with hyperactivity    • Attention deficit hyperactivity disorder    • Attention deficit hyperactivity disorder, predominantly hyperactive impulsive type    • Attention deficit hyperactivity disorder, predominantly inattentive type    • Backache    • Body mass index (bmi) 31.0-31.9, adult     Body mass index (BMI) 31.0-31.9, adult - BMI 33.5      • Burn of lower leg    • Candidiasis of skin    • Cardiac murmur, unspecified    • Decreased testosterone level    • Depressive disorder     Depressive disorder - acute on chronic      •  Dysgraphia    • Edema    • Elbow joint pain    • Encounter for general adult medical examination with abnormal findings    • Encounter for screening for malignant neoplasm of colon    • Essential hypertension    • Essential tremor    • Ex-smoker    • Fatigue    • Gastro-esophageal reflux disease with esophagitis    • Gastroesophageal reflux disease    • Hyperlipidemia    • Hyperlipoproteinemia    • Impotence of organic origin    • Insomnia    • Intermittent palpitations    • Knee pain     Knee pain - patellofemoral      • Lateral epicondylitis    • Low back pain    • Male erectile disorder    • Male erectile dysfunction, unspecified    • Male hypogonadism    • Neck pain     Neck pain aggravated by recumbency      • Obese     Obese - BMI 33.0      • On long term drug therapy    • Osteoarthrosis     Osteoarthrosis, unspecified whether generalized or localized, involving lower leg      • Other obesity    • Other specified diseases of anus and rectum     Other specified diseases of anus and rectum - rectal pain after c-scope prep      • Overweight    • Pain in left knee    • Pain in right knee    • Plantar fasciitis    • Shoulder pain, right    • Spasm of back muscles    • Tachycardia, unspecified    • Tinnitus    • Tremor, unspecified    • Tubular adenoma of colon    • Unspecified essential hypertension    • Verruca         Past Surgical History:   Procedure Laterality Date   • APPENDECTOMY     • COLONOSCOPY  03/16/2016    One polyp in the sigmoid colon.Resected and retrieved.        • COLONOSCOPY N/A 6/23/2021    Procedure: COLONOSCOPY;  Surgeon: Brenton Ruggiero MD;  Location: [facility] ENDOSCOPY;  Service: Gastroenterology;  Laterality: N/A;   • CYST REMOVAL  10/27/2009     Excision of sebaceous cyst of the forehead, glabellar region.   • ENDOSCOPY  03/16/2016    Mildly severe esophagitis.Gastritis.Normal examined duodenum.Several biopsies obtained in the lower third of the esophagus.    • ENDOSCOPY AND COLONOSCOPY   04/12/2006     Normal colonoscopic examination    • INCISION AND DRAINAGE ABSCESS  10/24/2012   • INJECTION OF MEDICATION  04/21/2011    Depo Medrol (Methylprednisone) 80mg (1)        • INJECTION OF MEDICATION  02/01/2016    Kenalog (3)        • INJECTION OF MEDICATION  02/19/2016    Toradol (3)    • JOINT REPLACEMENT Bilateral     Knee   • KNEE ARTHROSCOPY  04/02/2009     Medial meniscus tear of left knee   • SCROTUM EXPLORATION  07/02/2002     Excision of epidermal inclusion cyst. base of left scrotum   • SKIN BIOPSY  04/23/2012   • SKIN TAG REMOVAL  04/25/2002    Skin tag, left scrotum         PT Ortho     Row Name 04/11/23 1300       Precautions and Contraindications    Precautions hx of GERARDO July 2022  -CP       Posture/Observations    Posture/Observations Comments fwd head posture; no antalgia with gait  -CP          User Key  (r) = Recorded By, (t) = Taken By, (c) = Cosigned By    Initials Name Provider Type    CP Amy Santoyo, NANCY Physical Therapist Assistant                             PT Assessment/Plan     Row Name 04/11/23 1444          PT Assessment    Assessment Comments Very good tolerance to ther ex.  Patient was instructed to begin using the cybex leg press with lower weights and higher reps at the [facility]  -CP        PT Plan    PT Frequency 2x/week  -CP     Predicted Duration of Therapy Intervention (PT) 6-8 weeks  -CP     PT Plan Comments Cont with POC.  aquatics next visit  -CP           User Key  (r) = Recorded By, (t) = Taken By, (c) = Cosigned By    Initials Name Provider Type    CP Amy Santoyo PTA Physical Therapist Assistant                   OP Exercises     Row Name 04/11/23 1446 04/11/23 1300          Subjective Comments    Subjective Comments -- Patient states that his hip pain maybe getting bettr. He is having some low back pain.  He went to the pool at [facility] this morning.  -CP        Subjective Pain    Able to rate subjective pain? -- yes  -CP     Pre-Treatment Pain Level -- 1   "-CP        Total Minutes    76393 - PT Therapeutic Exercise Minutes 40  -CP --        Exercise 1    Exercise Name 1 -- Pro II level 3  -CP     Time 1 -- 10  -CP        Exercise 2    Exercise Name 2 -- standing HS stretch  -CP     Cueing 2 -- Verbal;Demo  -CP     Sets 2 -- 3  -CP     Time 2 -- 30\" holds  -CP     Additional Comments -- B LE  -CP        Exercise 3    Exercise Name 3 -- wall planks with hip IR/ER with hip fl  -CP     Cueing 3 -- Verbal;Demo  -CP     Sets 3 -- 2  -CP     Reps 3 -- 10  -CP        Exercise 4    Exercise Name 4 -- seated shin slides  -CP     Cueing 4 -- Verbal;Tactile;Demo  -CP     Sets 4 -- 2  -CP     Reps 4 -- 10  -CP        Exercise 5    Exercise Name 5 -- seated hip IR/ER with tband  -CP     Cueing 5 -- Verbal;Tactile  -CP     Sets 5 -- 2  -CP     Reps 5 -- 10  -CP     Time 5 -- green tband  -CP        Exercise 6    Exercise Name 6 -- SL'ing bikes  -CP     Cueing 6 -- Verbal;Tactile  -CP     Sets 6 -- 2  -CP     Reps 6 -- 10  -CP        Exercise 7    Exercise Name 7 -- supported hip ext  -CP     Cueing 7 -- Verbal;Demo  -CP     Sets 7 -- 2  -CP     Reps 7 -- 10  -CP     Time 7 -- B LE  -CP        Exercise 8    Exercise Name 8 -- cybex leg press  -CP     Cueing 8 -- Verbal;Tactile  -CP     Sets 8 -- 2  -CP     Reps 8 -- 10  -CP     Time 8 -- 110 lb  -CP           User Key  (r) = Recorded By, (t) = Taken By, (c) = Cosigned By    Initials Name Provider Type    CP Amy Santoyo, PTA Physical Therapist Assistant                              PT OP Goals     Row Name 04/11/23 1400          PT Short Term Goals    STG Date to Achieve 04/12/23  -CP     STG 1 Hip ER: 40 degrees,  -CP     STG 2 Hip IR: 70 degrees  -CP     STG 3 complete 10 pelvic tilts with back flat on wall to show good pelvic mobility/control  -CP        Long Term Goals    LTG Date to Achieve 05/17/23  -CP     LTG 1 complete in clinic or verbal ability to complete 2 miles of walking  -CP     LTG 2 SLS BLE 3\" each leg  -CP     "    Time Calculation    PT Goal Re-Cert Due Date 04/12/23  -CP           User Key  (r) = Recorded By, (t) = Taken By, (c) = Cosigned By    Initials Name Provider Type    CP Amy Santoyo PTA Physical Therapist Assistant                Therapy Education  Education Details: seated hip IR/ER with tband, seated shin slides, SL'ing bike, supported hip ext  Given: HEP  Program: New  How Provided: Verbal, Demonstration, Written  Provided to: Patient  Level of Understanding: Teach back education performed, Verbalized, Demonstrated              Time Calculation:   Start Time: 1345  Stop Time: 1425  Time Calculation (min): 40 min  Total Timed Code Minutes- PT: 40 minute(s)  Timed Charges  41472 - PT Therapeutic Exercise Minutes: 40  Total Minutes  Timed Charges Total Minutes: 40   Total Minutes: 40  Therapy Charges for Today     Code Description Service Date Service Provider Modifiers Qty    46802971486 HC PT THER PROC EA 15 MIN 4/11/2023 Amy Santoyo PTA GP, CQ 3                    Amy Santoyo PTA  4/11/2023

## 2023-04-12 ENCOUNTER — CLINICAL SUPPORT (OUTPATIENT)
Dept: FAMILY MEDICINE CLINIC | Facility: CLINIC | Age: 68
End: 2023-04-12
Payer: MEDICARE

## 2023-04-14 ENCOUNTER — HOSPITAL ENCOUNTER (OUTPATIENT)
Dept: PHYSICAL THERAPY | Facility: HOSPITAL | Age: 68
Setting detail: THERAPIES SERIES
Discharge: HOME | End: 2023-04-14
Payer: MEDICARE

## 2023-04-14 DIAGNOSIS — M25.551 RIGHT HIP PAIN: Primary | ICD-10-CM

## 2023-04-14 DIAGNOSIS — Z96.641 STATUS POST HIP REPLACEMENT, RIGHT: ICD-10-CM

## 2023-04-14 PROCEDURE — 97113 AQUATIC THERAPY/EXERCISES: CPT

## 2023-04-14 NOTE — THERAPY TREATMENT NOTE
Outpatient Physical Therapy Ortho Treatment Note  [facility]     Patient Name: Otf Murary  : 1955  MRN: [id number]  Today's Date: 2023      Visit Date: 2023     Attendance:   (Eval + 10 until 23)  Subjective Improvement: 0%  Recert Date: 23  MD appointment: prn     Therapy Diagnosis: hx of right GERARDO , decreased mobility    Visit Dx:    ICD-10-CM ICD-9-CM   1. Right hip pain  M25.551 719.45   2. Status post hip replacement, right  Z96.641 V43.64       Patient Active Problem List   Diagnosis   • ADHD, predominantly inattentive type   • Cervical spondylosis without myelopathy   • Chronic fatigue   • Chronic left-sided low back pain without sciatica   • Gastroesophageal reflux disease without esophagitis   • History of tobacco abuse   • History of total left knee replacement   • Primary osteoarthritis of both knees   • Recurrent major depressive disorder, in partial remission (HCC)   • Seasonal allergic rhinitis due to pollen   • History of colon polyps   • Morbid (severe) obesity due to excess calories   • Medically complex patient   • Testosterone insufficiency   • Primary hypertension   • Other hyperlipidemia        Past Medical History:   Diagnosis Date   • Acute gastritis without bleeding    • Alcohol abuse counseling and surveillance of alcoholic    • Allergic rhinitis    • Anxiety state    • Attention deficit disorder of childhood with hyperactivity    • Attention deficit hyperactivity disorder    • Attention deficit hyperactivity disorder, predominantly hyperactive impulsive type    • Attention deficit hyperactivity disorder, predominantly inattentive type    • Backache    • Body mass index (bmi) 31.0-31.9, adult     Body mass index (BMI) 31.0-31.9, adult - BMI 33.5      • Burn of lower leg    • Candidiasis of skin    • Cardiac murmur, unspecified    • Decreased testosterone level    • Depressive disorder     Depressive disorder - acute on chronic      •  Dysgraphia    • Edema    • Elbow joint pain    • Encounter for general adult medical examination with abnormal findings    • Encounter for screening for malignant neoplasm of colon    • Essential hypertension    • Essential tremor    • Ex-smoker    • Fatigue    • Gastro-esophageal reflux disease with esophagitis    • Gastroesophageal reflux disease    • Hyperlipidemia    • Hyperlipoproteinemia    • Impotence of organic origin    • Insomnia    • Intermittent palpitations    • Knee pain     Knee pain - patellofemoral      • Lateral epicondylitis    • Low back pain    • Male erectile disorder    • Male erectile dysfunction, unspecified    • Male hypogonadism    • Neck pain     Neck pain aggravated by recumbency      • Obese     Obese - BMI 33.0      • On long term drug therapy    • Osteoarthrosis     Osteoarthrosis, unspecified whether generalized or localized, involving lower leg      • Other obesity    • Other specified diseases of anus and rectum     Other specified diseases of anus and rectum - rectal pain after c-scope prep      • Overweight    • Pain in left knee    • Pain in right knee    • Plantar fasciitis    • Shoulder pain, right    • Spasm of back muscles    • Tachycardia, unspecified    • Tinnitus    • Tremor, unspecified    • Tubular adenoma of colon    • Unspecified essential hypertension    • Verruca         Past Surgical History:   Procedure Laterality Date   • APPENDECTOMY     • COLONOSCOPY  03/16/2016    One polyp in the sigmoid colon.Resected and retrieved.        • COLONOSCOPY N/A 6/23/2021    Procedure: COLONOSCOPY;  Surgeon: Brenton Ruggiero MD;  Location: [facility] ENDOSCOPY;  Service: Gastroenterology;  Laterality: N/A;   • CYST REMOVAL  10/27/2009     Excision of sebaceous cyst of the forehead, glabellar region.   • ENDOSCOPY  03/16/2016    Mildly severe esophagitis.Gastritis.Normal examined duodenum.Several biopsies obtained in the lower third of the esophagus.    • ENDOSCOPY AND COLONOSCOPY   04/12/2006     Normal colonoscopic examination    • INCISION AND DRAINAGE ABSCESS  10/24/2012   • INJECTION OF MEDICATION  04/21/2011    Depo Medrol (Methylprednisone) 80mg (1)        • INJECTION OF MEDICATION  02/01/2016    Kenalog (3)        • INJECTION OF MEDICATION  02/19/2016    Toradol (3)    • JOINT REPLACEMENT Bilateral     Knee   • KNEE ARTHROSCOPY  04/02/2009     Medial meniscus tear of left knee   • SCROTUM EXPLORATION  07/02/2002     Excision of epidermal inclusion cyst. base of left scrotum   • SKIN BIOPSY  04/23/2012   • SKIN TAG REMOVAL  04/25/2002    Skin tag, left scrotum                         PT Assessment/Plan     Row Name 04/14/23 1300          PT Assessment    Functional Limitations Decreased safety during functional activities;Impaired gait;Impaired locomotion;Limitation in home management;Limitations in community activities;Limitations in functional capacity and performance;Performance in leisure activities;Performance in self-care ADL  -     Impairments Balance;Gait;Impaired muscle endurance;Impaired muscle length;Muscle strength;Pain;Range of motion;Impaired flexibility  -     Assessment Comments continues to progress well with strength and resistance in aquatics without increased pain  -     Rehab Potential Good  -     Patient/caregiver participated in establishment of treatment plan and goals Yes  -     Patient would benefit from skilled therapy intervention Yes  -        PT Plan    PT Frequency 2x/week  -     Predicted Duration of Therapy Intervention (PT) 6-8 weeks  -     PT Plan Comments Continue to progress strength and endurance as able.  -           User Key  (r) = Recorded By, (t) = Taken By, (c) = Cosigned By    Initials Name Provider Type    Kristin Garcia PTA Physical Therapist Assistant                 Modalities     Row Name 04/14/23 1300             Subjective Pain    Post-Treatment Pain Level 2  -            User Key  (r) = Recorded By, (t) = Taken By,  (c) = Cosigned By    Initials Name Provider Type    Kristin Garcia PTA Physical Therapist Assistant               OP Exercises     Row Name 04/14/23 1300             Subjective Comments    Subjective Comments Increased pain today on the left side and running down the L leg; R side just stiff feeling but has done more this week than he has in two years so he knows he is getting better;  -KH         Subjective Pain    Able to rate subjective pain? yes  -KH      Pre-Treatment Pain Level 4  -KH      Post-Treatment Pain Level 2  -KH         Aquatics    Aquatics performed? Yes  -KH         Exercise 1    Exercise Name 1 Aquatics: water walking  -KH      Time 1 5 mins  -KH      Additional Comments fwd;lat;bkd  -KH         Exercise 2    Exercise Name 2 Aquatics: core stability with UE 4 way with paddles  -KH      Cueing 2 Verbal  -KH      Sets 2 2  -KH      Reps 2 10 each (yellow paddles)  -KH      Additional Comments horiz ab/add; abd/add; flex/ext;punches  -KH         Exercise 3    Exercise Name 3 Aquatics; SLR 3 way B  Large blue RF  -KH      Cueing 3 Verbal  -KH      Sets 3 2  -KH      Reps 3 10 each  -KH         Exercise 4    Exercise Name 4 Aquatics: Mini Squats-->CR with ABC board for resistance  -KH      Sets 4 2  -KH      Reps 4 10  -KH         Exercise 5    Exercise Name 5 Aquatics: deep end bicycle  -KH      Time 5 5 mins  -KH         Exercise 6    Exercise Name 6 Aquatics: deep end hang  -KH      Time 6 5 mins  -KH            User Key  (r) = Recorded By, (t) = Taken By, (c) = Cosigned By    Initials Name Provider Type    Kristin Garcia PTA Physical Therapist Assistant                              PT OP Goals     Row Name 04/14/23 1300          Time Calculation    PT Goal Re-Cert Due Date 04/12/23  -           User Key  (r) = Recorded By, (t) = Taken By, (c) = Cosigned By    Initials Name Provider Type    Kristin Garcia PTA Physical Therapist Assistant                               Time Calculation:   Start  Time: 1258  Stop Time: 1345  Time Calculation (min): 47 min  Therapy Charges for Today     Code Description Service Date Service Provider Modifiers Qty    61329643954 HC PT AQUATIC THERAPY EA 15 MIN 4/14/2023 Kristin Lamas, NANCY GP, CQ 3                    Kristin Lamas PTA  4/14/2023

## 2023-04-17 ENCOUNTER — HOSPITAL ENCOUNTER (OUTPATIENT)
Dept: PHYSICAL THERAPY | Facility: HOSPITAL | Age: 68
Setting detail: THERAPIES SERIES
Discharge: HOME | End: 2023-04-17
Payer: MEDICARE

## 2023-04-17 DIAGNOSIS — M25.551 RIGHT HIP PAIN: Primary | ICD-10-CM

## 2023-04-17 DIAGNOSIS — Z96.641 STATUS POST HIP REPLACEMENT, RIGHT: ICD-10-CM

## 2023-04-17 PROCEDURE — 97110 THERAPEUTIC EXERCISES: CPT | Performed by: PHYSICAL THERAPIST

## 2023-04-17 NOTE — THERAPY PROGRESS REPORT/RE-CERT
Outpatient Physical Therapy Ortho Progress Note  [facility]     Patient Name: Otf Murray  : 1955  MRN: [id number]  Today's Date: 2023      Visit Date: 2023  Attendance:   (Eval + 10 until 23)  Subjective Improvement: 50%  Recert Date: 23  MD appointment: prn     Therapy Diagnosis: hx of right GERARDO , decreased mobility  Visit Dx:    ICD-10-CM ICD-9-CM   1. Right hip pain  M25.551 719.45   2. Status post hip replacement, right  Z96.641 V43.64       Patient Active Problem List   Diagnosis   • ADHD, predominantly inattentive type   • Cervical spondylosis without myelopathy   • Chronic fatigue   • Chronic left-sided low back pain without sciatica   • Gastroesophageal reflux disease without esophagitis   • History of tobacco abuse   • History of total left knee replacement   • Primary osteoarthritis of both knees   • Recurrent major depressive disorder, in partial remission (HCC)   • Seasonal allergic rhinitis due to pollen   • History of colon polyps   • Morbid (severe) obesity due to excess calories   • Medically complex patient   • Testosterone insufficiency   • Primary hypertension   • Other hyperlipidemia        Past Medical History:   Diagnosis Date   • Acute gastritis without bleeding    • Alcohol abuse counseling and surveillance of alcoholic    • Allergic rhinitis    • Anxiety state    • Attention deficit disorder of childhood with hyperactivity    • Attention deficit hyperactivity disorder    • Attention deficit hyperactivity disorder, predominantly hyperactive impulsive type    • Attention deficit hyperactivity disorder, predominantly inattentive type    • Backache    • Body mass index (bmi) 31.0-31.9, adult     Body mass index (BMI) 31.0-31.9, adult - BMI 33.5      • Burn of lower leg    • Candidiasis of skin    • Cardiac murmur, unspecified    • Decreased testosterone level    • Depressive disorder     Depressive disorder - acute on chronic      • Dysgraphia     • Edema    • Elbow joint pain    • Encounter for general adult medical examination with abnormal findings    • Encounter for screening for malignant neoplasm of colon    • Essential hypertension    • Essential tremor    • Ex-smoker    • Fatigue    • Gastro-esophageal reflux disease with esophagitis    • Gastroesophageal reflux disease    • Hyperlipidemia    • Hyperlipoproteinemia    • Impotence of organic origin    • Insomnia    • Intermittent palpitations    • Knee pain     Knee pain - patellofemoral      • Lateral epicondylitis    • Low back pain    • Male erectile disorder    • Male erectile dysfunction, unspecified    • Male hypogonadism    • Neck pain     Neck pain aggravated by recumbency      • Obese     Obese - BMI 33.0      • On long term drug therapy    • Osteoarthrosis     Osteoarthrosis, unspecified whether generalized or localized, involving lower leg      • Other obesity    • Other specified diseases of anus and rectum     Other specified diseases of anus and rectum - rectal pain after c-scope prep      • Overweight    • Pain in left knee    • Pain in right knee    • Plantar fasciitis    • Shoulder pain, right    • Spasm of back muscles    • Tachycardia, unspecified    • Tinnitus    • Tremor, unspecified    • Tubular adenoma of colon    • Unspecified essential hypertension    • Verruca         Past Surgical History:   Procedure Laterality Date   • APPENDECTOMY     • COLONOSCOPY  03/16/2016    One polyp in the sigmoid colon.Resected and retrieved.        • COLONOSCOPY N/A 6/23/2021    Procedure: COLONOSCOPY;  Surgeon: Brenton Ruggiero MD;  Location: [facility] ENDOSCOPY;  Service: Gastroenterology;  Laterality: N/A;   • CYST REMOVAL  10/27/2009     Excision of sebaceous cyst of the forehead, glabellar region.   • ENDOSCOPY  03/16/2016    Mildly severe esophagitis.Gastritis.Normal examined duodenum.Several biopsies obtained in the lower third of the esophagus.    • ENDOSCOPY AND COLONOSCOPY  04/12/2006      "Normal colonoscopic examination    • INCISION AND DRAINAGE ABSCESS  10/24/2012   • INJECTION OF MEDICATION  04/21/2011    Depo Medrol (Methylprednisone) 80mg (1)        • INJECTION OF MEDICATION  02/01/2016    Kenalog (3)        • INJECTION OF MEDICATION  02/19/2016    Toradol (3)    • JOINT REPLACEMENT Bilateral     Knee   • KNEE ARTHROSCOPY  04/02/2009     Medial meniscus tear of left knee   • SCROTUM EXPLORATION  07/02/2002     Excision of epidermal inclusion cyst. base of left scrotum   • SKIN BIOPSY  04/23/2012   • SKIN TAG REMOVAL  04/25/2002    Skin tag, left scrotum         PT Ortho     Row Name 04/17/23 1100       Precautions and Contraindications    Precautions hx of GERARDO July 2022  -BB       Subjective Pain    Able to rate subjective pain? yes  -BB    Pre-Treatment Pain Level 0  -BB    Post-Treatment Pain Level 0  -BB       Posture/Observations    Posture/Observations Comments no antalgic gait, cues to perform exercises slowed and cues for safety with balance  -BB       General ROM    GENERAL ROM COMMENTS sitting ER 65, IR: 60 degrees.  -BB       MMT (Manual Muscle Testing)    General MMT Comments sititng hip flexion/abd/add: 5/5 sitting ER/IR: 5/5  -BB       Sensation    Sensation WNL? WNL  -BB       Balance Skills Training    SLS bilateral legs 3\" with poor control  -BB       Transfers    Comment, (Transfers) independent in all  -BB       Gait/Stairs (Locomotion)    Comment, (Gait/Stairs) no antalgics in gait, no assisitive device  -BB          User Key  (r) = Recorded By, (t) = Taken By, (c) = Cosigned By    Initials Name Provider Type    BB Rupinder Link, PT DPT Physical Therapist                             PT Assessment/Plan     Row Name 04/17/23 1100          PT Assessment    Functional Limitations Decreased safety during functional activities;Impaired gait;Impaired locomotion;Limitation in home management;Limitations in community activities;Limitations in functional capacity and " "performance;Performance in leisure activities;Performance in self-care ADL  -BB     Impairments Balance;Gait;Impaired muscle endurance;Impaired muscle length;Muscle strength;Pain;Range of motion;Impaired flexibility;Endurance  -BB     Assessment Comments Patient is progressing with hip ROM and strength seen this date. But continues to lack functional endurance and balance and could continue to work towards unmet LTGS.  -BB     Rehab Potential Good  -BB     Patient/caregiver participated in establishment of treatment plan and goals Yes  -BB     Patient would benefit from skilled therapy intervention Yes  -BB        PT Plan    PT Frequency 2x/week  -BB     Predicted Duration of Therapy Intervention (PT) 5-6 weeks  -BB     PT Plan Comments continue with endurance, strength, with emphasis on balance  -BB           User Key  (r) = Recorded By, (t) = Taken By, (c) = Cosigned By    Initials Name Provider Type    Rupinder Tiwari PT DPT Physical Therapist                   OP Exercises     Row Name 04/17/23 1100             Subjective Comments    Subjective Comments Reports doing good today, swam this AM. Feels about 50% better  -BB         Subjective Pain    Able to rate subjective pain? yes  -BB      Pre-Treatment Pain Level 0  -BB      Post-Treatment Pain Level 0  -BB         Exercise 1    Exercise Name 1 pro 2 level 4  -BB      Time 1 10'  -BB         Exercise 2    Exercise Name 2 standing HS stretch  -BB      Sets 2 3  -BB      Time 2 30\"  -BB         Exercise 3    Exercise Name 3 side step on foam  no UE assist  -BB      Reps 3 5 laps  -BB         Exercise 4    Exercise Name 4 ramp fwd  -BB      Reps 4 5 laps  -BB         Exercise 5    Exercise Name 5 anti trunk rotation step away blue tband  -BB      Additional Comments each way  -BB         Exercise 6    Exercise Name 6 SLS with poor control  -BB      Sets 6 5  -BB      Time 6 5\" hold  -BB      Additional Comments each leg  -BB         Exercise 7    Exercise Name " "7 cybex abduction  -BB      Sets 7 1  -BB      Reps 7 15  -BB      Time 7 2\" hold  -BB      Additional Comments 50lbs  -BB         Exercise 8    Exercise Name 8 cybex adduction  -BB      Sets 8 1  -BB      Reps 8 15  -BB      Additional Comments 50lbs  -BB         Exercise 9    Exercise Name 9 pelvic tilting on wall  -BB      Reps 9 15  -BB            User Key  (r) = Recorded By, (t) = Taken By, (c) = Cosigned By    Initials Name Provider Type    Rupinder Tiwari PT DPT Physical Therapist                              PT OP Goals     Row Name 04/17/23 1100          PT Short Term Goals    STG Date to Achieve 05/08/23  -BB     STG 1 Hip ER: 40 degrees,  -BB     STG 1 Progress Met  -BB     STG 2 Hip IR: 70 degrees  -BB     STG 2 Progress Not Met;Progressing  -BB     STG 3 complete 10 pelvic tilts with back flat on wall to show good pelvic mobility/control  -BB     STG 3 Progress Progressing  -BB     STG 3 Progress Comments cues needed to perform correctly  -BB        Long Term Goals    LTG Date to Achieve 05/15/23  -BB     LTG 1 complete in clinic or verbal ability to complete 2 miles of walking  -BB     LTG 1 Progress Not Met  -BB     LTG 2 SLS BLE 3\" each leg  -BB     LTG 2 Progress Partially Met  -BB     LTG 2 Progress Comments completes but not safe  -BB        Time Calculation    PT Goal Re-Cert Due Date 05/08/23  -BB           User Key  (r) = Recorded By, (t) = Taken By, (c) = Cosigned By    Initials Name Provider Type    Rupinder Tiwari PT DPT Physical Therapist                Therapy Education  Education Details: pelvic tilts and SLS with UE assist              Time Calculation:   Start Time: 1102  Stop Time: 1145  Time Calculation (min): 43 min  Therapy Charges for Today     Code Description Service Date Service Provider Modifiers Qty    74609902152 HC PT THER PROC EA 15 MIN 4/17/2023 Rupinder Link PT DPT GP 3                    Rupinder Link PT DPT  4/17/2023     "

## 2023-04-20 ENCOUNTER — HOSPITAL ENCOUNTER (OUTPATIENT)
Dept: PHYSICAL THERAPY | Facility: HOSPITAL | Age: 68
Setting detail: THERAPIES SERIES
Discharge: HOME | End: 2023-04-20
Payer: MEDICARE

## 2023-04-20 DIAGNOSIS — M25.551 RIGHT HIP PAIN: Primary | ICD-10-CM

## 2023-04-20 DIAGNOSIS — Z96.641 STATUS POST HIP REPLACEMENT, RIGHT: ICD-10-CM

## 2023-04-20 PROCEDURE — 97113 AQUATIC THERAPY/EXERCISES: CPT

## 2023-04-20 NOTE — THERAPY TREATMENT NOTE
Outpatient Physical Therapy Ortho Treatment Note  [facility]     Patient Name: Otf Murray  : 1955  MRN: [id number]  Today's Date: 2023      Visit Date: 2023     Attendance:   (Eval + 10 until 23)  Subjective Improvement: 50%  Recert Date: 23  MD appointment: prn     Therapy Diagnosis: hx of right GERARDO , decreased mobility    Visit Dx:    ICD-10-CM ICD-9-CM   1. Right hip pain  M25.551 719.45   2. Status post hip replacement, right  Z96.641 V43.64       Patient Active Problem List   Diagnosis   • ADHD, predominantly inattentive type   • Cervical spondylosis without myelopathy   • Chronic fatigue   • Chronic left-sided low back pain without sciatica   • Gastroesophageal reflux disease without esophagitis   • History of tobacco abuse   • History of total left knee replacement   • Primary osteoarthritis of both knees   • Recurrent major depressive disorder, in partial remission (HCC)   • Seasonal allergic rhinitis due to pollen   • History of colon polyps   • Morbid (severe) obesity due to excess calories   • Medically complex patient   • Testosterone insufficiency   • Primary hypertension   • Other hyperlipidemia        Past Medical History:   Diagnosis Date   • Acute gastritis without bleeding    • Alcohol abuse counseling and surveillance of alcoholic    • Allergic rhinitis    • Anxiety state    • Attention deficit disorder of childhood with hyperactivity    • Attention deficit hyperactivity disorder    • Attention deficit hyperactivity disorder, predominantly hyperactive impulsive type    • Attention deficit hyperactivity disorder, predominantly inattentive type    • Backache    • Body mass index (bmi) 31.0-31.9, adult     Body mass index (BMI) 31.0-31.9, adult - BMI 33.5      • Burn of lower leg    • Candidiasis of skin    • Cardiac murmur, unspecified    • Decreased testosterone level    • Depressive disorder     Depressive disorder - acute on chronic      •  Dysgraphia    • Edema    • Elbow joint pain    • Encounter for general adult medical examination with abnormal findings    • Encounter for screening for malignant neoplasm of colon    • Essential hypertension    • Essential tremor    • Ex-smoker    • Fatigue    • Gastro-esophageal reflux disease with esophagitis    • Gastroesophageal reflux disease    • Hyperlipidemia    • Hyperlipoproteinemia    • Impotence of organic origin    • Insomnia    • Intermittent palpitations    • Knee pain     Knee pain - patellofemoral      • Lateral epicondylitis    • Low back pain    • Male erectile disorder    • Male erectile dysfunction, unspecified    • Male hypogonadism    • Neck pain     Neck pain aggravated by recumbency      • Obese     Obese - BMI 33.0      • On long term drug therapy    • Osteoarthrosis     Osteoarthrosis, unspecified whether generalized or localized, involving lower leg      • Other obesity    • Other specified diseases of anus and rectum     Other specified diseases of anus and rectum - rectal pain after c-scope prep      • Overweight    • Pain in left knee    • Pain in right knee    • Plantar fasciitis    • Shoulder pain, right    • Spasm of back muscles    • Tachycardia, unspecified    • Tinnitus    • Tremor, unspecified    • Tubular adenoma of colon    • Unspecified essential hypertension    • Verruca         Past Surgical History:   Procedure Laterality Date   • APPENDECTOMY     • COLONOSCOPY  03/16/2016    One polyp in the sigmoid colon.Resected and retrieved.        • COLONOSCOPY N/A 6/23/2021    Procedure: COLONOSCOPY;  Surgeon: Brenton Ruggiero MD;  Location: [facility] ENDOSCOPY;  Service: Gastroenterology;  Laterality: N/A;   • CYST REMOVAL  10/27/2009     Excision of sebaceous cyst of the forehead, glabellar region.   • ENDOSCOPY  03/16/2016    Mildly severe esophagitis.Gastritis.Normal examined duodenum.Several biopsies obtained in the lower third of the esophagus.    • ENDOSCOPY AND COLONOSCOPY   04/12/2006     Normal colonoscopic examination    • INCISION AND DRAINAGE ABSCESS  10/24/2012   • INJECTION OF MEDICATION  04/21/2011    Depo Medrol (Methylprednisone) 80mg (1)        • INJECTION OF MEDICATION  02/01/2016    Kenalog (3)        • INJECTION OF MEDICATION  02/19/2016    Toradol (3)    • JOINT REPLACEMENT Bilateral     Knee   • KNEE ARTHROSCOPY  04/02/2009     Medial meniscus tear of left knee   • SCROTUM EXPLORATION  07/02/2002     Excision of epidermal inclusion cyst. base of left scrotum   • SKIN BIOPSY  04/23/2012   • SKIN TAG REMOVAL  04/25/2002    Skin tag, left scrotum         PT Ortho     Row Name 04/20/23 1500       Precautions and Contraindications    Precautions hx of GERARDO July 2022  -          User Key  (r) = Recorded By, (t) = Taken By, (c) = Cosigned By    Initials Name Provider Type    Kristin Garcia PTA Physical Therapist Assistant                             PT Assessment/Plan     Row Name 04/20/23 1500          PT Assessment    Functional Limitations Decreased safety during functional activities;Impaired gait;Impaired locomotion;Limitation in home management;Limitations in community activities;Limitations in functional capacity and performance;Performance in leisure activities;Performance in self-care ADL  -     Impairments Balance;Gait;Impaired muscle endurance;Impaired muscle length;Muscle strength;Pain;Range of motion;Impaired flexibility;Endurance  -     Assessment Comments added weights to deep end distraction to help assist with back and hip pain; responded well; did well with balance progression in aquatics as well;  -     Rehab Potential Good  -     Patient/caregiver participated in establishment of treatment plan and goals Yes  -     Patient would benefit from skilled therapy intervention Yes  -        PT Plan    PT Frequency 2x/week  -     Predicted Duration of Therapy Intervention (PT) 5-6 weeks  -     PT Plan Comments Continue with current POC:progress  "balance  -KH           User Key  (r) = Recorded By, (t) = Taken By, (c) = Cosigned By    Initials Name Provider Type    Kristin Garcia PTA Physical Therapist Assistant                   OP Exercises     Row Name 04/20/23 1500             Subjective Comments    Subjective Comments Increaed pain in the L LE/buttocks area; Reports feeling he has sciatica coming back again today; Been getting on and off tractor all day  -KH         Subjective Pain    Able to rate subjective pain? yes  -KH      Pre-Treatment Pain Level 4  -KH      Post-Treatment Pain Level 0  -KH         Aquatics    Aquatics performed? Yes  -KH         Exercise 1    Exercise Name 1 Aquatics: water walking  -KH      Time 1 5 mins  -KH      Additional Comments fwd;lat;bkd  -KH         Exercise 2    Exercise Name 2 Aquatics: lunge stance with push pull ABC board  -KH      Cueing 2 Verbal  -KH      Sets 2 2  -KH      Reps 2 10 each  -KH         Exercise 3    Exercise Name 3 Aquatics: SLS B  -KH      Cueing 3 Verbal  -KH      Sets 3 5  -KH      Time 3 15\" holds each  -KH         Exercise 4    Exercise Name 4 Aquatics: Mini Squats-->CR with ABC board for resistance  -KH      Sets 4 2  -KH      Reps 4 10  -KH         Exercise 5    Exercise Name 5 Aquatics: deep hang w/ ankle weights  -KH      Time 5 10 mins  -KH            User Key  (r) = Recorded By, (t) = Taken By, (c) = Cosigned By    Initials Name Provider Type    Kristin Garcia PTA Physical Therapist Assistant                              PT OP Goals     Row Name 04/20/23 1500          PT Short Term Goals    STG Date to Achieve 05/08/23  -KH     STG 1 Hip ER: 40 degrees,  -KH     STG 1 Progress Met  -     STG 2 Hip IR: 70 degrees  -KH     STG 2 Progress Not Met;Progressing  -     STG 3 complete 10 pelvic tilts with back flat on wall to show good pelvic mobility/control  -     STG 3 Progress Progressing  -        Long Term Goals    LTG Date to Achieve 05/15/23  -KH     LTG 1 complete in clinic or " "verbal ability to complete 2 miles of walking  -     LTG 1 Progress Not Met  -     LTG 2 SLS BLE 3\" each leg  -     LTG 2 Progress Partially Met  -        Time Calculation    PT Goal Re-Cert Due Date 05/08/23  -           User Key  (r) = Recorded By, (t) = Taken By, (c) = Cosigned By    Initials Name Provider Type    Kristin Garcia PTA Physical Therapist Assistant                               Time Calculation:   Start Time: 1428  Stop Time: 1510  Time Calculation (min): 42 min  Therapy Charges for Today     Code Description Service Date Service Provider Modifiers Qty    23265753208 HC PT AQUATIC THERAPY EA 15 MIN 4/20/2023 Kristin Lamas PTA GP, CQ 3                    Kristin Lamas PTA  4/20/2023     "

## 2023-04-24 ENCOUNTER — HOSPITAL ENCOUNTER (OUTPATIENT)
Dept: PHYSICAL THERAPY | Facility: HOSPITAL | Age: 68
Setting detail: THERAPIES SERIES
Discharge: HOME | End: 2023-04-24
Payer: MEDICARE

## 2023-04-24 DIAGNOSIS — M25.551 RIGHT HIP PAIN: Primary | ICD-10-CM

## 2023-04-24 DIAGNOSIS — Z96.641 STATUS POST HIP REPLACEMENT, RIGHT: ICD-10-CM

## 2023-04-24 PROCEDURE — 97110 THERAPEUTIC EXERCISES: CPT

## 2023-04-24 NOTE — THERAPY TREATMENT NOTE
Outpatient Physical Therapy Ortho Treatment Note  [facility]     Patient Name: Otf Murray  : 1955  MRN: [id number]  Today's Date: 2023      Visit Date: 2023     Attendance:  9/10 (Eval + 10 until 23)  Subjective Improvement: 50%  Recert Date: 23  MD appointment: prn     Therapy Diagnosis: hx of right GERARDO , decreased mobility    Visit Dx:    ICD-10-CM ICD-9-CM   1. Right hip pain  M25.551 719.45   2. Status post hip replacement, right  Z96.641 V43.64       Patient Active Problem List   Diagnosis   • ADHD, predominantly inattentive type   • Cervical spondylosis without myelopathy   • Chronic fatigue   • Chronic left-sided low back pain without sciatica   • Gastroesophageal reflux disease without esophagitis   • History of tobacco abuse   • History of total left knee replacement   • Primary osteoarthritis of both knees   • Recurrent major depressive disorder, in partial remission (HCC)   • Seasonal allergic rhinitis due to pollen   • History of colon polyps   • Morbid (severe) obesity due to excess calories   • Medically complex patient   • Testosterone insufficiency   • Primary hypertension   • Other hyperlipidemia        Past Medical History:   Diagnosis Date   • Acute gastritis without bleeding    • Alcohol abuse counseling and surveillance of alcoholic    • Allergic rhinitis    • Anxiety state    • Attention deficit disorder of childhood with hyperactivity    • Attention deficit hyperactivity disorder    • Attention deficit hyperactivity disorder, predominantly hyperactive impulsive type    • Attention deficit hyperactivity disorder, predominantly inattentive type    • Backache    • Body mass index (bmi) 31.0-31.9, adult     Body mass index (BMI) 31.0-31.9, adult - BMI 33.5      • Burn of lower leg    • Candidiasis of skin    • Cardiac murmur, unspecified    • Decreased testosterone level    • Depressive disorder     Depressive disorder - acute on chronic      •  Dysgraphia    • Edema    • Elbow joint pain    • Encounter for general adult medical examination with abnormal findings    • Encounter for screening for malignant neoplasm of colon    • Essential hypertension    • Essential tremor    • Ex-smoker    • Fatigue    • Gastro-esophageal reflux disease with esophagitis    • Gastroesophageal reflux disease    • Hyperlipidemia    • Hyperlipoproteinemia    • Impotence of organic origin    • Insomnia    • Intermittent palpitations    • Knee pain     Knee pain - patellofemoral      • Lateral epicondylitis    • Low back pain    • Male erectile disorder    • Male erectile dysfunction, unspecified    • Male hypogonadism    • Neck pain     Neck pain aggravated by recumbency      • Obese     Obese - BMI 33.0      • On long term drug therapy    • Osteoarthrosis     Osteoarthrosis, unspecified whether generalized or localized, involving lower leg      • Other obesity    • Other specified diseases of anus and rectum     Other specified diseases of anus and rectum - rectal pain after c-scope prep      • Overweight    • Pain in left knee    • Pain in right knee    • Plantar fasciitis    • Shoulder pain, right    • Spasm of back muscles    • Tachycardia, unspecified    • Tinnitus    • Tremor, unspecified    • Tubular adenoma of colon    • Unspecified essential hypertension    • Verruca         Past Surgical History:   Procedure Laterality Date   • APPENDECTOMY     • COLONOSCOPY  03/16/2016    One polyp in the sigmoid colon.Resected and retrieved.        • COLONOSCOPY N/A 6/23/2021    Procedure: COLONOSCOPY;  Surgeon: Brenton Ruggiero MD;  Location: [facility] ENDOSCOPY;  Service: Gastroenterology;  Laterality: N/A;   • CYST REMOVAL  10/27/2009     Excision of sebaceous cyst of the forehead, glabellar region.   • ENDOSCOPY  03/16/2016    Mildly severe esophagitis.Gastritis.Normal examined duodenum.Several biopsies obtained in the lower third of the esophagus.    • ENDOSCOPY AND COLONOSCOPY   04/12/2006     Normal colonoscopic examination    • INCISION AND DRAINAGE ABSCESS  10/24/2012   • INJECTION OF MEDICATION  04/21/2011    Depo Medrol (Methylprednisone) 80mg (1)        • INJECTION OF MEDICATION  02/01/2016    Kenalog (3)        • INJECTION OF MEDICATION  02/19/2016    Toradol (3)    • JOINT REPLACEMENT Bilateral     Knee   • KNEE ARTHROSCOPY  04/02/2009     Medial meniscus tear of left knee   • SCROTUM EXPLORATION  07/02/2002     Excision of epidermal inclusion cyst. base of left scrotum   • SKIN BIOPSY  04/23/2012   • SKIN TAG REMOVAL  04/25/2002    Skin tag, left scrotum         PT Ortho     Row Name 04/24/23 1300       Precautions and Contraindications    Precautions hx of GERARDO July 2022  -          User Key  (r) = Recorded By, (t) = Taken By, (c) = Cosigned By    Initials Name Provider Type    Kristin Garcia PTA Physical Therapist Assistant                             PT Assessment/Plan     Row Name 04/24/23 1300          PT Assessment    Functional Limitations Decreased safety during functional activities;Impaired gait;Impaired locomotion;Limitation in home management;Limitations in community activities;Limitations in functional capacity and performance;Performance in leisure activities;Performance in self-care ADL  -     Impairments Balance;Gait;Impaired muscle endurance;Impaired muscle length;Muscle strength;Pain;Range of motion;Impaired flexibility;Endurance  -     Assessment Comments did well with balance activties today; had to have VC to slow exercises down but did well;  -     Rehab Potential Good  -     Patient/caregiver participated in establishment of treatment plan and goals Yes  -     Patient would benefit from skilled therapy intervention Yes  -        PT Plan    PT Frequency 2x/week  -     Predicted Duration of Therapy Intervention (PT) 5-6 weeks  -     PT Plan Comments 2 more approved visits; Continue with current POC: progress strength and balance as able.  -   "         User Key  (r) = Recorded By, (t) = Taken By, (c) = Cosigned By    Initials Name Provider Type    Kristin Garcia PTA Physical Therapist Assistant                 Modalities     Row Name 04/24/23 1300             Subjective Comments    Subjective Comments Been going to the [facility] swim clased x5 a week; Feels like he is getting better and has more energy  -KH         Subjective Pain    Pre-Treatment Pain Level 2  -KH      Post-Treatment Pain Level 0  -KH            User Key  (r) = Recorded By, (t) = Taken By, (c) = Cosigned By    Initials Name Provider Type    Kristin Garcia PTA Physical Therapist Assistant               OP Exercises     Row Name 04/24/23 1300             Subjective Comments    Subjective Comments Been going to the [facility] swim clased x5 a week; Feels like he is getting better and has more energy  -KH         Subjective Pain    Able to rate subjective pain? yes  -KH      Pre-Treatment Pain Level 2  -KH      Post-Treatment Pain Level 0  -KH         Exercise 1    Exercise Name 1 Arc Trainer  -KH      Cueing 1 Verbal;Demo  -KH      Time 1 10 mins  -KH      Additional Comments quickstart level 15  -KH         Exercise 2    Exercise Name 2 standing hamstring stretch  -KH      Cueing 2 Verbal  -KH      Sets 2 3  -KH      Time 2 30\" holds B  -KH         Exercise 3    Exercise Name 3 Airex Beam: lateral stepping  -KH      Reps 3 5 trips  -KH         Exercise 4    Exercise Name 4 Airex SLS B  -KH      Sets 4 3  -KH      Time 4 15\" holds  -KH         Exercise 5    Exercise Name 5 Airex Foam Beam Heel Toe Walk  -KH      Reps 5 5 trips  -KH         Exercise 6    Exercise Name 6 BOSU Step ups B fwd  -KH      Cueing 6 Verbal  -KH      Sets 6 2  -KH      Reps 6 10  -KH         Exercise 7    Exercise Name 7 Cybex Leg Press  -KH      Cueing 7 Verbal  -KH      Sets 7 3  -KH      Reps 7 10  -KH      Additional Comments 150#  -KH         Exercise 8    Exercise Name 8 Cybex Hip abdcution/adduction  -KH      Cueing 8 " "Verbal  -      Sets 8 3  -      Reps 8 10 each  -      Additional Comments 55#  -            User Key  (r) = Recorded By, (t) = Taken By, (c) = Cosigned By    Initials Name Provider Type    Kristin Garcia PTA Physical Therapist Assistant                              PT OP Goals     Row Name 04/24/23 1300          PT Short Term Goals    STG Date to Achieve 05/08/23  -     STG 1 Hip ER: 40 degrees,  -     STG 1 Progress Met  -     STG 2 Hip IR: 70 degrees  -     STG 2 Progress Not Met;Progressing  -     STG 3 complete 10 pelvic tilts with back flat on wall to show good pelvic mobility/control  -     STG 3 Progress Progressing  -        Long Term Goals    LTG Date to Achieve 05/15/23  -     LTG 1 complete in clinic or verbal ability to complete 2 miles of walking  -     LTG 1 Progress Not Met  -     LTG 2 SLS BLE 3\" each leg  -     LTG 2 Progress Partially Met  -        Time Calculation    PT Goal Re-Cert Due Date 05/08/23  -           User Key  (r) = Recorded By, (t) = Taken By, (c) = Cosigned By    Initials Name Provider Type    Kristin Garcia PTA Physical Therapist Assistant                               Time Calculation:   Start Time: 1300  Stop Time: 1341  Time Calculation (min): 41 min  Therapy Charges for Today     Code Description Service Date Service Provider Modifiers Qty    22227842300 HC PT THER PROC EA 15 MIN 4/24/2023 Kristin Lamas PTA GP, CQ 3                    Kristin Lamas PTA  4/24/2023     "

## 2023-04-26 ENCOUNTER — CLINICAL SUPPORT (OUTPATIENT)
Dept: FAMILY MEDICINE CLINIC | Facility: CLINIC | Age: 68
End: 2023-04-26
Payer: MEDICARE

## 2023-04-26 RX ADMIN — TESTOSTERONE CYPIONATE 200 MG: 200 INJECTION, SOLUTION INTRAMUSCULAR at 13:21

## 2023-04-27 ENCOUNTER — HOSPITAL ENCOUNTER (OUTPATIENT)
Dept: PHYSICAL THERAPY | Facility: HOSPITAL | Age: 68
Setting detail: THERAPIES SERIES
Discharge: HOME | End: 2023-04-27
Payer: MEDICARE

## 2023-04-27 DIAGNOSIS — Z96.641 STATUS POST HIP REPLACEMENT, RIGHT: ICD-10-CM

## 2023-04-27 DIAGNOSIS — M25.551 RIGHT HIP PAIN: Primary | ICD-10-CM

## 2023-04-27 PROCEDURE — 97113 AQUATIC THERAPY/EXERCISES: CPT

## 2023-04-27 NOTE — THERAPY TREATMENT NOTE
Outpatient Physical Therapy Ortho Treatment Note  [facility]     Patient Name: Otf Murray  : 1955  MRN: [id number]  Today's Date: 2023      Visit Date: 2023     Attendance:  10/11 (Eval + 10 until 23)  Subjective Improvement: 50%  Recert Date: 23  MD appointment: prn     Therapy Diagnosis: hx of right GERARDO , decreased mobility    Visit Dx:    ICD-10-CM ICD-9-CM   1. Right hip pain  M25.551 719.45   2. Status post hip replacement, right  Z96.641 V43.64       Patient Active Problem List   Diagnosis   • ADHD, predominantly inattentive type   • Cervical spondylosis without myelopathy   • Chronic fatigue   • Chronic left-sided low back pain without sciatica   • Gastroesophageal reflux disease without esophagitis   • History of tobacco abuse   • History of total left knee replacement   • Primary osteoarthritis of both knees   • Recurrent major depressive disorder, in partial remission (HCC)   • Seasonal allergic rhinitis due to pollen   • History of colon polyps   • Morbid (severe) obesity due to excess calories   • Medically complex patient   • Testosterone insufficiency   • Primary hypertension   • Other hyperlipidemia        Past Medical History:   Diagnosis Date   • Acute gastritis without bleeding    • Alcohol abuse counseling and surveillance of alcoholic    • Allergic rhinitis    • Anxiety state    • Attention deficit disorder of childhood with hyperactivity    • Attention deficit hyperactivity disorder    • Attention deficit hyperactivity disorder, predominantly hyperactive impulsive type    • Attention deficit hyperactivity disorder, predominantly inattentive type    • Backache    • Body mass index (bmi) 31.0-31.9, adult     Body mass index (BMI) 31.0-31.9, adult - BMI 33.5      • Burn of lower leg    • Candidiasis of skin    • Cardiac murmur, unspecified    • Decreased testosterone level    • Depressive disorder     Depressive disorder - acute on chronic      •  Dysgraphia    • Edema    • Elbow joint pain    • Encounter for general adult medical examination with abnormal findings    • Encounter for screening for malignant neoplasm of colon    • Essential hypertension    • Essential tremor    • Ex-smoker    • Fatigue    • Gastro-esophageal reflux disease with esophagitis    • Gastroesophageal reflux disease    • Hyperlipidemia    • Hyperlipoproteinemia    • Impotence of organic origin    • Insomnia    • Intermittent palpitations    • Knee pain     Knee pain - patellofemoral      • Lateral epicondylitis    • Low back pain    • Male erectile disorder    • Male erectile dysfunction, unspecified    • Male hypogonadism    • Neck pain     Neck pain aggravated by recumbency      • Obese     Obese - BMI 33.0      • On long term drug therapy    • Osteoarthrosis     Osteoarthrosis, unspecified whether generalized or localized, involving lower leg      • Other obesity    • Other specified diseases of anus and rectum     Other specified diseases of anus and rectum - rectal pain after c-scope prep      • Overweight    • Pain in left knee    • Pain in right knee    • Plantar fasciitis    • Shoulder pain, right    • Spasm of back muscles    • Tachycardia, unspecified    • Tinnitus    • Tremor, unspecified    • Tubular adenoma of colon    • Unspecified essential hypertension    • Verruca         Past Surgical History:   Procedure Laterality Date   • APPENDECTOMY     • COLONOSCOPY  03/16/2016    One polyp in the sigmoid colon.Resected and retrieved.        • COLONOSCOPY N/A 6/23/2021    Procedure: COLONOSCOPY;  Surgeon: Brenton Ruggiero MD;  Location: [facility] ENDOSCOPY;  Service: Gastroenterology;  Laterality: N/A;   • CYST REMOVAL  10/27/2009     Excision of sebaceous cyst of the forehead, glabellar region.   • ENDOSCOPY  03/16/2016    Mildly severe esophagitis.Gastritis.Normal examined duodenum.Several biopsies obtained in the lower third of the esophagus.    • ENDOSCOPY AND COLONOSCOPY   04/12/2006     Normal colonoscopic examination    • INCISION AND DRAINAGE ABSCESS  10/24/2012   • INJECTION OF MEDICATION  04/21/2011    Depo Medrol (Methylprednisone) 80mg (1)        • INJECTION OF MEDICATION  02/01/2016    Kenalog (3)        • INJECTION OF MEDICATION  02/19/2016    Toradol (3)    • JOINT REPLACEMENT Bilateral     Knee   • KNEE ARTHROSCOPY  04/02/2009     Medial meniscus tear of left knee   • SCROTUM EXPLORATION  07/02/2002     Excision of epidermal inclusion cyst. base of left scrotum   • SKIN BIOPSY  04/23/2012   • SKIN TAG REMOVAL  04/25/2002    Skin tag, left scrotum         PT Ortho     Row Name 04/27/23 1400       Precautions and Contraindications    Precautions hx of GERARDO July 2022  -          User Key  (r) = Recorded By, (t) = Taken By, (c) = Cosigned By    Initials Name Provider Type    Kristin Garcia PTA Physical Therapist Assistant                             PT Assessment/Plan     Row Name 04/27/23 1400          PT Assessment    Functional Limitations Decreased safety during functional activities;Impaired gait;Impaired locomotion;Limitation in home management;Limitations in community activities;Limitations in functional capacity and performance;Performance in leisure activities;Performance in self-care ADL  -     Impairments Balance;Gait;Impaired muscle endurance;Impaired muscle length;Muscle strength;Pain;Range of motion;Impaired flexibility;Endurance  -     Assessment Comments progressed strengthening with aquatic resistance this date; met two new goals this date; progressing towards others;  -     Rehab Potential Good  -     Patient/caregiver participated in establishment of treatment plan and goals Yes  -     Patient would benefit from skilled therapy intervention Yes  -        PT Plan    PT Frequency 2x/week  -     Predicted Duration of Therapy Intervention (PT) 5-6 weeks  -     PT Plan Comments One more approved visit then d/c to independent program; attempt 2  "mile walk next visit;  -KH           User Key  (r) = Recorded By, (t) = Taken By, (c) = Cosigned By    Initials Name Provider Type    Kristin Garcia PTA Physical Therapist Assistant                   OP Exercises     Row Name 04/27/23 1400             Subjective Comments    Subjective Comments Was doing really good after last treatment and then he got the stomach bug; reports that he got his left foot caught and pulled his Left hamstring; Does states that he feels that he has everything he needs to continue PT on his own;  -KH         Subjective Pain    Able to rate subjective pain? yes  -KH      Pre-Treatment Pain Level 4  -KH      Post-Treatment Pain Level 0  -KH         Aquatics    Aquatics performed? Yes  -KH         Exercise 1    Exercise Name 1 Aquatics: water walking  -KH      Time 1 5 mins  -KH      Additional Comments fwd;lat;bkd  -KH         Exercise 2    Exercise Name 2 Aquatics: core stability with UE 4 way with paddles  -KH      Cueing 2 Verbal  -KH      Sets 2 2  -KH      Reps 2 10 each (yellow paddles)  -KH      Additional Comments horiz ab/add; abd/add; flex/ext;punches  -KH         Exercise 3    Exercise Name 3 Aquatics: standing hamstring stretch B  -KH      Cueing 3 Verbal  -KH      Sets 3 3  -KH      Time 3 30\" holds  -KH         Exercise 4    Exercise Name 4 Aquatics: lunge stance with push pull ABC board  -KH      Sets 4 2  -KH      Reps 4 10 each R/L  -KH         Exercise 5    Exercise Name 5 Aquatics: SLR w/ Large Ring Float  -KH      Cueing 5 Verbal  -KH      Time 5 1 mins each leg  -KH      Additional Comments flexion, ext, abductoin  -KH         Exercise 6    Exercise Name 6 Aquatics: deep end hang for lumbar/hip distractoin  -KH      Time 6 10 mins  -KH      Additional Comments 10# aw  -KH            User Key  (r) = Recorded By, (t) = Taken By, (c) = Cosigned By    Initials Name Provider Type    Kristin Garcia PTA Physical Therapist Assistant                              PT OP Goals     " "Row Name 04/27/23 1400          PT Short Term Goals    STG Date to Achieve 05/08/23  -     STG 1 Hip ER: 40 degrees,  -     STG 1 Progress Met  -     STG 2 Hip IR: 70 degrees  -     STG 2 Progress Not Met;Progressing  -     STG 3 complete 10 pelvic tilts with back flat on wall to show good pelvic mobility/control  -     STG 3 Progress Met   -        Long Term Goals    LTG Date to Achieve 05/15/23  -     LTG 1 complete in clinic or verbal ability to complete 2 miles of walking  -     LTG 1 Progress Not Met  -     LTG 2 SLS BLE 3\" each leg  -     LTG 2 Progress Met   -        Time Calculation    PT Goal Re-Cert Due Date 05/08/23  -           User Key  (r) = Recorded By, (t) = Taken By, (c) = Cosigned By    Initials Name Provider Type    Kristin Garcia PTA Physical Therapist Assistant                               Time Calculation:   Start Time: 1300  Stop Time: 1400  Time Calculation (min): 60 min  Therapy Charges for Today     Code Description Service Date Service Provider Modifiers Qty    50083992143 HC PT AQUATIC THERAPY EA 15 MIN 4/27/2023 Kristin Lamas PTA GP, CQ 4                    Kristin Lamas PTA  4/27/2023     "

## 2023-05-01 ENCOUNTER — HOSPITAL ENCOUNTER (OUTPATIENT)
Dept: PHYSICAL THERAPY | Facility: HOSPITAL | Age: 68
Setting detail: THERAPIES SERIES
Discharge: HOME | End: 2023-05-01
Payer: MEDICARE

## 2023-05-01 ENCOUNTER — TELEPHONE (OUTPATIENT)
Dept: FAMILY MEDICINE CLINIC | Facility: CLINIC | Age: 68
End: 2023-05-01
Payer: MEDICARE

## 2023-05-01 DIAGNOSIS — M25.551 RIGHT HIP PAIN: Primary | ICD-10-CM

## 2023-05-01 DIAGNOSIS — Z96.641 STATUS POST HIP REPLACEMENT, RIGHT: ICD-10-CM

## 2023-05-01 DIAGNOSIS — M50.90 CERVICAL DISC DISORDER: Primary | ICD-10-CM

## 2023-05-01 PROCEDURE — 97535 SELF CARE MNGMENT TRAINING: CPT | Performed by: PHYSICAL THERAPIST

## 2023-05-01 PROCEDURE — 97110 THERAPEUTIC EXERCISES: CPT | Performed by: PHYSICAL THERAPIST

## 2023-05-01 NOTE — TELEPHONE ENCOUNTER
Dr. Arcos pt -- Pt said that he was finished with his back therapy so they are needing his neck therapy order released at this time. Both therapies were ordered but due to insurance could only have 1 problem treated at a time. Please call patient if questions. Thank you!

## 2023-05-01 NOTE — THERAPY DISCHARGE NOTE
Outpatient Physical Therapy Ortho Treatment Note/Discharge Summary  [facility]     Patient Name: Otf Murray  : 1955  MRN: [id number]  Today's Date: 2023      Visit Date: 2023  Attendance:   (Eval + 10 until 23)  Subjective Improvement: 99.25%  Recert Date: DONTAE MEDINA appointment: prn     Therapy Diagnosis: hx of right GERARDO , decreased mobility     Visit Dx:    ICD-10-CM ICD-9-CM   1. Right hip pain  M25.551 719.45   2. Status post hip replacement, right  Z96.641 V43.64       Patient Active Problem List   Diagnosis   • ADHD, predominantly inattentive type   • Cervical spondylosis without myelopathy   • Chronic fatigue   • Chronic left-sided low back pain without sciatica   • Gastroesophageal reflux disease without esophagitis   • History of tobacco abuse   • History of total left knee replacement   • Primary osteoarthritis of both knees   • Recurrent major depressive disorder, in partial remission (HCC)   • Seasonal allergic rhinitis due to pollen   • History of colon polyps   • Morbid (severe) obesity due to excess calories   • Medically complex patient   • Testosterone insufficiency   • Primary hypertension   • Other hyperlipidemia        Past Medical History:   Diagnosis Date   • Acute gastritis without bleeding    • Alcohol abuse counseling and surveillance of alcoholic    • Allergic rhinitis    • Anxiety state    • Attention deficit disorder of childhood with hyperactivity    • Attention deficit hyperactivity disorder    • Attention deficit hyperactivity disorder, predominantly hyperactive impulsive type    • Attention deficit hyperactivity disorder, predominantly inattentive type    • Backache    • Body mass index (bmi) 31.0-31.9, adult     Body mass index (BMI) 31.0-31.9, adult - BMI 33.5      • Burn of lower leg    • Candidiasis of skin    • Cardiac murmur, unspecified    • Decreased testosterone level    • Depressive disorder     Depressive disorder - acute on  chronic      • Dysgraphia    • Edema    • Elbow joint pain    • Encounter for general adult medical examination with abnormal findings    • Encounter for screening for malignant neoplasm of colon    • Essential hypertension    • Essential tremor    • Ex-smoker    • Fatigue    • Gastro-esophageal reflux disease with esophagitis    • Gastroesophageal reflux disease    • Hyperlipidemia    • Hyperlipoproteinemia    • Impotence of organic origin    • Insomnia    • Intermittent palpitations    • Knee pain     Knee pain - patellofemoral      • Lateral epicondylitis    • Low back pain    • Male erectile disorder    • Male erectile dysfunction, unspecified    • Male hypogonadism    • Neck pain     Neck pain aggravated by recumbency      • Obese     Obese - BMI 33.0      • On long term drug therapy    • Osteoarthrosis     Osteoarthrosis, unspecified whether generalized or localized, involving lower leg      • Other obesity    • Other specified diseases of anus and rectum     Other specified diseases of anus and rectum - rectal pain after c-scope prep      • Overweight    • Pain in left knee    • Pain in right knee    • Plantar fasciitis    • Shoulder pain, right    • Spasm of back muscles    • Tachycardia, unspecified    • Tinnitus    • Tremor, unspecified    • Tubular adenoma of colon    • Unspecified essential hypertension    • Verruca         Past Surgical History:   Procedure Laterality Date   • APPENDECTOMY     • COLONOSCOPY  03/16/2016    One polyp in the sigmoid colon.Resected and retrieved.        • COLONOSCOPY N/A 6/23/2021    Procedure: COLONOSCOPY;  Surgeon: Brenton Ruggiero MD;  Location: [facility] ENDOSCOPY;  Service: Gastroenterology;  Laterality: N/A;   • CYST REMOVAL  10/27/2009     Excision of sebaceous cyst of the forehead, glabellar region.   • ENDOSCOPY  03/16/2016    Mildly severe esophagitis.Gastritis.Normal examined duodenum.Several biopsies obtained in the lower third of the esophagus.    • ENDOSCOPY AND  "COLONOSCOPY  04/12/2006     Normal colonoscopic examination    • INCISION AND DRAINAGE ABSCESS  10/24/2012   • INJECTION OF MEDICATION  04/21/2011    Depo Medrol (Methylprednisone) 80mg (1)        • INJECTION OF MEDICATION  02/01/2016    Kenalog (3)        • INJECTION OF MEDICATION  02/19/2016    Toradol (3)    • JOINT REPLACEMENT Bilateral     Knee   • KNEE ARTHROSCOPY  04/02/2009     Medial meniscus tear of left knee   • SCROTUM EXPLORATION  07/02/2002     Excision of epidermal inclusion cyst. base of left scrotum   • SKIN BIOPSY  04/23/2012   • SKIN TAG REMOVAL  04/25/2002    Skin tag, left scrotum         PT Ortho     Row Name 05/01/23 1300       Precautions and Contraindications    Precautions hx of GERARDO July 2022  -BB       Subjective Pain    Able to rate subjective pain? yes  -BB    Pre-Treatment Pain Level 0  -BB       Posture/Observations    Posture/Observations Comments No significant antalgic gait. No acute distress  -BB       General ROM    GENERAL ROM COMMENTS ER: 50, IR 80  -BB       MMT (Manual Muscle Testing)    General MMT Comments hip grossly 5/5 in supine hip flex,abd,ER,IR  -BB       Sensation    Sensation WNL? WNL  -BB       Balance Skills Training    SLS 4\" each leg  -BB          User Key  (r) = Recorded By, (t) = Taken By, (c) = Cosigned By    Initials Name Provider Type    Rupinder Tiwari, PT DPT Physical Therapist                             PT Assessment/Plan     Row Name 05/01/23 1300          PT Assessment    Assessment Comments Patients hip ROM and strength has progress and is WNL at this date and is independent in self management and verbalizes readiness to be DC. Verbalizes planning to return for neck ROM loss next and will request order.  -BB        PT Plan    Predicted Duration of Therapy Intervention (PT) DC  -BB     PT Plan Comments DC to HEP  -BB           User Key  (r) = Recorded By, (t) = Taken By, (c) = Cosigned By    Initials Name Provider Type    Rupinder Tiwari, PT DPT " "Physical Therapist                     OP Exercises     Row Name 05/01/23 1300             Subjective Comments    Subjective Comments Reports cathcing left leg in blanket while ill recently but feels better now. Reports going to [facility] and usually water to improve mobility. Reports feeling 99.25% better and reports can reach and tie shoe now.  -BB         Subjective Pain    Able to rate subjective pain? yes  -BB      Pre-Treatment Pain Level 0  -BB      Post-Treatment Pain Level 0  -BB         Aquatics    Aquatics performed? No  -BB         Exercise 1    Exercise Name 1 treadmill walk 1.7  -BB      Time 1 10'  -BB      Additional Comments mild left hip tendnerness due to incident this weekend  -BB         Exercise 2    Exercise Name 2 recheck/poc  -BB      Additional Comments ROM/MMT  -BB         Exercise 3    Exercise Name 3 tandem bilateral  -BB      Sets 3 4  -BB      Time 3 30\"  -BB         Exercise 4    Exercise Name 4 standing HS curl bilateral  -BB      Sets 4 1  -BB      Reps 4 10  -BB      Additional Comments bilateral  -BB         Exercise 5    Exercise Name 5 step up with one hand assist  -BB      Sets 5 1  -BB      Reps 5 10 each side  -BB         Exercise 6    Exercise Name 6 Hooklying hip flexion with knee flexion for increased control of ab/hip flexors  -BB      Sets 6 1  -BB      Reps 6 10 each  -BB            User Key  (r) = Recorded By, (t) = Taken By, (c) = Cosigned By    Initials Name Provider Type    Rupinder Tiwari, PT DPT Physical Therapist                                PT OP Goals     Row Name 05/01/23 1300          PT Short Term Goals    STG 1 Hip ER: 40 degrees,  -BB     STG 1 Progress Met  -BB     STG 1 Progress Comments 50 degrees  -BB     STG 2 Hip IR: 70 degrees  -BB     STG 2 Progress Met  -BB     STG 2 Progress Comments 80 degrees  -BB     STG 3 complete 10 pelvic tilts with back flat on wall to show good pelvic mobility/control  -BB     STG 3 Progress Met  -BB        Long Term Goals " "   LTG 1 complete in clinic or verbal ability to complete 2 miles of walking  -BB     LTG 1 Progress Partially Met  -BB     LTG 1 Progress Comments reports hasnt tried but thinks he could  -BB     LTG 2 SLS BLE 3\" each leg  -BB     LTG 2 Progress Met  -BB     LTG 2 Progress Comments 4\" each leg  -BB           User Key  (r) = Recorded By, (t) = Taken By, (c) = Cosigned By    Initials Name Provider Type    Rupinder Tiwari PT DPT Physical Therapist                               Time Calculation:   Start Time: 1301  Stop Time: 1341  Time Calculation (min): 40 min  Therapy Charges for Today     Code Description Service Date Service Provider Modifiers Qty    04832447559 HC PT THER PROC EA 15 MIN 5/1/2023 Rupinder Link PT DPT GP 2    14314847609 HC PT SELF CARE/MGMT/TRAIN EA 15 MIN 5/1/2023 Rupinder Link PT DPT GP 1                OP PT Discharge Summary  Date of Discharge: 05/01/23  Reason for Discharge: All goals achieved  Outcomes Achieved: Able to achieve all goals within established timeline  Discharge Destination: Home with home program      Rupinder Link PT DPT  5/1/2023       "

## 2023-05-10 ENCOUNTER — HOSPITAL ENCOUNTER (OUTPATIENT)
Dept: PHYSICAL THERAPY | Facility: HOSPITAL | Age: 68
Setting detail: THERAPIES SERIES
Discharge: HOME | End: 2023-05-10
Payer: MEDICARE

## 2023-05-10 ENCOUNTER — CLINICAL SUPPORT (OUTPATIENT)
Dept: FAMILY MEDICINE CLINIC | Facility: CLINIC | Age: 68
End: 2023-05-10
Payer: MEDICARE

## 2023-05-10 DIAGNOSIS — M50.90 CERVICAL DISC DISEASE: Primary | ICD-10-CM

## 2023-05-10 PROCEDURE — 97162 PT EVAL MOD COMPLEX 30 MIN: CPT | Performed by: PHYSICAL THERAPIST

## 2023-05-10 RX ADMIN — TESTOSTERONE CYPIONATE 200 MG: 200 INJECTION, SOLUTION INTRAMUSCULAR at 15:04

## 2023-05-10 NOTE — THERAPY EVALUATION
Outpatient Physical Therapy Ortho Initial Evaluation  [facility]     Patient Name: Otf Murray  : 1955  MRN: [id number]  Today's Date: 5/10/2023      Visit Date: 05/10/2023  Attendance:  (TBD approved)  Subjective % Improvement: n/a  Recert Date: 23  MD appointment: tbd    Therapy Diagnosis: cervical spine pain and ROM deficit    Patient Active Problem List   Diagnosis   • ADHD, predominantly inattentive type   • Cervical spondylosis without myelopathy   • Chronic fatigue   • Chronic left-sided low back pain without sciatica   • Gastroesophageal reflux disease without esophagitis   • History of tobacco abuse   • History of total left knee replacement   • Primary osteoarthritis of both knees   • Recurrent major depressive disorder, in partial remission (HCC)   • Seasonal allergic rhinitis due to pollen   • History of colon polyps   • Morbid (severe) obesity due to excess calories   • Medically complex patient   • Testosterone insufficiency   • Primary hypertension   • Other hyperlipidemia        Past Medical History:   Diagnosis Date   • Acute gastritis without bleeding    • Alcohol abuse counseling and surveillance of alcoholic    • Allergic rhinitis    • Anxiety state    • Attention deficit disorder of childhood with hyperactivity    • Attention deficit hyperactivity disorder    • Attention deficit hyperactivity disorder, predominantly hyperactive impulsive type    • Attention deficit hyperactivity disorder, predominantly inattentive type    • Backache    • Body mass index (bmi) 31.0-31.9, adult     Body mass index (BMI) 31.0-31.9, adult - BMI 33.5      • Burn of lower leg    • Candidiasis of skin    • Cardiac murmur, unspecified    • Decreased testosterone level    • Depressive disorder     Depressive disorder - acute on chronic      • Dysgraphia    • Edema    • Elbow joint pain    • Encounter for general adult medical examination with abnormal findings    • Encounter for screening  for malignant neoplasm of colon    • Essential hypertension    • Essential tremor    • Ex-smoker    • Fatigue    • Gastro-esophageal reflux disease with esophagitis    • Gastroesophageal reflux disease    • Hyperlipidemia    • Hyperlipoproteinemia    • Impotence of organic origin    • Insomnia    • Intermittent palpitations    • Knee pain     Knee pain - patellofemoral      • Lateral epicondylitis    • Low back pain    • Male erectile disorder    • Male erectile dysfunction, unspecified    • Male hypogonadism    • Neck pain     Neck pain aggravated by recumbency      • Obese     Obese - BMI 33.0      • On long term drug therapy    • Osteoarthrosis     Osteoarthrosis, unspecified whether generalized or localized, involving lower leg      • Other obesity    • Other specified diseases of anus and rectum     Other specified diseases of anus and rectum - rectal pain after c-scope prep      • Overweight    • Pain in left knee    • Pain in right knee    • Plantar fasciitis    • Shoulder pain, right    • Spasm of back muscles    • Tachycardia, unspecified    • Tinnitus    • Tremor, unspecified    • Tubular adenoma of colon    • Unspecified essential hypertension    • Verruca         Past Surgical History:   Procedure Laterality Date   • APPENDECTOMY     • COLONOSCOPY  03/16/2016    One polyp in the sigmoid colon.Resected and retrieved.        • COLONOSCOPY N/A 6/23/2021    Procedure: COLONOSCOPY;  Surgeon: Brenton Ruggiero MD;  Location: [facility] ENDOSCOPY;  Service: Gastroenterology;  Laterality: N/A;   • CYST REMOVAL  10/27/2009     Excision of sebaceous cyst of the forehead, glabellar region.   • ENDOSCOPY  03/16/2016    Mildly severe esophagitis.Gastritis.Normal examined duodenum.Several biopsies obtained in the lower third of the esophagus.    • ENDOSCOPY AND COLONOSCOPY  04/12/2006     Normal colonoscopic examination    • INCISION AND DRAINAGE ABSCESS  10/24/2012   • INJECTION OF MEDICATION  04/21/2011    Depo Medrol  (Methylprednisone) 80mg (1)        • INJECTION OF MEDICATION  02/01/2016    Kenalog (3)        • INJECTION OF MEDICATION  02/19/2016    Toradol (3)    • JOINT REPLACEMENT Bilateral     Knee   • KNEE ARTHROSCOPY  04/02/2009     Medial meniscus tear of left knee   • SCROTUM EXPLORATION  07/02/2002     Excision of epidermal inclusion cyst. base of left scrotum   • SKIN BIOPSY  04/23/2012   • SKIN TAG REMOVAL  04/25/2002    Skin tag, left scrotum        Visit Dx:     ICD-10-CM ICD-9-CM   1. Cervical disc disease  M50.90 722.91          Patient History     Row Name 05/10/23 5738             History    Brief Description of Current Complaint Present today with reports of neck pain. Notes trouble turning neck to see behind in driving tractor and driving. No injury to neck. Denies headaches. 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 Notes neck does pop like rice krispies and progressing for 5-10 years.  -BB      Patient/Caregiver Goals Relieve pain;Improve mobility  -BB      Hand Dominance right-handed  -BB         Pain     Pain Location Neck  -BB      Pain at Present 5  -BB         Fall Risk Assessment    Any falls in the past year: No  -BB            User Key  (r) = Recorded By, (t) = Taken By, (c) = Cosigned By    Initials Name Provider Type    Rupinder Tiwari, ODETTE DPT Physical Therapist                 PT Ortho     Row Name 05/10/23 3962       Subjective Comments    Subjective Comments see pt hx  -BB       Subjective Pain    Pre-Treatment Pain Level 5  -BB       Posture/Observations    Posture/Observations Comments significant fwd head of lower thoracics, thoracic kyphosis. decreased cervical  curvature.  -BB       Special Tests/Palpation    Special Tests/Palpation Cervical/Thoracic  -BB       Cervical Palpation    Cervical Palpation- Location? --  very mild taut bands of cervical spine,  no specific ttp noted.  -BB       Cervical Accessory Motions    Cervical Accessory Motions Tested? Yes  reported RUE numbness with supine positioning initially. Rotation segementally WFL with hypombility distal cervical spine.  -BB       Cervical/Thoracic Special Tests    Spurlings (Foraminal Compression) Negative  -BB    Transverse Ligament (Instability) Negative  -BB       General ROM    Head/Neck/Trunk Neck Lt Lateral Flexion;Neck Extension;Neck Flexion;Neck Rt Lateral Flexion;Neck Lt Rotation;Neck Rt Rotation  -BB       Head/Neck/Trunk    Neck Extension AROM 20  -BB    Neck Flexion AROM 20  -BB    Neck Lt Lateral Flexion AROM 5  -BB    Neck Rt Lateral Flexion AROM 10  -BB    Neck Lt Rotation AROM 60  -BB    Neck Rt Rotation AROM 48  -BB       MMT (Manual Muscle Testing)    General MMT Comments shoulders grossly 5/5  -BB       Sensation    Sensation WNL? WNL  -BB          User Key  (r) = Recorded By, (t) = Taken By, (c) = Cosigned By    Initials Name Provider Type    Rupinder Tiwari, PT DPT Physical Therapist                                   PT OP Goals     Row Name 05/10/23 6752          PT Short Term Goals    STG Date to Achieve 05/31/23  -BB     STG 1 left SB of 10 degrees or more  -BB     STG 2 bilateral rotation of 75 degrees or more of cervical spine  -BB     STG 3 resting neck pain of 0/10  -BB        Long Term Goals    LTG Date to Achieve 06/21/23  -BB     LTG 1 quickdash of 25% or less  -BB     LTG 2 Subjective reports of RUE not going numb with proloned right rotation  -BB        Time Calculation    PT Goal Re-Cert Due Date 05/31/23  -BB           User Key  (r) = Recorded By, (t) = Taken By, (c) = Cosigned By    Initials Name Provider Type    Rupinder Tiwari, PT DPT Physical Therapist                 PT Assessment/Plan     Row Name 05/10/23 2198          PT Assessment    Functional Limitations Limitations in functional capacity and performance;Performance in leisure activities  -BB     Impairments  Pain;Range of motion;Joint mobility  -BB     Assessment Comments Patient presents today with cervical spine pain with primary complaint of not being able to look to the right and left. Patient is noted to have significant forward head posture with limited rotation at lower cervical spine segments likely related to posture. Patient could benefit from skilled PT to improve posture, cervico thoracic ROM and strength.  -BB     Rehab Potential Good  -BB     Patient/caregiver participated in establishment of treatment plan and goals Yes  -BB     Patient would benefit from skilled therapy intervention Yes  -BB        PT Plan    PT Frequency 2x/week  -BB     Predicted Duration of Therapy Intervention (PT) 6 weeks  -BB     Planned CPT's? PT EVAL MOD COMPLELITY: 33877;PT RE-EVAL: 71427;PT MANUAL THERAPY EA 15 MIN: 64994;PT THER ACT EA 15 MIN: 99481;PT THER PROC EA 15 MIN: 65115;PT THER SUPP EA 15 MIN;PT ELECTRICAL STIM UNATTEND: ;PT NEUROMUSC RE-EDUCATION EA 15 MIN: 01313  -BB     PT Plan Comments cervical ROM, posture re-ed, thoracic stability and strength, cervical stabilization, manual PRN, modalities PRN  -BB           User Key  (r) = Recorded By, (t) = Taken By, (c) = Cosigned By    Initials Name Provider Type    Rupinder Tiwari, PT DPT Physical Therapist                   OP Exercises     Row Name 05/10/23 3871             Subjective Comments    Subjective Comments see pt hx  -BB         Subjective Pain    Able to rate subjective pain? yes  -BB      Pre-Treatment Pain Level 5  -BB      Post-Treatment Pain Level 5  -BB         Exercise 1    Exercise Name 1 eval/poc only per insurance  -BB            User Key  (r) = Recorded By, (t) = Taken By, (c) = Cosigned By    Initials Name Provider Type    Rupinder Tiwari, PT DPT Physical Therapist                              Outcome Measure Options: Quick DASH  Quick DASH  Open a tight or new jar.: Mild Difficulty  Do heavy household chores (e.g., wash walls, wash  floors): Severe Difficulty  Carry a shopping bag or briefcase: Moderate Difficulty  Wash your back: Moderate Difficulty  Use a knife to cut food: No Difficulty  Recreational activities in which you take some force or impact through your arm, should or hand (e.g. golf, hammering, tennis, etc.): Severe Difficulty  During the past week, to what extent has your arm, shoulder, or hand problem interfered with your normal social activites with family, friends, neighbors or groups?: Quite a bit  During the past week, were you limited in your work or other regular daily activities as a result of your arm, shoulder or hand problem?: Very limited  Arm, Shoulder, or hand pain: Moderate  Tingling (pins and needles) in your arm, shoulder, or hand: Moderate  During the past week, how much difficulty have you had sleeping because of the pain in your arm, shoulder or hand?: Mild Difficulty  Number of Questions Answered: 11  Quick DASH Score: 50         Time Calculation:     Start Time: 1345  Stop Time: 1422  Time Calculation (min): 37 min     Therapy Charges for Today     Code Description Service Date Service Provider Modifiers Qty    84666671352 HC PT EVAL MOD COMPLEXITY 3 5/10/2023 Rupinder Link PT DPT GP 1          PT G-Codes  Outcome Measure Options: Quick DASH  Quick DASH Score: 50         ODETTE ObrienT  5/10/2023

## 2023-05-22 ENCOUNTER — HOSPITAL ENCOUNTER (OUTPATIENT)
Dept: PHYSICAL THERAPY | Facility: HOSPITAL | Age: 68
Setting detail: THERAPIES SERIES
Discharge: HOME | End: 2023-05-22
Payer: MEDICARE

## 2023-05-22 DIAGNOSIS — M50.90 CERVICAL DISC DISEASE: Primary | ICD-10-CM

## 2023-05-22 PROCEDURE — 97140 MANUAL THERAPY 1/> REGIONS: CPT

## 2023-05-22 PROCEDURE — 97110 THERAPEUTIC EXERCISES: CPT

## 2023-05-22 NOTE — THERAPY TREATMENT NOTE
Outpatient Physical Therapy Ortho Treatment Note  [facility]     Patient Name: Otf Murray  : 1955  MRN: [id number]  Today's Date: 2023      Visit Date: 2023     Subjective Improvement: 0%  Attendance:  2/2 (eval + 6 until 23)  Next MD Visit : TBD  Recert Date:  23      Therapy Diagnosis:  cervical spine pain and ROM deficit        Visit Dx:    ICD-10-CM ICD-9-CM   1. Cervical disc disease  M50.90 722.91       Patient Active Problem List   Diagnosis   • ADHD, predominantly inattentive type   • Cervical spondylosis without myelopathy   • Chronic fatigue   • Chronic left-sided low back pain without sciatica   • Gastroesophageal reflux disease without esophagitis   • History of tobacco abuse   • History of total left knee replacement   • Primary osteoarthritis of both knees   • Recurrent major depressive disorder, in partial remission (HCC)   • Seasonal allergic rhinitis due to pollen   • History of colon polyps   • Morbid (severe) obesity due to excess calories   • Medically complex patient   • Testosterone insufficiency   • Primary hypertension   • Other hyperlipidemia        Past Medical History:   Diagnosis Date   • Acute gastritis without bleeding    • Alcohol abuse counseling and surveillance of alcoholic    • Allergic rhinitis    • Anxiety state    • Attention deficit disorder of childhood with hyperactivity    • Attention deficit hyperactivity disorder    • Attention deficit hyperactivity disorder, predominantly hyperactive impulsive type    • Attention deficit hyperactivity disorder, predominantly inattentive type    • Backache    • Body mass index (bmi) 31.0-31.9, adult     Body mass index (BMI) 31.0-31.9, adult - BMI 33.5      • Burn of lower leg    • Candidiasis of skin    • Cardiac murmur, unspecified    • Decreased testosterone level    • Depressive disorder     Depressive disorder - acute on chronic      • Dysgraphia    • Edema    • Elbow joint pain    •  Encounter for general adult medical examination with abnormal findings    • Encounter for screening for malignant neoplasm of colon    • Essential hypertension    • Essential tremor    • Ex-smoker    • Fatigue    • Gastro-esophageal reflux disease with esophagitis    • Gastroesophageal reflux disease    • Hyperlipidemia    • Hyperlipoproteinemia    • Impotence of organic origin    • Insomnia    • Intermittent palpitations    • Knee pain     Knee pain - patellofemoral      • Lateral epicondylitis    • Low back pain    • Male erectile disorder    • Male erectile dysfunction, unspecified    • Male hypogonadism    • Neck pain     Neck pain aggravated by recumbency      • Obese     Obese - BMI 33.0      • On long term drug therapy    • Osteoarthrosis     Osteoarthrosis, unspecified whether generalized or localized, involving lower leg      • Other obesity    • Other specified diseases of anus and rectum     Other specified diseases of anus and rectum - rectal pain after c-scope prep      • Overweight    • Pain in left knee    • Pain in right knee    • Plantar fasciitis    • Shoulder pain, right    • Spasm of back muscles    • Tachycardia, unspecified    • Tinnitus    • Tremor, unspecified    • Tubular adenoma of colon    • Unspecified essential hypertension    • Verruca         Past Surgical History:   Procedure Laterality Date   • APPENDECTOMY     • COLONOSCOPY  03/16/2016    One polyp in the sigmoid colon.Resected and retrieved.        • COLONOSCOPY N/A 6/23/2021    Procedure: COLONOSCOPY;  Surgeon: Brenton Ruggiero MD;  Location: [facility] ENDOSCOPY;  Service: Gastroenterology;  Laterality: N/A;   • CYST REMOVAL  10/27/2009     Excision of sebaceous cyst of the forehead, glabellar region.   • ENDOSCOPY  03/16/2016    Mildly severe esophagitis.Gastritis.Normal examined duodenum.Several biopsies obtained in the lower third of the esophagus.    • ENDOSCOPY AND COLONOSCOPY  04/12/2006     Normal colonoscopic examination    •  INCISION AND DRAINAGE ABSCESS  10/24/2012   • INJECTION OF MEDICATION  04/21/2011    Depo Medrol (Methylprednisone) 80mg (1)        • INJECTION OF MEDICATION  02/01/2016    Kenalog (3)        • INJECTION OF MEDICATION  02/19/2016    Toradol (3)    • JOINT REPLACEMENT Bilateral     Knee   • KNEE ARTHROSCOPY  04/02/2009     Medial meniscus tear of left knee   • SCROTUM EXPLORATION  07/02/2002     Excision of epidermal inclusion cyst. base of left scrotum   • SKIN BIOPSY  04/23/2012   • SKIN TAG REMOVAL  04/25/2002    Skin tag, left scrotum                         PT Assessment/Plan     Row Name 05/22/23 1200          PT Assessment    Functional Limitations Limitations in functional capacity and performance;Performance in leisure activities  -     Impairments Pain;Range of motion;Joint mobility  -     Assessment Comments did well with manual and exercises this date with decreased pain and improved posture after;  -     Rehab Potential Good  -     Patient/caregiver participated in establishment of treatment plan and goals Yes  -     Patient would benefit from skilled therapy intervention Yes  -        PT Plan    PT Frequency 2x/week  -     Predicted Duration of Therapy Intervention (PT) 6 weeks  -     PT Plan Comments Progress cervical/thoracic mobility next; cat/cow; foam roller thoracic extensions  -           User Key  (r) = Recorded By, (t) = Taken By, (c) = Cosigned By    Initials Name Provider Type    Kristin Garcia PTA Physical Therapist Assistant                 Modalities     Row Name 05/22/23 1200             Moist Heat    MH Applied Yes  -      Location Cervical Spine  -      MH Prior to Rx Yes  10 mins  -            User Key  (r) = Recorded By, (t) = Taken By, (c) = Cosigned By    Initials Name Provider Type    Kristin Garcia PTA Physical Therapist Assistant               OP Exercises     Row Name 05/22/23 1200             Subjective Comments    Subjective Comments pain isn't to bad  "today; reports that he is still having pain in the R shoulder blade and R hand continues to go numb at times;  -KH         Subjective Pain    Able to rate subjective pain? yes  -KH      Pre-Treatment Pain Level 4  -KH      Post-Treatment Pain Level 3  -KH         Exercise 1    Exercise Name 1 MHP to cervical spine  -KH      Time 1 10 mins  -KH         Exercise 2    Exercise Name 2 Manual: see flow sheet  -KH         Exercise 3    Exercise Name 3 supine chin tucks  -KH      Cueing 3 Verbal  -KH      Sets 3 1  -KH      Reps 3 10  -KH      Time 3 5\" holds  -KH         Exercise 4    Exercise Name 4 cervical extension AROM with head off table  -KH      Cueing 4 Verbal;Demo  -KH      Sets 4 1  -KH      Reps 4 10  -KH      Time 4 3\" holds  -KH         Exercise 5    Exercise Name 5 HELIO w/ chin tucks  -KH      Cueing 5 Verbal  -KH      Sets 5 1  -KH      Reps 5 10  -KH      Time 5 5\" holds  -KH         Exercise 6    Exercise Name 6 doorway stretch  -KH      Cueing 6 Verbal  -KH      Sets 6 3  -KH      Time 6 30\" holds  -KH            User Key  (r) = Recorded By, (t) = Taken By, (c) = Cosigned By    Initials Name Provider Type    Kristin Garcia PTA Physical Therapist Assistant                         Manual Rx (last 36 hours)     Manual Treatments     Row Name 05/22/23 1300             Manual Rx 1    Manual Rx 1 Location Cervical Spine  -KH      Manual Rx 1 Type Subocciptal Release  -KH      Manual Rx 1 Duration 4 mins  -KH         Manual Rx 2    Manual Rx 2 Location B Scalenes; upper traps; R rhomboids  -KH      Manual Rx 2 Type STM/MFR  -KH      Manual Rx 2 Duration 20 mins  -KH            User Key  (r) = Recorded By, (t) = Taken By, (c) = Cosigned By    Initials Name Provider Type    Kristin Garcia PTA Physical Therapist Assistant                 PT OP Goals     Row Name 05/22/23 1200          PT Short Term Goals    STG Date to Achieve 05/31/23  -     STG 1 left SB of 10 degrees or more  -     STG 2 bilateral " rotation of 75 degrees or more of cervical spine  -     STG 3 resting neck pain of 0/10  -        Long Term Goals    LTG Date to Achieve 06/21/23  -     LTG 1 quickdash of 25% or less  -     LTG 2 Subjective reports of RUE not going numb with proloned right rotation  -        Time Calculation    PT Goal Re-Cert Due Date 05/31/23  -           User Key  (r) = Recorded By, (t) = Taken By, (c) = Cosigned By    Initials Name Provider Type    Kristin Garcia PTA Physical Therapist Assistant                Therapy Education  Education Details: supine chin tucks; HELIO chin tucks; doorways stretch  Given: HEP  Program: New, Progressed  How Provided: Verbal, Demonstration  Provided to: Patient  Level of Understanding: Teach back education performed, Verbalized, Demonstrated              Time Calculation:   Start Time: 1258  Stop Time: 1346  Time Calculation (min): 48 min  Therapy Charges for Today     Code Description Service Date Service Provider Modifiers Qty    27777961603 HC PT THER PROC EA 15 MIN 5/22/2023 Kristin Lamas PTA GP, CQ 1    63413625772 HC PT MANUAL THERAPY EA 15 MIN 5/22/2023 Kristin Lamas PTA GP, CQ 2    99754849669 HC PT THER SUPP EA 15 MIN 5/22/2023 Kristin Lamas PTA GP 1                    Kristin Lamas PTA  5/22/2023

## 2023-05-25 ENCOUNTER — HOSPITAL ENCOUNTER (OUTPATIENT)
Dept: PHYSICAL THERAPY | Facility: HOSPITAL | Age: 68
Setting detail: THERAPIES SERIES
Discharge: HOME | End: 2023-05-25
Payer: MEDICARE

## 2023-05-25 ENCOUNTER — OFFICE VISIT (OUTPATIENT)
Dept: FAMILY MEDICINE CLINIC | Facility: CLINIC | Age: 68
End: 2023-05-25
Payer: MEDICARE

## 2023-05-25 VITALS
WEIGHT: 300.9 LBS | DIASTOLIC BLOOD PRESSURE: 82 MMHG | HEIGHT: 77 IN | BODY MASS INDEX: 35.53 KG/M2 | HEART RATE: 76 BPM | SYSTOLIC BLOOD PRESSURE: 126 MMHG | OXYGEN SATURATION: 96 %

## 2023-05-25 DIAGNOSIS — N52.8 OTHER MALE ERECTILE DYSFUNCTION: ICD-10-CM

## 2023-05-25 DIAGNOSIS — M50.90 CERVICAL DISC DISEASE: Primary | ICD-10-CM

## 2023-05-25 DIAGNOSIS — E34.9 TESTOSTERONE INSUFFICIENCY: Primary | Chronic | ICD-10-CM

## 2023-05-25 PROCEDURE — 97140 MANUAL THERAPY 1/> REGIONS: CPT

## 2023-05-25 PROCEDURE — 97110 THERAPEUTIC EXERCISES: CPT

## 2023-05-25 RX ORDER — SILDENAFIL 100 MG/1
100 TABLET, FILM COATED ORAL DAILY PRN
Qty: 30 TABLET | Refills: 3 | Status: SHIPPED | OUTPATIENT
Start: 2023-05-25

## 2023-05-25 RX ADMIN — TESTOSTERONE CYPIONATE 200 MG: 200 INJECTION, SOLUTION INTRAMUSCULAR at 11:21

## 2023-05-25 NOTE — THERAPY TREATMENT NOTE
Outpatient Physical Therapy Ortho Treatment Note  [facility]     Patient Name: Otf Murray  : 1955  MRN: [id number]  Today's Date: 2023      Visit Date: 2023     Subjective Improvement: 0%  Attendance:  3/3 (eval + 6 until 23)  Next MD Visit : TBD  Recert Date:  23        Therapy Diagnosis:  cervical spine pain and ROM deficit    Visit Dx:    ICD-10-CM ICD-9-CM   1. Cervical disc disease  M50.90 722.91       Patient Active Problem List   Diagnosis   • ADHD, predominantly inattentive type   • Cervical spondylosis without myelopathy   • Chronic fatigue   • Chronic left-sided low back pain without sciatica   • Gastroesophageal reflux disease without esophagitis   • History of tobacco abuse   • History of total left knee replacement   • Primary osteoarthritis of both knees   • Recurrent major depressive disorder, in partial remission (HCC)   • Seasonal allergic rhinitis due to pollen   • History of colon polyps   • Morbid (severe) obesity due to excess calories   • Medically complex patient   • Testosterone insufficiency   • Primary hypertension   • Other hyperlipidemia        Past Medical History:   Diagnosis Date   • Acute gastritis without bleeding    • Alcohol abuse counseling and surveillance of alcoholic    • Allergic rhinitis    • Anxiety state    • Attention deficit disorder of childhood with hyperactivity    • Attention deficit hyperactivity disorder    • Attention deficit hyperactivity disorder, predominantly hyperactive impulsive type    • Attention deficit hyperactivity disorder, predominantly inattentive type    • Backache    • Body mass index (bmi) 31.0-31.9, adult     Body mass index (BMI) 31.0-31.9, adult - BMI 33.5      • Burn of lower leg    • Candidiasis of skin    • Cardiac murmur, unspecified    • Decreased testosterone level    • Depressive disorder     Depressive disorder - acute on chronic      • Dysgraphia    • Edema    • Elbow joint pain    •  Encounter for general adult medical examination with abnormal findings    • Encounter for screening for malignant neoplasm of colon    • Essential hypertension    • Essential tremor    • Ex-smoker    • Fatigue    • Gastro-esophageal reflux disease with esophagitis    • Gastroesophageal reflux disease    • Hyperlipidemia    • Hyperlipoproteinemia    • Impotence of organic origin    • Insomnia    • Intermittent palpitations    • Knee pain     Knee pain - patellofemoral      • Lateral epicondylitis    • Low back pain    • Male erectile disorder    • Male erectile dysfunction, unspecified    • Male hypogonadism    • Neck pain     Neck pain aggravated by recumbency      • Obese     Obese - BMI 33.0      • On long term drug therapy    • Osteoarthrosis     Osteoarthrosis, unspecified whether generalized or localized, involving lower leg      • Other obesity    • Other specified diseases of anus and rectum     Other specified diseases of anus and rectum - rectal pain after c-scope prep      • Overweight    • Pain in left knee    • Pain in right knee    • Plantar fasciitis    • Shoulder pain, right    • Spasm of back muscles    • Tachycardia, unspecified    • Tinnitus    • Tremor, unspecified    • Tubular adenoma of colon    • Unspecified essential hypertension    • Verruca         Past Surgical History:   Procedure Laterality Date   • APPENDECTOMY     • COLONOSCOPY  03/16/2016    One polyp in the sigmoid colon.Resected and retrieved.        • COLONOSCOPY N/A 6/23/2021    Procedure: COLONOSCOPY;  Surgeon: Brenton Ruggiero MD;  Location: [facility] ENDOSCOPY;  Service: Gastroenterology;  Laterality: N/A;   • CYST REMOVAL  10/27/2009     Excision of sebaceous cyst of the forehead, glabellar region.   • ENDOSCOPY  03/16/2016    Mildly severe esophagitis.Gastritis.Normal examined duodenum.Several biopsies obtained in the lower third of the esophagus.    • ENDOSCOPY AND COLONOSCOPY  04/12/2006     Normal colonoscopic examination    •  INCISION AND DRAINAGE ABSCESS  10/24/2012   • INJECTION OF MEDICATION  04/21/2011    Depo Medrol (Methylprednisone) 80mg (1)        • INJECTION OF MEDICATION  02/01/2016    Kenalog (3)        • INJECTION OF MEDICATION  02/19/2016    Toradol (3)    • JOINT REPLACEMENT Bilateral     Knee   • KNEE ARTHROSCOPY  04/02/2009     Medial meniscus tear of left knee   • SCROTUM EXPLORATION  07/02/2002     Excision of epidermal inclusion cyst. base of left scrotum   • SKIN BIOPSY  04/23/2012   • SKIN TAG REMOVAL  04/25/2002    Skin tag, left scrotum         PT Ortho     Row Name 05/25/23 1300       Posture/Observations    Posture/Observations Comments significant fwd head of lower thoracics, thoracic kyphosis. decreased cervical  curvature.  -          User Key  (r) = Recorded By, (t) = Taken By, (c) = Cosigned By    Initials Name Provider Type    Kristin Garcia PTA Physical Therapist Assistant                             PT Assessment/Plan     Row Name 05/25/23 1300          PT Assessment    Functional Limitations Limitations in functional capacity and performance;Performance in leisure activities  -     Impairments Pain;Range of motion;Joint mobility  -     Assessment Comments continues to feel looser after manual with improved head posture noted; Reviewed HEP this date  -     Rehab Potential Good  -     Patient/caregiver participated in establishment of treatment plan and goals Yes  -     Patient would benefit from skilled therapy intervention Yes  -        PT Plan    PT Frequency 2x/week  -     Predicted Duration of Therapy Intervention (PT) 6 weeks  -     PT Plan Comments Continue to progress as able. Manual as needed;  -           User Key  (r) = Recorded By, (t) = Taken By, (c) = Cosigned By    Initials Name Provider Type    Kristin Garcia PTA Physical Therapist Assistant                 Modalities     Row Name 05/25/23 1300             Subjective Pain    Post-Treatment Pain Level 2  -          "Moist Heat    MH Applied Yes  -KH      Location Cervical Spine  -KH      MH Prior to Rx Yes  10 mins  -KH            User Key  (r) = Recorded By, (t) = Taken By, (c) = Cosigned By    Initials Name Provider Type    Kristin Garcia PTA Physical Therapist Assistant               OP Exercises     Row Name 05/25/23 1300             Subjective Comments    Subjective Comments A little sore after last treatment; Neck isn't to bad today but having pain in the R upper trap and scapular region;  -KH         Subjective Pain    Able to rate subjective pain? yes  -KH      Pre-Treatment Pain Level 4  -KH      Post-Treatment Pain Level 2  -KH         Exercise 1    Exercise Name 1 MHP to cervical spine  -KH      Time 1 10 mins  -KH         Exercise 2    Exercise Name 2 Manual: see flow sheet  -KH         Exercise 3    Exercise Name 3 cat & camels  -KH      Cueing 3 Verbal  -KH      Sets 3 1  -KH      Reps 3 10  -KH      Time 3 10\" holds  -KH         Exercise 4    Exercise Name 4 standing thoracic rotations at the wall R/L  -KH      Cueing 4 Verbal;Demo  -KH      Sets 4 1  -KH      Reps 4 10  -KH      Time 4 10\" holds each  -KH         Exercise 5    Exercise Name 5 Reviewed HEP  -KH            User Key  (r) = Recorded By, (t) = Taken By, (c) = Cosigned By    Initials Name Provider Type    Kristin Garcia PTA Physical Therapist Assistant                         Manual Rx (last 36 hours)     Manual Treatments     Row Name 05/25/23 1300             Manual Rx 1    Manual Rx 1 Location Cervical Spine  -KH      Manual Rx 1 Type Subocciptal Release  -KH      Manual Rx 1 Duration 4 mins  -KH         Manual Rx 2    Manual Rx 2 Location B Scalenes; upper traps; R rhomboids  -KH      Manual Rx 2 Type STM/MFR  -KH      Manual Rx 2 Duration 20 mins  -KH            User Key  (r) = Recorded By, (t) = Taken By, (c) = Cosigned By    Initials Name Provider Type    Kristin Garcia PTA Physical Therapist Assistant                 PT OP Goals     Row " Name 05/25/23 1300          PT Short Term Goals    STG Date to Achieve 05/31/23  -     STG 1 left SB of 10 degrees or more  -     STG 2 bilateral rotation of 75 degrees or more of cervical spine  -     STG 3 resting neck pain of 0/10  -        Long Term Goals    LTG Date to Achieve 06/21/23  -     LTG 1 quickdash of 25% or less  -     LTG 2 Subjective reports of RUE not going numb with proloned right rotation  -        Time Calculation    PT Goal Re-Cert Due Date 05/31/23  -           User Key  (r) = Recorded By, (t) = Taken By, (c) = Cosigned By    Initials Name Provider Type    Kristin Garcia PTA Physical Therapist Assistant                Therapy Education  Education Details: reviewed HEP; continue with current  Provided to: Patient              Time Calculation:   Start Time: 1300  Stop Time: 1350  Time Calculation (min): 50 min  Therapy Charges for Today     Code Description Service Date Service Provider Modifiers Qty    51804268887 HC PT THER PROC EA 15 MIN 5/25/2023 Kristin Lamas PTA GP, CQ 1    95934737836 HC PT MANUAL THERAPY EA 15 MIN 5/25/2023 Kristin Lamas PTA GP, CQ 2    21641138455 HC PT THER SUPP EA 15 MIN 5/25/2023 Kristin Lamas PTA GP 1                    Kristin Lamas PTA  5/25/2023

## 2023-05-25 NOTE — PROGRESS NOTES
Subjective   Otf Murray is a 68 y.o. male.  Evaluation institution of testosterone therapy.  The interim patient states feels much better combination of doing therapy for the neck and back and arms as well as using testosterone.  Lost 7 pounds more active wife notes a different activity level.  Is requesting refill on Viagra.  Neck is somewhat improved.    History of Present Illness   HPI    The following portions of the patient's history were reviewed and updated as appropriate: allergies, current medications, past family history, past medical history, past social history, past surgical history and problem list.    Review of Systems  Review of Systems   Constitutional: Negative for activity change, appetite change, fatigue and unexpected weight change.   HENT: Negative for trouble swallowing and voice change.    Eyes: Negative for redness and visual disturbance.   Respiratory: Negative for cough and wheezing.    Cardiovascular: Negative for chest pain and palpitations.   Gastrointestinal: Negative for abdominal pain, constipation, diarrhea, nausea and vomiting.   Genitourinary: Negative for urgency.   Musculoskeletal: Positive for arthralgias (Improved) and neck pain (Improved). Negative for joint swelling.   Neurological: Negative for syncope and headaches.   Hematological: Negative for adenopathy.   Psychiatric/Behavioral: Positive for dysphoric mood (Improved). Negative for sleep disturbance.       Objective   Physical Exam  Physical Exam  Vitals reviewed.   Constitutional:       Appearance: Normal appearance.   Neck:      Comments: Mild stiffness decreased range of motion.  Get up and go 3 to 5 seconds gait normal  Neurological:      Mental Status: He is alert.   Psychiatric:         Attention and Perception: Attention normal.         Mood and Affect: Mood normal.         Speech: Speech normal.         Behavior: Behavior normal.         Thought Content: Thought content normal.         Cognition and  "Memory: Cognition normal.           Visit Vitals  /82   Pulse 76   Ht 195.6 cm (77\")   Wt (!) 136 kg (300 lb 14.4 oz)   SpO2 96%   BMI 35.68 kg/m²     Body mass index is 35.68 kg/m².  /82   Pulse 76   Ht 195.6 cm (77\")   Wt (!) 136 kg (300 lb 14.4 oz)   SpO2 96%   BMI 35.68 kg/m²       Assessment/Plan   Diagnoses and all orders for this visit:    1. Testosterone insufficiency (Primary)  -     Testosterone (Free & Total), LC / MS; Future  -     sildenafil (VIAGRA) 100 MG tablet; Take 1 tablet by mouth Daily As Needed for Erectile Dysfunction.  Dispense: 30 tablet; Refill: 3    2. Other male erectile dysfunction  -     sildenafil (VIAGRA) 100 MG tablet; Take 1 tablet by mouth Daily As Needed for Erectile Dysfunction.  Dispense: 30 tablet; Refill: 3    Continue discal therapy.  Continue testosterone every 2 weeks.  Refill Viagra as outlined.  Recheck in September with testosterone level prior  "

## 2023-05-30 ENCOUNTER — HOSPITAL ENCOUNTER (OUTPATIENT)
Dept: PHYSICAL THERAPY | Facility: HOSPITAL | Age: 68
Setting detail: THERAPIES SERIES
Discharge: HOME | End: 2023-05-30
Payer: MEDICARE

## 2023-05-30 DIAGNOSIS — M50.90 CERVICAL DISC DISEASE: Primary | ICD-10-CM

## 2023-05-30 PROCEDURE — 97140 MANUAL THERAPY 1/> REGIONS: CPT

## 2023-05-30 PROCEDURE — 97110 THERAPEUTIC EXERCISES: CPT

## 2023-05-30 NOTE — THERAPY TREATMENT NOTE
Outpatient Physical Therapy Ortho Treatment Note  [facility]     Patient Name: Otf Murray  : 1955  MRN: [id number]  Today's Date: 2023      Visit Date: 2023     Subjective Improvement: 0%  Attendance:   (eval + 6 until 23)  Next MD Visit : TBD  Recert Date:  23        Therapy Diagnosis:  cervical spine pain and ROM deficit    Visit Dx:    ICD-10-CM ICD-9-CM   1. Cervical disc disease  M50.90 722.91       Patient Active Problem List   Diagnosis   • ADHD, predominantly inattentive type   • Cervical spondylosis without myelopathy   • Chronic fatigue   • Chronic left-sided low back pain without sciatica   • Gastroesophageal reflux disease without esophagitis   • History of tobacco abuse   • History of total left knee replacement   • Primary osteoarthritis of both knees   • Recurrent major depressive disorder, in partial remission (HCC)   • Seasonal allergic rhinitis due to pollen   • History of colon polyps   • Morbid (severe) obesity due to excess calories   • Medically complex patient   • Testosterone insufficiency   • Primary hypertension   • Other hyperlipidemia        Past Medical History:   Diagnosis Date   • Acute gastritis without bleeding    • Alcohol abuse counseling and surveillance of alcoholic    • Allergic rhinitis    • Anxiety state    • Attention deficit disorder of childhood with hyperactivity    • Attention deficit hyperactivity disorder    • Attention deficit hyperactivity disorder, predominantly hyperactive impulsive type    • Attention deficit hyperactivity disorder, predominantly inattentive type    • Backache    • Body mass index (bmi) 31.0-31.9, adult     Body mass index (BMI) 31.0-31.9, adult - BMI 33.5      • Burn of lower leg    • Candidiasis of skin    • Cardiac murmur, unspecified    • Decreased testosterone level    • Depressive disorder     Depressive disorder - acute on chronic      • Dysgraphia    • Edema    • Elbow joint pain    •  Encounter for general adult medical examination with abnormal findings    • Encounter for screening for malignant neoplasm of colon    • Essential hypertension    • Essential tremor    • Ex-smoker    • Fatigue    • Gastro-esophageal reflux disease with esophagitis    • Gastroesophageal reflux disease    • Hyperlipidemia    • Hyperlipoproteinemia    • Impotence of organic origin    • Insomnia    • Intermittent palpitations    • Knee pain     Knee pain - patellofemoral      • Lateral epicondylitis    • Low back pain    • Male erectile disorder    • Male erectile dysfunction, unspecified    • Male hypogonadism    • Neck pain     Neck pain aggravated by recumbency      • Obese     Obese - BMI 33.0      • On long term drug therapy    • Osteoarthrosis     Osteoarthrosis, unspecified whether generalized or localized, involving lower leg      • Other obesity    • Other specified diseases of anus and rectum     Other specified diseases of anus and rectum - rectal pain after c-scope prep      • Overweight    • Pain in left knee    • Pain in right knee    • Plantar fasciitis    • Shoulder pain, right    • Spasm of back muscles    • Tachycardia, unspecified    • Tinnitus    • Tremor, unspecified    • Tubular adenoma of colon    • Unspecified essential hypertension    • Verruca         Past Surgical History:   Procedure Laterality Date   • APPENDECTOMY     • COLONOSCOPY  03/16/2016    One polyp in the sigmoid colon.Resected and retrieved.        • COLONOSCOPY N/A 6/23/2021    Procedure: COLONOSCOPY;  Surgeon: Brenton Ruggiero MD;  Location: [facility] ENDOSCOPY;  Service: Gastroenterology;  Laterality: N/A;   • CYST REMOVAL  10/27/2009     Excision of sebaceous cyst of the forehead, glabellar region.   • ENDOSCOPY  03/16/2016    Mildly severe esophagitis.Gastritis.Normal examined duodenum.Several biopsies obtained in the lower third of the esophagus.    • ENDOSCOPY AND COLONOSCOPY  04/12/2006     Normal colonoscopic examination    •  INCISION AND DRAINAGE ABSCESS  10/24/2012   • INJECTION OF MEDICATION  04/21/2011    Depo Medrol (Methylprednisone) 80mg (1)        • INJECTION OF MEDICATION  02/01/2016    Kenalog (3)        • INJECTION OF MEDICATION  02/19/2016    Toradol (3)    • JOINT REPLACEMENT Bilateral     Knee   • KNEE ARTHROSCOPY  04/02/2009     Medial meniscus tear of left knee   • SCROTUM EXPLORATION  07/02/2002     Excision of epidermal inclusion cyst. base of left scrotum   • SKIN BIOPSY  04/23/2012   • SKIN TAG REMOVAL  04/25/2002    Skin tag, left scrotum         PT Ortho     Row Name 05/30/23 1300       Posture/Observations    Posture/Observations Comments signifcant fwd head posture but improving; Triggers in L upper trap  -          User Key  (r) = Recorded By, (t) = Taken By, (c) = Cosigned By    Initials Name Provider Type    Kristin Garcia PTA Physical Therapist Assistant                             PT Assessment/Plan     Row Name 05/30/23 1300          PT Assessment    Functional Limitations Limitations in functional capacity and performance;Performance in leisure activities  -     Impairments Pain;Range of motion;Joint mobility  -     Assessment Comments increased tightness and trigger in L upper trap this date did improve some with manual but had to use massage gun to assist with manual and improved  tightness and pain; did well with scapular stabilization and postural exercises;  -     Rehab Potential Good  -     Patient/caregiver participated in establishment of treatment plan and goals Yes  -     Patient would benefit from skilled therapy intervention Yes  -        PT Plan    PT Frequency 2x/week  -     Predicted Duration of Therapy Intervention (PT) 6 weeks  -     PT Plan Comments Continue with POC: progress manual as able and scapular postrual strength  -           User Key  (r) = Recorded By, (t) = Taken By, (c) = Cosigned By    Initials Name Provider Type    Kristin Garcia PTA Physical Therapist  "Assistant                   OP Exercises     Row Name 05/30/23 1300             Subjective Comments    Subjective Comments Patient reports that he had a busy weekend and states that he had increased pain this morning and had to take 3 ibprophren this morning; States that he rowed his boat for 10 minutes yesterday; had some increased tightness and pulled across the shoulder when turning his head this morning;  -KH         Subjective Pain    Able to rate subjective pain? yes  -KH      Pre-Treatment Pain Level 5  -KH      Post-Treatment Pain Level 3  -KH         Exercise 1    Exercise Name 1 pro ll UE strength and postural awarness  -      Time 1 10 mins  -KH      Additional Comments level 2; seat 10  -KH         Exercise 2    Exercise Name 2 Manual: see flowsheet  -KH         Exercise 3    Exercise Name 3 B prone extension  -KH      Cueing 3 Verbal;Demo  -KH      Sets 3 2  -KH      Reps 3 10  -KH      Time 3 5\" holds  -KH         Exercise 4    Exercise Name 4 B prone horizontal abduction  -KH      Cueing 4 Verbal;Demo  -KH      Sets 4 2  -KH      Reps 4 10  -KH      Time 4 5\" holds  -KH         Exercise 5    Exercise Name 5 B prone scaption  -KH      Cueing 5 Verbal;Demo  -KH      Sets 5 2  -KH      Reps 5 10  -KH      Time 5 5\" holds  -KH            User Key  (r) = Recorded By, (t) = Taken By, (c) = Cosigned By    Initials Name Provider Type    Kristin Garcia PTA Physical Therapist Assistant                         Manual Rx (last 36 hours)     Manual Treatments     Row Name 05/30/23 1300             Manual Rx 1    Manual Rx 1 Location Cervical Spine  -KH      Manual Rx 1 Type Subocciptal Release  -KH      Manual Rx 1 Duration 4 mins  -KH         Manual Rx 2    Manual Rx 2 Location B upper traps  -KH      Manual Rx 2 Type STM/MFR  -KH      Manual Rx 2 Duration 10 mins  -KH            User Key  (r) = Recorded By, (t) = Taken By, (c) = Cosigned By    Initials Name Provider Type    Kristin Garcia PTA Physical " Therapist Assistant                 PT OP Goals     Row Name 05/30/23 1300          PT Short Term Goals    STG Date to Achieve 05/31/23  -     STG 1 left SB of 10 degrees or more  -     STG 2 bilateral rotation of 75 degrees or more of cervical spine  -     STG 3 resting neck pain of 0/10  -KH        Long Term Goals    LTG Date to Achieve 06/21/23  -     LTG 1 quickdash of 25% or less  -     LTG 2 Subjective reports of RUE not going numb with proloned right rotation  -        Time Calculation    PT Goal Re-Cert Due Date 05/31/23  -           User Key  (r) = Recorded By, (t) = Taken By, (c) = Cosigned By    Initials Name Provider Type    Kristin Garcia PTA Physical Therapist Assistant                               Time Calculation:   Start Time: 1300  Stop Time: 1340  Time Calculation (min): 40 min  Therapy Charges for Today     Code Description Service Date Service Provider Modifiers Qty    90241327101  PT MANUAL THERAPY EA 15 MIN 5/30/2023 Kristin Lamas PTA GP, CQ 1    81728341903  PT THER PROC EA 15 MIN 5/30/2023 Kristin Lamas PTA GP, CQ 2                    Kristin Lamas PTA  5/30/2023

## 2023-06-02 ENCOUNTER — HOSPITAL ENCOUNTER (OUTPATIENT)
Dept: PHYSICAL THERAPY | Facility: HOSPITAL | Age: 68
Setting detail: THERAPIES SERIES
Discharge: HOME | End: 2023-06-02

## 2023-06-02 DIAGNOSIS — M50.90 CERVICAL DISC DISEASE: Primary | ICD-10-CM

## 2023-06-02 PROCEDURE — 97110 THERAPEUTIC EXERCISES: CPT

## 2023-06-02 NOTE — THERAPY TREATMENT NOTE
Outpatient Physical Therapy Ortho Treatment Note  [facility]     Patient Name: Otf Murray  : 1955  MRN: [id number]  Today's Date: 2023      Visit Date: 2023    Subjective Improvement: 50%  Attendance:   (eval + 6 until 23)  Next MD Visit : TBD  Recert Date:  23        Therapy Diagnosis:  cervical spine pain and ROM deficit    Visit Dx:    ICD-10-CM ICD-9-CM   1. Cervical disc disease  M50.90 722.91       Patient Active Problem List   Diagnosis   • ADHD, predominantly inattentive type   • Cervical spondylosis without myelopathy   • Chronic fatigue   • Chronic left-sided low back pain without sciatica   • Gastroesophageal reflux disease without esophagitis   • History of tobacco abuse   • History of total left knee replacement   • Primary osteoarthritis of both knees   • Recurrent major depressive disorder, in partial remission (HCC)   • Seasonal allergic rhinitis due to pollen   • History of colon polyps   • Morbid (severe) obesity due to excess calories   • Medically complex patient   • Testosterone insufficiency   • Primary hypertension   • Other hyperlipidemia        Past Medical History:   Diagnosis Date   • Acute gastritis without bleeding    • Alcohol abuse counseling and surveillance of alcoholic    • Allergic rhinitis    • Anxiety state    • Attention deficit disorder of childhood with hyperactivity    • Attention deficit hyperactivity disorder    • Attention deficit hyperactivity disorder, predominantly hyperactive impulsive type    • Attention deficit hyperactivity disorder, predominantly inattentive type    • Backache    • Body mass index (bmi) 31.0-31.9, adult     Body mass index (BMI) 31.0-31.9, adult - BMI 33.5      • Burn of lower leg    • Candidiasis of skin    • Cardiac murmur, unspecified    • Decreased testosterone level    • Depressive disorder     Depressive disorder - acute on chronic      • Dysgraphia    • Edema    • Elbow joint pain    •  Encounter for general adult medical examination with abnormal findings    • Encounter for screening for malignant neoplasm of colon    • Essential hypertension    • Essential tremor    • Ex-smoker    • Fatigue    • Gastro-esophageal reflux disease with esophagitis    • Gastroesophageal reflux disease    • Hyperlipidemia    • Hyperlipoproteinemia    • Impotence of organic origin    • Insomnia    • Intermittent palpitations    • Knee pain     Knee pain - patellofemoral      • Lateral epicondylitis    • Low back pain    • Male erectile disorder    • Male erectile dysfunction, unspecified    • Male hypogonadism    • Neck pain     Neck pain aggravated by recumbency      • Obese     Obese - BMI 33.0      • On long term drug therapy    • Osteoarthrosis     Osteoarthrosis, unspecified whether generalized or localized, involving lower leg      • Other obesity    • Other specified diseases of anus and rectum     Other specified diseases of anus and rectum - rectal pain after c-scope prep      • Overweight    • Pain in left knee    • Pain in right knee    • Plantar fasciitis    • Shoulder pain, right    • Spasm of back muscles    • Tachycardia, unspecified    • Tinnitus    • Tremor, unspecified    • Tubular adenoma of colon    • Unspecified essential hypertension    • Verruca         Past Surgical History:   Procedure Laterality Date   • APPENDECTOMY     • COLONOSCOPY  03/16/2016    One polyp in the sigmoid colon.Resected and retrieved.        • COLONOSCOPY N/A 6/23/2021    Procedure: COLONOSCOPY;  Surgeon: Brenton Ruggiero MD;  Location: [facility] ENDOSCOPY;  Service: Gastroenterology;  Laterality: N/A;   • CYST REMOVAL  10/27/2009     Excision of sebaceous cyst of the forehead, glabellar region.   • ENDOSCOPY  03/16/2016    Mildly severe esophagitis.Gastritis.Normal examined duodenum.Several biopsies obtained in the lower third of the esophagus.    • ENDOSCOPY AND COLONOSCOPY  04/12/2006     Normal colonoscopic examination    •  INCISION AND DRAINAGE ABSCESS  10/24/2012   • INJECTION OF MEDICATION  04/21/2011    Depo Medrol (Methylprednisone) 80mg (1)        • INJECTION OF MEDICATION  02/01/2016    Kenalog (3)        • INJECTION OF MEDICATION  02/19/2016    Toradol (3)    • JOINT REPLACEMENT Bilateral     Knee   • KNEE ARTHROSCOPY  04/02/2009     Medial meniscus tear of left knee   • SCROTUM EXPLORATION  07/02/2002     Excision of epidermal inclusion cyst. base of left scrotum   • SKIN BIOPSY  04/23/2012   • SKIN TAG REMOVAL  04/25/2002    Skin tag, left scrotum         PT Ortho     Row Name 06/02/23 1100       Posture/Observations    Posture/Observations Comments signifcant fwd head but improving with posture  -          User Key  (r) = Recorded By, (t) = Taken By, (c) = Cosigned By    Initials Name Provider Type    Kristin Garcia PTA Physical Therapist Assistant                             PT Assessment/Plan     Row Name 06/02/23 1100          PT Assessment    Functional Limitations Limitations in functional capacity and performance;Performance in leisure activities  -     Impairments Pain;Range of motion;Joint mobility  -     Assessment Comments no manual this date seocndary to decreased pain; did well with scapular strengthening/postural stabilization exercises; just sore post treatment;  -     Rehab Potential Good  -     Patient/caregiver participated in establishment of treatment plan and goals Yes  -     Patient would benefit from skilled therapy intervention Yes  -        PT Plan    PT Frequency 2x/week  -     Predicted Duration of Therapy Intervention (PT) 6 weeks  -     PT Plan Comments Recheck next; 2 more approved visits; Pt to be on vacation the week of 06/12/23  -           User Key  (r) = Recorded By, (t) = Taken By, (c) = Cosigned By    Initials Name Provider Type    Kristin Garcia PTA Physical Therapist Assistant                   OP Exercises     Row Name 06/02/23 1100             Subjective Comments  "   Subjective Comments Numbness and Tingling in the arm is better; Reports that he feels good today and feels that overall he is better; Reports that she is 50% better overall;  -KH         Subjective Pain    Able to rate subjective pain? yes  -KH      Pre-Treatment Pain Level 3  -KH      Post-Treatment Pain Level 3  -KH         Exercise 1    Exercise Name 1 pro ll UE strength and postural awarness  -KH      Time 1 10 mins  -KH      Additional Comments level 4; seat 10  -KH         Exercise 2    Exercise Name 2 T-Band Clocks  -KH      Cueing 2 Verbal  -KH      Sets 2 2  -KH      Reps 2 10  -KH      Time 2 each  -KH      Additional Comments Green  -KH         Exercise 3    Exercise Name 3 T-Band Rows High & Mid  -KH      Cueing 3 Verbal  -KH      Sets 3 2  -KH      Reps 3 10 each  -KH      Additional Comments Green T-Band  -KH         Exercise 4    Exercise Name 4 T-Bands Lat pulls  -KH      Cueing 4 Verbal  -KH      Sets 4 2  -KH      Reps 4 10  -KH      Additional Comments Green T-Band  -KH         Exercise 5    Exercise Name 5 Doorway Stretch  -KH      Cueing 5 Verbal  -KH      Sets 5 3  -KH      Time 5 30\" holds  -KH            User Key  (r) = Recorded By, (t) = Taken By, (c) = Cosigned By    Initials Name Provider Type    Kristin Garcia, PTA Physical Therapist Assistant                              PT OP Goals     Row Name 06/02/23 1100          PT Short Term Goals    STG Date to Achieve 05/31/23  -     STG 1 left SB of 10 degrees or more  -     STG 2 bilateral rotation of 75 degrees or more of cervical spine  -     STG 3 resting neck pain of 0/10  -KH        Long Term Goals    LTG Date to Achieve 06/21/23  -KH     LTG 1 quickdash of 25% or less  -     LTG 2 Subjective reports of RUE not going numb with proloned right rotation  -        Time Calculation    PT Goal Re-Cert Due Date 05/31/23  -           User Key  (r) = Recorded By, (t) = Taken By, (c) = Cosigned By    Initials Name Provider Type    CHELSEA " Kristin Lamas PTA Physical Therapist Assistant                Therapy Education  Education Details: T-Band High & Mid Rows; lat pulls and clocks  Given: HEP  Program: New  How Provided: Verbal, Demonstration, Written  Provided to: Patient  Level of Understanding: Teach back education performed, Verbalized, Demonstrated              Time Calculation:   Start Time: 1145  Stop Time: 1223  Time Calculation (min): 38 min  Therapy Charges for Today     Code Description Service Date Service Provider Modifiers Qty    [id number] HC PT THER PROC EA 15 MIN 6/2/2023 Kristin Lamas PTA GP, CQ 3                    Kristin Lamas PTA  6/2/2023

## 2023-06-05 DIAGNOSIS — E34.9 TESTOSTERONE INSUFFICIENCY: Chronic | ICD-10-CM

## 2023-06-05 DIAGNOSIS — R53.83 OTHER FATIGUE: ICD-10-CM

## 2023-06-05 RX ORDER — TESTOSTERONE CYPIONATE 200 MG/ML
200 VIAL (ML) INTRAMUSCULAR
Qty: 5 ML | Refills: 0 | Status: SHIPPED | OUTPATIENT
Start: 2023-06-05

## 2023-06-07 ENCOUNTER — CLINICAL SUPPORT (OUTPATIENT)
Dept: FAMILY MEDICINE CLINIC | Facility: CLINIC | Age: 68
End: 2023-06-07
Payer: MEDICARE

## 2023-06-07 RX ADMIN — TESTOSTERONE CYPIONATE 200 MG: 200 INJECTION, SOLUTION INTRAMUSCULAR at 13:26

## 2023-07-29 DIAGNOSIS — R53.83 OTHER FATIGUE: ICD-10-CM

## 2023-07-29 DIAGNOSIS — E34.9 TESTOSTERONE INSUFFICIENCY: Chronic | ICD-10-CM

## 2023-07-31 RX ORDER — TESTOSTERONE CYPIONATE 200 MG/ML
INJECTION, SOLUTION INTRAMUSCULAR
Qty: 2 ML | OUTPATIENT
Start: 2023-07-31

## 2023-08-02 ENCOUNTER — CLINICAL SUPPORT (OUTPATIENT)
Dept: FAMILY MEDICINE CLINIC | Facility: CLINIC | Age: 68
End: 2023-08-02
Payer: MEDICARE

## 2023-08-02 DIAGNOSIS — E34.9 TESTOSTERONE INSUFFICIENCY: Chronic | ICD-10-CM

## 2023-08-02 DIAGNOSIS — R53.83 OTHER FATIGUE: ICD-10-CM

## 2023-08-02 RX ORDER — TESTOSTERONE CYPIONATE 200 MG/ML
200 VIAL (ML) INTRAMUSCULAR
Qty: 5 ML | Refills: 0 | Status: SHIPPED | OUTPATIENT
Start: 2023-08-02

## 2023-08-02 RX ORDER — TESTOSTERONE CYPIONATE 200 MG/ML
200 VIAL (ML) INTRAMUSCULAR
Qty: 5 ML | Refills: 0 | Status: CANCELLED | OUTPATIENT
Start: 2023-08-02

## 2023-08-02 RX ADMIN — TESTOSTERONE CYPIONATE 200 MG: 200 INJECTION, SOLUTION INTRAMUSCULAR at 15:10

## 2023-08-16 ENCOUNTER — CLINICAL SUPPORT (OUTPATIENT)
Dept: FAMILY MEDICINE CLINIC | Facility: CLINIC | Age: 68
End: 2023-08-16
Payer: MEDICARE

## 2023-08-16 RX ADMIN — TESTOSTERONE CYPIONATE 200 MG: 200 INJECTION, SOLUTION INTRAMUSCULAR at 14:23

## 2023-08-17 ENCOUNTER — OFFICE VISIT (OUTPATIENT)
Dept: OTOLARYNGOLOGY | Facility: CLINIC | Age: 68
End: 2023-08-17
Payer: MEDICARE

## 2023-08-17 ENCOUNTER — CLINICAL SUPPORT (OUTPATIENT)
Dept: AUDIOLOGY | Facility: CLINIC | Age: 68
End: 2023-08-17
Payer: MEDICARE

## 2023-08-17 VITALS — HEART RATE: 72 BPM | HEIGHT: 77 IN | OXYGEN SATURATION: 96 % | BODY MASS INDEX: 36.2 KG/M2

## 2023-08-17 DIAGNOSIS — J30.89 NON-SEASONAL ALLERGIC RHINITIS DUE TO OTHER ALLERGIC TRIGGER: ICD-10-CM

## 2023-08-17 DIAGNOSIS — H90.3 SENSORINEURAL HEARING LOSS (SNHL) OF BOTH EARS: Primary | ICD-10-CM

## 2023-08-17 DIAGNOSIS — H90.3 SENSORINEURAL HEARING LOSS (SNHL) OF BOTH EARS: ICD-10-CM

## 2023-08-17 DIAGNOSIS — J30.0 VASOMOTOR RHINITIS: ICD-10-CM

## 2023-08-17 DIAGNOSIS — H93.13 TINNITUS OF BOTH EARS: ICD-10-CM

## 2023-08-17 DIAGNOSIS — H93.13 TINNITUS OF BOTH EARS: Primary | ICD-10-CM

## 2023-08-17 PROCEDURE — 92557 COMPREHENSIVE HEARING TEST: CPT | Performed by: AUDIOLOGIST

## 2023-08-17 PROCEDURE — 92567 TYMPANOMETRY: CPT | Performed by: AUDIOLOGIST

## 2023-08-17 RX ORDER — NYSTATIN AND TRIAMCINOLONE ACETONIDE 100000; 1 [USP'U]/G; MG/G
OINTMENT TOPICAL
COMMUNITY
Start: 2023-08-09

## 2023-08-17 RX ORDER — IPRATROPIUM BROMIDE 42 UG/1
2 SPRAY, METERED NASAL 3 TIMES DAILY
Qty: 15 ML | Refills: 5 | Status: SHIPPED | OUTPATIENT
Start: 2023-08-17

## 2023-08-17 NOTE — PROGRESS NOTES
STANDARD AUDIOMETRIC EVALUATION      Name:  Otf Murray  :  1955  Age:  68 y.o.  Date of Evaluation:  2023      HISTORY    Reason for visit:  Otf Murray is seen today for a hearing test at the request of Dr. Lety Luna.  Patient reports he has problems hearing people, especially when he is not facing the person talking to him.  He states he hears tinnitus which he thinks is worse in the right ear.       EVALUATION    See Audiogram    RESULTS        Otoscopy and Tympanometry 226 Hz :  Right Ear:  Otoscopy:  Clear ear canal          Tympanometry:  Middle ear function within normal limits    Left Ear:   Otoscopy:  Clear ear canal        Tympanometry:  Middle ear function within normal limits    Test technique:  Standard Audiometry     Pure Tone Audiometry:   Patient responded to pure tones at 10-50 dB for 250-8000 Hz in right ear, and at 10-65 dB for 250-8000 Hz in left ear.       Speech Audiometry:        Right Ear:  Speech Reception Threshold (SRT) was obtained at 10 dBHL                 Speech Discrimination scores were 100% in quiet when words were presented at 50 dBHL       Left Ear:  Speech Reception Threshold (SRT) was obtained at 10 dBHL                 Speech Discrimination scores were 100% in quiet when words were presented at 50 dBHL    Reliability:   good    IMPRESSIONS:  1.  Tympanometry results are consistent with Middle ear function within normal limits in both ears.  2.  Pure tone results are consistent with within normal limits to moderate sloping, high frequency sensorineural hearing loss for both ears.       RECOMMENDATIONS:  Patient is seeing the Ear Nose and Throat physician immediately following this examination.  It was a pleasure seeing Otf Murray in Audiology today.  We would be happy to do further testing or discuss these test as necessary.          This document has been electronically signed by Awilda Davis MS CCC-RADHA on 2023 12:56 CDT        Awilda Davis MS [facility]  Licensed Audiologist

## 2023-08-17 NOTE — PROGRESS NOTES
Follow up Regarding: AR, PMR, hearing loss/tinnitus    Assessment and Plan:  68-year-old male with allergic rhinitis, vasomotor rhinitis, tinnitus, and bilateral moderate sensorineural hearing loss    -Currently he is having good hearing function throughout most of the day, if his hearing were to progressively worsen he currently has excellent speech discrimination would likely be a good candidate for hearing aid caged  -We discussed distraction techniques for tinnitus  -Continue budesonide irrigations twice daily, Atrovent 3 times daily, Astelin as needed for breakthrough rhinorrhea or sneezing  -Follow-up in 5 to 6 months for reevaluation    History of present illness/Interval history:    Mr. Murray is a 68-year-old male returning today for reevaluation of vasomotor rhinitis, allergic rhinitis, and tinnitus.  He states that his tinnitus is unchanged and he does have some difficulty hearing especially his daughter or his wife at times.  He denies ear infections or history of ear surgery.  He notes that he has more trouble with his vasomotor rhinitis at nighttime but has good benefit from the Atrovent, which he says he needs more of.  He does do his twice daily budesonide irrigations and as needed Astelin for breakthrough sneezing with good benefit.  He is currently happy with his current state of nasal function.    Physical Exam:  General: NAD, awake and alert  Head: normocephalic, atraumatic  Eyes: EOMI, sclerae white, conjunctivae pink  Ears: pinnae intact without masses or lesions              Right: TM intact without injection or effusion              Left: TM intact without injection or effusion  Nose: external straight without deformity              Mucosa pink and moist, mildly edematous, improved from prior. No polyps seen. No purulence.              Septum: Grossly midline with a mid septal buckle causing a left septal spur that is not obstructive              Turbinates: Not hypertrophied  OC/OP: mucosa  moist and pink, no masses or lesions, tongue is midline and mobile. Tonsils 1+ without exudate. Uvula single and elevates symmetrically.  Mallampati 3 Espinoza class III  Neuro: no focal deficits    Vital Signs   Vitals:    08/17/23 1057   Pulse: 72   SpO2: 96%     Results Review:  Previous clinic visit from 7/6/2023 reviewed today and demonstrates at that time allergic rhinitis and vasomotor rhinitis with patient complaint of subjective hearing change and tinnitus recommendations at that time were to add saline to his Astelin and continue twice daily budesonide irrigations, continue Atrovent 3 times daily.  Follow-up with an audiogram to assess his hearing complaints.  Audiogram and tympanometry from 8/17/2023 reviewed today and demonstrates: Type a tympanograms bilaterally.  SRT 10 dB bilaterally with 100% discrimination bilaterally.  Pure tones demonstrate sloping to moderate high-frequency sensorineural hearing loss bilaterally.    Histories:  Allergies   Allergen Reactions    Ceftriaxone Cough    Lisinopril Cough       Prior to Admission medications    Medication Sig Start Date End Date Taking? Authorizing Provider   amLODIPine (NORVASC) 5 MG tablet Take 1 tablet by mouth Daily. Taking 10 mg daily    Rj Issa MD   azelastine (ASTEPRO) 0.15 % solution nasal spray 1 spray into the nostril(s) as directed by provider 2 (Two) Times a Day. 3/3/23   Idris Arcos MD   buPROPion XL (WELLBUTRIN XL) 300 MG 24 hr tablet Take 1 tablet by mouth Daily.  Patient taking differently: Take 1 tablet by mouth Daily. Takes 150 mg daily 3/24/17   Ray Ureña MD   diclofenac sodium (VOTAREN XR) 100 MG 24 hr tablet Take 1 tablet by mouth Daily.    Rj Issa MD   esomeprazole (nexIUM) 40 MG capsule TAKE ONE CAPSULE BY MOUTH DAILY 7/7/23   Idris Arcos MD   Glucosamine-Chondroit-Vit C-Mn (GLUCOSAMINE CHONDR 1500 COMPLX PO) Take 2 tablets by mouth Daily.    Rj Issa MD   ipratropium (ATROVENT)  0.06 % nasal spray 2 sprays into the nostril(s) as directed by provider 3 (Three) Times a Day. 4/6/23   Lety Luna MD   losartan-hydrochlorothiazide (HYZAAR) 100-12.5 MG per tablet Take 1 tablet by mouth Daily.    Rj Issa MD   polyethylene glycol (MIRALAX) 17 g packet Take 17 g by mouth Daily. With 12 oz water daily 3/2/23   Idris Arcos MD   primidone (MYSOLINE) 50 MG tablet 1 tablet. Taking 100 mg per day 12/20/20   Rj Issa MD   sildenafil (VIAGRA) 100 MG tablet Take 1 tablet by mouth Daily As Needed for Erectile Dysfunction. 5/25/23   Idris Arcos MD   simvastatin (ZOCOR) 20 MG tablet Take 1 tablet by mouth.    Rj Issa MD   Testosterone Cypionate 200 MG/ML solution Inject 1 mL as directed Every 14 (Fourteen) Days. 8/2/23   Idris Arcos MD   Wheat Dextrin (Benefiber) powder Use per direction daily for bowels till seen again 3/2/23   Idris Arcos MD       Past Medical History:   Diagnosis Date    Acute gastritis without bleeding     Alcohol abuse counseling and surveillance of alcoholic     Allergic rhinitis     Anxiety state     Attention deficit disorder of childhood with hyperactivity     Attention deficit hyperactivity disorder     Attention deficit hyperactivity disorder, predominantly hyperactive impulsive type     Attention deficit hyperactivity disorder, predominantly inattentive type     Backache     Body mass index (bmi) 31.0-31.9, adult     Body mass index (BMI) 31.0-31.9, adult - BMI 33.5       Burn of lower leg     Candidiasis of skin     Cardiac murmur, unspecified     Decreased testosterone level     Depressive disorder     Depressive disorder - acute on chronic       Dysgraphia     Edema     Elbow joint pain     Encounter for general adult medical examination with abnormal findings     Encounter for screening for malignant neoplasm of colon     Essential hypertension     Essential tremor     Ex-smoker     Fatigue     Gastro-esophageal reflux  disease with esophagitis     Gastroesophageal reflux disease     Hyperlipidemia     Hyperlipoproteinemia     Impotence of organic origin     Insomnia     Intermittent palpitations     Knee pain     Knee pain - patellofemoral       Lateral epicondylitis     Low back pain     Male erectile disorder     Male erectile dysfunction, unspecified     Male hypogonadism     Neck pain     Neck pain aggravated by recumbency       Obese     Obese - BMI 33.0       On long term drug therapy     Osteoarthrosis     Osteoarthrosis, unspecified whether generalized or localized, involving lower leg       Other obesity     Other specified diseases of anus and rectum     Other specified diseases of anus and rectum - rectal pain after c-scope prep       Overweight     Pain in left knee     Pain in right knee     Plantar fasciitis     Shoulder pain, right     Spasm of back muscles     Tachycardia, unspecified     Tinnitus     Tremor, unspecified     Tubular adenoma of colon     Unspecified essential hypertension     Verruca        Past Surgical History:   Procedure Laterality Date    APPENDECTOMY      COLONOSCOPY  03/16/2016    One polyp in the sigmoid colon.Resected and retrieved.         COLONOSCOPY N/A 6/23/2021    Procedure: COLONOSCOPY;  Surgeon: Brenton Ruggiero MD;  Location: [facility] ENDOSCOPY;  Service: Gastroenterology;  Laterality: N/A;    CYST REMOVAL  10/27/2009     Excision of sebaceous cyst of the forehead, glabellar region.    ENDOSCOPY  03/16/2016    Mildly severe esophagitis.Gastritis.Normal examined duodenum.Several biopsies obtained in the lower third of the esophagus.     ENDOSCOPY AND COLONOSCOPY  04/12/2006     Normal colonoscopic examination     INCISION AND DRAINAGE ABSCESS  10/24/2012    INJECTION OF MEDICATION  04/21/2011    Depo Medrol (Methylprednisone) 80mg (1)         INJECTION OF MEDICATION  02/01/2016    Kenalog (3)         INJECTION OF MEDICATION  02/19/2016    Toradol (3)     JOINT REPLACEMENT Bilateral      Knee    KNEE ARTHROSCOPY  04/02/2009     Medial meniscus tear of left knee    SCROTUM EXPLORATION  07/02/2002     Excision of epidermal inclusion cyst. base of left scrotum    SKIN BIOPSY  04/23/2012    SKIN TAG REMOVAL  04/25/2002    Skin tag, left scrotum        Social History     Socioeconomic History    Marital status:    Tobacco Use    Smoking status: Former     Packs/day: 0.00     Years: 0.00     Pack years: 0.00     Types: Cigarettes     Quit date: 1/1/2020     Years since quitting: 3.6    Smokeless tobacco: Never   Vaping Use    Vaping Use: Never used   Substance and Sexual Activity    Alcohol use: Yes     Comment: daily     Drug use: Never    Sexual activity: Defer       Family History   Problem Relation Age of Onset    Diabetes Mother     Kidney disease Mother     Thyroid disease Mother     Atrial fibrillation Mother     Coronary artery disease Father     Hypertension Father     Hearing loss Father     Cancer Other     Coronary artery disease Other     Diabetes Other     Hearing loss Other     Heart disease Other     Hypertension Other     Osteoarthritis Other     Stroke Other     Thyroid disease Other     Pancreatitis Other        10 point ROS asked and negative unless otherwise noted in HPI.    Immunization status not specifically asked and therefore not specifically documented.    Voice dictation disclaimer:  Voice dictation was used in the creation of this note.  As such, there may be typos or inappropriate words throughout the document.  The document is proofread for typos and errors, however some may not be caught.      This document has been electronically signed by Lety Luna MD on August 17, 2023 08:00 CDT

## 2023-08-31 ENCOUNTER — HOSPITAL ENCOUNTER (EMERGENCY)
Facility: HOSPITAL | Age: 68
Discharge: HOME | End: 2023-08-31
Attending: EMERGENCY MEDICINE
Payer: MEDICARE

## 2023-08-31 ENCOUNTER — CLINICAL SUPPORT (OUTPATIENT)
Dept: FAMILY MEDICINE CLINIC | Facility: CLINIC | Age: 68
End: 2023-08-31
Payer: MEDICARE

## 2023-08-31 ENCOUNTER — LAB (OUTPATIENT)
Dept: LAB | Facility: HOSPITAL | Age: 68
End: 2023-08-31
Payer: MEDICARE

## 2023-08-31 VITALS
RESPIRATION RATE: 18 BRPM | DIASTOLIC BLOOD PRESSURE: 73 MMHG | TEMPERATURE: 97.5 F | BODY MASS INDEX: 35.42 KG/M2 | SYSTOLIC BLOOD PRESSURE: 135 MMHG | HEIGHT: 77 IN | OXYGEN SATURATION: 95 % | WEIGHT: 300 LBS | HEART RATE: 66 BPM

## 2023-08-31 DIAGNOSIS — E34.9 TESTOSTERONE INSUFFICIENCY: Chronic | ICD-10-CM

## 2023-08-31 DIAGNOSIS — M54.16 RADICULOPATHY, LUMBAR REGION: Primary | ICD-10-CM

## 2023-08-31 PROCEDURE — 84402 ASSAY OF FREE TESTOSTERONE: CPT

## 2023-08-31 PROCEDURE — 25010000002 TRIAMCINOLONE PER 10 MG

## 2023-08-31 PROCEDURE — 96372 THER/PROPH/DIAG INJ SC/IM: CPT

## 2023-08-31 PROCEDURE — 36415 COLL VENOUS BLD VENIPUNCTURE: CPT

## 2023-08-31 PROCEDURE — 99282 EMERGENCY DEPT VISIT SF MDM: CPT

## 2023-08-31 PROCEDURE — 84403 ASSAY OF TOTAL TESTOSTERONE: CPT

## 2023-08-31 RX ORDER — TRIAMCINOLONE ACETONIDE 40 MG/ML
60 INJECTION, SUSPENSION INTRA-ARTICULAR; INTRAMUSCULAR ONCE
Status: COMPLETED | OUTPATIENT
Start: 2023-08-31 | End: 2023-08-31

## 2023-08-31 RX ADMIN — TRIAMCINOLONE ACETONIDE 60 MG: 40 INJECTION, SUSPENSION INTRA-ARTICULAR; INTRAMUSCULAR at 19:08

## 2023-08-31 RX ADMIN — TESTOSTERONE CYPIONATE 200 MG: 200 INJECTION, SOLUTION INTRAMUSCULAR at 10:44

## 2023-08-31 NOTE — ED PROVIDER NOTES
Subjective   History of Present Illness  68 year old male patient presents to ER for complaint of pain to outer aspect of right lower leg x2-3 days. He has chronic lower back pain with radiation to outer right thigh. He had an MRI of his lumbar on 8/28 which shows degenerative changes at L5-S1. He denies any falls or injury recently. He was on a long car ride last weekend. He denies swelling or redness. He is rating his pain  7/10 presently, worse with elevation of leg. He is a former smoker, drinks ETOH occasionally, and denies illicit drug use.    Review of Systems   Constitutional: Negative.  Negative for fever.   HENT: Negative.     Eyes: Negative.    Respiratory: Negative.  Negative for shortness of breath.    Cardiovascular: Negative.    Gastrointestinal: Negative.    Endocrine: Negative.    Genitourinary: Negative.    Musculoskeletal:  Positive for back pain and myalgias.        Right leg pain   Skin: Negative.    Allergic/Immunologic: Negative.    Neurological: Negative.    Hematological: Negative.    Psychiatric/Behavioral: Negative.       Past Medical History:   Diagnosis Date    Acute gastritis without bleeding     Alcohol abuse counseling and surveillance of alcoholic     Allergic rhinitis     Anxiety state     Attention deficit disorder of childhood with hyperactivity     Attention deficit hyperactivity disorder     Attention deficit hyperactivity disorder, predominantly hyperactive impulsive type     Attention deficit hyperactivity disorder, predominantly inattentive type     Backache     Body mass index (bmi) 31.0-31.9, adult     Body mass index (BMI) 31.0-31.9, adult - BMI 33.5       Burn of lower leg     Candidiasis of skin     Cardiac murmur, unspecified     Decreased testosterone level     Depressive disorder     Depressive disorder - acute on chronic       Dysgraphia     Edema     Elbow joint pain     Encounter for general adult medical examination with abnormal findings     Encounter for  screening for malignant neoplasm of colon     Essential hypertension     Essential tremor     Ex-smoker     Fatigue     Gastro-esophageal reflux disease with esophagitis     Gastroesophageal reflux disease     Hyperlipidemia     Hyperlipoproteinemia     Impotence of organic origin     Insomnia     Intermittent palpitations     Knee pain     Knee pain - patellofemoral       Lateral epicondylitis     Low back pain     Male erectile disorder     Male erectile dysfunction, unspecified     Male hypogonadism     Neck pain     Neck pain aggravated by recumbency       Obese     Obese - BMI 33.0       On long term drug therapy     Osteoarthrosis     Osteoarthrosis, unspecified whether generalized or localized, involving lower leg       Other obesity     Other specified diseases of anus and rectum     Other specified diseases of anus and rectum - rectal pain after c-scope prep       Overweight     Pain in left knee     Pain in right knee     Plantar fasciitis     Shoulder pain, right     Spasm of back muscles     Tachycardia, unspecified     Tinnitus     Tremor, unspecified     Tubular adenoma of colon     Unspecified essential hypertension     Verruca        Allergies   Allergen Reactions    Ceftriaxone Cough    Lisinopril Cough       Past Surgical History:   Procedure Laterality Date    APPENDECTOMY      COLONOSCOPY  03/16/2016    One polyp in the sigmoid colon.Resected and retrieved.         COLONOSCOPY N/A 6/23/2021    Procedure: COLONOSCOPY;  Surgeon: Brenton Ruggiero MD;  Location: [facility] ENDOSCOPY;  Service: Gastroenterology;  Laterality: N/A;    CYST REMOVAL  10/27/2009     Excision of sebaceous cyst of the forehead, glabellar region.    ENDOSCOPY  03/16/2016    Mildly severe esophagitis.Gastritis.Normal examined duodenum.Several biopsies obtained in the lower third of the esophagus.     ENDOSCOPY AND COLONOSCOPY  04/12/2006     Normal colonoscopic examination     INCISION AND DRAINAGE ABSCESS  10/24/2012    INJECTION  "OF MEDICATION  04/21/2011    Depo Medrol (Methylprednisone) 80mg (1)         INJECTION OF MEDICATION  02/01/2016    Kenalog (3)         INJECTION OF MEDICATION  02/19/2016    Toradol (3)     JOINT REPLACEMENT Bilateral     Knee    KNEE ARTHROSCOPY  04/02/2009     Medial meniscus tear of left knee    SCROTUM EXPLORATION  07/02/2002     Excision of epidermal inclusion cyst. base of left scrotum    SKIN BIOPSY  04/23/2012    SKIN TAG REMOVAL  04/25/2002    Skin tag, left scrotum        Family History   Problem Relation Age of Onset    Diabetes Mother     Kidney disease Mother     Thyroid disease Mother     Atrial fibrillation Mother     Coronary artery disease Father     Hypertension Father     Hearing loss Father     Cancer Other     Coronary artery disease Other     Diabetes Other     Hearing loss Other     Heart disease Other     Hypertension Other     Osteoarthritis Other     Stroke Other     Thyroid disease Other     Pancreatitis Other        Social History     Socioeconomic History    Marital status:    Tobacco Use    Smoking status: Former     Packs/day: 0.00     Years: 0.00     Pack years: 0.00     Types: Cigarettes     Quit date: 1/1/2020     Years since quitting: 3.6    Smokeless tobacco: Never   Vaping Use    Vaping Use: Never used   Substance and Sexual Activity    Alcohol use: Yes     Comment: daily     Drug use: Never    Sexual activity: Defer           Objective   /73 (BP Location: Right arm, Patient Position: Sitting)   Pulse 66   Temp 97.5 °F (36.4 °C) (Oral)   Resp 18   Ht 195.6 cm (77\")   Wt 136 kg (300 lb)   SpO2 95%   BMI 35.57 kg/m²     Physical Exam  Vitals and nursing note reviewed.   Constitutional:       General: He is not in acute distress.     Appearance: Normal appearance. He is not ill-appearing, toxic-appearing or diaphoretic.   HENT:      Head: Normocephalic.      Nose: Nose normal.      Mouth/Throat:      Mouth: Mucous membranes are moist.   Eyes:      Pupils: Pupils " are equal, round, and reactive to light.   Cardiovascular:      Rate and Rhythm: Normal rate and regular rhythm.      Pulses: Normal pulses.      Heart sounds: Normal heart sounds.   Pulmonary:      Effort: Pulmonary effort is normal.      Breath sounds: Normal breath sounds.   Abdominal:      General: Bowel sounds are normal.      Palpations: Abdomen is soft.   Musculoskeletal:         General: No swelling, tenderness, deformity or signs of injury. Normal range of motion.      Cervical back: Normal range of motion.      Comments: Positive straight leg raise to RLE. No swelling, deformity, or erythema appreciated. No tenderness to upper, lower leg or behind knee or calf. CMS intact. No tenderness in lumbar region.   Skin:     General: Skin is warm and dry.      Capillary Refill: Capillary refill takes less than 2 seconds.   Neurological:      Mental Status: He is alert and oriented to person, place, and time.   Psychiatric:         Mood and Affect: Mood normal.         Behavior: Behavior normal.         Thought Content: Thought content normal.         Judgment: Judgment normal.       Procedures           ED Course  ED Course as of 08/31/23 1857   u Aug 31, 2023   1856 Discussed plan for discharge including continuation of his voltaren and steroid injection here in ER. He is to follow up with Jesús Joya about MRI results. He verbalizes understanding and is agreeable with plan. [HS]      ED Course User Index  [HS] Chanel Etienne, YUDY        MRI Cyberknife Lumbar Spine Without Contrast    Result Date: 8/28/2023  1.  Mild spinal stenosis at the L3-4 and L4-5 levels that is probably not significant. 2.  Old left-sided disc protrusion at the L5-S1 level with uncertain impact on the left S1 nerve root.  Some of the distortion  may be due to epidural scarring ; if there are specific symptoms referable to the left S1 nerve root, gadolinium images are recommended to separate disc protrusion from scar. 3.  Old far  lateral disc protrusions at the L5-S1 level with uncertain but doubtful impact on the L5 nerve roots.  Please correlate. [id number]                                    Medical Decision Making  Problems Addressed:  Radiculopathy, lumbar region: complicated acute illness or injury    Risk  Prescription drug management.        Final diagnoses:   Radiculopathy, lumbar region       ED Disposition  ED Disposition       ED Disposition   Discharge    Condition   Stable    Comment   --               Jesús Joya, SHARI  [address] [facility]  [address]  [facility] [id number] [phone]    Call   ER follow up, call to schedule appointment         Medication List        Changed      buPROPion  MG 24 hr tablet  Commonly known as: Wellbutrin XL  Take 1 tablet by mouth Daily.  What changed: additional instructions                 Chanel Etienne, APRN  08/31/23 1856       Chanel Etienne, APRN  08/31/23 1853

## 2023-08-31 NOTE — ED NOTES
This RN took report from [facility]. States pt rode in vehicle for 6 hours with  no breaks and now c/o leg pain and states it is hard to tolerate ambulation. Sent to rule out dvt

## 2023-08-31 NOTE — DISCHARGE INSTRUCTIONS
Home to rest. Continue your home Voltaren. Follow up with your primary care provider to review recent MRI of lumbar spine. Return to ER for worsening symptoms such as shortness of breath or fever.

## 2023-09-10 LAB
TESTOST FREE SERPL-MCNC: 3.3 PG/ML (ref 6.6–18.1)
TESTOST SERPL-MCNC: 258.8 NG/DL (ref 264–916)

## 2023-09-15 ENCOUNTER — OFFICE VISIT (OUTPATIENT)
Dept: FAMILY MEDICINE CLINIC | Facility: CLINIC | Age: 68
End: 2023-09-15
Payer: MEDICARE

## 2023-09-15 VITALS
DIASTOLIC BLOOD PRESSURE: 72 MMHG | OXYGEN SATURATION: 98 % | BODY MASS INDEX: 34.63 KG/M2 | SYSTOLIC BLOOD PRESSURE: 124 MMHG | HEART RATE: 98 BPM | HEIGHT: 77 IN | WEIGHT: 293.3 LBS

## 2023-09-15 DIAGNOSIS — R53.82 CHRONIC FATIGUE: Chronic | ICD-10-CM

## 2023-09-15 DIAGNOSIS — Z78.9 MEDICALLY COMPLEX PATIENT: Chronic | ICD-10-CM

## 2023-09-15 DIAGNOSIS — Z12.5 SCREENING PSA (PROSTATE SPECIFIC ANTIGEN): ICD-10-CM

## 2023-09-15 DIAGNOSIS — E34.9 TESTOSTERONE INSUFFICIENCY: Chronic | ICD-10-CM

## 2023-09-15 DIAGNOSIS — E66.09 CLASS 1 OBESITY DUE TO EXCESS CALORIES WITH SERIOUS COMORBIDITY AND BODY MASS INDEX (BMI) OF 34.0 TO 34.9 IN ADULT: Chronic | ICD-10-CM

## 2023-09-15 DIAGNOSIS — Z87.891 HISTORY OF TOBACCO ABUSE: Chronic | ICD-10-CM

## 2023-09-15 DIAGNOSIS — I10 PRIMARY HYPERTENSION: Primary | Chronic | ICD-10-CM

## 2023-09-15 DIAGNOSIS — E78.49 OTHER HYPERLIPIDEMIA: Chronic | ICD-10-CM

## 2023-09-15 DIAGNOSIS — Z87.891 PERSONAL HISTORY OF TOBACCO USE, PRESENTING HAZARDS TO HEALTH: ICD-10-CM

## 2023-09-15 DIAGNOSIS — Z87.891 HISTORY OF NICOTINE DEPENDENCE: ICD-10-CM

## 2023-09-15 PROBLEM — E66.01 MORBID (SEVERE) OBESITY DUE TO EXCESS CALORIES: Chronic | Status: RESOLVED | Noted: 2023-03-02 | Resolved: 2023-09-15

## 2023-09-15 NOTE — PROGRESS NOTES
Subjective   Otf Murray is a 68 y.o. male.  Reevaluation low testosterone hypertension morbid obesity diagnoses below.  Interim review repeat testosterone levels not too high at the kesha.  Feels well is exercising every day losing weight taking all medications correctly.  Spent some time today counseling on flu vaccine COVID and RSV in the fall.  Is up-to-date on all other.  Medicines reviewed.    History of Present Illness   HPI    The following portions of the patient's history were reviewed and updated as appropriate: allergies, current medications, past family history, past medical history, past social history, past surgical history, and problem list.    Review of Systems  Review of Systems   Constitutional:  Negative for activity change, appetite change, fatigue and unexpected weight change.   HENT:  Negative for trouble swallowing and voice change.    Eyes:  Negative for redness and visual disturbance.   Respiratory:  Negative for cough and wheezing.    Cardiovascular:  Negative for chest pain and palpitations.   Gastrointestinal:  Negative for abdominal pain, constipation, diarrhea, nausea and vomiting.   Genitourinary:  Negative for urgency.   Musculoskeletal:  Positive for arthralgias. Negative for joint swelling.   Neurological:  Negative for syncope and headaches.   Hematological:  Negative for adenopathy.   Psychiatric/Behavioral:  Negative for sleep disturbance.      Objective   Physical Exam  Physical Exam  Constitutional:       Appearance: He is well-developed.   HENT:      Head: Normocephalic.      Nose: Nose normal.   Eyes:      Pupils: Pupils are equal, round, and reactive to light.   Neck:      Thyroid: No thyromegaly.   Cardiovascular:      Rate and Rhythm: Normal rate and regular rhythm.      Heart sounds: Normal heart sounds. No murmur heard.    No friction rub. No gallop.   Pulmonary:      Breath sounds: Normal breath sounds.   Abdominal:      General: There is no distension.       "Palpations: Abdomen is soft. There is no mass.      Tenderness: There is no abdominal tenderness.   Musculoskeletal:         General: Normal range of motion.      Cervical back: Normal range of motion.      Comments: Get up and go 3 to 5 seconds gait normal   Skin:     General: Skin is warm and dry.   Neurological:      Mental Status: He is alert and oriented to person, place, and time.      Deep Tendon Reflexes: Reflexes are normal and symmetric.   Psychiatric:         Attention and Perception: Attention normal.         Mood and Affect: Mood normal.         Speech: Speech normal.         Behavior: Behavior normal.         Thought Content: Thought content normal.         Cognition and Memory: Cognition normal.         Judgment: Judgment normal.     Results for orders placed or performed in visit on 08/31/23   Testosterone (Free & Total), LC / MS    Specimen: Blood   Result Value Ref Range    Testosterone, Total 258.8 (L) 264.0 - 916.0 ng/dL    Testosterone, Free 3.3 (L) 6.6 - 18.1 pg/mL         Visit Vitals  /72   Pulse 98   Ht 195.6 cm (77\")   Wt 133 kg (293 lb 4.8 oz)   SpO2 98%   BMI 34.78 kg/m²     Body mass index is 34.78 kg/m².  /72   Pulse 98   Ht 195.6 cm (77\")   Wt 133 kg (293 lb 4.8 oz)   SpO2 98%   BMI 34.78 kg/m²       Assessment/Plan   Diagnoses and all orders for this visit:    1. Primary hypertension (Primary)  -     Comprehensive Metabolic Panel; Future  -     Magnesium; Future    2. Other hyperlipidemia  -     Lipid Panel With LDL / HDL Ratio; Future    3. Medically complex patient    4. Testosterone insufficiency  -     Testosterone (Free & Total), LC / MS; Future    5. Chronic fatigue  -     CBC (No Diff); Future    6. Screening PSA (prostate specific antigen)  -     PSA Screen; Future    7. Personal history of tobacco use, presenting hazards to health  -     CT chest low dose wo; Future    8. History of nicotine dependence  -     CT chest low dose wo; Future    9. Class 1 obesity " due to excess calories with serious comorbidity and body mass index (BMI) of 34.0 to 34.9 in adult    10. History of tobacco abuse       on wt loss measures and programs  Counseled on getting vaccines in the fall counseled maintaining hydration continue testosterone on schedule.  Counseled on continuing all medicines.  Recheck 6 months all lab prior will also be time for subsequent Medicare

## 2023-09-15 NOTE — PROGRESS NOTES
" Low-Dose Lung Cancer CT Screening Visit    CHIEF COMPLAINT:    Shared Decision Making  I am discussing tobacco cessation today with Otf Murray    SMOKING HISTORY:     Social History     Tobacco Use   Smoking Status Former    Packs/day: 0.50    Years: 50.00    Pack years: 25.00    Types: Cigarettes    Quit date: 1/1/2020    Years since quitting: 3.7   Smokeless Tobacco Never       SUBJECTIVE:     Otf Murray is a former smoker quitting 1 years ago with a  25  pack year history.  he reports no use of alternate forms of tobacco, electronic cigarettes, marijuana or other substances.  Based on the recommendation of the [place] Preventive Services Task Force, this patient is at high risk for lung cancer and a low-dose CT screening scan is recommended.     The patient has had no hemoptysis, unintentional weight loss or increasing shortness of breath. The patient is asymptomatic and has no signs or symptoms of lung cancer.     Together we discussed the potential benefits and potential harms of being screened for lung cancer including the potential for follow up diagnostic testing, risk for over diagnosis, false positive rate and radiation exposure using the [facility] Lung Cancer Screening Shared Decision-Making Tool. A copy of this decision aid resource has been provided in the after visit summary.  We also reviewed the patient's smoking history and counseled him on the importance and health benefits of maintaining cigarette smoking abstinence.      OBJECTIVE:    /72   Pulse 98   Ht 195.6 cm (77\")   Wt 133 kg (293 lb 4.8 oz)   SpO2 98%   BMI 34.78 kg/m²   General: no distress, alert and oriented  Chest: Lung sounds are clear to auscultation, no wheezes, rales or rhonchi, with symmetric air entry. No respiratory distress  Cardiovascular: RRR with no murmur auscultated      Continued Smoking Abstinence discussion:     We discussed that there are a number of resources and interventions to " assist with smoking cessation if needed in the future.   On a scale of zero to ten, the patient rates their motivation to stay quit at a 10 out of 10 today.  The patient is confident that they will never smoke in the future.    Recommendations for continued lung cancer screening:      We discussed the NCCN guidelines for lung cancer screening and the patient verbalized understanding that annual screening is recommended until fifteen years beyond smoking as long as they have no other disease or comorbidity that would prevent them from receiving cancer treatments such as surgery should a lung cancer be detected.  After review of the NCCN guidelines and recommendations for ongoing screening, the patient verbalized understanding of recommendations for follow-up.  The patient has decided to proceed with a Low Dose Lung Cancer Screening CT today.       3minutes face-to-face spent counseling patient on the continued health benefits of having quit tobacco, maintaining smoking abstinence, smoking cessation strategies and resources, as well as the importance of adherence to annual lung cancer low-dose CT screening.

## 2023-09-20 ENCOUNTER — CLINICAL SUPPORT (OUTPATIENT)
Dept: FAMILY MEDICINE CLINIC | Facility: CLINIC | Age: 68
End: 2023-09-20
Payer: MEDICARE

## 2023-09-20 PROCEDURE — 96372 THER/PROPH/DIAG INJ SC/IM: CPT | Performed by: FAMILY MEDICINE

## 2023-09-20 RX ADMIN — TESTOSTERONE CYPIONATE 200 MG: 200 INJECTION, SOLUTION INTRAMUSCULAR at 13:56

## 2023-09-26 ENCOUNTER — HOSPITAL ENCOUNTER (OUTPATIENT)
Dept: CT IMAGING | Facility: HOSPITAL | Age: 68
Discharge: HOME | End: 2023-09-26
Admitting: FAMILY MEDICINE
Payer: MEDICARE

## 2023-09-26 DIAGNOSIS — Z87.891 HISTORY OF NICOTINE DEPENDENCE: ICD-10-CM

## 2023-09-26 DIAGNOSIS — Z87.891 PERSONAL HISTORY OF TOBACCO USE, PRESENTING HAZARDS TO HEALTH: ICD-10-CM

## 2023-09-26 PROCEDURE — 71271 CT THORAX LUNG CANCER SCR C-: CPT

## 2023-09-27 ENCOUNTER — TELEPHONE (OUTPATIENT)
Dept: FAMILY MEDICINE CLINIC | Facility: CLINIC | Age: 68
End: 2023-09-27
Payer: MEDICARE

## 2023-09-27 RX ORDER — AMLODIPINE BESYLATE 5 MG/1
5 TABLET ORAL DAILY
Qty: 90 TABLET | Refills: 3 | Status: SHIPPED | OUTPATIENT
Start: 2023-09-27

## 2023-09-27 RX ORDER — LOSARTAN POTASSIUM AND HYDROCHLOROTHIAZIDE 12.5; 1 MG/1; MG/1
1 TABLET ORAL DAILY
Qty: 90 TABLET | Refills: 3 | Status: SHIPPED | OUTPATIENT
Start: 2023-09-27

## (undated) DEVICE — TRAP SXN POLYP QUICKCATCH LF

## (undated) DEVICE — Device: Brand: DISPOSABLE ELECTROSURGICAL SNARE

## (undated) DEVICE — CANN SMPL SOFTECH BIFLO ETCO2 A/M 7FT